# Patient Record
Sex: FEMALE | Race: BLACK OR AFRICAN AMERICAN | NOT HISPANIC OR LATINO | Employment: FULL TIME | URBAN - METROPOLITAN AREA
[De-identification: names, ages, dates, MRNs, and addresses within clinical notes are randomized per-mention and may not be internally consistent; named-entity substitution may affect disease eponyms.]

---

## 2017-01-18 ENCOUNTER — TELEPHONE (OUTPATIENT)
Dept: INTERNAL MEDICINE | Facility: CLINIC | Age: 47
End: 2017-01-18

## 2017-01-18 ENCOUNTER — OFFICE VISIT (OUTPATIENT)
Dept: INTERNAL MEDICINE | Facility: CLINIC | Age: 47
End: 2017-01-18
Payer: COMMERCIAL

## 2017-01-18 VITALS
SYSTOLIC BLOOD PRESSURE: 160 MMHG | BODY MASS INDEX: 36.43 KG/M2 | DIASTOLIC BLOOD PRESSURE: 98 MMHG | TEMPERATURE: 98 F | WEIGHT: 213.38 LBS | HEIGHT: 64 IN

## 2017-01-18 DIAGNOSIS — S16.1XXA CERVICAL STRAIN, ACUTE, INITIAL ENCOUNTER: Primary | ICD-10-CM

## 2017-01-18 DIAGNOSIS — Z00.00 HEALTH CARE MAINTENANCE: ICD-10-CM

## 2017-01-18 PROCEDURE — 99999 PR PBB SHADOW E&M-EST. PATIENT-LVL III: CPT | Mod: PBBFAC,,, | Performed by: HOSPITALIST

## 2017-01-18 PROCEDURE — 99214 OFFICE O/P EST MOD 30 MIN: CPT | Mod: 25,S$GLB,, | Performed by: HOSPITALIST

## 2017-01-18 PROCEDURE — 3077F SYST BP >= 140 MM HG: CPT | Mod: S$GLB,,, | Performed by: HOSPITALIST

## 2017-01-18 PROCEDURE — 3080F DIAST BP >= 90 MM HG: CPT | Mod: S$GLB,,, | Performed by: HOSPITALIST

## 2017-01-18 PROCEDURE — 96372 THER/PROPH/DIAG INJ SC/IM: CPT | Mod: S$GLB,,, | Performed by: HOSPITALIST

## 2017-01-18 PROCEDURE — 1159F MED LIST DOCD IN RCRD: CPT | Mod: S$GLB,,, | Performed by: HOSPITALIST

## 2017-01-18 RX ORDER — METOPROLOL SUCCINATE 50 MG/1
50 TABLET, EXTENDED RELEASE ORAL DAILY
Qty: 30 TABLET | Refills: 9 | Status: CANCELLED | OUTPATIENT
Start: 2017-01-18

## 2017-01-18 RX ORDER — SERTRALINE HYDROCHLORIDE 50 MG/1
50 TABLET, FILM COATED ORAL DAILY
Qty: 30 TABLET | Refills: 11 | Status: SHIPPED | OUTPATIENT
Start: 2017-01-18 | End: 2017-11-27 | Stop reason: SDUPTHER

## 2017-01-18 RX ORDER — SUMATRIPTAN SUCCINATE 100 MG/1
100 TABLET ORAL
Qty: 6 TABLET | Refills: 12 | Status: SHIPPED | OUTPATIENT
Start: 2017-01-18 | End: 2022-09-29

## 2017-01-18 RX ORDER — CYCLOBENZAPRINE HCL 10 MG
10 TABLET ORAL 3 TIMES DAILY PRN
Status: CANCELLED | OUTPATIENT
Start: 2017-01-18

## 2017-01-18 RX ORDER — AMLODIPINE BESYLATE 10 MG/1
10 TABLET ORAL DAILY
Qty: 30 TABLET | Refills: 9 | Status: SHIPPED | OUTPATIENT
Start: 2017-01-18 | End: 2017-06-20 | Stop reason: SDUPTHER

## 2017-01-18 RX ORDER — KETOROLAC TROMETHAMINE 30 MG/ML
30 INJECTION, SOLUTION INTRAMUSCULAR; INTRAVENOUS
Status: COMPLETED | OUTPATIENT
Start: 2017-01-18 | End: 2017-01-18

## 2017-01-18 RX ORDER — SUMATRIPTAN SUCCINATE 100 MG/1
100 TABLET ORAL
Qty: 6 TABLET | Refills: 12 | Status: CANCELLED | OUTPATIENT
Start: 2017-01-18

## 2017-01-18 RX ORDER — CYCLOBENZAPRINE HCL 10 MG
10 TABLET ORAL 3 TIMES DAILY PRN
COMMUNITY
End: 2017-11-27

## 2017-01-18 RX ORDER — MELOXICAM 7.5 MG/1
7.5 TABLET ORAL 2 TIMES DAILY PRN
Start: 2017-01-18 | End: 2017-01-25

## 2017-01-18 RX ORDER — LOSARTAN POTASSIUM 100 MG/1
100 TABLET ORAL DAILY
Qty: 30 TABLET | Refills: 9 | Status: CANCELLED | OUTPATIENT
Start: 2017-01-18

## 2017-01-18 RX ORDER — METOPROLOL SUCCINATE 50 MG/1
50 TABLET, EXTENDED RELEASE ORAL DAILY
Qty: 30 TABLET | Refills: 9 | Status: SHIPPED | OUTPATIENT
Start: 2017-01-18 | End: 2017-06-20 | Stop reason: SDUPTHER

## 2017-01-18 RX ORDER — FUROSEMIDE 40 MG/1
40 TABLET ORAL DAILY
Qty: 30 TABLET | Refills: 9 | Status: CANCELLED | OUTPATIENT
Start: 2017-01-18

## 2017-01-18 RX ORDER — KETOROLAC TROMETHAMINE 30 MG/ML
15 INJECTION, SOLUTION INTRAMUSCULAR; INTRAVENOUS
Status: DISCONTINUED | OUTPATIENT
Start: 2017-01-18 | End: 2017-01-18

## 2017-01-18 RX ORDER — SERTRALINE HYDROCHLORIDE 50 MG/1
50 TABLET, FILM COATED ORAL DAILY
Qty: 30 TABLET | Refills: 11 | Status: CANCELLED | OUTPATIENT
Start: 2017-01-18

## 2017-01-18 RX ORDER — FUROSEMIDE 40 MG/1
40 TABLET ORAL DAILY
Qty: 30 TABLET | Refills: 9 | Status: SHIPPED | OUTPATIENT
Start: 2017-01-18 | End: 2017-06-20 | Stop reason: SDUPTHER

## 2017-01-18 RX ORDER — LOSARTAN POTASSIUM 100 MG/1
100 TABLET ORAL DAILY
Qty: 30 TABLET | Refills: 9 | Status: SHIPPED | OUTPATIENT
Start: 2017-01-18 | End: 2017-06-20 | Stop reason: SDUPTHER

## 2017-01-18 RX ORDER — KETOROLAC TROMETHAMINE 10 MG/1
10 TABLET, FILM COATED ORAL DAILY PRN
COMMUNITY
End: 2017-01-18 | Stop reason: ALTCHOICE

## 2017-01-18 RX ORDER — KETOROLAC TROMETHAMINE 10 MG/1
10 TABLET, FILM COATED ORAL DAILY PRN
Status: CANCELLED | OUTPATIENT
Start: 2017-01-18

## 2017-01-18 RX ORDER — AMLODIPINE BESYLATE 10 MG/1
10 TABLET ORAL DAILY
Qty: 30 TABLET | Refills: 9 | Status: CANCELLED | OUTPATIENT
Start: 2017-01-18

## 2017-01-18 RX ADMIN — KETOROLAC TROMETHAMINE 30 MG: 30 INJECTION, SOLUTION INTRAMUSCULAR; INTRAVENOUS at 02:01

## 2017-01-18 NOTE — MR AVS SNAPSHOT
[facility] - Internal Medicine  [address]  [place] [address]  Phone: [phone]  Fax: [phone]                  Calos Rios   2017 1:15 PM   Office Visit    Description:  Female : 1970   Provider:  Aliyah Gomez MD   Department:  [facility] - Internal Medicine           Reason for Visit     Neck Pain           Diagnoses this Visit        Comments    Cervical strain, acute, initial encounter    -  Primary     Health care maintenance                To Do List           Goals (5 Years of Data)     None      Follow-Up and Disposition     Return if symptoms worsen or fail to improve.       These Medications        Disp Refills Start End    meloxicam (MOBIC) 7.5 MG tablet   2017    Take 1 tablet (7.5 mg total) by mouth 2 (two) times daily as needed for Pain. - Oral    Pharmacy: [facility]/pharmacy #[id number] - [place], [place] - [address] Ph #: [phone]         [facility] On Call     [facility] On Call Nurse Care Line -  Assistance  Registered nurses in the [facility] On Call Center provide clinical advisement, health education, appointment booking, and other advisory services.  Call for this free service at [phone].             Medications           Message regarding Medications     Verify the changes and/or additions to your medication regime listed below are the same as discussed with your clinician today.  If any of these changes or additions are incorrect, please notify your healthcare provider.        START taking these NEW medications        Refills    meloxicam (MOBIC) 7.5 MG tablet     Sig: Take 1 tablet (7.5 mg total) by mouth 2 (two) times daily as needed for Pain.    Class: No Print    Route: Oral      These medications were administered today        Dose Freq    ketorolac injection 30 mg 30 mg Clinic/HOD 1 time    Sig: Inject 30 mg into the muscle one time.    Class: Normal    Route: Intramuscular      STOP taking these medications   "   ketorolac (TORADOL) 10 mg tablet Take 10 mg by mouth daily as needed for Pain.           Verify that the below list of medications is an accurate representation of the medications you are currently taking.  If none reported, the list may be blank. If incorrect, please contact your healthcare provider. Carry this list with you in case of emergency.           Current Medications     amlodipine (NORVASC) 10 MG tablet Take 1 tablet (10 mg total) by mouth once daily.    cyclobenzaprine (FLEXERIL) 10 MG tablet Take 10 mg by mouth 3 (three) times daily as needed for Muscle spasms.    furosemide (LASIX) 40 MG tablet Take 1 tablet (40 mg total) by mouth once daily.    losartan (COZAAR) 100 MG tablet Take 1 tablet (100 mg total) by mouth once daily.    meloxicam (MOBIC) 7.5 MG tablet Take 1 tablet (7.5 mg total) by mouth 2 (two) times daily as needed for Pain.    metoprolol succinate (TOPROL-XL) 50 MG 24 hr tablet Take 1 tablet (50 mg total) by mouth once daily.    sertraline (ZOLOFT) 50 MG tablet Take 1 tablet (50 mg total) by mouth once daily.    sumatriptan (IMITREX) 100 MG tablet Take 1 tablet (100 mg total) by mouth every 2 (two) hours as needed (no more than 2 doses a day).           Clinical Reference Information           Vital Signs - Last Recorded  Most recent update: 1/18/2017  1:21 PM by Nessa Jameson MA    BP Temp Ht Wt BMI    (!) 160/98 (BP Location: Right arm, Patient Position: Sitting, BP Method: Manual) 98.2 °F (36.8 °C) (Oral) 5' 4" (1.626 m) 96.8 kg (213 lb 6.5 oz) 36.63 kg/m2      Blood Pressure          Most Recent Value    BP  (!)  160/98      Allergies as of 1/18/2017     No Known Allergies      Immunizations Administered on Date of Encounter - 1/18/2017     None      [facility] Sign-Up     Activating your [facility] account is as easy as 1-2-3!     1) Visit [email or web address], select Sign Up Now, enter this activation code and your date of birth, then select Next.  [email or web address]  Expires: 3/4/2017  " 2:01 PM      2) Create a username and password to use when you visit [facility] in the future and select a security question in case you lose your password and select Next.    3) Enter your e-mail address and click Sign Up!    Additional Information  If you have questions, please e-mail [email or web address] or call [phone] to talk to our [facility] staff. Remember, [email or web address] is NOT to be used for urgent needs. For medical emergencies, dial 911.         Instructions    Don't lift over 10 lbs of weight for the next 1 week.   Take flexeril up to 3 times daily if it doesn't make your sleepy. If flexeril makes you sleepy, only take it at night.   Take meloxicam 7.5 mg up to two times daily for the next 7 days.   No alcohol with flexeril.   Don't take ketorolac if you are taking meloxicam.   Please call us in 2 weeks if your pain doesn't improve. Also, call us sooner if you start developing numbness, tingling or sudden weakness of the arm.   You are also getting the flu vaccine today.        Smoking Cessation     If you would like to quit smoking:   You may be eligible for free services if you are a [place] resident and started smoking cigarettes before September 1, 1988.  Call the [employer]oking [employer]ess[employer] ([employer]) toll free at (757) 601-9682 or (100) 782-4370.   Call 2-800-QUIT-NOW if you do not meet the above criteria.

## 2017-01-18 NOTE — PROGRESS NOTES
Subjective:       Patient ID: Calos Rios is a 46 y.o. female.    Chief Complaint: Neck Pain    HPI     Calos Rios is a 47 yo woman with PMHx of HTN and anxiety who presents to the clinic urgently today for left sided neck pain. She was driving after work two days ago when she suddenly developed neck pain and neck strain. It starts behind her left ear and goes down to her neck and the side of the shoulder extending to her upper arm. It is a severe pain 10/10 that was only somewhat relieved by taking flexeril 10 mg twice so far and ketorolac 10 mg po once daily. Her pain is worse with trying to turn her head to the left side. She is otherwise able to turn her head to the right side, bend her head down and lift her head up. 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 She was also helping her daughter with her school project by knitting/doing handiwork at home after work. She was prescribed flexeril 10 mg to be taken 3 times daily along with ketorolac 10 mg every 6 hours by a telemedicine physician yesterday. It has only minimally improved with the medicine thus far; however, she has only taken flexeril at night due to drowsiness and ketorolac only once thus far. She denies any arm tingling, numbness, pain radiating to her lower arm or any anginal chest pain. This does not feel like a cardiac chest pain such that this pain does not worsen with walking up stairs or exertion; she only has pain worsening with motion of the neck or when driving.     She specifically denies any fevers, chills, malaise, photosensitivity, confusion, headaches, chest pain radiating to her arm, chest pressure, shortness of breath, exertional chest pain, syncope, dizziness or trauma.       Review of Systems   Constitutional: Negative for chills, diaphoresis and fever.   HENT: Positive for sore throat. Negative for trouble swallowing.    Eyes: Negative for photophobia, pain  "and visual disturbance.   Respiratory: Negative for shortness of breath.    Cardiovascular: Negative for chest pain.   Gastrointestinal: Negative for abdominal pain, nausea and vomiting.   Genitourinary: Positive for menstrual problem ( heavy cycles). Negative for pelvic pain and vaginal pain.   Musculoskeletal: Positive for myalgias and neck pain. Negative for arthralgias, back pain and joint swelling.   Skin: Negative for rash.   Neurological: Negative for syncope and weakness.        Denies numbness, tingling, weakness of arm   Psychiatric/Behavioral: Negative for confusion.       Objective:       Visit Vitals    BP (!) 160/98 (BP Location: Right arm, Patient Position: Sitting, BP Method: Manual)    Temp 98.2 °F (36.8 °C) (Oral)    Ht 5' 4" (1.626 m)    Wt 96.8 kg (213 lb 6.5 oz)    BMI 36.63 kg/m2       Physical Exam   Constitutional: She is oriented to person, place, and time. She appears well-developed and well-nourished. She appears distressed.   HENT:   Head: Normocephalic and atraumatic.   Mouth/Throat: No oropharyngeal exudate.   Eyes: EOM are normal. Pupils are equal, round, and reactive to light. No scleral icterus.   Neck: Trachea normal and phonation normal. Neck supple. Muscular tenderness present. No spinous process tenderness present. No rigidity. Decreased range of motion present. No edema and no erythema present. No Brudzinski's sign noted. No thyroid mass and no thyromegaly present.   Cardiovascular: Normal rate, regular rhythm, normal heart sounds and intact distal pulses.  Exam reveals no gallop and no friction rub.    No murmur heard.  Pulmonary/Chest: Effort normal and breath sounds normal. No respiratory distress. She has no wheezes. She has no rales. She exhibits no tenderness.   Musculoskeletal: She exhibits tenderness. She exhibits no edema or deformity.   Lymphadenopathy:     She has no cervical adenopathy.   Neurological: She is alert and oriented to person, place, and time. "   Skin: Skin is warm and dry. No rash noted. She is not diaphoretic. No erythema. No pallor.       Assessment:       1. Cervical strain, acute, initial encounter    2. Health care maintenance        Plan:     RTC in 2 weeks if pain continues, then we will consider physical therapy   Ketorolac IM 30 mg in clinic, meloxicam 7.5 mg twice daily prn for 7 days   Flu vaccine today     1. Cervical strain, acute, initial encounter  - musculoskeletal strain of upper back and shoulder due to repetitive mechanical activity of upper body   - weight bearing restrictions of 10 lbs for at least 7 days; use either ice pack or warm compress over left neck and shoulder; OTC bengay or topical pain relievers as needed   - meloxicam (MOBIC) 7.5 MG tablet; Take 1 tablet (7.5 mg total) by mouth 2 (two) times daily as needed for Pain.  - ketorolac injection 30 mg; Inject 30 mg into the muscle one time for severe 10/10 pain today    - not to use ketorolac 10 mg po q 6 hours prescription given at outside clinic when taking meloxicam   - continue using flexeril 10 mg q nightly per outside clinic prescription   - if pain does not improve in 2 weeks, pt to call the clinic for further evaluation and possibly PT/OT     2. Health care maintenance  - flu vaccine today       Discussed with attending Dr. Haider. Staff recommendations to follow.     Aliyah Gomez MD   PGY1 Internal Medicine   [facility]   Pager [phone]

## 2017-01-18 NOTE — TELEPHONE ENCOUNTER
----- Message from Macrinamilo Velázquez sent at 1/18/2017 10:38 AM CST -----  Contact: Self/[phone] cell   Type: Rx    Name of medication(s): all current medications    Is this a refill? New rx?refill    Who prescribed medication?    Pharm[facility] Phone[facility]    Comments:Patient is calling to request a refill on all current medications. She does not know the names of the medications. Pt would like to speak with someone in the office. Please call and advise.    Thank you!

## 2017-01-18 NOTE — LETTER
January 18, 2017    Calos Rios  [address]  [facility] [address]         [facility]  [address]  [facility] [address]  Phone: [phone]  Fax: [phone] January 18, 2017     Patient: Calos Rios   YOB: 1970   Date of Visit: 1/18/2017       To Whom It May Concern:    It is my medical opinion that Calos Rios may return to light duty immediately with the following restrictions: Weight bearing or lifting of objects NO more than 10 lbs.    If you have any questions or concerns, please don't hesitate to call.    Sincerely,        Aliyah Gomez MD

## 2017-01-18 NOTE — PATIENT INSTRUCTIONS
Don't lift over 10 lbs of weight for the next 1 week.   Take flexeril up to 3 times daily if it doesn't make your sleepy. If flexeril makes you sleepy, only take it at night.   Take meloxicam 7.5 mg up to two times daily for the next 7 days.   No alcohol with flexeril.   Don't take ketorolac if you are taking meloxicam.   Please call us in 2 weeks if your pain doesn't improve. Also, call us sooner if you start developing numbness, tingling or sudden weakness of the arm.   You are also getting the flu vaccine today.

## 2017-01-19 NOTE — PROGRESS NOTES
I have personally interviewed and examined this patient and agree with resident's note as below. Mildly elevated BP today but patient having pain. Recommended patient follow up with PCP for BP check.  Gabrielle Haider MD

## 2017-06-20 RX ORDER — METOPROLOL SUCCINATE 50 MG/1
50 TABLET, EXTENDED RELEASE ORAL DAILY
Qty: 30 TABLET | Refills: 9 | Status: SHIPPED | OUTPATIENT
Start: 2017-06-20 | End: 2017-11-27 | Stop reason: SDUPTHER

## 2017-06-20 RX ORDER — LOSARTAN POTASSIUM 100 MG/1
100 TABLET ORAL DAILY
Qty: 30 TABLET | Refills: 9 | Status: SHIPPED | OUTPATIENT
Start: 2017-06-20 | End: 2017-11-27 | Stop reason: SDUPTHER

## 2017-06-20 RX ORDER — FUROSEMIDE 40 MG/1
40 TABLET ORAL DAILY
Qty: 30 TABLET | Refills: 9 | Status: SHIPPED | OUTPATIENT
Start: 2017-06-20 | End: 2017-11-27 | Stop reason: SDUPTHER

## 2017-06-20 RX ORDER — AMLODIPINE BESYLATE 10 MG/1
10 TABLET ORAL DAILY
Qty: 30 TABLET | Refills: 9 | Status: SHIPPED | OUTPATIENT
Start: 2017-06-20 | End: 2017-11-27 | Stop reason: SDUPTHER

## 2017-06-20 NOTE — TELEPHONE ENCOUNTER
----- Message from Dariana Diana sent at 6/20/2017 11:39 AM CDT -----  Contact: Self/ [phone]   Type: Rx    Name of medication(s): amlodipine (NORVASC) 10 MG tablet, furosemide (LASIX) 40 MG tablet, losartan (COZAAR) 100 MG tablet, metoprolol succinate (TOPROL-XL) 50 MG 24 hr tablet, something for anxiety.     Is this a refill? New rx? Refill     Who prescribed medication? Dr. Doll     [facility]arm[facility] Phone[facility]     Comments: pt is calling to have a refill on the medications above. Pt stated the anxiety medication is not working. She would like to have something else sent to the pharmacy. Please call and advise     Thank you

## 2017-10-19 ENCOUNTER — TELEPHONE (OUTPATIENT)
Dept: INTERNAL MEDICINE | Facility: CLINIC | Age: 47
End: 2017-10-19

## 2017-10-19 NOTE — TELEPHONE ENCOUNTER
----- Message from Sarah Maki sent at 10/19/2017  2:02 PM CDT -----  Contact: Pt can be reached at [phone]  Pt is requesting to be seen asap if possible for stomach issue, nausea, stressed.      Thank you!

## 2017-11-27 ENCOUNTER — LAB VISIT (OUTPATIENT)
Dept: LAB | Facility: HOSPITAL | Age: 47
End: 2017-11-27
Attending: INTERNAL MEDICINE
Payer: COMMERCIAL

## 2017-11-27 ENCOUNTER — OFFICE VISIT (OUTPATIENT)
Dept: INTERNAL MEDICINE | Facility: CLINIC | Age: 47
End: 2017-11-27
Payer: COMMERCIAL

## 2017-11-27 VITALS
WEIGHT: 204.38 LBS | OXYGEN SATURATION: 99 % | HEART RATE: 75 BPM | SYSTOLIC BLOOD PRESSURE: 162 MMHG | HEIGHT: 64 IN | DIASTOLIC BLOOD PRESSURE: 110 MMHG | BODY MASS INDEX: 34.89 KG/M2

## 2017-11-27 DIAGNOSIS — K21.9 GASTROESOPHAGEAL REFLUX DISEASE WITHOUT ESOPHAGITIS: ICD-10-CM

## 2017-11-27 DIAGNOSIS — I10 ESSENTIAL HYPERTENSION: Primary | ICD-10-CM

## 2017-11-27 DIAGNOSIS — F41.9 ANXIETY: ICD-10-CM

## 2017-11-27 DIAGNOSIS — I10 ESSENTIAL HYPERTENSION: ICD-10-CM

## 2017-11-27 LAB
ALBUMIN SERPL BCP-MCNC: 3.3 G/DL
ALP SERPL-CCNC: 51 U/L
ALT SERPL W/O P-5'-P-CCNC: 21 U/L
ANION GAP SERPL CALC-SCNC: 8 MMOL/L
AST SERPL-CCNC: 27 U/L
BASOPHILS # BLD AUTO: 0.01 K/UL
BASOPHILS NFR BLD: 0.1 %
BILIRUB SERPL-MCNC: 0.2 MG/DL
BUN SERPL-MCNC: 21 MG/DL
CALCIUM SERPL-MCNC: 9.1 MG/DL
CHLORIDE SERPL-SCNC: 106 MMOL/L
CHOLEST SERPL-MCNC: 150 MG/DL
CHOLEST/HDLC SERPL: 2.9 {RATIO}
CO2 SERPL-SCNC: 25 MMOL/L
CREAT SERPL-MCNC: 1 MG/DL
DIFFERENTIAL METHOD: ABNORMAL
EOSINOPHIL # BLD AUTO: 0.2 K/UL
EOSINOPHIL NFR BLD: 2 %
ERYTHROCYTE [DISTWIDTH] IN BLOOD BY AUTOMATED COUNT: 14.6 %
EST. GFR  (AFRICAN AMERICAN): >60 ML/MIN/1.73 M^2
EST. GFR  (NON AFRICAN AMERICAN): >60 ML/MIN/1.73 M^2
GLUCOSE SERPL-MCNC: 90 MG/DL
HCT VFR BLD AUTO: 36.6 %
HDLC SERPL-MCNC: 52 MG/DL
HDLC SERPL: 34.7 %
HGB BLD-MCNC: 13.2 G/DL
LDLC SERPL CALC-MCNC: 74.6 MG/DL
LYMPHOCYTES # BLD AUTO: 2.9 K/UL
LYMPHOCYTES NFR BLD: 29.7 %
MCH RBC QN AUTO: 34.3 PG
MCHC RBC AUTO-ENTMCNC: 36.1 G/DL
MCV RBC AUTO: 95 FL
MONOCYTES # BLD AUTO: 0.7 K/UL
MONOCYTES NFR BLD: 6.8 %
NEUTROPHILS # BLD AUTO: 5.9 K/UL
NEUTROPHILS NFR BLD: 61.3 %
NONHDLC SERPL-MCNC: 98 MG/DL
PLATELET # BLD AUTO: 58 K/UL
PMV BLD AUTO: 11.9 FL
POTASSIUM SERPL-SCNC: 3.8 MMOL/L
PROT SERPL-MCNC: 7 G/DL
RBC # BLD AUTO: 3.85 M/UL
SODIUM SERPL-SCNC: 139 MMOL/L
TRIGL SERPL-MCNC: 117 MG/DL
WBC # BLD AUTO: 9.58 K/UL

## 2017-11-27 PROCEDURE — 85025 COMPLETE CBC W/AUTO DIFF WBC: CPT

## 2017-11-27 PROCEDURE — 80061 LIPID PANEL: CPT

## 2017-11-27 PROCEDURE — 36415 COLL VENOUS BLD VENIPUNCTURE: CPT

## 2017-11-27 PROCEDURE — 99214 OFFICE O/P EST MOD 30 MIN: CPT | Mod: S$GLB,,, | Performed by: INTERNAL MEDICINE

## 2017-11-27 PROCEDURE — 80053 COMPREHEN METABOLIC PANEL: CPT

## 2017-11-27 PROCEDURE — 99999 PR PBB SHADOW E&M-EST. PATIENT-LVL III: CPT | Mod: PBBFAC,,, | Performed by: INTERNAL MEDICINE

## 2017-11-27 RX ORDER — OMEPRAZOLE 40 MG/1
40 CAPSULE, DELAYED RELEASE ORAL DAILY
Qty: 90 CAPSULE | Refills: 3 | Status: SHIPPED | OUTPATIENT
Start: 2017-11-27 | End: 2018-11-06

## 2017-11-27 RX ORDER — SERTRALINE HYDROCHLORIDE 100 MG/1
100 TABLET, FILM COATED ORAL DAILY
Qty: 90 TABLET | Refills: 3 | Status: SHIPPED | OUTPATIENT
Start: 2017-11-27 | End: 2018-02-21

## 2017-11-27 RX ORDER — LOSARTAN POTASSIUM 100 MG/1
100 TABLET ORAL DAILY
Qty: 90 TABLET | Refills: 3 | Status: SHIPPED | OUTPATIENT
Start: 2017-11-27 | End: 2018-06-21

## 2017-11-27 RX ORDER — AMLODIPINE BESYLATE 10 MG/1
10 TABLET ORAL DAILY
Qty: 90 TABLET | Refills: 3 | Status: SHIPPED | OUTPATIENT
Start: 2017-11-27 | End: 2018-06-21

## 2017-11-27 RX ORDER — FUROSEMIDE 40 MG/1
40 TABLET ORAL DAILY
Qty: 30 TABLET | Refills: 9 | Status: SHIPPED | OUTPATIENT
Start: 2017-11-27 | End: 2018-08-21

## 2017-11-27 RX ORDER — METOPROLOL SUCCINATE 50 MG/1
50 TABLET, EXTENDED RELEASE ORAL DAILY
Qty: 90 TABLET | Refills: 3 | Status: SHIPPED | OUTPATIENT
Start: 2017-11-27 | End: 2018-02-21

## 2017-12-11 NOTE — PROGRESS NOTES
HISTORY OF PRESENT ILLNESS:  She is a 46-year-old female coming in today for   stomach issues, nausea and stress.  1.  She has hypertension.  Blood pressure 162/110.  She is off her Norvasc 10 mg   a day.  She is taking losartan, but she is unsure whether she is on her Toprol      certain medications and has forgotten to them.  She denies any chest pain,   shortness of breath, palpitations, nausea or vomiting.  She has not had any   kidney failure she knows of.  Her weight actually gone down about 9 pounds since   her last blood pressure check in January 2017, however, at that time was 160/98   also.  She has had some better blood pressure control in January.  2.  Anxiety.  She has been under a lot of stress recently, unable to eat as much   because of stress.  She is only eating one meal a day.  She has some burning in   her stomach and acidy taste in her mouth.  She feels overloaded.  She is   denying any suicidal or homicidal thoughts or ideations.  She does discuss some   stressors with me today.  She has been on Zoloft 50 mg and she is thinking it is   not working as well it has in the past.  She has some reflux-type symptoms   also.    PHYSICAL EXAMINATION:  GENERAL:  She is a well-appearing 46-year-old female in no acute distress.  NECK:  Supple.  She has no JVD.  Thyroid is not enlarged.  CARDIOVASCULAR:  S1 and S2, regular rate and rhythm without murmur, gallop or   rub.  ABDOMEN:  Soft, obese, but nontender.  SKIN:  She did not appear cachectic at all.  LOWER EXTREMITIES:  No edema.    ASSESSMENT:  Hypertension, anxiety and reflux.  We are going to increase her   Zoloft from 50 to 100.  We also discussed some biofeedback mechanisms to help   with anxiety.  Exercise would also help.  We are going to put her on omeprazole.    We will refill her blood pressure medication for hypertension.  Hopefully for   some stress increasing the Zoloft will help, but also will bring her back again   in 4-6 weeks to recheck  the blood pressure and go from there.  She denies any   suicidal or homicidal thoughts or ideations today.  We did discuss this and if   she starts having this she is going let me know or come into Emergency Room.          /denny [id number] lynn(s)        MARTHA/GRISELDA  dd: 12/10/2017 22:46:51 (CST)  td: 12/11/2017 07:08:48 (CST)  Doc ID   #[id number]  Job ID #[id number]    CC:

## 2018-01-08 ENCOUNTER — OFFICE VISIT (OUTPATIENT)
Dept: INTERNAL MEDICINE | Facility: CLINIC | Age: 48
End: 2018-01-08
Payer: COMMERCIAL

## 2018-01-08 VITALS
HEIGHT: 64 IN | SYSTOLIC BLOOD PRESSURE: 150 MMHG | HEART RATE: 65 BPM | BODY MASS INDEX: 34.89 KG/M2 | DIASTOLIC BLOOD PRESSURE: 80 MMHG | WEIGHT: 204.38 LBS

## 2018-01-08 DIAGNOSIS — F41.9 ANXIETY: ICD-10-CM

## 2018-01-08 DIAGNOSIS — I10 ESSENTIAL HYPERTENSION: ICD-10-CM

## 2018-01-08 DIAGNOSIS — Z12.31 ENCOUNTER FOR SCREENING MAMMOGRAM FOR BREAST CANCER: Primary | ICD-10-CM

## 2018-01-08 PROCEDURE — 99213 OFFICE O/P EST LOW 20 MIN: CPT | Mod: S$GLB,,, | Performed by: INTERNAL MEDICINE

## 2018-01-08 PROCEDURE — 99999 PR PBB SHADOW E&M-EST. PATIENT-LVL IV: CPT | Mod: PBBFAC,,, | Performed by: INTERNAL MEDICINE

## 2018-01-08 RX ORDER — HYDROCHLOROTHIAZIDE 25 MG/1
25 TABLET ORAL DAILY
Qty: 90 TABLET | Refills: 1 | Status: SHIPPED | OUTPATIENT
Start: 2018-01-08 | End: 2018-08-21

## 2018-01-08 NOTE — PROGRESS NOTES
INTERNAL MEDICINE RESIDENT CLINIC  CLINIC NOTE    Patient Name: Calos Rios  YOB: 1970    PRESENTING HISTORY       History of Present Illness:  Ms. Calos Rios is a 47 y.o. female w/ significant PMHx of HTN, anxiety, GERD who presents to clinic for 4W f/u on HTN and anxiety. Last visit her BP was 160s systolic, this visit is is 150 systolic. Denies CP, SOB, or any new symptoms. For her anxiety last visit we increased zoloft from 50 mg to 100 mg, she says that her appetite is getting better and that she is able to sleep better.    She says that she is under a lot of stress still as her mother is in the hospital after a cervical fusion surgery but that she is coping well.     Review of Systems   Constitutional: Negative for chills, fever and weight loss.   Eyes: Negative for blurred vision.   Respiratory: Negative for cough.    Cardiovascular: Negative for chest pain, palpitations and leg swelling.   Gastrointestinal: Negative for nausea and vomiting.   Genitourinary: Negative for dysuria and hematuria.   Musculoskeletal: Negative for joint pain and myalgias.   Skin: Negative for itching and rash.   Neurological: Negative for dizziness and headaches.   Endo/Heme/Allergies: Does not bruise/bleed easily.         PAST HISTORY:     Past Medical History:   Diagnosis Date    Hypertension     Migraine headache     Obesity        Past Surgical History:   Procedure Laterality Date    TUBAL LIGATION         Family History   Problem Relation Age of Onset    Heart disease Mother     Drug abuse Father     Heart disease Father     Kidney disease Father     Diabetes Maternal Uncle     Heart disease Maternal Grandmother     Cancer Neg Hx        Social History     Social History    Marital status: Single     Spouse name: N/A    Number of children: N/A    Years of education: N/A     Occupational History     Home [employer]     Social History Main Topics    Smoking status: Current Every Day Smoker     "Smokeless tobacco: Never Used      Comment: quit for lent 2013; none past 8 weeks.    Alcohol use Yes      Comment: scocially on weekends    Drug use: No    Sexual activity: Not Asked     Other Topics Concern    None     Social History Narrative    None       MEDICATIONS & ALLERGIES:     Current Outpatient Prescriptions on File Prior to Visit   Medication Sig    amLODIPine (NORVASC) 10 MG tablet Take 1 tablet (10 mg total) by mouth once daily.    furosemide (LASIX) 40 MG tablet Take 1 tablet (40 mg total) by mouth once daily.    losartan (COZAAR) 100 MG tablet Take 1 tablet (100 mg total) by mouth once daily.    metoprolol succinate (TOPROL-XL) 50 MG 24 hr tablet Take 1 tablet (50 mg total) by mouth once daily.    omeprazole (PRILOSEC) 40 MG capsule Take 1 capsule (40 mg total) by mouth once daily.    sertraline (ZOLOFT) 100 MG tablet Take 1 tablet (100 mg total) by mouth once daily.    sumatriptan (IMITREX) 100 MG tablet Take 1 tablet (100 mg total) by mouth every 2 (two) hours as needed (no more than 2 doses a day).     No current facility-administered medications on file prior to visit.        Review of patient's allergies indicates:  No Known Allergies    OBJECTIVE:   Vital Signs:  Vitals:    01/08/18 0828   BP: (!) 150/80   Pulse: 65   Weight: 92.7 kg (204 lb 5.9 oz)   Height: 5' 4" (1.626 m)       No results found for this or any previous visit (from the past 24 hour(s)).      Physical Exam   Constitutional: She appears well-developed.   HENT:   Head: Normocephalic and atraumatic.   Right Ear: External ear normal.   Left Ear: External ear normal.   Nose: Nose normal.   Eyes: EOM are normal. Pupils are equal, round, and reactive to light.   Neck: No tracheal deviation present. No thyromegaly present.   Cardiovascular: Normal rate.    No murmur heard.  Pulmonary/Chest: Effort normal and breath sounds normal.   Abdominal: Soft. There is no tenderness. No hernia.   Musculoskeletal: She exhibits no " edema.   Neurological: She displays normal reflexes. No cranial nerve deficit.   Skin: No rash noted.   Psychiatric: She has a normal mood and affect. Judgment normal.   Vitals reviewed.        ASSESSMENT & PLAN:     Calos was seen today for follow-up.    Diagnoses and all orders for this visit:    Encounter for screening mammogram for breast cancer  -     Mammo Digital Screening Bilat with CAD; Future    Essential hypertension    Anxiety    Other orders  -     hydroCHLOROthiazide (HYDRODIURIL) 25 MG tablet; Take 1 tablet (25 mg total) by mouth once daily.    Plan  1. HTN  - Add HCTZ 25 mg to regimen for better control of BP, f/u in 6-8W for BP check. If well-controlled, combine losartan and HCTZ  - Continue lasix on PRN basis; pt says that she doesn't take it daily because it makes her urinate too much  2. Anxiety  - Continue zoloft 100 mg daily  3. Screening / preventative health  - Mammogram ordered, plan for next week.    RTC 6-8W      Trina Knapp MD  Internal Medicine PGY-1

## 2018-01-11 NOTE — PROGRESS NOTES
I have personally taken the history and examined this patient and agree with the resident's note as stated above with the following thoughts:      bp still not well controled.  Will add some hctz and recheck bp in 2 months-- call with any ill effects.

## 2018-01-23 ENCOUNTER — TELEPHONE (OUTPATIENT)
Dept: RESEARCH | Facility: HOSPITAL | Age: 48
End: 2018-01-23

## 2018-01-24 ENCOUNTER — HOSPITAL ENCOUNTER (EMERGENCY)
Facility: HOSPITAL | Age: 48
Discharge: HOME | End: 2018-01-24
Attending: FAMILY MEDICINE
Payer: COMMERCIAL

## 2018-01-24 VITALS
DIASTOLIC BLOOD PRESSURE: 68 MMHG | BODY MASS INDEX: 33.8 KG/M2 | RESPIRATION RATE: 20 BRPM | HEART RATE: 100 BPM | OXYGEN SATURATION: 99 % | WEIGHT: 198 LBS | HEIGHT: 64 IN | TEMPERATURE: 100 F | SYSTOLIC BLOOD PRESSURE: 134 MMHG

## 2018-01-24 DIAGNOSIS — N39.0 URINARY TRACT INFECTION WITH HEMATURIA, SITE UNSPECIFIED: Primary | ICD-10-CM

## 2018-01-24 DIAGNOSIS — R31.9 URINARY TRACT INFECTION WITH HEMATURIA, SITE UNSPECIFIED: Primary | ICD-10-CM

## 2018-01-24 LAB
B-HCG UR QL: NEGATIVE
BACTERIA #/AREA URNS AUTO: ABNORMAL /HPF
BACTERIA GENITAL QL WET PREP: ABNORMAL
BASOPHILS # BLD AUTO: 0.03 K/UL
BASOPHILS NFR BLD: 0.2 %
BILIRUB UR QL STRIP: NEGATIVE
BUN SERPL-MCNC: 17 MG/DL (ref 6–30)
CHLORIDE SERPL-SCNC: 103 MMOL/L (ref 95–110)
CLARITY UR REFRACT.AUTO: ABNORMAL
CLUE CELLS VAG QL WET PREP: ABNORMAL
COLOR UR AUTO: ABNORMAL
CREAT SERPL-MCNC: 1.4 MG/DL (ref 0.5–1.4)
CTP QC/QA: YES
DIFFERENTIAL METHOD: ABNORMAL
EOSINOPHIL # BLD AUTO: 0 K/UL
EOSINOPHIL NFR BLD: 0.2 %
ERYTHROCYTE [DISTWIDTH] IN BLOOD BY AUTOMATED COUNT: 14.2 %
FILAMENT FUNGI VAG WET PREP-#/AREA: ABNORMAL
GLUCOSE SERPL-MCNC: 102 MG/DL (ref 70–110)
GLUCOSE UR QL STRIP: NEGATIVE
HCT VFR BLD AUTO: 35.3 %
HCT VFR BLD CALC: 35 %PCV (ref 36–54)
HGB BLD-MCNC: 11.6 G/DL
HGB UR QL STRIP: ABNORMAL
HYALINE CASTS UR QL AUTO: 0 /LPF
IMM GRANULOCYTES # BLD AUTO: 0.06 K/UL
IMM GRANULOCYTES NFR BLD AUTO: 0.4 %
KETONES UR QL STRIP: NEGATIVE
LEUKOCYTE ESTERASE UR QL STRIP: ABNORMAL
LYMPHOCYTES # BLD AUTO: 1.5 K/UL
LYMPHOCYTES NFR BLD: 10.6 %
MCH RBC QN AUTO: 30.2 PG
MCHC RBC AUTO-ENTMCNC: 32.9 G/DL
MCV RBC AUTO: 92 FL
MICROSCOPIC COMMENT: ABNORMAL
MONOCYTES # BLD AUTO: 1.2 K/UL
MONOCYTES NFR BLD: 8.9 %
NEUTROPHILS # BLD AUTO: 11 K/UL
NEUTROPHILS NFR BLD: 79.7 %
NITRITE UR QL STRIP: NEGATIVE
NRBC BLD-RTO: 0 /100 WBC
PH UR STRIP: 6 [PH] (ref 5–8)
PLATELET # BLD AUTO: 207 K/UL
PMV BLD AUTO: 10.4 FL
POC IONIZED CALCIUM: 1 MMOL/L (ref 1.06–1.42)
POC TCO2 (MEASURED): 23 MMOL/L (ref 23–29)
POTASSIUM BLD-SCNC: 3.1 MMOL/L (ref 3.5–5.1)
PROT UR QL STRIP: ABNORMAL
RBC # BLD AUTO: 3.84 M/UL
RBC #/AREA URNS AUTO: 51 /HPF (ref 0–4)
SAMPLE: ABNORMAL
SODIUM BLD-SCNC: 136 MMOL/L (ref 136–145)
SP GR UR STRIP: 1.02 (ref 1–1.03)
SPECIMEN SOURCE: ABNORMAL
SQUAMOUS #/AREA URNS AUTO: 2 /HPF
T VAGINALIS GENITAL QL WET PREP: ABNORMAL
URN SPEC COLLECT METH UR: ABNORMAL
UROBILINOGEN UR STRIP-ACNC: 4 EU/DL
WBC # BLD AUTO: 13.86 K/UL
WBC #/AREA URNS AUTO: >100 /HPF (ref 0–5)
WBC #/AREA VAG WET PREP: ABNORMAL
YEAST GENITAL QL WET PREP: ABNORMAL

## 2018-01-24 PROCEDURE — 87186 SC STD MICRODIL/AGAR DIL: CPT

## 2018-01-24 PROCEDURE — 87077 CULTURE AEROBIC IDENTIFY: CPT

## 2018-01-24 PROCEDURE — 81025 URINE PREGNANCY TEST: CPT | Performed by: FAMILY MEDICINE

## 2018-01-24 PROCEDURE — 85025 COMPLETE CBC W/AUTO DIFF WBC: CPT

## 2018-01-24 PROCEDURE — 87210 SMEAR WET MOUNT SALINE/INK: CPT

## 2018-01-24 PROCEDURE — 99283 EMERGENCY DEPT VISIT LOW MDM: CPT | Mod: 25

## 2018-01-24 PROCEDURE — 87088 URINE BACTERIA CULTURE: CPT

## 2018-01-24 PROCEDURE — 99284 EMERGENCY DEPT VISIT MOD MDM: CPT | Mod: ,,, | Performed by: PHYSICIAN ASSISTANT

## 2018-01-24 PROCEDURE — 25000003 PHARM REV CODE 250: Performed by: PHYSICIAN ASSISTANT

## 2018-01-24 PROCEDURE — 87491 CHLMYD TRACH DNA AMP PROBE: CPT

## 2018-01-24 PROCEDURE — 87086 URINE CULTURE/COLONY COUNT: CPT

## 2018-01-24 PROCEDURE — 81001 URINALYSIS AUTO W/SCOPE: CPT

## 2018-01-24 RX ORDER — POTASSIUM CHLORIDE 20 MEQ/1
40 TABLET, EXTENDED RELEASE ORAL ONCE
Status: COMPLETED | OUTPATIENT
Start: 2018-01-24 | End: 2018-01-24

## 2018-01-24 RX ORDER — ACETAMINOPHEN 325 MG/1
650 TABLET ORAL
Status: COMPLETED | OUTPATIENT
Start: 2018-01-24 | End: 2018-01-24

## 2018-01-24 RX ORDER — NITROFURANTOIN 25; 75 MG/1; MG/1
100 CAPSULE ORAL 2 TIMES DAILY
Qty: 10 CAPSULE | Refills: 0 | Status: SHIPPED | OUTPATIENT
Start: 2018-01-24 | End: 2018-01-29

## 2018-01-24 RX ORDER — ONDANSETRON 4 MG/1
4 TABLET, FILM COATED ORAL EVERY 6 HOURS PRN
Qty: 12 TABLET | Refills: 0 | Status: SHIPPED | OUTPATIENT
Start: 2018-01-24 | End: 2019-06-18

## 2018-01-24 RX ADMIN — POTASSIUM CHLORIDE 40 MEQ: 1500 TABLET, EXTENDED RELEASE ORAL at 08:01

## 2018-01-24 RX ADMIN — ACETAMINOPHEN 650 MG: 325 TABLET ORAL at 08:01

## 2018-01-25 LAB
C TRACH DNA SPEC QL NAA+PROBE: NOT DETECTED
N GONORRHOEA DNA SPEC QL NAA+PROBE: NOT DETECTED

## 2018-01-25 NOTE — ED TRIAGE NOTES
Presents to ER with lower abdominal and pelvic pain for 5 days.  Denies vaginal discharge or dysuria.    Pt identifiers checked and correct  LOC: The patient is awake, alert, aware of environment with an appropriate affect. Oriented x3, speaking appropriately  APPEARANCE: Pt resting comfortably, in no acute distress, pt is clean and well groomed, clothing properly fastened  SKIN: Skin warm, dry and intact, normal skin turgor, moist mucus membranes  RESPIRATORY: Airway is open and patent, respirations are spontaneous, even and unlabored, normal effort and rate  MUSCULOSKELETAL: No obvious deformities.

## 2018-01-25 NOTE — ED PROVIDER NOTES
"Encounter Date: 1/24/2018       History     Chief Complaint   Patient presents with    Multiple Medical Complaints     "I'm cold. I'm hot. I have pain below my pelvic area." pt c/o vomiting and diarrhea. want to be checked in case it's "more than the flu"     Patient is a 47-year-old female with past medical history of hypertension, anxiety, GERD, and fibroids who presents to the emergency department due to a worsening history of pelvic pain.  Patient states she's had intermittent pelvic pain for the past 12 years which began getting worse on Saturday.  Patient states that she intermittently has pelvic pain located in the left side of her pelvis and suprapubically.  Patient states she has seen her OB/GYN for this in the past and was diagnosed with fibroids.  Patient also complains of vaginal bleeding that has been spotting and intermittent since 3 weeks ago when she had intercourse.  Patient states vaginal bleeding subsided however returned today.  Patient also complains of subjective fevers and chills patient also complains of nausea and vomiting.  Patient states she has had 6 episodes of vomiting since Saturday.  Patient denies any abdominal pain, chest pain, shortness of breath, or any other complaints.            Review of patient's allergies indicates:  No Known Allergies  Past Medical History:   Diagnosis Date    Hypertension     Migraine headache     Obesity      Past Surgical History:   Procedure Laterality Date    TUBAL LIGATION       Family History   Problem Relation Age of Onset    Heart disease Mother     Drug abuse Father     Heart disease Father     Kidney disease Father     Diabetes Maternal Uncle     Heart disease Maternal Grandmother     Cancer Neg Hx      Social History   Substance Use Topics    Smoking status: Current Every Day Smoker    Smokeless tobacco: Never Used      Comment: quit for lent 2013; none past 8 weeks.    Alcohol use Yes      Comment: scocially on weekends "     Review of Systems   Constitutional: Positive for chills and fever. Negative for activity change, appetite change and fatigue.   HENT: Negative for congestion, drooling, ear discharge, ear pain, facial swelling, hearing loss, mouth sores, sore throat and trouble swallowing.    Eyes: Negative for photophobia, pain and discharge.   Respiratory: Negative for apnea, cough, chest tightness, shortness of breath and wheezing.    Cardiovascular: Negative for chest pain and leg swelling.   Gastrointestinal: Positive for nausea and vomiting. Negative for abdominal distention, abdominal pain, blood in stool, constipation and diarrhea.   Endocrine: Negative for cold intolerance, polydipsia and polyuria.   Genitourinary: Positive for pelvic pain and vaginal bleeding. Negative for decreased urine volume, difficulty urinating, enuresis, frequency and hematuria.   Musculoskeletal: Negative for arthralgias, gait problem, myalgias and neck stiffness.   Skin: Negative for color change, rash and wound.   Allergic/Immunologic: Negative for environmental allergies.   Neurological: Negative for dizziness, tremors, syncope, weakness, light-headedness, numbness and headaches.   Hematological: Negative for adenopathy.   Psychiatric/Behavioral: Negative for agitation.       Physical Exam     Initial Vitals [01/24/18 1724]   BP Pulse Resp Temp SpO2   134/68 100 20 (!) 100.7 °F (38.2 °C) 99 %      MAP       90         Physical Exam    Nursing note and vitals reviewed.  Constitutional: She appears well-developed and well-nourished. She is not diaphoretic. No distress.   HENT:   Head: Normocephalic and atraumatic.   Neck: Normal range of motion. Neck supple.   Cardiovascular: Normal rate, regular rhythm and normal heart sounds. Exam reveals no gallop and no friction rub.    No murmur heard.  Pulmonary/Chest: Breath sounds normal. She has no wheezes. She has no rhonchi. She has no rales.   Abdominal: Soft. Bowel sounds are normal. There is no  tenderness. There is no rebound and no guarding.   Genitourinary: Vagina normal.   Genitourinary Comments: Bleeding noted.  No cervical wall tenderness.   Musculoskeletal: Normal range of motion.   Neurological: She is alert and oriented to person, place, and time.   Skin: Skin is warm and dry. No rash noted. No erythema.   Psychiatric: She has a normal mood and affect.         ED Course   Procedures  Labs Reviewed   VAGINAL SCREEN - Abnormal; Notable for the following:        Result Value    WBC - Vaginal Screen Few (*)     Bacteria - Vaginal Screen Moderate (*)     All other components within normal limits   URINALYSIS, REFLEX TO URINE CULTURE - Abnormal; Notable for the following:     Appearance, UA Cloudy (*)     Protein, UA 2+ (*)     Occult Blood UA 2+ (*)     Leukocytes, UA 3+ (*)     All other components within normal limits    Narrative:     Preferred Collection Type->Urine, Clean Catch   URINALYSIS MICROSCOPIC - Abnormal; Notable for the following:     RBC, UA 51 (*)     WBC, UA >100 (*)     All other components within normal limits    Narrative:     Preferred Collection Type->Urine, Clean Catch   CBC W/ AUTO DIFFERENTIAL - Abnormal; Notable for the following:     WBC 13.86 (*)     RBC 3.84 (*)     Hemoglobin 11.6 (*)     Hematocrit 35.3 (*)     Gran # 11.0 (*)     Immature Grans (Abs) 0.06 (*)     Mono # 1.2 (*)     Gran% 79.7 (*)     Lymph% 10.6 (*)     All other components within normal limits   ISTAT PROCEDURE - Abnormal; Notable for the following:     POC Potassium 3.1 (*)     POC Ionized Calcium 1.00 (*)     POC Hematocrit 35 (*)     All other components within normal limits   C. TRACHOMATIS/N. GONORRHOEAE BY AMP DNA   CULTURE, URINE   POCT URINE PREGNANCY   ISTAT CHEM8                   APC / Resident Notes:   Patient is a 47-year-old female with past medical history of hypertension, anxiety, GERD, and fibroids who presents to the emergency department due to a worsening history of pelvic pain.   Physical exam reveals female in no acute distress.  Heart regular rate and rhythm.  Lungs clear to auscultation bilaterally.  Abdomen soft nontender nondistended.  Vaginal  exam with no cervical wall tenderness. Will obtain lab work.     CBC shows leukocytosis with a white blood cell count of 13.86.  I-STAT unremarkable.  UA shows 3+ leukocytes and greater than 100 WBCs.  Patient will be discharged on antibiotics for UTI.  Plan of treatment discussed with attending physician and he is agreeable.               ED Course      Clinical Impression:   The encounter diagnosis was Urinary tract infection with hematuria, site unspecified.    Disposition:   Disposition: Discharged  Condition: Stable                        Janay Arce PA-C  01/24/18 2053

## 2018-01-27 LAB — BACTERIA UR CULT: NORMAL

## 2018-02-21 ENCOUNTER — OFFICE VISIT (OUTPATIENT)
Dept: INTERNAL MEDICINE | Facility: CLINIC | Age: 48
End: 2018-02-21
Payer: COMMERCIAL

## 2018-02-21 VITALS
WEIGHT: 205.5 LBS | HEART RATE: 64 BPM | HEIGHT: 64 IN | SYSTOLIC BLOOD PRESSURE: 150 MMHG | BODY MASS INDEX: 35.08 KG/M2 | DIASTOLIC BLOOD PRESSURE: 110 MMHG

## 2018-02-21 DIAGNOSIS — F41.9 ANXIETY: ICD-10-CM

## 2018-02-21 DIAGNOSIS — I10 ESSENTIAL HYPERTENSION: Primary | ICD-10-CM

## 2018-02-21 DIAGNOSIS — R39.89 ABNORMAL URINE COLOR: ICD-10-CM

## 2018-02-21 DIAGNOSIS — N30.00 ACUTE CYSTITIS WITHOUT HEMATURIA: ICD-10-CM

## 2018-02-21 LAB
BACTERIA #/AREA URNS AUTO: ABNORMAL /HPF
BILIRUB UR QL STRIP: NEGATIVE
CLARITY UR REFRACT.AUTO: ABNORMAL
COLOR UR AUTO: YELLOW
GLUCOSE UR QL STRIP: NEGATIVE
HGB UR QL STRIP: ABNORMAL
HYALINE CASTS UR QL AUTO: 2 /LPF
KETONES UR QL STRIP: NEGATIVE
LEUKOCYTE ESTERASE UR QL STRIP: ABNORMAL
MICROSCOPIC COMMENT: ABNORMAL
NITRITE UR QL STRIP: NEGATIVE
PH UR STRIP: 6 [PH] (ref 5–8)
PROT UR QL STRIP: NEGATIVE
RBC #/AREA URNS AUTO: 10 /HPF (ref 0–4)
SP GR UR STRIP: 1.01 (ref 1–1.03)
SQUAMOUS #/AREA URNS AUTO: 2 /HPF
URN SPEC COLLECT METH UR: ABNORMAL
UROBILINOGEN UR STRIP-ACNC: NEGATIVE EU/DL
WBC #/AREA URNS AUTO: >100 /HPF (ref 0–5)
WBC CLUMPS UR QL AUTO: ABNORMAL

## 2018-02-21 PROCEDURE — 81001 URINALYSIS AUTO W/SCOPE: CPT

## 2018-02-21 PROCEDURE — 87186 SC STD MICRODIL/AGAR DIL: CPT

## 2018-02-21 PROCEDURE — 3008F BODY MASS INDEX DOCD: CPT | Mod: S$GLB,,, | Performed by: INTERNAL MEDICINE

## 2018-02-21 PROCEDURE — 87088 URINE BACTERIA CULTURE: CPT

## 2018-02-21 PROCEDURE — 87086 URINE CULTURE/COLONY COUNT: CPT

## 2018-02-21 PROCEDURE — 87077 CULTURE AEROBIC IDENTIFY: CPT

## 2018-02-21 PROCEDURE — 99999 PR PBB SHADOW E&M-EST. PATIENT-LVL IV: CPT | Mod: PBBFAC,,, | Performed by: INTERNAL MEDICINE

## 2018-02-21 PROCEDURE — 99214 OFFICE O/P EST MOD 30 MIN: CPT | Mod: S$GLB,,, | Performed by: INTERNAL MEDICINE

## 2018-02-21 RX ORDER — CIPROFLOXACIN 500 MG/1
500 TABLET ORAL 2 TIMES DAILY
Qty: 10 TABLET | Refills: 0 | Status: SHIPPED | OUTPATIENT
Start: 2018-02-21 | End: 2018-02-26

## 2018-02-21 RX ORDER — CARVEDILOL 12.5 MG/1
12.5 TABLET ORAL 2 TIMES DAILY WITH MEALS
Qty: 180 TABLET | Refills: 3 | Status: SHIPPED | OUTPATIENT
Start: 2018-02-21 | End: 2018-06-21

## 2018-02-21 RX ORDER — DULOXETIN HYDROCHLORIDE 60 MG/1
60 CAPSULE, DELAYED RELEASE ORAL DAILY
Qty: 90 CAPSULE | Refills: 3 | Status: SHIPPED | OUTPATIENT
Start: 2018-02-21 | End: 2018-04-04 | Stop reason: SDUPTHER

## 2018-02-21 NOTE — MEDICAL/APP STUDENT
"Progress Note    Patient Name: Calos Rios  MRN: [id number]    Patient information was obtained from patient and ER records.     Subjective     Chief Complaint:   Chief Complaint   Patient presents with    Hospital Follow Up    Urinary Tract Infection       HPI   Calos Rios is a 47 y.o. female who  has a past medical history of Hypertension; Migraine headache; and Obesity., presenting for her follow up.     On 1/24, she went to the ED with a UTI. She was prescribed nitrofurantoin and d/c same day. She took full antibiotics and feels much better but still has cloudy urine. Patient had urine dipstick in clinic which revealed still elevated WBC. Urine will be sent for culture.     1. HTN - patient is on amlodipine 10, furosemide 40, hctz 25, losartan 100, and metoprolol 50. BP today is 150/110. Patient is compliant with all medications.     2. Anxiety - patient was on Celexa and this did not help so was switched to Zoloft 100. She reports this is still not working. She is constantly anxious with work and her daughter's anxiety at home. She has insomnia and is not sleeping.         Review Of Systems:  Review of Systems   Constitutional: Negative for chills and fever.   Cardiovascular: Negative for chest pain and palpitations.   Gastrointestinal: Negative for abdominal pain, constipation, diarrhea, heartburn, nausea and vomiting.   Genitourinary: Negative for dysuria, flank pain, frequency, hematuria and urgency.   Musculoskeletal: Negative for back pain.   Neurological: Negative for dizziness and headaches.   Psychiatric/Behavioral: The patient is nervous/anxious and has insomnia.              Objective     Vitals:    02/21/18 0914   BP: (!) 150/110   BP Location: Right arm   Patient Position: Sitting   BP Method: Large (Manual)   Pulse: 64   Weight: 93.2 kg (205 lb 7.5 oz)   Height: 5' 4" (1.626 m)       Physical Exam:  Physical Exam   Constitutional: She appears well-developed and well-nourished.   Cardiovascular: " Normal rate and regular rhythm.    Pulmonary/Chest: Effort normal and breath sounds normal.   Abdominal: Soft. Bowel sounds are normal. There is tenderness. There is no CVA tenderness.   Tenderness in RLQ. Patient states this is usual and is due to her fibroids.   Psychiatric: Her speech is normal and behavior is normal. Judgment and thought content normal. Her mood appears anxious. Cognition and memory are normal.           ASSESSMENT/PLAN:     Plan    1. UTI  - Urine culture pending.   - Patient is prescribed ciprofloxacin and is told to complete abx and return if sx have not improved.     2. HTN  - replaced metoprolol for carvedilol 12.5.    3. Anxiety  - replaced sertraline for duloxetine 60.  - amb referral to psychiatry.     Return in 6 weeks for f/u.     ______________________________________________________________________  Becky Guevara  Medical Student

## 2018-02-24 LAB — BACTERIA UR CULT: NORMAL

## 2018-02-25 NOTE — PROGRESS NOTES
"Calos Rios is a 47 y.o. female who  has a past medical history of Hypertension; Migraine headache; and Obesity., presenting for her follow up.      On 1/24, she went to the ED with a UTI. She was prescribed nitrofurantoin and d/c same day. She took full antibiotics and feels much better but still has cloudy urine. Patient had urine dipstick in clinic which revealed still elevated WBC. Urine will be sent for culture.      1. HTN - patient is on amlodipine 10, furosemide 40, hctz 25, losartan 100, and metoprolol 50. BP today is 150/110. Patient is compliant with all medications.      2. Anxiety - patient was on Celexa and this did not help so was switched to Zoloft 100. She reports this is still not working. She is constantly anxious with work and her daughter's anxiety at home. She has insomnia and is not sleeping.            Review Of Systems:  Review of Systems   Constitutional: Negative for chills and fever.   Cardiovascular: Negative for chest pain and palpitations.   Gastrointestinal: Negative for abdominal pain, constipation, diarrhea, heartburn, nausea and vomiting.   Genitourinary: Negative for dysuria, flank pain, frequency, hematuria and urgency.   Musculoskeletal: Negative for back pain.   Neurological: Negative for dizziness and headaches.   Psychiatric/Behavioral: The patient is nervous/anxious and has insomnia.                   Objective         BP (!) 150/110 (BP Location: Right arm, Patient Position: Sitting, BP Method: Large (Manual))   Pulse 64   Ht 5' 4" (1.626 m)   Wt 93.2 kg (205 lb 7.5 oz)   BMI 35.27 kg/m²        Physical Exam:  Physical Exam   Constitutional: She appears well-developed and well-nourished.   Cardiovascular: Normal rate and regular rhythm.    Pulmonary/Chest: Effort normal and breath sounds normal.   Abdominal: Soft. Bowel sounds are normal. There is tenderness. There is no CVA tenderness.   Tenderness in RLQ. Patient states this is usual and is due to her fibroids. "   Psychiatric: Her speech is normal and behavior is normal. Judgment and thought content normal. Her mood appears anxious. Cognition and memory are normal.               ASSESSMENT/PLAN:      Plan     1. UTI  - Urine culture pending.   - Patient is prescribed ciprofloxacin and is told to complete abx and return if sx have not improved.      2. HTN  - replaced metoprolol for carvedilol 12.5.     3. Anxiety  - replaced sertraline for duloxetine 60.  - amb referral to psychiatry.      Return in 6 weeks for f/u.

## 2018-03-09 ENCOUNTER — TELEPHONE (OUTPATIENT)
Dept: INTERNAL MEDICINE | Facility: CLINIC | Age: 48
End: 2018-03-09

## 2018-03-09 NOTE — TELEPHONE ENCOUNTER
----- Message from Adria Doll Jr., MD sent at 3/7/2018  9:43 PM CST -----  cipro should cover UTI- lab reviewed

## 2018-03-20 ENCOUNTER — HOSPITAL ENCOUNTER (OUTPATIENT)
Dept: RADIOLOGY | Facility: HOSPITAL | Age: 48
Discharge: HOME | End: 2018-03-20
Attending: INTERNAL MEDICINE
Payer: COMMERCIAL

## 2018-03-20 DIAGNOSIS — Z12.31 ENCOUNTER FOR SCREENING MAMMOGRAM FOR BREAST CANCER: ICD-10-CM

## 2018-03-20 PROCEDURE — 77067 SCR MAMMO BI INCL CAD: CPT | Mod: TC

## 2018-03-20 PROCEDURE — 77067 SCR MAMMO BI INCL CAD: CPT | Mod: 26,,, | Performed by: RADIOLOGY

## 2018-03-20 PROCEDURE — 77063 BREAST TOMOSYNTHESIS BI: CPT | Mod: 26,,, | Performed by: RADIOLOGY

## 2018-04-04 ENCOUNTER — OFFICE VISIT (OUTPATIENT)
Dept: INTERNAL MEDICINE | Facility: CLINIC | Age: 48
End: 2018-04-04
Payer: COMMERCIAL

## 2018-04-04 VITALS
HEIGHT: 64 IN | WEIGHT: 207.25 LBS | BODY MASS INDEX: 35.38 KG/M2 | DIASTOLIC BLOOD PRESSURE: 80 MMHG | SYSTOLIC BLOOD PRESSURE: 112 MMHG

## 2018-04-04 DIAGNOSIS — F41.9 ANXIETY: ICD-10-CM

## 2018-04-04 DIAGNOSIS — I10 ESSENTIAL HYPERTENSION: Primary | ICD-10-CM

## 2018-04-04 DIAGNOSIS — G43.809 OTHER MIGRAINE WITHOUT STATUS MIGRAINOSUS, NOT INTRACTABLE: ICD-10-CM

## 2018-04-04 PROCEDURE — 3074F SYST BP LT 130 MM HG: CPT | Mod: CPTII,S$GLB,, | Performed by: INTERNAL MEDICINE

## 2018-04-04 PROCEDURE — 3079F DIAST BP 80-89 MM HG: CPT | Mod: CPTII,S$GLB,, | Performed by: INTERNAL MEDICINE

## 2018-04-04 PROCEDURE — 99999 PR PBB SHADOW E&M-EST. PATIENT-LVL III: CPT | Mod: PBBFAC,,, | Performed by: INTERNAL MEDICINE

## 2018-04-04 PROCEDURE — 99214 OFFICE O/P EST MOD 30 MIN: CPT | Mod: S$GLB,,, | Performed by: INTERNAL MEDICINE

## 2018-04-04 RX ORDER — DULOXETIN HYDROCHLORIDE 60 MG/1
120 CAPSULE, DELAYED RELEASE ORAL DAILY
Qty: 180 CAPSULE | Refills: 3 | Status: SHIPPED | OUTPATIENT
Start: 2018-04-04 | End: 2018-07-03 | Stop reason: SDUPTHER

## 2018-04-11 NOTE — PROGRESS NOTES
HISTORY OF PRESENT ILLNESS:  Ms. Live is a 47-year-old female coming for a   six-week followup of her ongoing medical problems.  I saw her last in February.    She had essential hypertension, migraine headache and obesity.  She has had   ypertension for a decent amount of time.  Her blood pressure is 150/110 the last   time despite being compliant on amlodipine, Lasix 40, hydrochlorothiazide 25,   losartan 10 and metoprolol.  At last visit, we found that she had a UTI, which   we treated with Cipro.  We also took her off her metoprolol and put her on   carvedilol.  She was having a decent amount of anxiety also.  We took her off of   her Zoloft 100 and started her on duloxetine 60.  Today, her blood pressure is   112/80.  She is no longer having headaches and hypertension has been doing much   better.  Her headaches have resolved.  She continues to have anxiety.  We have   her set up to see Psychiatry, but she has not seen them yet.  She has been on   Cymbalta and thinks this  helps, but just not well enough and she wishes to   possibly increase the dose.  She denies any suicidal or homicidal thoughts or   ideation.  Otherwise, she says she has been feeling pretty well.  She has been   sleeping better.  She has had benign insomnia, but that has helped with her   taking the Cymbalta.  Otherwise, no new complaints.    PHYSICAL EXAMINATION:  GENERAL:  She is well appearing.  VITAL SIGNS:  Blood pressure is 112/80.  Her vital signs are in the chart.  NECK:  Supple.  CHEST:  Clear without wheeze.  CARDIOVASCULAR:  S1 and S2, regular rate and rhythm without murmur, gallop or   rub.  ABDOMEN:  Soft.  LOWER EXTREMITIES:  No edema.  Mood seems good.  She does not seem overly depressed.    ASSESSMENT:  1.  Hypertension, doing well on current medications; continue current   medications.  Low salt diet.  We discussed diet and weight loss.  2.  Depression and anxiety.  Continue Cymbalta.  She is going to follow up with    Psychiatry soon.  3.  Migraine headaches and headache related to blood pressure both resolved.  4.  Insomnia, doing a little bit better.    PLAN:  I will see her back in about six months.                  /denny [id number] lynn(s)        MARTHA/GRISELDA  dd: 04/11/2018 08:03:21 (CDT)  td: 04/11/2018 22:57:34 (CDT)  Doc ID   #[id number]  Job ID #[id number]    CC:

## 2018-04-19 ENCOUNTER — OFFICE VISIT (OUTPATIENT)
Dept: PSYCHIATRY | Facility: CLINIC | Age: 48
End: 2018-04-19
Payer: COMMERCIAL

## 2018-04-19 VITALS
HEIGHT: 64 IN | DIASTOLIC BLOOD PRESSURE: 91 MMHG | SYSTOLIC BLOOD PRESSURE: 126 MMHG | BODY MASS INDEX: 34.28 KG/M2 | WEIGHT: 200.81 LBS | HEART RATE: 78 BPM

## 2018-04-19 DIAGNOSIS — F32.9 REACTIVE DEPRESSION: ICD-10-CM

## 2018-04-19 DIAGNOSIS — F41.9 ANXIETY: ICD-10-CM

## 2018-04-19 DIAGNOSIS — F51.05 INSOMNIA DUE TO MENTAL CONDITION: ICD-10-CM

## 2018-04-19 PROBLEM — F32.A DEPRESSION: Status: ACTIVE | Noted: 2018-04-19

## 2018-04-19 PROCEDURE — 99999 PR PBB SHADOW E&M-EST. PATIENT-LVL III: CPT | Mod: PBBFAC,,, | Performed by: PSYCHIATRY & NEUROLOGY

## 2018-04-19 PROCEDURE — 99204 OFFICE O/P NEW MOD 45 MIN: CPT | Mod: S$GLB,,, | Performed by: PSYCHIATRY & NEUROLOGY

## 2018-04-19 PROCEDURE — 3080F DIAST BP >= 90 MM HG: CPT | Mod: CPTII,S$GLB,, | Performed by: PSYCHIATRY & NEUROLOGY

## 2018-04-19 PROCEDURE — 3074F SYST BP LT 130 MM HG: CPT | Mod: CPTII,S$GLB,, | Performed by: PSYCHIATRY & NEUROLOGY

## 2018-04-19 RX ORDER — DULOXETIN HYDROCHLORIDE 30 MG/1
30 CAPSULE, DELAYED RELEASE ORAL DAILY
Qty: 30 CAPSULE | Refills: 3 | Status: SHIPPED | OUTPATIENT
Start: 2018-04-19 | End: 2018-07-03

## 2018-04-19 RX ORDER — TRAZODONE HYDROCHLORIDE 50 MG/1
100 TABLET ORAL NIGHTLY
Qty: 60 TABLET | Refills: 2 | Status: SHIPPED | OUTPATIENT
Start: 2018-04-19 | End: 2018-08-21

## 2018-04-19 NOTE — PATIENT INSTRUCTIONS
Decrease Cymbalta dose from 120mg to 90mg; take one capsule 30mg + one capsule 60mg = total of 90mg daily    Start Trazodone 50mg nightly, can go up to two tablets (100mg) nightly for insomnia.     Referred for therapy    Return to clinic in 3-4 weeks
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
## 2018-05-01 NOTE — PROGRESS NOTES
I have independently evaluated the patient and have reviewed this note and agree with its contents, including the assessment and plan.     Thelma Wooten MD

## 2018-05-25 ENCOUNTER — HOSPITAL ENCOUNTER (EMERGENCY)
Facility: HOSPITAL | Age: 48
Discharge: HOME | End: 2018-05-25
Attending: EMERGENCY MEDICINE
Payer: COMMERCIAL

## 2018-05-25 VITALS
OXYGEN SATURATION: 99 % | WEIGHT: 200 LBS | HEIGHT: 64 IN | RESPIRATION RATE: 16 BRPM | DIASTOLIC BLOOD PRESSURE: 108 MMHG | BODY MASS INDEX: 34.15 KG/M2 | TEMPERATURE: 99 F | HEART RATE: 90 BPM | SYSTOLIC BLOOD PRESSURE: 163 MMHG

## 2018-05-25 DIAGNOSIS — N30.00 ACUTE CYSTITIS WITHOUT HEMATURIA: Primary | ICD-10-CM

## 2018-05-25 LAB
BACTERIA #/AREA URNS AUTO: ABNORMAL /HPF
BILIRUB UR QL STRIP: NEGATIVE
CLARITY UR REFRACT.AUTO: ABNORMAL
COLOR UR AUTO: YELLOW
GLUCOSE UR QL STRIP: NEGATIVE
HGB UR QL STRIP: ABNORMAL
KETONES UR QL STRIP: NEGATIVE
LEUKOCYTE ESTERASE UR QL STRIP: NEGATIVE
MICROSCOPIC COMMENT: ABNORMAL
NITRITE UR QL STRIP: POSITIVE
PH UR STRIP: 6 [PH] (ref 5–8)
PROT UR QL STRIP: NEGATIVE
RBC #/AREA URNS AUTO: 19 /HPF (ref 0–4)
SP GR UR STRIP: 1.01 (ref 1–1.03)
SQUAMOUS #/AREA URNS AUTO: 7 /HPF
URN SPEC COLLECT METH UR: ABNORMAL
UROBILINOGEN UR STRIP-ACNC: NEGATIVE EU/DL
WBC #/AREA URNS AUTO: 4 /HPF (ref 0–5)

## 2018-05-25 PROCEDURE — 25000003 PHARM REV CODE 250: Performed by: PHYSICIAN ASSISTANT

## 2018-05-25 PROCEDURE — 81001 URINALYSIS AUTO W/SCOPE: CPT

## 2018-05-25 PROCEDURE — 99283 EMERGENCY DEPT VISIT LOW MDM: CPT

## 2018-05-25 PROCEDURE — 99283 EMERGENCY DEPT VISIT LOW MDM: CPT | Mod: ,,, | Performed by: PHYSICIAN ASSISTANT

## 2018-05-25 RX ORDER — CEPHALEXIN 500 MG/1
500 CAPSULE ORAL EVERY 12 HOURS
Qty: 14 CAPSULE | Refills: 0 | Status: SHIPPED | OUTPATIENT
Start: 2018-05-25 | End: 2018-06-01

## 2018-05-25 RX ORDER — CEPHALEXIN 500 MG/1
500 CAPSULE ORAL EVERY 12 HOURS
Qty: 14 CAPSULE | Refills: 0 | Status: SHIPPED | OUTPATIENT
Start: 2018-05-25 | End: 2018-05-25

## 2018-05-25 RX ORDER — ONDANSETRON 4 MG/1
4 TABLET, ORALLY DISINTEGRATING ORAL
Status: COMPLETED | OUTPATIENT
Start: 2018-05-25 | End: 2018-05-25

## 2018-05-25 RX ORDER — IBUPROFEN 600 MG/1
600 TABLET ORAL
Status: COMPLETED | OUTPATIENT
Start: 2018-05-25 | End: 2018-05-25

## 2018-05-25 RX ADMIN — IBUPROFEN 600 MG: 600 TABLET ORAL at 12:05

## 2018-05-25 RX ADMIN — ONDANSETRON 4 MG: 4 TABLET, ORALLY DISINTEGRATING ORAL at 12:05

## 2018-05-25 NOTE — ED PROVIDER NOTES
"Encounter Date: 5/25/2018       History     Chief Complaint   Patient presents with    Back Pain     Pt c/o "back problems".  States "I think I may have a UTI"     47-year-old female with hypertension and history of migraines presents for back pain x6 days.  Patient reports dull constant pain in her left flank radiating to her abdomen.  Nothing palliates or provokes the pain, pain is non positional.  Has not taken any medication.  Reports associated cloudy foul-smelling urine x1 day with nausea and vomiting. Denies dysuria, hematuria, fever, chills, change in bowel movements.  Patient is concerned about urinary tract infection, because she has previously had similar symptoms which led to fever and chills.          Review of patient's allergies indicates:  No Known Allergies  Past Medical History:   Diagnosis Date    Hypertension     Migraine headache     Obesity      Past Surgical History:   Procedure Laterality Date    TUBAL LIGATION       Social History   Substance Use Topics    Smoking status: Current Every Day Smoker    Smokeless tobacco: Never Used      Comment: quit for lent 2013; none past 8 weeks.    Alcohol use Yes      Comment: scocially on weekends     Review of Systems   Constitutional: Negative for chills, fatigue and fever.   Respiratory: Negative for cough and shortness of breath.    Cardiovascular: Negative for chest pain.   Gastrointestinal: Positive for nausea and vomiting. Negative for abdominal pain, blood in stool, constipation and diarrhea.   Endocrine: Positive for polyuria.   Genitourinary: Positive for flank pain and frequency. Negative for decreased urine volume, difficulty urinating, dysuria, genital sores, hematuria, pelvic pain, urgency and vaginal discharge.   Musculoskeletal: Positive for back pain.   Skin: Negative for color change.   Allergic/Immunologic: Negative for immunocompromised state.   Neurological: Negative for light-headedness and headaches.       Physical Exam "     Initial Vitals [05/25/18 1146]   BP Pulse Resp Temp SpO2   (!) 163/108 90 16 99 °F (37.2 °C) 99 %      MAP       126.33         Physical Exam    Nursing note and vitals reviewed.  Constitutional: She appears well-developed and well-nourished. She is not diaphoretic. No distress.   HENT:   Head: Normocephalic and atraumatic.   Eyes: EOM are normal. Pupils are equal, round, and reactive to light.   Neck: Normal range of motion. Neck supple.   Cardiovascular: Normal rate, regular rhythm and normal heart sounds. Exam reveals no gallop and no friction rub.    No murmur heard.  Pulmonary/Chest: Breath sounds normal. No respiratory distress. She has no wheezes. She has no rhonchi. She has no rales. She exhibits no tenderness.   Abdominal: Soft. Bowel sounds are normal. She exhibits no distension and no mass. There is tenderness. There is no rebound and no guarding.   CVA tenderness left flank   Musculoskeletal: Normal range of motion. She exhibits no edema or tenderness.   Neurological: She is alert and oriented to person, place, and time.   Skin: Skin is warm and dry.   Psychiatric: She has a normal mood and affect.         ED Course   Procedures  Labs Reviewed   URINALYSIS, REFLEX TO URINE CULTURE                   APC / Resident Notes:   47-year-old female presents with back pain and cloudy urine.  DdX includes urinary tract infection, muscle strain, nephrolithiasis.  Will check UA, give Zofran and Motrin and reassess.    UA shows nitrite positive UTI.  Previous urine cultures show Klebsiella sensitive to everything except Macrobid.  Will discharge with Keflex.  Discussed the importance of follow-up as well as ED return precautions.  Patient voiced understanding is comfortable with discharge. I discussed this patient with my supervising physician.    Luciana Maurer PA-C                   Clinical Impression:   The encounter diagnosis was Acute cystitis without hematuria.    Disposition:   Disposition:  Discharged  Condition: Stable                        Luciana Maurer PA-C  05/25/18 3337

## 2018-05-25 NOTE — ED NOTES
"Pt c/o "back pain and cloudy urPatient identifiers verified and correct for 1.5 wks    C/C: low back pain and cloudy urine  APPEARANCE: awake and alert in NAD.  SKIN: warm, dry and intact. No breakdown or bruising.  MUSCULOSKELETAL: Patient moving all extremities spontaneously, no obvious swelling or deformities noted. Ambulates independently.  RESPIRATORY: no shortness of breath. All breath sounds CTA bilaterally.  CARDIAC: heart tones normal. Regular rate and rhythm; 2+ distal pulses; no peripheral edema  ABDOMEN: S/ND/NT, normoactive bowel sounds present in all four quadrants. Normal stool pattern.  : voids spontaneously without difficulty.  Neurologic: AAO x 4; follows commands equal strength in all extremities; denies numbness/tingling. PERRLA  and nausea, last time felt like this had a UTI"      "

## 2018-06-21 ENCOUNTER — NURSE TRIAGE (OUTPATIENT)
Dept: ADMINISTRATIVE | Facility: CLINIC | Age: 48
End: 2018-06-21

## 2018-06-21 ENCOUNTER — HOSPITAL ENCOUNTER (EMERGENCY)
Facility: HOSPITAL | Age: 48
Discharge: HOME | End: 2018-06-21
Attending: EMERGENCY MEDICINE
Payer: MEDICAID

## 2018-06-21 VITALS
SYSTOLIC BLOOD PRESSURE: 218 MMHG | WEIGHT: 209 LBS | RESPIRATION RATE: 18 BRPM | HEIGHT: 64 IN | OXYGEN SATURATION: 100 % | TEMPERATURE: 98 F | DIASTOLIC BLOOD PRESSURE: 123 MMHG | BODY MASS INDEX: 35.68 KG/M2 | HEART RATE: 81 BPM

## 2018-06-21 DIAGNOSIS — R05.9 COUGH: Primary | ICD-10-CM

## 2018-06-21 DIAGNOSIS — I10 ESSENTIAL HYPERTENSION: ICD-10-CM

## 2018-06-21 LAB
B-HCG UR QL: NEGATIVE
BUN SERPL-MCNC: 13 MG/DL (ref 6–30)
CHLORIDE SERPL-SCNC: 104 MMOL/L (ref 95–110)
CREAT SERPL-MCNC: 1.1 MG/DL (ref 0.5–1.4)
CTP QC/QA: YES
GLUCOSE SERPL-MCNC: 74 MG/DL (ref 70–110)
HCT VFR BLD CALC: 40 %PCV (ref 36–54)
POC IONIZED CALCIUM: 1.19 MMOL/L (ref 1.06–1.42)
POC TCO2 (MEASURED): 28 MMOL/L (ref 23–29)
POTASSIUM BLD-SCNC: 3.5 MMOL/L (ref 3.5–5.1)
SAMPLE: NORMAL
SODIUM BLD-SCNC: 142 MMOL/L (ref 136–145)

## 2018-06-21 PROCEDURE — 25000003 PHARM REV CODE 250: Performed by: PHYSICIAN ASSISTANT

## 2018-06-21 PROCEDURE — 81025 URINE PREGNANCY TEST: CPT | Performed by: PHYSICIAN ASSISTANT

## 2018-06-21 PROCEDURE — 99284 EMERGENCY DEPT VISIT MOD MDM: CPT | Mod: 25

## 2018-06-21 PROCEDURE — 99283 EMERGENCY DEPT VISIT LOW MDM: CPT | Mod: ,,, | Performed by: PHYSICIAN ASSISTANT

## 2018-06-21 RX ORDER — AMLODIPINE BESYLATE 10 MG/1
10 TABLET ORAL DAILY
Qty: 30 TABLET | Refills: 0 | Status: SHIPPED | OUTPATIENT
Start: 2018-06-21 | End: 2018-11-06 | Stop reason: SDUPTHER

## 2018-06-21 RX ORDER — LOSARTAN POTASSIUM 100 MG/1
100 TABLET ORAL DAILY
Qty: 30 TABLET | Refills: 0 | Status: SHIPPED | OUTPATIENT
Start: 2018-06-21 | End: 2018-11-06 | Stop reason: SDUPTHER

## 2018-06-21 RX ORDER — CARVEDILOL 12.5 MG/1
12.5 TABLET ORAL
Status: COMPLETED | OUTPATIENT
Start: 2018-06-21 | End: 2018-06-21

## 2018-06-21 RX ORDER — CARVEDILOL 12.5 MG/1
12.5 TABLET ORAL 2 TIMES DAILY WITH MEALS
Qty: 60 TABLET | Refills: 0 | Status: SHIPPED | OUTPATIENT
Start: 2018-06-21 | End: 2018-11-06 | Stop reason: SDUPTHER

## 2018-06-21 RX ORDER — HYDROCHLOROTHIAZIDE 25 MG/1
25 TABLET ORAL
Status: COMPLETED | OUTPATIENT
Start: 2018-06-21 | End: 2018-06-21

## 2018-06-21 RX ORDER — AMLODIPINE BESYLATE 10 MG/1
10 TABLET ORAL
Status: COMPLETED | OUTPATIENT
Start: 2018-06-21 | End: 2018-06-21

## 2018-06-21 RX ADMIN — AMLODIPINE BESYLATE 10 MG: 10 TABLET ORAL at 12:06

## 2018-06-21 RX ADMIN — CARVEDILOL 12.5 MG: 12.5 TABLET, FILM COATED ORAL at 12:06

## 2018-06-21 RX ADMIN — HYDROCHLOROTHIAZIDE 25 MG: 25 TABLET ORAL at 02:06

## 2018-06-21 NOTE — TELEPHONE ENCOUNTER
Reason for Disposition   Wheezing can be heard across the room    Protocols used: ST BREATHING DIFFICULTY-A-OH    Calos is calling to report that she has bronchitis and last night she had severe difficulty breathing.  States today chest is tight and it is hard to exhale.  Advised ER.

## 2018-06-21 NOTE — ED PROVIDER NOTES
Encounter Date: 6/21/2018       History     Chief Complaint   Patient presents with    Cough     productive since Sunday.  Report she is now getting SOB. reports history of bronchitis. Reports HTN and did NOT take BP meds today     Patient is a 47 year old female with PMHX of HTN, migraines, GERD, insomnia, anxiety, and depression. She presents to the ED for cough. She reports having a productive cough with yellow tinged sputum for approximately four days. Reports associated wheezing and SOB. Reports hx of bronchitis. Also, reports being without hypertensive medications for approximately two months due to unemployment. Reports taking carvedilol 12.5 mg, Norvasc 10 mg, and losartan 100 mg. She denies fever,chills, nausea, vomiting, chest pain, abd pain, dysuria, diarrhea, or constipation. She is a current everyday smoker and reports alcohol use.          Review of patient's allergies indicates:  No Known Allergies  Past Medical History:   Diagnosis Date    Hypertension     Migraine headache     Obesity      Past Surgical History:   Procedure Laterality Date    TUBAL LIGATION       Family History   Problem Relation Age of Onset    Heart disease Mother     Drug abuse Father     Heart disease Father     Kidney disease Father     Diabetes Maternal Uncle     Heart disease Maternal Grandmother     Ovarian cancer Maternal Aunt     Cancer Neg Hx      Social History   Substance Use Topics    Smoking status: Current Every Day Smoker    Smokeless tobacco: Never Used      Comment: quit for lent 2013; none past 8 weeks.    Alcohol use Yes      Comment: scocially on weekends     Review of Systems   Constitutional: Negative for fever.   HENT: Negative for sore throat.    Respiratory: Positive for cough, shortness of breath and wheezing.    Cardiovascular: Negative for chest pain.   Gastrointestinal: Negative for abdominal pain, nausea and vomiting.   Genitourinary: Negative for dysuria.   Musculoskeletal: Negative  for back pain.   Skin: Negative for rash.   Neurological: Negative for weakness.   Hematological: Does not bruise/bleed easily.       Physical Exam     Initial Vitals [06/21/18 1038]   BP Pulse Resp Temp SpO2   (!) 218/123 81 18 98.2 °F (36.8 °C) 100 %      MAP       --         Physical Exam    Vitals reviewed.  Constitutional: She appears well-developed and well-nourished. She is not diaphoretic. No distress.   Patient is resting comfortably in NAD on RA.    HENT:   Head: Normocephalic and atraumatic.   Nose: Nose normal.   Eyes: Conjunctivae and EOM are normal. Pupils are equal, round, and reactive to light.   Neck: Normal range of motion.   Cardiovascular: Normal rate and regular rhythm. Exam reveals no friction rub.    No murmur heard.  Pulmonary/Chest: Breath sounds normal. No respiratory distress. She has no wheezes. She has no rales.   Abdominal: Soft. Bowel sounds are normal. She exhibits no distension. There is no tenderness. There is no rebound.   Musculoskeletal: Normal range of motion. She exhibits no edema.   Neurological: She is alert and oriented to person, place, and time. She has normal strength. No sensory deficit.   Skin: Skin is warm and dry. No erythema.   Psychiatric: She has a normal mood and affect. Thought content normal.         ED Course   Procedures  Labs Reviewed   POCT URINE PREGNANCY   ISTAT PROCEDURE          Imaging Results          X-Ray Chest PA And Lateral (Final result)  Result time 06/21/18 13:17:35    Final result by Tenzin Yepez MD (06/21/18 13:17:35)                 Impression:      Borderline cardiomegaly.  No significant detrimental interval change in the appearance of the chest since 04/05/2010 is appreciated, allowing for a poorer inspiratory depth on the current PA projection.      Electronically signed by: Tenzin Yepez MD  Date:    06/21/2018  Time:    13:17             Narrative:    EXAMINATION:  XR CHEST PA AND LATERAL    TECHNIQUE:  Two views of the chest were  obtained, with PA and lateral projections submitted.    COMPARISON:  Comparison is made to 04/05/2010.    FINDINGS:  Heart is again noted to be at the upper limit of normal to minimally enlarged, but there has been no detrimental interval change in the appearance of the cardiomediastinal silhouette since the examination referenced above, allowing for a poorer inspiratory depth level on the current PA projection.  Pulmonary vascularity is normal.  Lung zones are clear, and free of significant airspace consolidation or volume loss.  No pleural fluid.  No pneumothorax.                                       APC / Resident Notes:   Patient is a 47 year old female with PMHX of HTN, migraines, GERD, insomnia, anxiety, and depression. She presents to the ED for cough.    Will order istat and imaging. Will order hypertensive medications while in ED. Will continue to monitor.     Differential diagnoses include, but are not limited to: bronchitis, asthma, viral syndrome, or generalized anxiety disorder. I considered but do not suspect pneumonia or hypertensive emergency.     UPT negative. istat WNL. CXR found to have Borderline cardiomegaly.     Patient reassessed, reports symptoms improved with ED management. I have discussed emergency department findings, and plan with the patient. Pt has asymptomatic HTN.  Per most recent [facility] clinical policy, there is no indication for emergent work-up or treatment in the ED. Will discharge home with anti-hypertensive medications and F/U with PCP. Patient verbalizes understanding of plan and agrees. Return precautions given.     I have discussed and reviewed with my supervising physician.            Clinical Impression:   The primary encounter diagnosis was Cough. A diagnosis of Essential hypertension was also pertinent to this visit.      Disposition:   Disposition: Discharged                        Nadine Stapleton PA-C  06/21/18 9056

## 2018-06-21 NOTE — ED TRIAGE NOTES
Pt presents with c/o chest tightness, SOB and cough. Pt thought she was having and asthma attack. Pt does not have asthma. Pt is also hypertensive. She did not take her BP meds this morning.

## 2018-06-21 NOTE — ED NOTES
Pt's first and last name and birthday confirmed.   LOC: The patient is awake, alert and aware of environment with an appropriate affect, the patient is oriented x 3 and speaking appropriately.  APPEARANCE: Patient resting comfortably and in no acute distress, patient is clean and well groomed.  SKIN: The skin is warm and dry, patient has normal skin turgor and moist mucus membranes, skin intact, no breakdown or brusing noted.  MUSKULOSKELETAL: Patient moving all extremities well, no obvious swelling or deformities noted.  RESPIRATORY: Airway is open and patent, respirations are spontaneous, patient has a normal effort and rate. Breath sounds are clear and equal bilaterally.  CARDIAC: Normal heart sounds. No peripheral edema.  ABDOMEN: Soft and non tender to palpation, no distention noted. Bowel sounds present.   NEURO: No neuro deficits, hand grasp equal, no drift noted, no facial droop noted. Speech is clear.

## 2018-06-27 ENCOUNTER — TELEPHONE (OUTPATIENT)
Dept: INTERNAL MEDICINE | Facility: CLINIC | Age: 48
End: 2018-06-27

## 2018-06-27 NOTE — TELEPHONE ENCOUNTER
"----- Message from Lolita Keturah Carlos sent at 6/27/2018  9:16 AM CDT -----  2nd Request     Prior Authorization Needed     To submit the PA:     1: Go to " [email or web address] " and click "Enter a Key"     2. Enter the patient's last name and date of birth and the key.                  KEY: [id number]     3. Complete the forms and click "send to Plan"     Note chart when prior authorization has been submitted.     Please notify pharmacy when prior authorization has been approved.     Thank You   "

## 2018-07-03 ENCOUNTER — TELEPHONE (OUTPATIENT)
Dept: INTERNAL MEDICINE | Facility: CLINIC | Age: 48
End: 2018-07-03

## 2018-07-03 DIAGNOSIS — F41.9 ANXIETY: ICD-10-CM

## 2018-07-03 RX ORDER — DULOXETIN HYDROCHLORIDE 60 MG/1
120 CAPSULE, DELAYED RELEASE ORAL DAILY
Qty: 180 CAPSULE | Refills: 3 | Status: SHIPPED | OUTPATIENT
Start: 2018-07-03 | End: 2018-11-06 | Stop reason: SDUPTHER

## 2018-07-03 NOTE — TELEPHONE ENCOUNTER
----- Message from Nena Servin sent at 7/3/2018  9:53 AM CDT -----  Contact: Patient [phone]  Is this a refill or new RX:  Refill    RX name and strength: DULoxetine (CYMBALTA) 60 MG capsule    Pharmacy name and phone # [id number] - [place], [place] - [address] [phone] (Phone) [phone] (Fax)

## 2018-07-03 NOTE — TELEPHONE ENCOUNTER
"----- Message from Lolita Keturahcait Gonzalez sent at 7/3/2018 10:29 AM CDT -----  Contact: [id number] [phone]  3rd Request    Received another request. Please check the status.    Prior Authorization Needed     To submit the PA:     1: Go to " [email or web address] " and click "Enter a Key"     2. Enter the patient's last name and date of birth and the key.                  KEY: [id number]     3. Complete the forms and click "send to Plan"     Note chart when prior authorization has been submitted.     Please notify pharmacy when prior authorization has been approved.     Thank You       "

## 2018-08-21 ENCOUNTER — OFFICE VISIT (OUTPATIENT)
Dept: INTERNAL MEDICINE | Facility: CLINIC | Age: 48
End: 2018-08-21
Payer: MEDICAID

## 2018-08-21 ENCOUNTER — LAB VISIT (OUTPATIENT)
Dept: LAB | Facility: HOSPITAL | Age: 48
End: 2018-08-21
Attending: INTERNAL MEDICINE
Payer: MEDICAID

## 2018-08-21 VITALS
BODY MASS INDEX: 34.85 KG/M2 | HEIGHT: 64 IN | OXYGEN SATURATION: 100 % | SYSTOLIC BLOOD PRESSURE: 130 MMHG | DIASTOLIC BLOOD PRESSURE: 88 MMHG | HEART RATE: 61 BPM | WEIGHT: 204.13 LBS

## 2018-08-21 DIAGNOSIS — E66.9 OBESITY, UNSPECIFIED CLASSIFICATION, UNSPECIFIED OBESITY TYPE, UNSPECIFIED WHETHER SERIOUS COMORBIDITY PRESENT: ICD-10-CM

## 2018-08-21 DIAGNOSIS — F32.9 REACTIVE DEPRESSION: ICD-10-CM

## 2018-08-21 DIAGNOSIS — I10 ESSENTIAL HYPERTENSION: Primary | ICD-10-CM

## 2018-08-21 DIAGNOSIS — I10 ESSENTIAL HYPERTENSION: ICD-10-CM

## 2018-08-21 DIAGNOSIS — R06.83 SNORING: ICD-10-CM

## 2018-08-21 DIAGNOSIS — G43.809 OTHER MIGRAINE WITHOUT STATUS MIGRAINOSUS, NOT INTRACTABLE: ICD-10-CM

## 2018-08-21 LAB
ANION GAP SERPL CALC-SCNC: 9 MMOL/L
BUN SERPL-MCNC: 10 MG/DL
CALCIUM SERPL-MCNC: 9.8 MG/DL
CHLORIDE SERPL-SCNC: 105 MMOL/L
CO2 SERPL-SCNC: 25 MMOL/L
CREAT SERPL-MCNC: 1 MG/DL
EST. GFR  (AFRICAN AMERICAN): >60 ML/MIN/1.73 M^2
EST. GFR  (NON AFRICAN AMERICAN): >60 ML/MIN/1.73 M^2
ESTIMATED AVG GLUCOSE: 103 MG/DL
GLUCOSE SERPL-MCNC: 82 MG/DL
HBA1C MFR BLD HPLC: 5.2 %
POTASSIUM SERPL-SCNC: 3.4 MMOL/L
SODIUM SERPL-SCNC: 139 MMOL/L

## 2018-08-21 PROCEDURE — 99999 PR PBB SHADOW E&M-EST. PATIENT-LVL IV: CPT | Mod: PBBFAC,,, | Performed by: INTERNAL MEDICINE

## 2018-08-21 PROCEDURE — 80048 BASIC METABOLIC PNL TOTAL CA: CPT

## 2018-08-21 PROCEDURE — 36415 COLL VENOUS BLD VENIPUNCTURE: CPT

## 2018-08-21 PROCEDURE — 83036 HEMOGLOBIN GLYCOSYLATED A1C: CPT

## 2018-08-21 PROCEDURE — 99214 OFFICE O/P EST MOD 30 MIN: CPT | Mod: PBBFAC | Performed by: INTERNAL MEDICINE

## 2018-08-21 PROCEDURE — 99214 OFFICE O/P EST MOD 30 MIN: CPT | Mod: S$PBB,,, | Performed by: INTERNAL MEDICINE

## 2018-08-21 RX ORDER — SPIRONOLACTONE 25 MG/1
25 TABLET ORAL DAILY
Qty: 30 TABLET | Refills: 11 | Status: SHIPPED | OUTPATIENT
Start: 2018-08-21 | End: 2018-11-06 | Stop reason: SDUPTHER

## 2018-09-01 NOTE — PROGRESS NOTES
HISTORY OF PRESENT ILLNESS:  She is a 47-year-old female who I last saw in April   with hypertension, depression, migraines, insomnia. I bring her back in to   follow up her ongoing medical problems.  She has had hypertension for multiple   years.  She is on Aldactone right now for blood pressure.  Blood pressure today   is 130/88.  Currently, she is on amlodipine 10, Coreg 12.5, losartan, and   Aldactone for her blood pressure.  Blood pressure has been well controlled.  She   has depression for which she is on Cymbalta and that has been doing pretty   well.  She has been trying to follow up with Psychiatry.  She has migraines,   which she has not had in a while.  Has not had to take any medication for this.    No headaches.  No change in mentation.  She has Zofran which she gets nauseated   with it.  Imitrex when she has headaches.  Today she complains of daytime   fatigue and also she has trouble snoring.  Sometimes in the morning, she does   wake up with headache.  She has history of insomnia, which is doing a little bit   better last visit, and she says it is not her main problem, just the difficulty   with snoring.  She is obese with a BMI of 35.04 and her weight has been pretty   stable.  Otherwise no new complaints.    PHYSICAL EXAMINATION:  GENERAL:  She is a well-appearing 47-year-old female, no acute distress.  HEENT:  Ear canals are open.  TMs are clear.  Oropharynx, she does have a lot of   soft tissue in the back of her oropharynx.  NECK:  Supple.  She has no JVD.  Thyroid is not enlarged.  CARDIOVASCULAR:  S1 and S2.  Regular rate and rhythm without murmur, gallop, or   rub.  ABDOMEN:  Soft and nontender.  No hepatosplenomegaly.  No guarding, rebound, or   tenderness.  LOWER EXTREMITIES:  No edema.    ASSESSMENT:  Hypertension, relatively well controlled.  I would like to see   weight loss and if she does have sleep apnea, with snoring and with obesity   suggest treating that may help bring down the  blood pressure also.  Depression,   she continues Cymbalta.  Follow up with Psychiatry.  Migraines.  Actually this   is doing pretty well right now.  With the snoring, we are going to have her get   a sleep study and I will follow up in two months with labs.           /denny [id number] lynn(s)        MARTHA/GRISELDA  dd: 09/01/2018 09:24:03 (CDT)  td: 09/01/2018 18:12:59 (CDT)  Doc ID   #[id number]  Job ID #[id number]    CC:

## 2018-11-06 ENCOUNTER — OFFICE VISIT (OUTPATIENT)
Dept: INTERNAL MEDICINE | Facility: CLINIC | Age: 48
End: 2018-11-06
Payer: MEDICAID

## 2018-11-06 VITALS
WEIGHT: 212.75 LBS | BODY MASS INDEX: 36.32 KG/M2 | HEART RATE: 74 BPM | DIASTOLIC BLOOD PRESSURE: 70 MMHG | SYSTOLIC BLOOD PRESSURE: 134 MMHG | OXYGEN SATURATION: 98 % | HEIGHT: 64 IN

## 2018-11-06 DIAGNOSIS — R06.83 SNORING: Primary | ICD-10-CM

## 2018-11-06 DIAGNOSIS — I10 ESSENTIAL HYPERTENSION: ICD-10-CM

## 2018-11-06 DIAGNOSIS — F32.9 REACTIVE DEPRESSION: ICD-10-CM

## 2018-11-06 DIAGNOSIS — G47.33 OSA (OBSTRUCTIVE SLEEP APNEA): ICD-10-CM

## 2018-11-06 DIAGNOSIS — F41.9 ANXIETY: ICD-10-CM

## 2018-11-06 PROCEDURE — 99999 PR PBB SHADOW E&M-EST. PATIENT-LVL IV: CPT | Mod: PBBFAC,,, | Performed by: INTERNAL MEDICINE

## 2018-11-06 PROCEDURE — 99213 OFFICE O/P EST LOW 20 MIN: CPT | Mod: S$PBB,,, | Performed by: INTERNAL MEDICINE

## 2018-11-06 PROCEDURE — 99214 OFFICE O/P EST MOD 30 MIN: CPT | Mod: PBBFAC | Performed by: INTERNAL MEDICINE

## 2018-11-06 RX ORDER — LOSARTAN POTASSIUM 100 MG/1
100 TABLET ORAL DAILY
Qty: 90 TABLET | Refills: 3 | Status: SHIPPED | OUTPATIENT
Start: 2018-11-06 | End: 2019-06-18

## 2018-11-06 RX ORDER — AMLODIPINE BESYLATE 10 MG/1
10 TABLET ORAL DAILY
Qty: 90 TABLET | Refills: 3 | Status: SHIPPED | OUTPATIENT
Start: 2018-11-06 | End: 2019-05-10 | Stop reason: SDUPTHER

## 2018-11-06 RX ORDER — DULOXETIN HYDROCHLORIDE 60 MG/1
120 CAPSULE, DELAYED RELEASE ORAL DAILY
Qty: 180 CAPSULE | Refills: 3 | Status: SHIPPED | OUTPATIENT
Start: 2018-11-06 | End: 2019-11-22 | Stop reason: SDUPTHER

## 2018-11-06 RX ORDER — CARVEDILOL 12.5 MG/1
12.5 TABLET ORAL 2 TIMES DAILY WITH MEALS
Qty: 180 TABLET | Refills: 3 | Status: SHIPPED | OUTPATIENT
Start: 2018-11-06 | End: 2019-06-18 | Stop reason: SDUPTHER

## 2018-11-06 RX ORDER — SPIRONOLACTONE 25 MG/1
25 TABLET ORAL DAILY
Qty: 90 TABLET | Refills: 3 | Status: SHIPPED | OUTPATIENT
Start: 2018-11-06 | End: 2019-05-10 | Stop reason: SDUPTHER

## 2018-11-06 NOTE — PROGRESS NOTES
[facility]  CLINIC NOTE    Patient Name: Calos Rios  YOB: 1970    PRESENTING HISTORY       History of Present Illness:  Ms. Calos Rios is a 47 y.o. female w/ significant PMHx of hypertension, depression, migraines, insomnia presented today for follow up her ongoing medical problems. For past two months patient has been doing well. She continues to take her medications and blood pressure is controlled. Patient sleep about 5 hrs per day and takes nap everyday around 2PM for about an hour. Patient hasn't experience migraine in past two months and control with current medication regiment. Patient was under stress recently because her daughter attempted suicide by overdosing on her psych medication. Patient denied fever, chill, SOB, CP, cough, N/V, dizziness, HA and depression.           Review of Systems   Constitutional: Negative for chills, fever, malaise/fatigue and weight loss.   HENT: Negative for congestion and sore throat.    Eyes: Negative for blurred vision.   Respiratory: Negative for cough.    Cardiovascular: Negative for chest pain.   Gastrointestinal: Negative for abdominal pain, nausea and vomiting.   Genitourinary: Negative for dysuria.   Musculoskeletal: Negative for myalgias.   Neurological: Negative for dizziness and headaches.   Psychiatric/Behavioral: Negative for depression.       PAST HISTORY:     Past Medical History:   Diagnosis Date    Hypertension     Migraine headache     Obesity        Past Surgical History:   Procedure Laterality Date    TUBAL LIGATION         Family History   Problem Relation Age of Onset    Heart disease Mother     Drug abuse Father     Heart disease Father     Kidney disease Father     Diabetes Maternal Uncle     Heart disease Maternal Grandmother     Ovarian cancer Maternal Aunt     Cancer Neg Hx        Social History     Socioeconomic History    Marital status: Single     Spouse name: None    Number of  children: None    Years of education: None    Highest education level: None   Social Needs    Financial resource strain: None    Food insecurity - worry: None    Food insecurity - inability: None    Transportation needs - medical: None    Transportation needs - non-medical: None   Occupational History     Employer: [employer]   Tobacco Use    Smoking status: Current Every Day Smoker    Smokeless tobacco: Never Used    Tobacco comment: quit for lent 2013; none past 8 weeks.   Substance and Sexual Activity    Alcohol use: Yes     Comment: scocially on weekends    Drug use: No    Sexual activity: Yes     Partners: Male   Other Topics Concern    None   Social History Narrative    None       MEDICATIONS & ALLERGIES:     Current Outpatient Medications on File Prior to Visit   Medication Sig    ondansetron (ZOFRAN) 4 MG tablet Take 1 tablet (4 mg total) by mouth every 6 (six) hours as needed.    sumatriptan (IMITREX) 100 MG tablet Take 1 tablet (100 mg total) by mouth every 2 (two) hours as needed (no more than 2 doses a day).    [DISCONTINUED] DULoxetine (CYMBALTA) 60 MG capsule Take 2 capsules (120 mg total) by mouth once daily.    [DISCONTINUED] omeprazole (PRILOSEC) 40 MG capsule Take 1 capsule (40 mg total) by mouth once daily.    [DISCONTINUED] spironolactone (ALDACTONE) 25 MG tablet Take 1 tablet (25 mg total) by mouth once daily.    [DISCONTINUED] amLODIPine (NORVASC) 10 MG tablet Take 1 tablet (10 mg total) by mouth once daily.    [DISCONTINUED] carvedilol (COREG) 12.5 MG tablet Take 1 tablet (12.5 mg total) by mouth 2 (two) times daily with meals.    [DISCONTINUED] losartan (COZAAR) 100 MG tablet Take 1 tablet (100 mg total) by mouth once daily.     No current facility-administered medications on file prior to visit.        Review of patient's allergies indicates:  No Known Allergies    OBJECTIVE:   Vital Signs:  Vitals:    11/06/18 1049   BP: 134/70   Pulse: 74   SpO2: 98%   Weight: 96.5  "kg (212 lb 11.9 oz)   Height: 5' 4" (1.626 m)       No results found for this or any previous visit (from the past 24 hour(s)).      Physical Exam   Constitutional: She is oriented to person, place, and time and well-developed, well-nourished, and in no distress. No distress.   HENT:   Head: Normocephalic and atraumatic.   Eyes: Conjunctivae and EOM are normal. Pupils are equal, round, and reactive to light. Left eye exhibits no discharge.   Neck: Normal range of motion. Neck supple.   Cardiovascular: Normal rate, regular rhythm, normal heart sounds and intact distal pulses.   Pulmonary/Chest: Effort normal and breath sounds normal.   Abdominal: Soft. Bowel sounds are normal.   Musculoskeletal: Normal range of motion.   Neurological: She is alert and oriented to person, place, and time.   Skin: Skin is warm and dry.       ASSESSMENT & PLAN:     Calos was seen today for follow-up.  Patient's medical condition is stable. Blood pressure is controlled under her current medication. Referral to Pulmonary for BURAK and Psychiatry for her depression to [facility].   Continue current medication and follow up in 6 month.     Diagnoses and all orders for this visit:    Snoring  -     Ambulatory consult to Pulmonology    BURAK (obstructive sleep apnea)  -     Ambulatory consult to Pulmonology    Reactive depression  -     Ambulatory consult to Psychiatry    Essential hypertension  -     carvedilol (COREG) 12.5 MG tablet; Take 1 tablet (12.5 mg total) by mouth 2 (two) times daily with meals.    Anxiety  -     DULoxetine (CYMBALTA) 60 MG capsule; Take 2 capsules (120 mg total) by mouth once daily.    Other orders  -     amLODIPine (NORVASC) 10 MG tablet; Take 1 tablet (10 mg total) by mouth once daily.  -     losartan (COZAAR) 100 MG tablet; Take 1 tablet (100 mg total) by mouth once daily.  -     spironolactone (ALDACTONE) 25 MG tablet; Take 1 tablet (25 mg total) by mouth once daily.            DOROTEO Hernandez MD  Internal Medicine " PGY-1

## 2018-11-07 NOTE — PROGRESS NOTES
"I have personally taken the history and examined this patient and agree with the resident's note as stated above with the following thoughts:  /70 (BP Location: Left arm, Patient Position: Sitting, BP Method: Large (Manual))   Pulse 74   Ht 5' 4" (1.626 m)   Wt 96.5 kg (212 lb 11.9 oz)   SpO2 98%   BMI 36.52 kg/m²     Discussed getting vaccines up to date.  Discussed importance of low fat diet and exercise     Will have her see Pulmonary at [facility] to work her up for BURAK. And will have her follow with Psych for depression.  Will refill meds.      "

## 2019-01-16 ENCOUNTER — HOSPITAL ENCOUNTER (EMERGENCY)
Facility: HOSPITAL | Age: 49
Discharge: HOME | End: 2019-01-16
Attending: EMERGENCY MEDICINE
Payer: MEDICAID

## 2019-01-16 VITALS
HEART RATE: 91 BPM | RESPIRATION RATE: 16 BRPM | HEIGHT: 64 IN | SYSTOLIC BLOOD PRESSURE: 192 MMHG | DIASTOLIC BLOOD PRESSURE: 116 MMHG | WEIGHT: 212 LBS | BODY MASS INDEX: 36.19 KG/M2 | OXYGEN SATURATION: 95 % | TEMPERATURE: 100 F

## 2019-01-16 DIAGNOSIS — R31.9 URINARY TRACT INFECTION WITH HEMATURIA, SITE UNSPECIFIED: ICD-10-CM

## 2019-01-16 DIAGNOSIS — N39.0 URINARY TRACT INFECTION WITH HEMATURIA, SITE UNSPECIFIED: ICD-10-CM

## 2019-01-16 DIAGNOSIS — J06.9 UPPER RESPIRATORY TRACT INFECTION, UNSPECIFIED TYPE: Primary | ICD-10-CM

## 2019-01-16 LAB
B-HCG UR QL: NEGATIVE
BACTERIA #/AREA URNS AUTO: ABNORMAL /HPF
BILIRUB UR QL STRIP: NEGATIVE
CLARITY UR REFRACT.AUTO: ABNORMAL
COLOR UR AUTO: ABNORMAL
CTP QC/QA: YES
GLUCOSE UR QL STRIP: NEGATIVE
HGB UR QL STRIP: ABNORMAL
HYALINE CASTS UR QL AUTO: 0 /LPF
KETONES UR QL STRIP: NEGATIVE
LEUKOCYTE ESTERASE UR QL STRIP: NEGATIVE
MICROSCOPIC COMMENT: ABNORMAL
NITRITE UR QL STRIP: POSITIVE
PH UR STRIP: 6 [PH] (ref 5–8)
PROT UR QL STRIP: ABNORMAL
RBC #/AREA URNS AUTO: >100 /HPF (ref 0–4)
SP GR UR STRIP: 1.02 (ref 1–1.03)
URN SPEC COLLECT METH UR: ABNORMAL
WBC #/AREA URNS AUTO: 5 /HPF (ref 0–5)

## 2019-01-16 PROCEDURE — 81001 URINALYSIS AUTO W/SCOPE: CPT

## 2019-01-16 PROCEDURE — 99283 EMERGENCY DEPT VISIT LOW MDM: CPT

## 2019-01-16 PROCEDURE — 25000003 PHARM REV CODE 250: Performed by: EMERGENCY MEDICINE

## 2019-01-16 PROCEDURE — 99284 EMERGENCY DEPT VISIT MOD MDM: CPT | Mod: ,,, | Performed by: EMERGENCY MEDICINE

## 2019-01-16 PROCEDURE — 99284 PR EMERGENCY DEPT VISIT,LEVEL IV: ICD-10-PCS | Mod: ,,, | Performed by: EMERGENCY MEDICINE

## 2019-01-16 PROCEDURE — 81025 URINE PREGNANCY TEST: CPT | Performed by: EMERGENCY MEDICINE

## 2019-01-16 RX ORDER — ACETAMINOPHEN 325 MG/1
650 TABLET ORAL
Status: COMPLETED | OUTPATIENT
Start: 2019-01-16 | End: 2019-01-16

## 2019-01-16 RX ORDER — NITROFURANTOIN 25; 75 MG/1; MG/1
100 CAPSULE ORAL 2 TIMES DAILY
Qty: 10 CAPSULE | Refills: 0 | Status: SHIPPED | OUTPATIENT
Start: 2019-01-16 | End: 2019-01-21

## 2019-01-16 RX ORDER — NITROFURANTOIN 25; 75 MG/1; MG/1
100 CAPSULE ORAL
Status: COMPLETED | OUTPATIENT
Start: 2019-01-16 | End: 2019-01-16

## 2019-01-16 RX ORDER — ONDANSETRON 8 MG/1
8 TABLET, ORALLY DISINTEGRATING ORAL
Status: COMPLETED | OUTPATIENT
Start: 2019-01-16 | End: 2019-01-16

## 2019-01-16 RX ADMIN — NITROFURANTOIN MONOHYDRATE/MACROCRYSTALLINE 100 MG: 25; 75 CAPSULE ORAL at 09:01

## 2019-01-16 RX ADMIN — ONDANSETRON 8 MG: 8 TABLET, ORALLY DISINTEGRATING ORAL at 07:01

## 2019-01-16 RX ADMIN — ACETAMINOPHEN 650 MG: 325 TABLET ORAL at 07:01

## 2019-01-17 NOTE — DISCHARGE INSTRUCTIONS
It appears to have a upper respiratory tract infection, most likely viral.  Urine did show signs of urinary infection.  You were given antibiotic to treat it.   Please follow up with her primary care doctor in 3-5 days if your symptoms are not completely resolved.     Return to the emergency department if you have any trouble breathing, elevated fevers, back pain, cannot tolerate food or liquids, or any other new, worsening or worrisome symptoms.

## 2019-01-17 NOTE — ED TRIAGE NOTES
Pt arrived to ED with CC of N/V, productive coughs, fever and chills x 3 days. Denies CP. Reports SOB on exertion x 2 days.     Patient identifiers verified and correct for Nichele White.    LOC: The patient is awake, alert and oriented x 4. Pt is speaking appropriately, no slurred speech.  APPEARANCE: Patient resting comfortably and in no acute distress. Pt is clean and well groomed. No JVD visible. Pt reports pain level of 0.  SKIN: Skin is warm dry and intact, and color is consistent with ethnicity. No tenting observed and capillary refill <3 seconds. No clubbing noted to nail beds. No breakdown or brusing visible and mucus membranes moist and acyanotic.  MUSCULOSKELETAL: Full range of motion present in all extremities. Hand  equal and leg strength strong +5 bilaterally.  RESPIRATORY: Airway is open and patent. Respirations-unlabored, regular rate, equal bilaterally on inspiration and expiration. No accessory muscle use noted. Lungs clear to auscultation in all fields bilaterally anterior and posterior. No productive coughs  CARDIAC: Patient has regular heart rate and rhythm.  No peripheral edema noted, and patient has no c/o chest pain.  ABDOMEN: Soft and non-tender to palpation with no distention noted. Normoactive bowel sounds X4 quadrants. Pt has no complaints of abnormal bowel movements. Pt reports normal appetite.   NEUROLOGIC: Eyes open spontaneously and facial expression symmetrical. Pt behavior appropriate to situation, and pt follows commands.  Pt reports sensation present in all extremities when touched with a finger. No s/s of ischemic stroke. PERRLA  : No complaints of frequency, burning, urgency or blood in the urine.

## 2019-01-17 NOTE — ED PROVIDER NOTES
Encounter Date: 1/16/2019    SCRIBE #1 NOTE: I, Walt Lepe, am scribing for, and in the presence of,  Dr. Castaneda. I have scribed the entire note.       History     Chief Complaint   Patient presents with    URI     looses urine when she coughs,  n/v, chest cold - onset 3 days.hasn't been able to keep down her meds , no HTN meds today    Nausea    Vomiting     48 y.o. female with Hx of bronchitis, hypertension, migranes present for evalution of URI with associated nausea and vomiting.  Patient endorses productive cough (yellow, fragoso color), headache, subjective wheezing and nasal congestion x 3 days but symptoms worsened last night.  Patient states her cough causes her to urinate slightly and she last vomited this morning saying it is hard to tolerate anything P/O.  Did not take her hypertension medications today, but she has been able to tolerate fluids before arrival to the ED.  She denies blood in her stool, throat pain, chest pain, abdominal pain and recent travel.  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  Patient is taking dayquil and nyquil (last taken 5AM).       The history is provided by the patient and medical records.     Review of patient's allergies indicates:  No Known Allergies  Past Medical History:   Diagnosis Date    Hypertension     Migraine headache     Obesity      Past Surgical History:   Procedure Laterality Date    TUBAL LIGATION       Family History   Problem Relation Age of Onset    Heart disease Mother     Drug abuse Father     Heart disease Father     Kidney disease Father     Diabetes Maternal Uncle     Heart disease Maternal Grandmother     Ovarian cancer Maternal Aunt     Cancer Neg Hx      Social History     Tobacco Use    Smoking status: Current Every Day Smoker    Smokeless tobacco: Never  Used    Tobacco comment: quit for lent 2013; none past 8 weeks.   Substance Use Topics    Alcohol use: Yes     Comment: scocially on weekends    Drug use: No     Review of Systems  Constitutional:  No Fever, No Chills,   Eyes: No Vision Changes  ENT/Mouth: No sore throat, No rhinorrhea. (+) Nasal congestion.    Cardiovascular:  No Chest Pain, No Palpitations  Respiratory:  (+) Productive Cough, No SOB. (+) Subjective Wheezing.  (+) Chest discomfort while coughing.    Gastrointestinal:  (+) Nausea, (+) Vomiting, (+) Diarrhea, No abdo pain. No blood in stool.    Genitourinary:  No  pain, No dysuria   Musculoskeletal:  No Arthralgias, No Back Pain, No Neck Pain, No recent trauma.  Skin:  No skin Lesions  Neuro:  No Weakness, No Numbness, No Paresthesias, No Dizziness, (+) Headache  No recent travel.        Physical Exam     Initial Vitals [01/16/19 1759]   BP Pulse Resp Temp SpO2   (!) 192/116 91 16 99.5 °F (37.5 °C) 95 %      MAP       --         Physical Exam    Nursing note and vitals reviewed.    Physical Exam:  GENERAL APPEARANCE: Well developed, well nourished, in no acute distress.  HENT: Normocephalic, atraumatic, mild erythema of throat.  No exudates or tonsillar swelling.  Nasal Congestion.        EYES: Sclerae anicteric   NECK: Supple, no thyroid enlargement.  Small cervical lymphadenopathy.    CARDIOVASCULAR: Regular rate and rhythm without any murmurs, gallops, rubs.  LUNGS: Speaking in full sentences. Breathing comfortably. Auscultation of the lungs revealed normal breath sounds b/l  ABDOMEN: Soft and nontender, no masses, no rebound or guarding   NEUROLOGIC: Alert, interacting normally. No facial droop.   MSK: Moving all four extremities.  Skin: Warm and dry. No visible rash on exposed areas of skin.    Psych: Mood and affect normal.       ED Course   Procedures  Labs Reviewed   URINALYSIS, REFLEX TO URINE CULTURE   POCT URINE PREGNANCY          Imaging Results    None          Medical Decision  Making:   History:   Old Medical Records: I decided to obtain old medical records.  Initial Assessment:   48 y.o. Female has history and exam consistent with viral syndrome and URI.    Clinical Tests:   Lab Tests: Ordered and Reviewed  ED Management:  We will give her Zofran P/O for nausea, tylenol for symptom control and will check urine due to history of UTI/frequency.    Flu testing of limited value given > 48 hours of symptoms.    Chest discomfort is only while coughing, URI symptoms, not consistent with ACS  Lungs clear on my exam, breathing comfortably, not consistent with pneumonia at this time.   Abdomen is benign, non-surgical. Given zofran, but is already tolerating PO at ED arrival.   Urine with nitrites, frequency is in the context of cough, most likely stress incontinence, however given nitrites, will treat with macrobid for possible concomitant UTI.     Patient is safe for outpatient management within 1 week if all symptoms not completely resolved. Advised to re-start HTN meds and follow up with PMD for elevated BP.     ED return instruction. Pt agrees with plan and verbalized understanding or return precautions.                   Scribe Attestation:   Scribe #1: I performed the above scribed service and the documentation accurately describes the services I performed. I attest to the accuracy of the note.               Clinical Impression:   There were no encounter diagnoses.                             Sancho Castaneda MD  01/18/19 [id number]

## 2019-05-10 ENCOUNTER — HOSPITAL ENCOUNTER (EMERGENCY)
Facility: HOSPITAL | Age: 49
Discharge: HOME | End: 2019-05-10
Attending: EMERGENCY MEDICINE
Payer: MEDICAID

## 2019-05-10 VITALS
SYSTOLIC BLOOD PRESSURE: 163 MMHG | BODY MASS INDEX: 35.85 KG/M2 | DIASTOLIC BLOOD PRESSURE: 108 MMHG | TEMPERATURE: 98 F | HEART RATE: 70 BPM | HEIGHT: 64 IN | RESPIRATION RATE: 16 BRPM | OXYGEN SATURATION: 98 % | WEIGHT: 210 LBS

## 2019-05-10 DIAGNOSIS — G89.29 CHRONIC ABDOMINAL PAIN: ICD-10-CM

## 2019-05-10 DIAGNOSIS — R10.9 CHRONIC ABDOMINAL PAIN: ICD-10-CM

## 2019-05-10 DIAGNOSIS — I10 HYPERTENSION, UNSPECIFIED TYPE: Primary | ICD-10-CM

## 2019-05-10 DIAGNOSIS — Z91.148 NONCOMPLIANCE WITH MEDICATIONS: ICD-10-CM

## 2019-05-10 LAB
B-HCG UR QL: NEGATIVE
BACTERIA #/AREA URNS AUTO: ABNORMAL /HPF
BILIRUB UR QL STRIP: NEGATIVE
CLARITY UR REFRACT.AUTO: ABNORMAL
COLOR UR AUTO: YELLOW
CTP QC/QA: YES
GLUCOSE UR QL STRIP: NEGATIVE
HGB UR QL STRIP: ABNORMAL
HYALINE CASTS UR QL AUTO: 0 /LPF
KETONES UR QL STRIP: NEGATIVE
LEUKOCYTE ESTERASE UR QL STRIP: ABNORMAL
MICROSCOPIC COMMENT: ABNORMAL
NITRITE UR QL STRIP: NEGATIVE
PH UR STRIP: 6 [PH] (ref 5–8)
PROT UR QL STRIP: ABNORMAL
RBC #/AREA URNS AUTO: 39 /HPF (ref 0–4)
SP GR UR STRIP: 1.02 (ref 1–1.03)
SQUAMOUS #/AREA URNS AUTO: 17 /HPF
URN SPEC COLLECT METH UR: ABNORMAL
WBC #/AREA URNS AUTO: 21 /HPF (ref 0–5)

## 2019-05-10 PROCEDURE — 25000003 PHARM REV CODE 250: Performed by: PHYSICIAN ASSISTANT

## 2019-05-10 PROCEDURE — 81025 URINE PREGNANCY TEST: CPT | Performed by: EMERGENCY MEDICINE

## 2019-05-10 PROCEDURE — 99284 EMERGENCY DEPT VISIT MOD MDM: CPT

## 2019-05-10 PROCEDURE — 81001 URINALYSIS AUTO W/SCOPE: CPT

## 2019-05-10 PROCEDURE — 25000003 PHARM REV CODE 250: Performed by: EMERGENCY MEDICINE

## 2019-05-10 PROCEDURE — 87086 URINE CULTURE/COLONY COUNT: CPT

## 2019-05-10 PROCEDURE — 99284 PR EMERGENCY DEPT VISIT,LEVEL IV: ICD-10-PCS | Mod: ,,, | Performed by: PHYSICIAN ASSISTANT

## 2019-05-10 PROCEDURE — 99284 EMERGENCY DEPT VISIT MOD MDM: CPT | Mod: ,,, | Performed by: PHYSICIAN ASSISTANT

## 2019-05-10 RX ORDER — SPIRONOLACTONE 25 MG/1
25 TABLET ORAL DAILY
Qty: 30 TABLET | Refills: 0 | Status: SHIPPED | OUTPATIENT
Start: 2019-05-10 | End: 2019-06-18 | Stop reason: SDUPTHER

## 2019-05-10 RX ORDER — SPIRONOLACTONE 25 MG/1
25 TABLET ORAL
Status: COMPLETED | OUTPATIENT
Start: 2019-05-10 | End: 2019-05-10

## 2019-05-10 RX ORDER — AMLODIPINE BESYLATE 10 MG/1
10 TABLET ORAL DAILY
Qty: 30 TABLET | Refills: 0 | Status: SHIPPED | OUTPATIENT
Start: 2019-05-10 | End: 2019-06-18 | Stop reason: SDUPTHER

## 2019-05-10 RX ORDER — AMLODIPINE BESYLATE 10 MG/1
10 TABLET ORAL DAILY
Status: DISCONTINUED | OUTPATIENT
Start: 2019-05-10 | End: 2019-05-10

## 2019-05-10 RX ORDER — AMLODIPINE BESYLATE 10 MG/1
10 TABLET ORAL ONCE
Status: COMPLETED | OUTPATIENT
Start: 2019-05-10 | End: 2019-05-10

## 2019-05-10 RX ADMIN — AMLODIPINE BESYLATE 10 MG: 10 TABLET ORAL at 01:05

## 2019-05-10 RX ADMIN — SPIRONOLACTONE 25 MG: 25 TABLET, FILM COATED ORAL at 01:05

## 2019-05-10 NOTE — ED PROVIDER NOTES
Encounter Date: 5/10/2019       History     Chief Complaint   Patient presents with    Abdominal Pain     Pt states that she was in the office to see her OB Gyn for abd pain and was found to have high BP.  Pt states she got upset because her doctor was not in.  Pt denies CP.  Pt states that she has taken her BP med today.     12:48 PM  Patient is a 48-year-old female with a history of HTN, obesity, migraines presents the ED with elevated blood pressure.  Patient states that she had her suprapubic cramping pain earlier this week that was severe and wanted to see her OBGYN (Fab lopes Prytania) today.  Patient showed up to his office and was notified that he was not in clinic.  Patient became tearful.  They recommend that she report to the ED for elevated blood pressure.  Patient reports compliance to her Coreg.  She does not take amlodipine because she ran out.  She has never started spironolactone.  She was told to discontinue losartan by [facility].  Patient denies any headache, dizziness, chest pain, cough, shortness of breath, current abdominal pain, vaginal bleeding, dysuria, frequency, hematuria.  Her last menstrual cycle was around Easter.  Patient states that she has been having her intermittent suprapubic cramping for 3-4 years (chronic) mainly before and after her menses. She was told by her OBGYN that she needed a hysterectomy.        Review of patient's allergies indicates:  No Known Allergies  Past Medical History:   Diagnosis Date    Hypertension     Migraine headache     Obesity      Past Surgical History:   Procedure Laterality Date    TUBAL LIGATION       Family History   Problem Relation Age of Onset    Heart disease Mother     Drug abuse Father     Heart disease Father     Kidney disease Father     Diabetes Maternal Uncle     Heart disease Maternal Grandmother     Ovarian cancer Maternal Aunt     Cancer Neg Hx      Social History     Tobacco Use    Smoking status: Current Every Day  Smoker    Smokeless tobacco: Never Used    Tobacco comment: quit for lent 2013; none past 8 weeks.   Substance Use Topics    Alcohol use: Yes     Comment: scocially on weekends    Drug use: No     Review of Systems   Constitutional: Negative for chills and fever.   HENT: Negative for congestion, facial swelling and sore throat.    Respiratory: Negative for cough and shortness of breath.    Cardiovascular: Negative for chest pain.   Gastrointestinal: Positive for abdominal pain (none currently). Negative for nausea.   Genitourinary: Negative for dysuria, frequency, urgency, vaginal bleeding and vaginal discharge.   Musculoskeletal: Negative for back pain.   Skin: Negative for rash.   Neurological: Negative for dizziness, weakness and headaches.   Hematological: Does not bruise/bleed easily.       Physical Exam     Initial Vitals [05/10/19 1208]   BP Pulse Resp Temp SpO2   (!) 207/92 82 16 98.1 °F (36.7 °C) 98 %      MAP       --         Physical Exam    Nursing note and vitals reviewed.  Constitutional: She appears well-developed and well-nourished. She is not diaphoretic. No distress.   HENT:   Head: Normocephalic and atraumatic.   Nose: Nose normal.   Eyes: Conjunctivae and EOM are normal.   Neck: Normal range of motion.   Cardiovascular: Normal rate, regular rhythm and normal heart sounds. Exam reveals no friction rub.    No murmur heard.  Pulmonary/Chest: Breath sounds normal. No respiratory distress. She has no wheezes. She has no rales.   Abdominal: Soft. Bowel sounds are normal. She exhibits no distension and no mass. There is no tenderness. There is no rigidity, no rebound, no guarding, no CVA tenderness and no tenderness at McBurney's point.   Musculoskeletal: Normal range of motion.   Neurological: She is alert and oriented to person, place, and time. She has normal strength. No sensory deficit.   Skin: Skin is warm and dry. No erythema. No pallor.   Psychiatric: She has a normal mood and affect. Her  behavior is normal. Judgment and thought content normal.         ED Course   Procedures  Labs Reviewed   URINALYSIS, REFLEX TO URINE CULTURE - Abnormal; Notable for the following components:       Result Value    Appearance, UA Hazy (*)     Protein, UA 1+ (*)     Occult Blood UA 2+ (*)     Leukocytes, UA Trace (*)     All other components within normal limits    Narrative:     Preferred Collection Type->Urine, Clean Catch   URINALYSIS MICROSCOPIC - Abnormal; Notable for the following components:    RBC, UA 39 (*)     WBC, UA 21 (*)     Bacteria Few (*)     All other components within normal limits    Narrative:     Preferred Collection Type->Urine, Clean Catch   CULTURE, URINE   POCT URINE PREGNANCY          Imaging Results    None          Medical Decision Making:   History:   Old Medical Records: I decided to obtain old medical records.  Old Records Summarized: records from clinic visits.       <> Summary of Records: Patient was prescribed amlodipine, losartan, and spironolactone in January of 2019 by PCP.  Initial Assessment:   Patient is a 48-year-old female that presents the ED with elevated blood pressure.  She reports compliance to Coreg.  She does not take losartan anymore due to discontinuation.  She ran out of amlodipine and has never picked up spironolactone.  Differential Diagnosis:   Includes but is not limited to a symptomatic hypertension, stress reaction, white coat hypertension, premenstrual syndrome, dysfyunctional uterine bleeding, UTI.  Clinical Tests:   Lab Tests: Ordered and Reviewed  ED Management:  Patient has been having her suprapubic abdominal cramping for 3-4 years.  She follows up with OBGYN and was recommended that she needed a hysterectomy.  She is not here for her chronic abdominal pains.  She does not have any active pains, but states that they were bad last Monday, 4 days ago, which prompted her to see her OBGYN.  Her abdomen is soft without peritoneal signs. She does not need any  labs or imaging. I suspect that her chronic abdominal pain is premenstrual syndrome given that she will suffer from it after and before her menses. However, will defer further workup to Dr. CHRIS Sanon, her OBGYN.    BP in triage was 207/92 mmHg.  She denies any symptoms at this time. Review of last BMP with normal kidney function.  According to ACEP, there are no indications to acutely lower blood pressure in asymptomatic hypertensive patients.  Patient had her Coreg this a.m..  Will give amlodipine and spironolacton here, refill prescriptions for amlodipine and spironolactone, check UA to rule out UTI, and reassess.    UPT negative.  UA with 39 RBCs, 21 WBCs, and 17 squams. Patient without urinary symptoms. Urine culture pending. Will not treat at this time.     Patient updated with her results. Repeat BP of 163/108 mmHg prior to administration of to blood pressure medications here.  She is still asymptomatic.  She was updated with plan.  I will refill patient's amlodipine and spironolactone.  She is to closely follow up with her PCP and her OBGYN.  All questions were answered.  Patient is comfortable with plan and is stable for discharge.    I have reviewed patient's chart and discussed this case with my supervising MD.     Court Clarke PA-C  Emergent Department  [facility]  Spectralink #[id number] or #[id number]                        Clinical Impression:       ICD-10-CM ICD-9-CM   1. Hypertension, unspecified type I10 401.9   2. Noncompliance with medications Z91.14 V15.81   3. Chronic abdominal pain R10.9 789.00    G89.29 338.29         Disposition:   Disposition: Discharged  Condition: Stable                        Court Clarke PA-C  05/10/19 2224

## 2019-05-10 NOTE — ED TRIAGE NOTES
Having lower abd pain and elevated BP- onset since mid April. Worse this last week.  Having  difficulty holding down food/ liquids due to pain and nausea. Denies blood in stool/ emesis , denies dysuria or fever.

## 2019-05-10 NOTE — DISCHARGE INSTRUCTIONS
Take your high blood pressure medication. Consider lifestyle changes such as weight loss, physical activity, and healthy diet to further lower your blood pressure.  Document a blood pressure log and bring it to your primary care physician for further evaluation and blood pressure control.  Return to the emergency department for signs and symptoms of a headache, lightheadedness, chest pain, shortness of breath, or any concerning signs or symptoms.    Future Appointments   Date Time [facility]   6/18/2019 10:40 AM Adria Doll Jr., MD [facility] [place]       Our goal in the emergency department is to always give you outstanding care and exceptional service. You may receive a survey by mail or e-mail in the next week regarding your experience in our ED. We would greatly appreciate your completing and returning the survey. Your feedback provides us with a way to recognize our staff who give very good care and it helps us learn how to improve when your experience was below our aspiration of excellence.

## 2019-05-11 LAB — BACTERIA UR CULT: NORMAL

## 2019-06-18 ENCOUNTER — OFFICE VISIT (OUTPATIENT)
Dept: INTERNAL MEDICINE | Facility: CLINIC | Age: 49
End: 2019-06-18
Payer: MEDICAID

## 2019-06-18 DIAGNOSIS — I10 ESSENTIAL HYPERTENSION: ICD-10-CM

## 2019-06-18 DIAGNOSIS — R06.83 SNORING: Primary | ICD-10-CM

## 2019-06-18 PROCEDURE — 99999 PR PBB SHADOW E&M-EST. PATIENT-LVL IV: ICD-10-PCS | Mod: PBBFAC,,, | Performed by: INTERNAL MEDICINE

## 2019-06-18 PROCEDURE — 99214 PR OFFICE/OUTPT VISIT, EST, LEVL IV, 30-39 MIN: ICD-10-PCS | Mod: S$PBB,,, | Performed by: INTERNAL MEDICINE

## 2019-06-18 PROCEDURE — 99999 PR PBB SHADOW E&M-EST. PATIENT-LVL IV: CPT | Mod: PBBFAC,,, | Performed by: INTERNAL MEDICINE

## 2019-06-18 PROCEDURE — 99214 OFFICE O/P EST MOD 30 MIN: CPT | Mod: PBBFAC | Performed by: INTERNAL MEDICINE

## 2019-06-18 PROCEDURE — 99214 OFFICE O/P EST MOD 30 MIN: CPT | Mod: S$PBB,,, | Performed by: INTERNAL MEDICINE

## 2019-06-18 RX ORDER — AMLODIPINE BESYLATE 10 MG/1
10 TABLET ORAL DAILY
Qty: 90 TABLET | Refills: 3 | Status: SHIPPED | OUTPATIENT
Start: 2019-06-18 | End: 2019-06-18 | Stop reason: SDUPTHER

## 2019-06-18 RX ORDER — SPIRONOLACTONE 25 MG/1
25 TABLET ORAL DAILY
Qty: 90 TABLET | Refills: 3 | Status: SHIPPED | OUTPATIENT
Start: 2019-06-18 | End: 2019-06-18 | Stop reason: SDUPTHER

## 2019-06-18 RX ORDER — VALSARTAN AND HYDROCHLOROTHIAZIDE 160; 12.5 MG/1; MG/1
1 TABLET, FILM COATED ORAL DAILY
Qty: 90 TABLET | Refills: 3 | Status: SHIPPED | OUTPATIENT
Start: 2019-06-18 | End: 2021-01-12 | Stop reason: SDUPTHER

## 2019-06-18 RX ORDER — CARVEDILOL 12.5 MG/1
12.5 TABLET ORAL 2 TIMES DAILY WITH MEALS
Qty: 180 TABLET | Refills: 3 | Status: SHIPPED | OUTPATIENT
Start: 2019-06-18 | End: 2019-06-18 | Stop reason: SDUPTHER

## 2019-06-18 RX ORDER — CIPROFLOXACIN 500 MG/1
500 TABLET ORAL 2 TIMES DAILY
Qty: 10 TABLET | Refills: 0 | Status: SHIPPED | OUTPATIENT
Start: 2019-06-18 | End: 2019-06-23

## 2019-06-18 RX ORDER — CARVEDILOL 12.5 MG/1
12.5 TABLET ORAL 2 TIMES DAILY WITH MEALS
Qty: 180 TABLET | Refills: 3 | Status: SHIPPED | OUTPATIENT
Start: 2019-06-18 | End: 2021-01-12 | Stop reason: SDUPTHER

## 2019-06-18 RX ORDER — AMLODIPINE BESYLATE 10 MG/1
10 TABLET ORAL DAILY
Qty: 90 TABLET | Refills: 3 | Status: SHIPPED | OUTPATIENT
Start: 2019-06-18 | End: 2019-10-15 | Stop reason: SDUPTHER

## 2019-06-18 RX ORDER — SPIRONOLACTONE 25 MG/1
25 TABLET ORAL DAILY
Qty: 90 TABLET | Refills: 3 | Status: SHIPPED | OUTPATIENT
Start: 2019-06-18 | End: 2021-01-12 | Stop reason: SDUPTHER

## 2019-06-22 VITALS
HEART RATE: 81 BPM | OXYGEN SATURATION: 92 % | DIASTOLIC BLOOD PRESSURE: 100 MMHG | WEIGHT: 214.94 LBS | SYSTOLIC BLOOD PRESSURE: 160 MMHG | BODY MASS INDEX: 36.7 KG/M2 | HEIGHT: 64 IN

## 2019-06-22 NOTE — PROGRESS NOTES
She is a 48-year-old female coming in today to follow up her hypertension and   other medical problems.  I last saw her back in November 2018.  She has   hypertension for which she is supposed to be on Coreg 12.5 twice a day, but she   only takes it once a day, amlodipine 10 mg a day.  Reviewing my records, she has   not had enough to last this long.  She had last refills back in 2018 in May.    Losartan, which she is off of, and spironolactone once a day.  She was seen on   10th of the last month in the Emergency Room with hypertension at that time   blood pressure was much worse.  It was 207/92.  She does have obstructive sleep   apnea.  She is supposed to be seeing [facility] for her CPAP, but she is   unsure about this.  She denies any headaches or chest pain today.  No nausea,   vomiting, palpitations.  No PND or orthopnea.  Today, we discussed her   medications with her.  It looks like she got off to losartan because she got   recalled even though her particular brand or setting did not necessarily get   recall, she just decided to stop it.    PHYSICAL EXAMINATION:  GENERAL:  She is much older appearing 48-year-old, in no acute distress.  VITAL SIGNS:  Her blood pressure on recheck is 160/100, pulse of 81, temperature   is 97.5.  She is 5 feet 4 inches, weighing 98 kg.  NECK:  Supple.  HEENT:  Pupils are equal, round, reactive to light and accommodation.  CHEST:  Clear without wheeze.  CARDIOVASCULAR:  S1 and S2, regular rate and rhythm without murmur, gallop or   rub.  ABDOMEN:  Soft, obese, nontender, no hepatosplenomegaly, no guarding or rebound   tenderness.  LOWER EXTREMITIES:  No edema.    ASSESSMENT:  Hypertension, uncontrolled.  Today, we discussed in great detail   that she needs to take her medicines more appropriately and not get off   medicines or take twice a day medicines once a day and that she has increased   risk of stroke and heart attack because of her hypertension.  We will bring her    back to the unit in a couple of months to recheck the blood pressure, but first   we are going to start her Coreg 12.5 twice a day, refill her Aldactone and will   switch her to Diovan and hydrochlorothiazide since she cannot take any losartan   and hydrochlorothiazide and refill the amlodipine that she has probably been in   and out for a couple of weeks, but she still says she has on, but it has been   about 50 days and she only had 30 pills so that    work out, so we discussed   this.  We also discussed possibly getting a pill container or something to help   her keep track the medications.  She is snoring, which makes me worry about   obstructive sleep apnea, so we would like to get her set up to see [facility]   [facility] and to be tested for obstructive sleep apnea.  We discussed the role   this may have in her hypertension.  We will see her back again in a couple of   months.  I gave her a 90-day refill with multiple refills, so she should not be   running out of medication.        LUIS MANUEL  dd: 06/22/2019 08:57:12 (CDT)  td: 06/22/2019 16:28:13 (CDT)  Doc ID   #[id number]  Job ID #[id number]    CC:

## 2019-07-19 ENCOUNTER — TELEPHONE (OUTPATIENT)
Dept: INTERNAL MEDICINE | Facility: CLINIC | Age: 49
End: 2019-07-19

## 2019-07-19 NOTE — TELEPHONE ENCOUNTER
----- Message from Jasmine Thompson sent at 7/19/2019 12:35 PM CDT -----  Contact: Patient [phone]  Pt states that she is calling to speak with someone in your office. She states that she has a UTI and wants to know if she can get something sent over to her pharmacy over on [facility]/pharmacy #[id number] - [place], [place] - [address] Please call back and advise.      Thanks

## 2019-07-22 NOTE — TELEPHONE ENCOUNTER
----- Message from Karen Thompson sent at 7/22/2019 12:35 PM CDT -----  Contact: SELF/ [phone]  Patient is returning a phone call.  Who left a message for the patient: Kaela Medina MA      Does patient know what this is regarding:  Rx for a UTI  Comments: Please leave a message if no answer.

## 2019-07-24 RX ORDER — CIPROFLOXACIN 500 MG/1
500 TABLET ORAL 2 TIMES DAILY
Qty: 10 TABLET | Refills: 0 | OUTPATIENT
Start: 2019-07-24 | End: 2019-07-29

## 2019-08-23 ENCOUNTER — TELEPHONE (OUTPATIENT)
Dept: INTERNAL MEDICINE | Facility: CLINIC | Age: 49
End: 2019-08-23

## 2019-08-23 DIAGNOSIS — N39.0 URINARY TRACT INFECTION WITHOUT HEMATURIA, SITE UNSPECIFIED: Primary | ICD-10-CM

## 2019-08-23 NOTE — TELEPHONE ENCOUNTER
----- Message from Maggie Gallagher sent at 8/23/2019  3:51 PM CDT -----  Contact: self/[phone]  .1 Patient would like to get medical advice.  Symptoms (please be specific): Possible UTI  How long has patient had these symptoms: Possible a month  Pharmacy name and phone#: [id number] - [place], [place] - [address] -[phone] (Phone) [phone] (Fax)  Any drug allergies:  Comments: Patient stated that she called about a month ago for the same issue but didn't get a courtesy call back. Patient would like to get medical advice.

## 2019-08-23 NOTE — TELEPHONE ENCOUNTER
informed pt that an appt maybe needed, pt would like an RX sent in. Stated she would like diflucan    Possible uti for x1 month with smelly urine

## 2019-08-30 ENCOUNTER — LAB VISIT (OUTPATIENT)
Dept: LAB | Facility: HOSPITAL | Age: 49
End: 2019-08-30
Attending: INTERNAL MEDICINE
Payer: MEDICAID

## 2019-08-30 DIAGNOSIS — N39.0 URINARY TRACT INFECTION WITHOUT HEMATURIA, SITE UNSPECIFIED: ICD-10-CM

## 2019-08-30 LAB
BACTERIA #/AREA URNS AUTO: ABNORMAL /HPF
BILIRUB UR QL STRIP: NEGATIVE
CLARITY UR REFRACT.AUTO: ABNORMAL
COLOR UR AUTO: YELLOW
GLUCOSE UR QL STRIP: NEGATIVE
HGB UR QL STRIP: ABNORMAL
KETONES UR QL STRIP: NEGATIVE
LEUKOCYTE ESTERASE UR QL STRIP: ABNORMAL
MICROSCOPIC COMMENT: ABNORMAL
NITRITE UR QL STRIP: NEGATIVE
PH UR STRIP: 7 [PH] (ref 5–8)
PROT UR QL STRIP: NEGATIVE
RBC #/AREA URNS AUTO: 19 /HPF (ref 0–4)
SP GR UR STRIP: 1.01 (ref 1–1.03)
SQUAMOUS #/AREA URNS AUTO: 9 /HPF
URN SPEC COLLECT METH UR: ABNORMAL
WBC #/AREA URNS AUTO: 9 /HPF (ref 0–5)

## 2019-08-30 PROCEDURE — 81001 URINALYSIS AUTO W/SCOPE: CPT

## 2019-10-15 ENCOUNTER — OFFICE VISIT (OUTPATIENT)
Dept: INTERNAL MEDICINE | Facility: CLINIC | Age: 49
End: 2019-10-15
Payer: MEDICAID

## 2019-10-15 ENCOUNTER — IMMUNIZATION (OUTPATIENT)
Dept: PHARMACY | Facility: CLINIC | Age: 49
End: 2019-10-15
Payer: MEDICAID

## 2019-10-15 VITALS
SYSTOLIC BLOOD PRESSURE: 146 MMHG | OXYGEN SATURATION: 98 % | BODY MASS INDEX: 37.34 KG/M2 | WEIGHT: 218.69 LBS | HEIGHT: 64 IN | DIASTOLIC BLOOD PRESSURE: 100 MMHG | HEART RATE: 85 BPM

## 2019-10-15 DIAGNOSIS — I10 ESSENTIAL HYPERTENSION: Primary | ICD-10-CM

## 2019-10-15 DIAGNOSIS — G47.33 OSA (OBSTRUCTIVE SLEEP APNEA): ICD-10-CM

## 2019-10-15 PROCEDURE — 99999 PR PBB SHADOW E&M-EST. PATIENT-LVL III: ICD-10-PCS | Mod: PBBFAC,,, | Performed by: INTERNAL MEDICINE

## 2019-10-15 PROCEDURE — 99214 OFFICE O/P EST MOD 30 MIN: CPT | Mod: S$PBB,,, | Performed by: INTERNAL MEDICINE

## 2019-10-15 PROCEDURE — 99214 PR OFFICE/OUTPT VISIT, EST, LEVL IV, 30-39 MIN: ICD-10-PCS | Mod: S$PBB,,, | Performed by: INTERNAL MEDICINE

## 2019-10-15 PROCEDURE — 99213 OFFICE O/P EST LOW 20 MIN: CPT | Mod: PBBFAC | Performed by: INTERNAL MEDICINE

## 2019-10-15 PROCEDURE — 99999 PR PBB SHADOW E&M-EST. PATIENT-LVL III: CPT | Mod: PBBFAC,,, | Performed by: INTERNAL MEDICINE

## 2019-10-15 RX ORDER — AMLODIPINE BESYLATE 10 MG/1
10 TABLET ORAL DAILY
Qty: 90 TABLET | Refills: 3 | Status: SHIPPED | OUTPATIENT
Start: 2019-10-15 | End: 2021-01-12 | Stop reason: SDUPTHER

## 2019-10-27 NOTE — PROGRESS NOTES
"Is a 48-year-old female comes in today to follow-up for hypertension.  She is currently taking valsartan hct. , carvedilol, and spironolactone.  When I saw back in June she was out of some of medications and she also needed her losartan changed to valsartan.  She is tolerating the valsartan very well.  Her blood pressure is 146/100 today.  She says the blood pressure has been high recently.  We went over her medicine was she tells me she is not taking  her amlodipine.  She does not remember why she stopped taking it.  Last visit she was off of her losartan because she has decided to stop taking.  At that visit she was on her amlodipine, but her blood pressure still elevated.  She occasionally has headaches, but no nausea and vomiting.  She denies any other problems today.  I reviewed my note from last time and it is mentioned that she was following up with [facility] for her CPAP.  She has not done.  this yet.    PHYSICAL EXAMINATION:  GENERAL:  She is much older appearing 48-year-old, in no acute distress.  VITAL SIGNS:  BP (!) 146/100 (BP Location: Right arm, Patient Position: Sitting, BP Method: Large (Manual))   Pulse 85   Ht 5' 4" (1.626 m)   Wt 99.2 kg (218 lb 11.1 oz)   SpO2 98%   BMI 37.54 kg/m²   NECK:  Supple.  HEENT:  Pupils are equal, round, reactive to light and accommodation.  CHEST:  Clear without wheeze.  CARDIOVASCULAR:  S1 and S2, regular rate and rhythm without murmur, gallop or   rub.  ABDOMEN:  Soft, obese, nontender, no hepatosplenomegaly, no guarding or rebound   tenderness.  LOWER EXTREMITIES:  No edema.     ASSESSMENT:  Hypertension, uncontrolled.   continue on her current medication.  Also osseous important that she does follow-up with [facility] and get her CPAP set up for her obstructive sleep apnea.  I am going to refill her amlodipine.  Today we discussed that she needs to be on the valsartan hydrochlorothiazide, spironolactone, the amlodipine, and carvedilol.  We discussed " importance of blood pressure control in helping reduce the risk of death, stroke, and/ or heart attacks

## 2019-11-08 ENCOUNTER — TELEPHONE (OUTPATIENT)
Dept: INTERNAL MEDICINE | Facility: CLINIC | Age: 49
End: 2019-11-08

## 2019-11-08 NOTE — TELEPHONE ENCOUNTER
----- Message from Hina To sent at 11/8/2019 11:00 AM CST -----  Contact: Patient [phone]  Patient is calling for an RX refill or new RX.  Is this a refill or new RX:  new  RX name and strength:   Directions (copy/paste from chart):    Is this a 30 day or 90 day RX:    Local pharmacy or mail order pharmacy:  local  Pharmacy name and phone # (copy/paste from chart): [id number] [place] [facility] [address] -[phone] (Phone)  [phone] (Fax)      Comments:  Requesting a Zpac. Patient is coughing and sneezing. Has been taking things OTC but not working. Requesting something to her pharmacy asap.

## 2019-11-09 ENCOUNTER — OFFICE VISIT (OUTPATIENT)
Dept: INTERNAL MEDICINE | Facility: CLINIC | Age: 49
End: 2019-11-09
Payer: MEDICAID

## 2019-11-09 VITALS
BODY MASS INDEX: 36.47 KG/M2 | OXYGEN SATURATION: 99 % | HEIGHT: 64 IN | SYSTOLIC BLOOD PRESSURE: 156 MMHG | DIASTOLIC BLOOD PRESSURE: 100 MMHG | HEART RATE: 79 BPM | WEIGHT: 213.63 LBS

## 2019-11-09 DIAGNOSIS — J20.9 BRONCHITIS WITH BRONCHOSPASM: Primary | ICD-10-CM

## 2019-11-09 PROCEDURE — 99213 OFFICE O/P EST LOW 20 MIN: CPT | Mod: PBBFAC | Performed by: INTERNAL MEDICINE

## 2019-11-09 PROCEDURE — 99214 OFFICE O/P EST MOD 30 MIN: CPT | Mod: S$PBB,,, | Performed by: INTERNAL MEDICINE

## 2019-11-09 PROCEDURE — 99999 PR PBB SHADOW E&M-EST. PATIENT-LVL III: CPT | Mod: PBBFAC,,, | Performed by: INTERNAL MEDICINE

## 2019-11-09 PROCEDURE — 99999 PR PBB SHADOW E&M-EST. PATIENT-LVL III: ICD-10-PCS | Mod: PBBFAC,,, | Performed by: INTERNAL MEDICINE

## 2019-11-09 PROCEDURE — 99214 PR OFFICE/OUTPT VISIT, EST, LEVL IV, 30-39 MIN: ICD-10-PCS | Mod: S$PBB,,, | Performed by: INTERNAL MEDICINE

## 2019-11-09 PROCEDURE — 94640 AIRWAY INHALATION TREATMENT: CPT | Mod: PBBFAC

## 2019-11-09 RX ORDER — ALBUTEROL SULFATE 0.83 MG/ML
2.5 SOLUTION RESPIRATORY (INHALATION)
Status: COMPLETED | OUTPATIENT
Start: 2019-11-09 | End: 2019-11-09

## 2019-11-09 RX ORDER — LEVOFLOXACIN 500 MG/1
500 TABLET, FILM COATED ORAL DAILY
Qty: 10 TABLET | Refills: 0 | Status: SHIPPED | OUTPATIENT
Start: 2019-11-09 | End: 2019-11-19

## 2019-11-09 RX ORDER — ALBUTEROL SULFATE 90 UG/1
2 AEROSOL, METERED RESPIRATORY (INHALATION) EVERY 6 HOURS PRN
Qty: 1 EACH | Refills: 11 | Status: SHIPPED | OUTPATIENT
Start: 2019-11-09 | End: 2021-06-01 | Stop reason: SDUPTHER

## 2019-11-09 RX ADMIN — ALBUTEROL SULFATE 2.5 MG: 2.5 SOLUTION INTRABRONCHIAL at 08:11

## 2019-11-09 NOTE — PROGRESS NOTES
"Subjective:       Patient ID: Calos Rios is a 48 y.o. female.    Chief Complaint: URI (since wed)    48 year old lady with "cold" and wheezing for 2 days.  Sputum is thick and yellow    Review of Systems   Constitutional: Negative for activity change, chills, fatigue and fever.   HENT: Negative for congestion, ear pain, nosebleeds, postnasal drip, sinus pressure and sore throat.    Eyes: Negative.  Negative for visual disturbance.   Respiratory: Negative for cough, chest tightness, shortness of breath and wheezing.    Cardiovascular: Negative for chest pain.   Gastrointestinal: Negative for abdominal pain, diarrhea, nausea and vomiting.   Genitourinary: Negative for difficulty urinating, dysuria, frequency and urgency.   Musculoskeletal: Negative for arthralgias and neck stiffness.   Skin: Negative for rash.   Neurological: Negative for dizziness, weakness and headaches.   Psychiatric/Behavioral: Negative for sleep disturbance. The patient is not nervous/anxious.        Objective:      Physical Exam   Constitutional: She is oriented to person, place, and time. She appears well-developed and well-nourished.  Non-toxic appearance. No distress.   HENT:   Head: Normocephalic and atraumatic.   Right Ear: Tympanic membrane, external ear and ear canal normal.   Left Ear: Tympanic membrane, external ear and ear canal normal.   Eyes: Pupils are equal, round, and reactive to light. EOM are normal. No scleral icterus.   Neck: Normal range of motion. Neck supple. No thyromegaly present.   Cardiovascular: Normal rate, regular rhythm and normal heart sounds.   Pulmonary/Chest: Effort normal. She has decreased breath sounds in the right lower field and the left lower field. She has wheezes in the right upper field, the right middle field, the right lower field, the left upper field, the left middle field and the left lower field. She has no rhonchi. She has no rales.           Abdominal: Soft. Bowel sounds are normal. She " exhibits no mass. There is no tenderness. There is no rebound.   Musculoskeletal: Normal range of motion.   Lymphadenopathy:     She has no cervical adenopathy.   Neurological: She is alert and oriented to person, place, and time. She has normal reflexes. She displays normal reflexes. No cranial nerve deficit. She exhibits normal muscle tone. Coordination normal.   Skin: Skin is warm and dry.   Psychiatric: She has a normal mood and affect. Her behavior is normal.       Assessment:       1. Bronchitis with bronchospasm        Plan:   Calos was seen today for uri.    Diagnoses and all orders for this visit:    Bronchitis with bronchospasm    Other orders  -     albuterol nebulizer solution 2.5 mg  -     albuterol (PROVENTIL/VENTOLIN HFA) 90 mcg/actuation inhaler; Inhale 2 puffs into the lungs every 6 (six) hours as needed for Wheezing.  -     levoFLOXacin (LEVAQUIN) 500 MG tablet; Take 1 tablet (500 mg total) by mouth once daily. for 10 days

## 2019-11-09 NOTE — TELEPHONE ENCOUNTER
----- Message from Avi Martinez sent at 11/8/2019  4:31 PM CST -----  Contact: Patient [phone]  Patient would like to get medical advice.  Symptoms (please be specific):  Read below   How long has patient had these symptoms:  yesterday  Pharmacy name and phone #:  [id number] - [place], [place] - [address] -[phone] (Phone)  [phone] (Fax    Comments: congested, wheezing, eyes and nose irritation, patient calling would like to know if Rx can be sent to local pharmacy above, please call prior to weekend to inform has been sent.    Please call an advise  Thank you

## 2019-11-19 ENCOUNTER — OFFICE VISIT (OUTPATIENT)
Dept: INTERNAL MEDICINE | Facility: CLINIC | Age: 49
End: 2019-11-19
Payer: MEDICAID

## 2019-11-19 VITALS
DIASTOLIC BLOOD PRESSURE: 72 MMHG | OXYGEN SATURATION: 99 % | WEIGHT: 211 LBS | HEIGHT: 64 IN | HEART RATE: 66 BPM | SYSTOLIC BLOOD PRESSURE: 100 MMHG | BODY MASS INDEX: 36.02 KG/M2

## 2019-11-19 DIAGNOSIS — R06.83 SNORING: ICD-10-CM

## 2019-11-19 DIAGNOSIS — I10 ESSENTIAL HYPERTENSION: Primary | ICD-10-CM

## 2019-11-19 DIAGNOSIS — E66.9 OBESITY, UNSPECIFIED CLASSIFICATION, UNSPECIFIED OBESITY TYPE, UNSPECIFIED WHETHER SERIOUS COMORBIDITY PRESENT: ICD-10-CM

## 2019-11-19 PROCEDURE — 99999 PR PBB SHADOW E&M-EST. PATIENT-LVL III: ICD-10-PCS | Mod: PBBFAC,,, | Performed by: INTERNAL MEDICINE

## 2019-11-19 PROCEDURE — 99213 OFFICE O/P EST LOW 20 MIN: CPT | Mod: S$PBB,,, | Performed by: INTERNAL MEDICINE

## 2019-11-19 PROCEDURE — 99213 PR OFFICE/OUTPT VISIT, EST, LEVL III, 20-29 MIN: ICD-10-PCS | Mod: S$PBB,,, | Performed by: INTERNAL MEDICINE

## 2019-11-19 PROCEDURE — 99213 OFFICE O/P EST LOW 20 MIN: CPT | Mod: PBBFAC | Performed by: INTERNAL MEDICINE

## 2019-11-19 PROCEDURE — 99999 PR PBB SHADOW E&M-EST. PATIENT-LVL III: CPT | Mod: PBBFAC,,, | Performed by: INTERNAL MEDICINE

## 2019-11-22 DIAGNOSIS — F41.9 ANXIETY: ICD-10-CM

## 2019-11-22 RX ORDER — DULOXETIN HYDROCHLORIDE 60 MG/1
120 CAPSULE, DELAYED RELEASE ORAL DAILY
Qty: 60 CAPSULE | Refills: 11 | Status: SHIPPED | OUTPATIENT
Start: 2019-11-22 | End: 2020-12-01 | Stop reason: SDUPTHER

## 2019-12-31 NOTE — PROGRESS NOTES
Is a 48-year-old female comes in today to follow-up for hypertension.  She is currently taking valsartan hct., amlodipine  , carvedilol, and spironolactone.    last visit her blood pressure was still elevated.  .  We restarted her  amlodipine that she was out of.  Currently she says she is back on all her medications and tolerating well.  Her blood pressure day his 100/72 She occasionally has headaches, but no nausea and vomiting.  She denies any other problems today.  I reviewed my note from last time and it is mentioned that she was following up with [facility] for her CPAP.  She has not done.  this yet.       ROS : Gen - no fatigue or significant weight change  Eyes - no eye pain or visual changes  ENT - no hoarseness or sore throat  CV - No chest pain or SOB.  NO palpitations.  Pulm - no cough or wheezing  GI - no N/V/D   no dysuria or incontinence  MS - no joint pain or muscle pain  Skin - no rash, or c/o of skin lesions  Neuro - no HA, dizziness--- memory is doing well.   Heme - no abnormal bleeding or bruising  Endo - no polydipsia, or temperature changes  Psych - no anxiety or depression      PHYSICAL EXAMINATION:  GENERAL:  She is much older appearing 49-year-old, in no acute distress.  VITAL SIGNS:  .vws  HEENT:  Pupils are equal, round, reactive to light and accommodation.  CHEST:  Clear without wheeze.  CARDIOVASCULAR:  S1 and S2, regular rate and rhythm without murmur, gallop or   rub.  ABDOMEN:  Soft, obese, nontender, no hepatosplenomegaly, no guarding or rebound   tenderness.  LOWER EXTREMITIES:  No edema.     ASSESSMENT:  Hypertension, much better controlled.  We discussed low-fat, low-salt diet.  She should get a sleep study to rule out sleep apnea contributing to her hypertension.  She needs to stay on all her medications.  If she runs out of medication she needs to let me know we refill it over the phone.  Also she has had a little trouble with compliance in the past list make sure to  reconcile her medicine  list every time she comes in.  Last visit she says she is on amlodipine but after further questioning she is off amlodipine and that made all the difference.  I will see her back in about 4-6 months.

## 2020-02-16 ENCOUNTER — HOSPITAL ENCOUNTER (EMERGENCY)
Facility: HOSPITAL | Age: 50
Discharge: HOME | End: 2020-02-16
Attending: EMERGENCY MEDICINE
Payer: MEDICAID

## 2020-02-16 VITALS
RESPIRATION RATE: 15 BRPM | BODY MASS INDEX: 37.22 KG/M2 | TEMPERATURE: 98 F | WEIGHT: 218 LBS | OXYGEN SATURATION: 99 % | HEART RATE: 58 BPM | HEIGHT: 64 IN | SYSTOLIC BLOOD PRESSURE: 105 MMHG | DIASTOLIC BLOOD PRESSURE: 63 MMHG

## 2020-02-16 DIAGNOSIS — J11.1 INFLUENZA: Primary | ICD-10-CM

## 2020-02-16 DIAGNOSIS — I95.9 HYPOTENSION: ICD-10-CM

## 2020-02-16 LAB
ALBUMIN SERPL BCP-MCNC: 2.7 G/DL (ref 3.5–5.2)
ALP SERPL-CCNC: 43 U/L (ref 55–135)
ALT SERPL W/O P-5'-P-CCNC: 16 U/L (ref 10–44)
ANION GAP SERPL CALC-SCNC: 9 MMOL/L (ref 8–16)
AST SERPL-CCNC: 22 U/L (ref 10–40)
B-HCG UR QL: NEGATIVE
BACTERIA #/AREA URNS AUTO: NORMAL /HPF
BASOPHILS # BLD AUTO: 0.02 K/UL (ref 0–0.2)
BASOPHILS NFR BLD: 0.4 % (ref 0–1.9)
BILIRUB SERPL-MCNC: 0.2 MG/DL (ref 0.1–1)
BILIRUB UR QL STRIP: NEGATIVE
BNP SERPL-MCNC: <10 PG/ML (ref 0–99)
BUN SERPL-MCNC: 10 MG/DL (ref 6–20)
CALCIUM SERPL-MCNC: 8.2 MG/DL (ref 8.7–10.5)
CHLORIDE SERPL-SCNC: 106 MMOL/L (ref 95–110)
CK SERPL-CCNC: 211 U/L (ref 20–180)
CLARITY UR REFRACT.AUTO: ABNORMAL
CO2 SERPL-SCNC: 22 MMOL/L (ref 23–29)
COLOR UR AUTO: YELLOW
CREAT SERPL-MCNC: 1.4 MG/DL (ref 0.5–1.4)
CTP QC/QA: YES
DIFFERENTIAL METHOD: ABNORMAL
EOSINOPHIL # BLD AUTO: 0.2 K/UL (ref 0–0.5)
EOSINOPHIL NFR BLD: 3.7 % (ref 0–8)
ERYTHROCYTE [DISTWIDTH] IN BLOOD BY AUTOMATED COUNT: 15.2 % (ref 11.5–14.5)
EST. GFR  (AFRICAN AMERICAN): 51.3 ML/MIN/1.73 M^2
EST. GFR  (NON AFRICAN AMERICAN): 44.5 ML/MIN/1.73 M^2
GLUCOSE SERPL-MCNC: 92 MG/DL (ref 70–110)
GLUCOSE UR QL STRIP: NEGATIVE
HCT VFR BLD AUTO: 42.6 % (ref 37–48.5)
HGB BLD-MCNC: 13.5 G/DL (ref 12–16)
HGB UR QL STRIP: ABNORMAL
IMM GRANULOCYTES # BLD AUTO: 0.01 K/UL (ref 0–0.04)
IMM GRANULOCYTES NFR BLD AUTO: 0.2 % (ref 0–0.5)
INFLUENZA A, MOLECULAR: POSITIVE
INFLUENZA B, MOLECULAR: NEGATIVE
KETONES UR QL STRIP: NEGATIVE
LACTATE SERPL-SCNC: 0.6 MMOL/L (ref 0.5–2.2)
LACTATE SERPL-SCNC: 0.6 MMOL/L (ref 0.5–2.2)
LEUKOCYTE ESTERASE UR QL STRIP: ABNORMAL
LYMPHOCYTES # BLD AUTO: 1.9 K/UL (ref 1–4.8)
LYMPHOCYTES NFR BLD: 35.4 % (ref 18–48)
MAGNESIUM SERPL-MCNC: 2.1 MG/DL (ref 1.6–2.6)
MCH RBC QN AUTO: 31.1 PG (ref 27–31)
MCHC RBC AUTO-ENTMCNC: 31.7 G/DL (ref 32–36)
MCV RBC AUTO: 98 FL (ref 82–98)
MICROSCOPIC COMMENT: NORMAL
MONOCYTES # BLD AUTO: 0.5 K/UL (ref 0.3–1)
MONOCYTES NFR BLD: 9.9 % (ref 4–15)
NEUTROPHILS # BLD AUTO: 2.7 K/UL (ref 1.8–7.7)
NEUTROPHILS NFR BLD: 50.4 % (ref 38–73)
NITRITE UR QL STRIP: NEGATIVE
NRBC BLD-RTO: 0 /100 WBC
PH UR STRIP: 6 [PH] (ref 5–8)
PHOSPHATE SERPL-MCNC: 3.9 MG/DL (ref 2.7–4.5)
PLATELET # BLD AUTO: 210 K/UL (ref 150–350)
PMV BLD AUTO: 11.3 FL (ref 9.2–12.9)
POCT GLUCOSE: 107 MG/DL (ref 70–110)
POTASSIUM SERPL-SCNC: 4.2 MMOL/L (ref 3.5–5.1)
PROCALCITONIN SERPL IA-MCNC: 0.05 NG/ML
PROT SERPL-MCNC: 6.4 G/DL (ref 6–8.4)
PROT UR QL STRIP: NEGATIVE
RBC # BLD AUTO: 4.34 M/UL (ref 4–5.4)
RBC #/AREA URNS AUTO: 1 /HPF (ref 0–4)
SODIUM SERPL-SCNC: 137 MMOL/L (ref 136–145)
SP GR UR STRIP: 1.02 (ref 1–1.03)
SPECIMEN SOURCE: ABNORMAL
SQUAMOUS #/AREA URNS AUTO: 2 /HPF
TROPONIN I SERPL DL<=0.01 NG/ML-MCNC: <0.006 NG/ML (ref 0–0.03)
URN SPEC COLLECT METH UR: ABNORMAL
WBC # BLD AUTO: 5.36 K/UL (ref 3.9–12.7)
WBC #/AREA URNS AUTO: 2 /HPF (ref 0–5)

## 2020-02-16 PROCEDURE — 85025 COMPLETE CBC W/AUTO DIFF WBC: CPT

## 2020-02-16 PROCEDURE — 83880 ASSAY OF NATRIURETIC PEPTIDE: CPT

## 2020-02-16 PROCEDURE — 93010 EKG 12-LEAD: ICD-10-PCS | Mod: ,,, | Performed by: INTERNAL MEDICINE

## 2020-02-16 PROCEDURE — 96375 TX/PRO/DX INJ NEW DRUG ADDON: CPT

## 2020-02-16 PROCEDURE — 25000003 PHARM REV CODE 250: Performed by: EMERGENCY MEDICINE

## 2020-02-16 PROCEDURE — 87502 INFLUENZA DNA AMP PROBE: CPT

## 2020-02-16 PROCEDURE — 99291 PR CRITICAL CARE, E/M 30-74 MINUTES: ICD-10-PCS | Mod: ,,, | Performed by: EMERGENCY MEDICINE

## 2020-02-16 PROCEDURE — 81001 URINALYSIS AUTO W/SCOPE: CPT

## 2020-02-16 PROCEDURE — 96361 HYDRATE IV INFUSION ADD-ON: CPT

## 2020-02-16 PROCEDURE — 83735 ASSAY OF MAGNESIUM: CPT

## 2020-02-16 PROCEDURE — 96365 THER/PROPH/DIAG IV INF INIT: CPT

## 2020-02-16 PROCEDURE — 99291 CRITICAL CARE FIRST HOUR: CPT | Mod: 25

## 2020-02-16 PROCEDURE — 82550 ASSAY OF CK (CPK): CPT

## 2020-02-16 PROCEDURE — 63600175 PHARM REV CODE 636 W HCPCS: Performed by: EMERGENCY MEDICINE

## 2020-02-16 PROCEDURE — 84100 ASSAY OF PHOSPHORUS: CPT

## 2020-02-16 PROCEDURE — 81025 URINE PREGNANCY TEST: CPT | Performed by: EMERGENCY MEDICINE

## 2020-02-16 PROCEDURE — 96366 THER/PROPH/DIAG IV INF ADDON: CPT

## 2020-02-16 PROCEDURE — 83605 ASSAY OF LACTIC ACID: CPT | Mod: 91

## 2020-02-16 PROCEDURE — 99291 CRITICAL CARE FIRST HOUR: CPT | Mod: ,,, | Performed by: EMERGENCY MEDICINE

## 2020-02-16 PROCEDURE — 84484 ASSAY OF TROPONIN QUANT: CPT

## 2020-02-16 PROCEDURE — 80053 COMPREHEN METABOLIC PANEL: CPT

## 2020-02-16 PROCEDURE — 93010 ELECTROCARDIOGRAM REPORT: CPT | Mod: ,,, | Performed by: INTERNAL MEDICINE

## 2020-02-16 PROCEDURE — 96367 TX/PROPH/DG ADDL SEQ IV INF: CPT

## 2020-02-16 PROCEDURE — 87040 BLOOD CULTURE FOR BACTERIA: CPT | Mod: 59

## 2020-02-16 PROCEDURE — 84145 PROCALCITONIN (PCT): CPT

## 2020-02-16 PROCEDURE — 82962 GLUCOSE BLOOD TEST: CPT

## 2020-02-16 PROCEDURE — 93005 ELECTROCARDIOGRAM TRACING: CPT

## 2020-02-16 RX ORDER — OSELTAMIVIR PHOSPHATE 75 MG/1
75 CAPSULE ORAL
Status: COMPLETED | OUTPATIENT
Start: 2020-02-16 | End: 2020-02-16

## 2020-02-16 RX ORDER — ONDANSETRON 4 MG/1
4 TABLET, ORALLY DISINTEGRATING ORAL EVERY 6 HOURS PRN
Qty: 12 TABLET | Refills: 0 | Status: SHIPPED | OUTPATIENT
Start: 2020-02-16 | End: 2021-01-12

## 2020-02-16 RX ORDER — OSELTAMIVIR PHOSPHATE 75 MG/1
75 CAPSULE ORAL 2 TIMES DAILY
Qty: 10 CAPSULE | Refills: 0 | Status: SHIPPED | OUTPATIENT
Start: 2020-02-16 | End: 2020-02-21

## 2020-02-16 RX ORDER — KETOROLAC TROMETHAMINE 30 MG/ML
15 INJECTION, SOLUTION INTRAMUSCULAR; INTRAVENOUS
Status: COMPLETED | OUTPATIENT
Start: 2020-02-16 | End: 2020-02-16

## 2020-02-16 RX ORDER — IBUPROFEN 600 MG/1
600 TABLET ORAL EVERY 6 HOURS PRN
Qty: 20 TABLET | Refills: 0 | Status: SHIPPED | OUTPATIENT
Start: 2020-02-16

## 2020-02-16 RX ADMIN — AZITHROMYCIN MONOHYDRATE 250 MG: 500 INJECTION, POWDER, LYOPHILIZED, FOR SOLUTION INTRAVENOUS at 08:02

## 2020-02-16 RX ADMIN — OSELTAMIVIR PHOSPHATE 75 MG: 75 CAPSULE ORAL at 08:02

## 2020-02-16 RX ADMIN — SODIUM CHLORIDE 2967 ML: 0.9 INJECTION, SOLUTION INTRAVENOUS at 07:02

## 2020-02-16 RX ADMIN — CEFTRIAXONE 2 G: 2 INJECTION, SOLUTION INTRAVENOUS at 07:02

## 2020-02-16 RX ADMIN — KETOROLAC TROMETHAMINE 15 MG: 30 INJECTION, SOLUTION INTRAMUSCULAR; INTRAVENOUS at 09:02

## 2020-02-16 NOTE — ED NOTES
The patient is awake, alert and cooperative with a calm affect, patient is aware of environment. Airway is open and patent, respirations are spontaneous, normal respiratory effort and rate noted, skin warm and dry, moves all extremities well, appearance: no apparent distress noted. Pt states she feels a lot better. Pt escorted by wheelchair to parking garage.

## 2020-02-16 NOTE — DISCHARGE INSTRUCTIONS
Drink plenty of fluids.  Ibuprofen as needed for pain.  Zofran as needed for nausea.  Return to the emergency department for any worsening weakness, dehydration, fever, or other concerning symptoms.

## 2020-02-16 NOTE — ED TRIAGE NOTES
Calos Rios, a 49 y.o. female presents to the ED w/ complaint of flu like symtoms x 3 days with acute onset SB this morning. PT stated that a few other people have tested positive for the flu at her job. PT denies chest pain and n/v/d.    Triage note:  Chief Complaint   Patient presents with    Shortness of Breath     Pt c/o SOB, hx of asthma with no relief from rescue inhaler. Pt also c/o productive cough associated with stress urinary incontinence. Pt also c/o light headed and body aches     Review of patient's allergies indicates:  No Known Allergies  Past Medical History:   Diagnosis Date    Hypertension     Migraine headache     Obesity      Adult Physical Assessment  LOC: Calos Rios, 49 y.o. female verified via two identifiers.  The patient is awake, alert, oriented and speaking appropriately at this time.  APPEARANCE: Patient resting comfortably and appears to be in no acute distress at this time. Patient is clean and well groomed, patient's clothing is properly fastened.  SKIN:The skin is warm and dry, color consistent with ethnicity, patient has normal skin turgor and moist mucus membranes, skin intact, no breakdown or brusing noted.  MUSCULOSKELETAL: Patient moving all extremities well, no obvious swelling or deformities noted.  RESPIRATORY: Airway is open and patent, respirations are spontaneous, patient has a normal effort and rate, no accessory muscle use noted.  CARDIAC: Patient has a normal rate and rhythm, no periphreal edema noted in any extremity, capillary refill < 3 seconds in all extremities  ABDOMEN: Soft and non tender to palpation, no abdominal distention noted. Bowel sounds present in all four quadrants.  NEUROLOGIC: Eyes open spontaneously, behavior appropriate to situation, follows commands, facial expression symmetrical, bilateral hand grasp equal and even, purposeful motor response noted, normal sensation in all extremities when touched with a finger.

## 2020-02-16 NOTE — ED PROVIDER NOTES
Encounter Date: 2/16/2020       History     Chief Complaint   Patient presents with    Shortness of Breath     Pt c/o SOB, hx of asthma with no relief from rescue inhaler. Pt also c/o productive cough associated with stress urinary incontinence. Pt also c/o light headed and body aches     50 yo W with pmhx migraines, HTN, obesity, asthma presents with a chief complaint of URI symptoms. Patient reports worsening cough for the past 5 days.  Associated with chest pain but none at the time.  Also shortness of breath with dyspnea on exertion.  Patient has tried her rescue inhaler without relief of symptoms. Since onset of symptoms, she has not had an appetite and has not eaten much.  Patient reports myalgias over the past 48 hrs.  Patient has had sick contacts at work.  This morning patient felt lightheaded and diaphoretic.  No black or bloody stools.  Three weeks ago, patient got off a 6 hour bus ride to [place].  No history of VTE.        Review of patient's allergies indicates:  No Known Allergies  Past Medical History:   Diagnosis Date    Hypertension     Migraine headache     Obesity      Past Surgical History:   Procedure Laterality Date    TUBAL LIGATION       Family History   Problem Relation Age of Onset    Heart disease Mother     Drug abuse Father     Heart disease Father     Kidney disease Father     Diabetes Maternal Uncle     Heart disease Maternal Grandmother     Ovarian cancer Maternal Aunt     Cancer Neg Hx      Social History     Tobacco Use    Smoking status: Current Every Day Smoker    Smokeless tobacco: Never Used    Tobacco comment: quit for lent 2013; none past 8 weeks.   Substance Use Topics    Alcohol use: Yes     Comment: scocially on weekends    Drug use: No     Review of Systems   Constitutional: Positive for activity change, chills, diaphoresis and fatigue. Negative for fever.   HENT: Positive for postnasal drip. Negative for congestion.    Eyes: Negative for discharge.    Respiratory: Positive for cough and shortness of breath.    Cardiovascular: Positive for chest pain.   Gastrointestinal: Negative for abdominal pain.   Endocrine: Negative for polyuria.   Genitourinary: Negative for dysuria.   Musculoskeletal: Negative for back pain.   Skin: Negative for wound.   Allergic/Immunologic: Negative for immunocompromised state.   Neurological: Negative for headaches.   Hematological: Does not bruise/bleed easily.   Psychiatric/Behavioral: Negative for confusion.       Physical Exam     Initial Vitals [02/16/20 0629]   BP Pulse Resp Temp SpO2   (!) 81/56 84 16 98.2 °F (36.8 °C) 95 %      MAP       --         Physical Exam    Nursing note and vitals reviewed.  Constitutional: She appears well-developed and well-nourished. She is diaphoretic. She appears distressed.   HENT:   Head: Normocephalic and atraumatic.   Dry mucous membranes   Eyes: EOM are normal. Right eye exhibits no discharge. Left eye exhibits no discharge. No scleral icterus.   Neck: Normal range of motion. Neck supple. No JVD present.   Cardiovascular: Normal rate, regular rhythm, normal heart sounds and intact distal pulses. Exam reveals no gallop and no friction rub.    No murmur heard.  Pulmonary/Chest: Breath sounds normal. No respiratory distress. She has no wheezes. She has no rhonchi. She has no rales. She exhibits no tenderness.   Rare cough noted   Abdominal: Soft. Bowel sounds are normal. She exhibits no distension and no mass. There is no tenderness. There is no rebound and no guarding.   Musculoskeletal: Normal range of motion. She exhibits no edema or tenderness.   Neurological: She is alert and oriented to person, place, and time. She has normal strength. No sensory deficit.   Skin: Capillary refill takes more than 3 seconds.   Cool, diaphoretic   Psychiatric: She has a normal mood and affect.         ED Course   Procedures  Labs Reviewed   INFLUENZA A & B BY MOLECULAR - Abnormal; Notable for the following  components:       Result Value    Influenza A, Molecular Positive (*)     All other components within normal limits   CBC W/ AUTO DIFFERENTIAL - Abnormal; Notable for the following components:    Mean Corpuscular Hemoglobin 31.1 (*)     Mean Corpuscular Hemoglobin Conc 31.7 (*)     RDW 15.2 (*)     All other components within normal limits   URINALYSIS, REFLEX TO URINE CULTURE - Abnormal; Notable for the following components:    Appearance, UA Hazy (*)     Occult Blood UA 3+ (*)     Leukocytes, UA 2+ (*)     All other components within normal limits    Narrative:     Preferred Collection Type->Urine, Clean Catch   COMPREHENSIVE METABOLIC PANEL - Abnormal; Notable for the following components:    CO2 22 (*)     Calcium 8.2 (*)     Albumin 2.7 (*)     Alkaline Phosphatase 43 (*)     eGFR if  51.3 (*)     eGFR if non  44.5 (*)     All other components within normal limits   CK - Abnormal; Notable for the following components:     (*)     All other components within normal limits    Narrative:     CPK added per Dr. Manuel, order ID [id number] 02/16/20 10:39   CULTURE, BLOOD   CULTURE, BLOOD   B-TYPE NATRIURETIC PEPTIDE   TROPONIN I   PROCALCITONIN   MAGNESIUM   PHOSPHORUS   LACTIC ACID, PLASMA   URINALYSIS MICROSCOPIC    Narrative:     Preferred Collection Type->Urine, Clean Catch   LACTIC ACID, PLASMA   POCT URINE PREGNANCY   POCT GLUCOSE        ECG Results          EKG 12-lead (In process)  Result time 02/16/20 09:24:32    In process by Interface, Lab In [facility] (02/16/20 09:24:32)                 Narrative:    Test Reason : I95.9,    Vent. Rate : 070 BPM     Atrial Rate : 070 BPM     P-R Int : 152 ms          QRS Dur : 080 ms      QT Int : 408 ms       P-R-T Axes : 028 065 021 degrees     QTc Int : 440 ms    Normal sinus rhythm  Normal ECG  When compared with ECG of 08-SEP-2014 10:32,  Questionable change in The axis    Referred By: [email or web address]   SELF           Confirmed By:                              Imaging Results          X-Ray Chest AP Portable (Final result)  Result time 02/16/20 07:32:57    Final result by Ger Ho MD (02/16/20 07:32:57)                 Impression:      No radiographic evidence of acute intrathoracic process.      Electronically signed by: Ger Ho MD  Date:    02/16/2020  Time:    07:32             Narrative:    EXAMINATION:  XR CHEST AP PORTABLE    CLINICAL HISTORY:  Sepsis;    TECHNIQUE:  Single frontal view of the chest was performed.    COMPARISON:  06/21/2018    FINDINGS:  Cardiac monitoring leads overlie the chest.  Cardiomediastinal silhouette is stable in size.  The lungs appear symmetrically expanded without evidence of confluent airspace consolidation or pleural effusion.  There is no evidence of pneumothorax.  Osseous structures demonstrate mild degenerative changes.                                 Medical Decision Making:   History:   I obtained history from: someone other than patient.  Old Medical Records: I decided to obtain old medical records.  Initial Assessment:   50 yo W with pmhx migraines, HTN, obesity, asthma presents with a chief complaint of URI symptoms. Patient noted to be profoundly hypotensive.  Differential Diagnosis:   Shock, sepsis, hemorrhagic shock, ruptured ectopic, cardiogenic shock, PE  Clinical Tests:   Lab Tests: Ordered  Radiological Study: Ordered  Medical Tests: Ordered  ED Management:  Patient was evaluated probably in resuscitation [place].  She is noted to be hypotensive.  Ultrasound-guided peripheral IVs were placed.  Will initiate investigation for sepsis with 30 cc/kg bolus of IV fluids, labs, cultures.  Empiric treatment for community-acquired pneumonia with ceftriaxone and azithromycin.    Bedside echo was negative for any apparent acute right heart strain.  No pericardial effusion.  There is a preserved ejection fraction.  There is significant hypertrophy of the left ventricle.    Reassessment:   Patient's blood pressure continues to trend up and is within normal limits. Influenza A is positive, will administer Tamiflu. Chest x-ray is negative for pneumonia, pneumothorax, or other acute process.    Reassessment: CMP with no significant abnormalities.  Lactic acid is 0.6.  Procalcitonin is 0.05.  CBC without leukocytosis or anemia.  Troponin and BNP are within normal limits.  On reassessment, patient appears improved and less diaphoretic but reports she still feels myalgias.  Will administer Toradol.  Patient has received 2 L IV fluids with only trace urine output.  Will add CPK.    Reassessment:  UA without evidence of infection.  There is 3+ blood and only 1 RBC.  CPK 200s.  Mild rhabdo secondary to influenza.  This likely improved after IV fluids.  Patient's blood pressure continues to trend up.    Reassessment:  Serial lactate 0.6.  Patient reports feeling improved.  On exam, she appears significantly improved.  Symptoms are overall consistent with influenza.  Patient's hypotension resolved with IV fluids.  Patient feels comfortable with discharge at this time.  Patient encouraged p.o. hydration.  Will discharge on course of NSAIDs, antiemetics, and course of Tamiflu.  Patient provided with extensive instructions for symptomatic treatment and return precautions.             Attending Attestation:         Attending Critical Care:   Critical Care Times:   Direct Patient Care (initial evaluation, reassessments, and time considering the case)................................................................25 minutes.   Additional History from reviewing old medical records or taking additional history from the family, EMS, PCP, etc.......................5 minutes.   Ordering, Reviewing, and Interpreting Diagnostic Studies...............................................................................................................5 minutes.    Documentation..................................................................................................................................................................................5 minutes.   ==============================================================  · Total Critical Care Time - exclusive of procedural time: 40 minutes.  ==============================================================  Critical care was necessary to treat or prevent imminent or life-threatening deterioration of the following conditions: hypotension.                             Clinical Impression:       ICD-10-CM ICD-9-CM   1. Influenza J11.1 487.1   2. Hypotension I95.9 458.9         Disposition:   Disposition: Discharged                     Yunier Manuel MD  02/16/20 1142       Yunier Manuel MD  02/16/20 1144

## 2020-02-21 LAB
BACTERIA BLD CULT: NORMAL
BACTERIA BLD CULT: NORMAL

## 2020-06-01 ENCOUNTER — TELEPHONE (OUTPATIENT)
Dept: INTERNAL MEDICINE | Facility: CLINIC | Age: 50
End: 2020-06-01

## 2020-06-01 NOTE — TELEPHONE ENCOUNTER
----- Message from Herminia Wolf sent at 6/1/2020 11:34 AM CDT -----  Contact: patient [phone]  Patient called with c/o a possible uti. She doesn't want an appt. Pt asked if you will send medication to her pharmacy. She c/o chills and fever over the weekend/ strong odor to her urine./ severe pain on r side of abdomen.    [id number] [place] [facility] [address] -[phone]

## 2020-06-03 ENCOUNTER — TELEPHONE (OUTPATIENT)
Dept: INTERNAL MEDICINE | Facility: CLINIC | Age: 50
End: 2020-06-03

## 2020-06-03 DIAGNOSIS — R30.0 DYSURIA: Primary | ICD-10-CM

## 2020-06-03 NOTE — TELEPHONE ENCOUNTER
----- Message from Denisse Harrison sent at 6/3/2020  8:07 AM CDT -----  Contact: self [phone]  Would like to get medical advice.  Symptoms (please be specific):  UTI with fever, abdominal pain, frequent urination and smelly urine  How long has patient had these symptoms:  5/30  Pharmacy name and phone #:  [id number] [place], [place] - [address] [phone] (Phone)  [phone] (Fax)  Any drug allergies (copy from chart):  See chart   Would the patient rather a call back or a response via [facility]?:  Call back  Comments:

## 2020-06-04 ENCOUNTER — LAB VISIT (OUTPATIENT)
Dept: LAB | Facility: HOSPITAL | Age: 50
End: 2020-06-04
Attending: INTERNAL MEDICINE
Payer: MEDICAID

## 2020-06-04 DIAGNOSIS — R30.0 DYSURIA: ICD-10-CM

## 2020-06-04 LAB
BACTERIA #/AREA URNS AUTO: ABNORMAL /HPF
BILIRUB UR QL STRIP: NEGATIVE
CLARITY UR REFRACT.AUTO: ABNORMAL
COLOR UR AUTO: ABNORMAL
GLUCOSE UR QL STRIP: NEGATIVE
HGB UR QL STRIP: ABNORMAL
HYALINE CASTS UR QL AUTO: 0 /LPF
KETONES UR QL STRIP: NEGATIVE
LEUKOCYTE ESTERASE UR QL STRIP: ABNORMAL
MICROSCOPIC COMMENT: ABNORMAL
NITRITE UR QL STRIP: NEGATIVE
PH UR STRIP: 6 [PH] (ref 5–8)
PROT UR QL STRIP: ABNORMAL
RBC #/AREA URNS AUTO: >100 /HPF (ref 0–4)
SP GR UR STRIP: 1.01 (ref 1–1.03)
SQUAMOUS #/AREA URNS AUTO: 2 /HPF
URN SPEC COLLECT METH UR: ABNORMAL
WBC #/AREA URNS AUTO: >100 /HPF (ref 0–5)
WBC CLUMPS UR QL AUTO: ABNORMAL

## 2020-06-04 PROCEDURE — 81001 URINALYSIS AUTO W/SCOPE: CPT

## 2020-06-05 ENCOUNTER — TELEPHONE (OUTPATIENT)
Dept: INTERNAL MEDICINE | Facility: CLINIC | Age: 50
End: 2020-06-05

## 2020-06-05 NOTE — TELEPHONE ENCOUNTER
----- Message from Cristóbal Chakraborty sent at 6/5/2020  9:28 AM CDT -----  Contact: Self   Patient state she missed a call from Dr. Doll office. Please call and advise.

## 2020-06-07 RX ORDER — CIPROFLOXACIN 500 MG/1
500 TABLET ORAL 2 TIMES DAILY
Qty: 14 TABLET | Refills: 0 | Status: SHIPPED | OUTPATIENT
Start: 2020-06-07 | End: 2020-06-14

## 2020-06-09 DIAGNOSIS — Z12.39 BREAST CANCER SCREENING: ICD-10-CM

## 2020-06-16 ENCOUNTER — TELEPHONE (OUTPATIENT)
Dept: INTERNAL MEDICINE | Facility: CLINIC | Age: 50
End: 2020-06-16

## 2020-10-29 ENCOUNTER — HOSPITAL ENCOUNTER (EMERGENCY)
Facility: HOSPITAL | Age: 50
Discharge: HOME | End: 2020-10-29
Attending: EMERGENCY MEDICINE
Payer: MEDICAID

## 2020-10-29 VITALS
RESPIRATION RATE: 18 BRPM | HEART RATE: 66 BPM | DIASTOLIC BLOOD PRESSURE: 113 MMHG | TEMPERATURE: 98 F | OXYGEN SATURATION: 95 % | SYSTOLIC BLOOD PRESSURE: 184 MMHG

## 2020-10-29 DIAGNOSIS — T20.10XA FACIAL BURN, FIRST DEGREE, INITIAL ENCOUNTER: Primary | ICD-10-CM

## 2020-10-29 PROCEDURE — 99284 EMERGENCY DEPT VISIT MOD MDM: CPT | Mod: 25,,, | Performed by: EMERGENCY MEDICINE

## 2020-10-29 PROCEDURE — 16000 INITIAL TREATMENT OF BURN(S): CPT | Mod: ,,, | Performed by: EMERGENCY MEDICINE

## 2020-10-29 PROCEDURE — 16000 PR INITIAL RX BURN(S) 1ST DEGREE: ICD-10-PCS | Mod: ,,, | Performed by: EMERGENCY MEDICINE

## 2020-10-29 PROCEDURE — 99283 EMERGENCY DEPT VISIT LOW MDM: CPT

## 2020-10-29 PROCEDURE — 25000003 PHARM REV CODE 250: Performed by: EMERGENCY MEDICINE

## 2020-10-29 PROCEDURE — 99284 PR EMERGENCY DEPT VISIT,LEVEL IV: ICD-10-PCS | Mod: 25,,, | Performed by: EMERGENCY MEDICINE

## 2020-10-29 RX ORDER — BACITRACIN ZINC 500 UNIT/G
1 OINTMENT (GRAM) TOPICAL
Status: DISCONTINUED | OUTPATIENT
Start: 2020-10-29 | End: 2020-10-29 | Stop reason: HOSPADM

## 2020-10-29 RX ADMIN — FLUORESCEIN SODIUM 1 EACH: 1 STRIP OPHTHALMIC at 07:10
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
## 2020-10-29 NOTE — Clinical Note
"Calos Muse" Gabriel was seen and treated in our emergency department on 10/29/2020.  She may return to work on 10/30/2020.       If you have any questions or concerns, please don't hesitate to call.      Flavia Guthrie MD"
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
## 2020-10-30 NOTE — ED NOTES
Patient was told by Flavia Guthrie MD that she can buy bacitracin over the counter. Patient was ok with that and stated that she had some at home

## 2020-10-30 NOTE — DISCHARGE INSTRUCTIONS
Apply bacitracin to the face twice a day for five days.     Our goal in the emergency department is to always give you outstanding care and exceptional service. You may receive a survey by mail or e-mail in the next week regarding your experience in our ED. We would greatly appreciate your completing and returning the survey. Your feedback provides us with a way to recognize our staff who give very good care and it helps us learn how to improve when your experience was below our aspiration of excellence.

## 2020-12-01 DIAGNOSIS — F41.9 ANXIETY: ICD-10-CM

## 2020-12-01 RX ORDER — DULOXETIN HYDROCHLORIDE 60 MG/1
120 CAPSULE, DELAYED RELEASE ORAL DAILY
Qty: 60 CAPSULE | Refills: 11 | Status: SHIPPED | OUTPATIENT
Start: 2020-12-01 | End: 2021-01-12 | Stop reason: SDUPTHER

## 2020-12-01 NOTE — TELEPHONE ENCOUNTER
----- Message from Deepa Glen sent at 12/1/2020  9:58 AM CST -----  Contact: [phone]  Requesting an RX refill or new RX.  Is this a refill or new RX: Refill  RX name and strength:DULoxetine (CYMBALTA) 60 MG capsule  Is this a 30 day or 90 day RX: 30  Pharmacy name and phone # (copy/paste from chart):  [id number] [place] [facility] [address] -[phone] (Phone)  [phone] (Fax)  Comments:

## 2020-12-11 ENCOUNTER — PATIENT MESSAGE (OUTPATIENT)
Dept: OTHER | Facility: OTHER | Age: 50
End: 2020-12-11

## 2020-12-14 ENCOUNTER — PATIENT MESSAGE (OUTPATIENT)
Dept: ADMINISTRATIVE | Facility: OTHER | Age: 50
End: 2020-12-14

## 2020-12-22 ENCOUNTER — LAB VISIT (OUTPATIENT)
Dept: PRIMARY CARE CLINIC | Facility: OTHER | Age: 50
End: 2020-12-22
Attending: INTERNAL MEDICINE
Payer: MEDICAID

## 2020-12-22 DIAGNOSIS — Z03.818 ENCOUNTER FOR OBSERVATION FOR SUSPECTED EXPOSURE TO OTHER BIOLOGICAL AGENTS RULED OUT: ICD-10-CM

## 2020-12-22 PROCEDURE — U0003 INFECTIOUS AGENT DETECTION BY NUCLEIC ACID (DNA OR RNA); SEVERE ACUTE RESPIRATORY SYNDROME CORONAVIRUS 2 (SARS-COV-2) (CORONAVIRUS DISEASE [COVID-19]), AMPLIFIED PROBE TECHNIQUE, MAKING USE OF HIGH THROUGHPUT TECHNOLOGIES AS DESCRIBED BY CMS-2020-01-R: HCPCS

## 2020-12-23 LAB — SARS-COV-2 RNA RESP QL NAA+PROBE: NOT DETECTED

## 2021-01-04 ENCOUNTER — PATIENT MESSAGE (OUTPATIENT)
Dept: ADMINISTRATIVE | Facility: HOSPITAL | Age: 51
End: 2021-01-04

## 2021-01-05 ENCOUNTER — PATIENT MESSAGE (OUTPATIENT)
Dept: ADMINISTRATIVE | Facility: OTHER | Age: 51
End: 2021-01-05

## 2021-01-05 ENCOUNTER — TELEPHONE (OUTPATIENT)
Dept: INTERNAL MEDICINE | Facility: CLINIC | Age: 51
End: 2021-01-05

## 2021-01-12 ENCOUNTER — LAB VISIT (OUTPATIENT)
Dept: LAB | Facility: HOSPITAL | Age: 51
End: 2021-01-12
Attending: INTERNAL MEDICINE
Payer: MEDICAID

## 2021-01-12 ENCOUNTER — OFFICE VISIT (OUTPATIENT)
Dept: INTERNAL MEDICINE | Facility: CLINIC | Age: 51
End: 2021-01-12
Payer: MEDICAID

## 2021-01-12 VITALS
DIASTOLIC BLOOD PRESSURE: 86 MMHG | SYSTOLIC BLOOD PRESSURE: 118 MMHG | WEIGHT: 222.25 LBS | BODY MASS INDEX: 37.94 KG/M2 | HEART RATE: 88 BPM | OXYGEN SATURATION: 100 % | HEIGHT: 64 IN

## 2021-01-12 DIAGNOSIS — I10 ESSENTIAL HYPERTENSION: ICD-10-CM

## 2021-01-12 DIAGNOSIS — E66.9 OBESITY, UNSPECIFIED CLASSIFICATION, UNSPECIFIED OBESITY TYPE, UNSPECIFIED WHETHER SERIOUS COMORBIDITY PRESENT: ICD-10-CM

## 2021-01-12 DIAGNOSIS — Z11.59 ENCOUNTER FOR HEPATITIS C SCREENING TEST FOR LOW RISK PATIENT: Primary | ICD-10-CM

## 2021-01-12 DIAGNOSIS — Z11.59 ENCOUNTER FOR HEPATITIS C SCREENING TEST FOR LOW RISK PATIENT: ICD-10-CM

## 2021-01-12 DIAGNOSIS — Z11.4 ENCOUNTER FOR SCREENING FOR HIV: ICD-10-CM

## 2021-01-12 DIAGNOSIS — F41.9 ANXIETY: ICD-10-CM

## 2021-01-12 LAB
ALBUMIN SERPL BCP-MCNC: 3.5 G/DL (ref 3.5–5.2)
ALP SERPL-CCNC: 65 U/L (ref 55–135)
ALT SERPL W/O P-5'-P-CCNC: 15 U/L (ref 10–44)
ANION GAP SERPL CALC-SCNC: 9 MMOL/L (ref 8–16)
AST SERPL-CCNC: 18 U/L (ref 10–40)
BILIRUB SERPL-MCNC: 0.5 MG/DL (ref 0.1–1)
BUN SERPL-MCNC: 15 MG/DL (ref 6–20)
CALCIUM SERPL-MCNC: 9 MG/DL (ref 8.7–10.5)
CHLORIDE SERPL-SCNC: 101 MMOL/L (ref 95–110)
CHOLEST SERPL-MCNC: 170 MG/DL (ref 120–199)
CHOLEST/HDLC SERPL: 4 {RATIO} (ref 2–5)
CO2 SERPL-SCNC: 25 MMOL/L (ref 23–29)
CREAT SERPL-MCNC: 1.2 MG/DL (ref 0.5–1.4)
EST. GFR  (AFRICAN AMERICAN): >60 ML/MIN/1.73 M^2
EST. GFR  (NON AFRICAN AMERICAN): 53.2 ML/MIN/1.73 M^2
ESTIMATED AVG GLUCOSE: 105 MG/DL (ref 68–131)
GLUCOSE SERPL-MCNC: 86 MG/DL (ref 70–110)
HBA1C MFR BLD HPLC: 5.3 % (ref 4–5.6)
HDLC SERPL-MCNC: 42 MG/DL (ref 40–75)
HDLC SERPL: 24.7 % (ref 20–50)
LDLC SERPL CALC-MCNC: 102.4 MG/DL (ref 63–159)
NONHDLC SERPL-MCNC: 128 MG/DL
POTASSIUM SERPL-SCNC: 3.8 MMOL/L (ref 3.5–5.1)
PROT SERPL-MCNC: 7.8 G/DL (ref 6–8.4)
SODIUM SERPL-SCNC: 135 MMOL/L (ref 136–145)
TRIGL SERPL-MCNC: 128 MG/DL (ref 30–150)

## 2021-01-12 PROCEDURE — 80061 LIPID PANEL: CPT

## 2021-01-12 PROCEDURE — 99396 PR PREVENTIVE VISIT,EST,40-64: ICD-10-PCS | Mod: S$PBB,,, | Performed by: INTERNAL MEDICINE

## 2021-01-12 PROCEDURE — 86703 HIV-1/HIV-2 1 RESULT ANTBDY: CPT

## 2021-01-12 PROCEDURE — 83036 HEMOGLOBIN GLYCOSYLATED A1C: CPT

## 2021-01-12 PROCEDURE — 99396 PREV VISIT EST AGE 40-64: CPT | Mod: S$PBB,,, | Performed by: INTERNAL MEDICINE

## 2021-01-12 PROCEDURE — 80053 COMPREHEN METABOLIC PANEL: CPT

## 2021-01-12 PROCEDURE — 86803 HEPATITIS C AB TEST: CPT

## 2021-01-12 PROCEDURE — 99999 PR PBB SHADOW E&M-EST. PATIENT-LVL III: ICD-10-PCS | Mod: PBBFAC,,, | Performed by: INTERNAL MEDICINE

## 2021-01-12 PROCEDURE — 99999 PR PBB SHADOW E&M-EST. PATIENT-LVL III: CPT | Mod: PBBFAC,,, | Performed by: INTERNAL MEDICINE

## 2021-01-12 PROCEDURE — 36415 COLL VENOUS BLD VENIPUNCTURE: CPT

## 2021-01-12 PROCEDURE — 99213 OFFICE O/P EST LOW 20 MIN: CPT | Mod: PBBFAC | Performed by: INTERNAL MEDICINE

## 2021-01-12 RX ORDER — VALSARTAN AND HYDROCHLOROTHIAZIDE 160; 12.5 MG/1; MG/1
1 TABLET, FILM COATED ORAL DAILY
Qty: 90 TABLET | Refills: 3 | Status: SHIPPED | OUTPATIENT
Start: 2021-01-12 | End: 2022-01-27

## 2021-01-12 RX ORDER — AMLODIPINE BESYLATE 10 MG/1
10 TABLET ORAL DAILY
Qty: 90 TABLET | Refills: 3 | Status: SHIPPED | OUTPATIENT
Start: 2021-01-12 | End: 2022-01-04

## 2021-01-12 RX ORDER — SPIRONOLACTONE 25 MG/1
25 TABLET ORAL DAILY
Qty: 90 TABLET | Refills: 3 | Status: SHIPPED | OUTPATIENT
Start: 2021-01-12 | End: 2022-01-04

## 2021-01-12 RX ORDER — CARVEDILOL 12.5 MG/1
12.5 TABLET ORAL 2 TIMES DAILY WITH MEALS
Qty: 180 TABLET | Refills: 3 | Status: SHIPPED | OUTPATIENT
Start: 2021-01-12 | End: 2022-01-04

## 2021-01-12 RX ORDER — DULOXETIN HYDROCHLORIDE 60 MG/1
120 CAPSULE, DELAYED RELEASE ORAL DAILY
Qty: 180 CAPSULE | Refills: 3 | Status: SHIPPED | OUTPATIENT
Start: 2021-01-12 | End: 2022-04-11

## 2021-01-13 LAB
HCV AB SERPL QL IA: NEGATIVE
HIV 1+2 AB+HIV1 P24 AG SERPL QL IA: NEGATIVE

## 2021-04-05 ENCOUNTER — PATIENT MESSAGE (OUTPATIENT)
Dept: ADMINISTRATIVE | Facility: HOSPITAL | Age: 51
End: 2021-04-05

## 2021-04-16 ENCOUNTER — PATIENT MESSAGE (OUTPATIENT)
Dept: RESEARCH | Facility: HOSPITAL | Age: 51
End: 2021-04-16

## 2021-06-02 RX ORDER — ALBUTEROL SULFATE 90 UG/1
2 AEROSOL, METERED RESPIRATORY (INHALATION) EVERY 6 HOURS PRN
Qty: 18 G | Refills: 3 | Status: SHIPPED | OUTPATIENT
Start: 2021-06-02 | End: 2022-01-06 | Stop reason: SDUPTHER

## 2021-06-08 ENCOUNTER — OFFICE VISIT (OUTPATIENT)
Dept: INTERNAL MEDICINE | Facility: CLINIC | Age: 51
End: 2021-06-08
Payer: MEDICAID

## 2021-06-08 VITALS
HEART RATE: 62 BPM | OXYGEN SATURATION: 98 % | WEIGHT: 222.88 LBS | BODY MASS INDEX: 38.05 KG/M2 | HEIGHT: 64 IN | SYSTOLIC BLOOD PRESSURE: 118 MMHG | DIASTOLIC BLOOD PRESSURE: 80 MMHG

## 2021-06-08 DIAGNOSIS — I10 ESSENTIAL HYPERTENSION: Primary | ICD-10-CM

## 2021-06-08 DIAGNOSIS — E66.9 CLASS 2 OBESITY WITHOUT SERIOUS COMORBIDITY WITH BODY MASS INDEX (BMI) OF 38.0 TO 38.9 IN ADULT, UNSPECIFIED OBESITY TYPE: ICD-10-CM

## 2021-06-08 PROCEDURE — 99999 PR PBB SHADOW E&M-EST. PATIENT-LVL III: ICD-10-PCS | Mod: PBBFAC,,, | Performed by: INTERNAL MEDICINE

## 2021-06-08 PROCEDURE — 99213 OFFICE O/P EST LOW 20 MIN: CPT | Mod: PBBFAC | Performed by: INTERNAL MEDICINE

## 2021-06-08 PROCEDURE — 99999 PR PBB SHADOW E&M-EST. PATIENT-LVL III: CPT | Mod: PBBFAC,,, | Performed by: INTERNAL MEDICINE

## 2021-06-08 PROCEDURE — 99214 OFFICE O/P EST MOD 30 MIN: CPT | Mod: S$PBB,,, | Performed by: INTERNAL MEDICINE

## 2021-06-08 PROCEDURE — 99214 PR OFFICE/OUTPT VISIT, EST, LEVL IV, 30-39 MIN: ICD-10-PCS | Mod: S$PBB,,, | Performed by: INTERNAL MEDICINE

## 2021-06-08 RX ORDER — DULAGLUTIDE 0.75 MG/.5ML
0.75 INJECTION, SOLUTION SUBCUTANEOUS
Qty: 4 PEN | Refills: 5 | Status: SHIPPED | OUTPATIENT
Start: 2021-06-08 | End: 2022-01-06 | Stop reason: SDUPTHER

## 2021-06-08 RX ORDER — ESTRADIOL 1 MG/1
1 TABLET ORAL DAILY
COMMUNITY
Start: 2021-05-27 | End: 2022-10-21 | Stop reason: CLARIF

## 2021-09-03 ENCOUNTER — TELEPHONE (OUTPATIENT)
Dept: INTERNAL MEDICINE | Facility: CLINIC | Age: 51
End: 2021-09-03

## 2021-09-09 ENCOUNTER — OFFICE VISIT (OUTPATIENT)
Dept: INTERNAL MEDICINE | Facility: CLINIC | Age: 51
End: 2021-09-09
Payer: MEDICAID

## 2021-09-09 DIAGNOSIS — F41.9 ANXIETY: Primary | ICD-10-CM

## 2021-09-09 DIAGNOSIS — I10 ESSENTIAL HYPERTENSION: ICD-10-CM

## 2021-09-09 DIAGNOSIS — J45.909 REACTIVE AIRWAY DISEASE WITHOUT COMPLICATION, UNSPECIFIED ASTHMA SEVERITY, UNSPECIFIED WHETHER PERSISTENT: ICD-10-CM

## 2021-09-09 PROCEDURE — 99999 PR PBB SHADOW E&M-EST. PATIENT-LVL III: ICD-10-PCS | Mod: PBBFAC,,, | Performed by: INTERNAL MEDICINE

## 2021-09-09 PROCEDURE — 99214 PR OFFICE/OUTPT VISIT, EST, LEVL IV, 30-39 MIN: ICD-10-PCS | Mod: S$PBB,,, | Performed by: INTERNAL MEDICINE

## 2021-09-09 PROCEDURE — 99214 OFFICE O/P EST MOD 30 MIN: CPT | Mod: S$PBB,,, | Performed by: INTERNAL MEDICINE

## 2021-09-09 PROCEDURE — 99213 OFFICE O/P EST LOW 20 MIN: CPT | Mod: PBBFAC | Performed by: INTERNAL MEDICINE

## 2021-09-09 PROCEDURE — 99999 PR PBB SHADOW E&M-EST. PATIENT-LVL III: CPT | Mod: PBBFAC,,, | Performed by: INTERNAL MEDICINE

## 2021-09-09 RX ORDER — PREDNISONE 20 MG/1
40 TABLET ORAL DAILY
Qty: 8 TABLET | Refills: 0 | Status: SHIPPED | OUTPATIENT
Start: 2021-09-09 | End: 2021-09-13

## 2021-09-09 RX ORDER — AZITHROMYCIN 250 MG/1
TABLET, FILM COATED ORAL
Qty: 6 TABLET | Refills: 0 | Status: SHIPPED | OUTPATIENT
Start: 2021-09-09 | End: 2021-09-14

## 2021-09-10 VITALS
HEART RATE: 86 BPM | HEIGHT: 64 IN | BODY MASS INDEX: 36.62 KG/M2 | WEIGHT: 214.5 LBS | DIASTOLIC BLOOD PRESSURE: 96 MMHG | SYSTOLIC BLOOD PRESSURE: 152 MMHG | OXYGEN SATURATION: 99 %

## 2021-10-01 ENCOUNTER — PATIENT OUTREACH (OUTPATIENT)
Dept: ADMINISTRATIVE | Facility: HOSPITAL | Age: 51
End: 2021-10-01

## 2021-10-22 ENCOUNTER — PATIENT OUTREACH (OUTPATIENT)
Dept: ADMINISTRATIVE | Facility: HOSPITAL | Age: 51
End: 2021-10-22

## 2021-11-11 LAB — BCS RECOMMENDATION EXT: NORMAL

## 2021-12-22 DIAGNOSIS — Z12.31 OTHER SCREENING MAMMOGRAM: ICD-10-CM

## 2022-01-06 RX ORDER — DULAGLUTIDE 0.75 MG/.5ML
0.75 INJECTION, SOLUTION SUBCUTANEOUS
Qty: 4 PEN | Refills: 0 | Status: SHIPPED | OUTPATIENT
Start: 2022-01-06 | End: 2022-01-27

## 2022-01-06 RX ORDER — ALBUTEROL SULFATE 90 UG/1
2 AEROSOL, METERED RESPIRATORY (INHALATION) EVERY 6 HOURS PRN
Qty: 18 G | Refills: 0 | Status: SHIPPED | OUTPATIENT
Start: 2022-01-06 | End: 2023-05-23 | Stop reason: SDUPTHER

## 2022-01-06 NOTE — TELEPHONE ENCOUNTER
No new care gaps identified.  Powered by "[employer]" by [employer] (formerly [employer] Reference number: [id number].   1/06/2022 9:18:53 AM CST  
no

## 2022-01-07 ENCOUNTER — TELEPHONE (OUTPATIENT)
Dept: INTERNAL MEDICINE | Facility: CLINIC | Age: 52
End: 2022-01-07
Payer: MEDICAID

## 2022-01-07 NOTE — TELEPHONE ENCOUNTER
----- Message from Galina Mathias sent at 1/6/2022 10:25 AM CST -----  Contact: Calos [phone]  Patient would like to get medical advice.    Comments:   Calling to discuss scheduling with provider for a 3 month follow up.

## 2022-01-18 ENCOUNTER — IMMUNIZATION (OUTPATIENT)
Dept: INTERNAL MEDICINE | Facility: CLINIC | Age: 52
End: 2022-01-18
Payer: MEDICAID

## 2022-01-18 ENCOUNTER — PATIENT MESSAGE (OUTPATIENT)
Dept: ADMINISTRATIVE | Facility: HOSPITAL | Age: 52
End: 2022-01-18
Payer: MEDICAID

## 2022-01-18 DIAGNOSIS — Z23 NEED FOR VACCINATION: Primary | ICD-10-CM

## 2022-01-18 PROCEDURE — 0004A COVID-19, MRNA, LNP-S, PF, 30 MCG/0.3 ML DOSE VACCINE: CPT | Mod: PBBFAC,CV19

## 2022-01-27 ENCOUNTER — OFFICE VISIT (OUTPATIENT)
Dept: INTERNAL MEDICINE | Facility: CLINIC | Age: 52
End: 2022-01-27
Payer: MEDICAID

## 2022-01-27 VITALS
WEIGHT: 216.25 LBS | SYSTOLIC BLOOD PRESSURE: 150 MMHG | BODY MASS INDEX: 36.92 KG/M2 | DIASTOLIC BLOOD PRESSURE: 94 MMHG | HEIGHT: 64 IN

## 2022-01-27 DIAGNOSIS — I10 PRIMARY HYPERTENSION: Primary | ICD-10-CM

## 2022-01-27 DIAGNOSIS — E66.9 CLASS 2 OBESITY WITHOUT SERIOUS COMORBIDITY WITH BODY MASS INDEX (BMI) OF 38.0 TO 38.9 IN ADULT, UNSPECIFIED OBESITY TYPE: ICD-10-CM

## 2022-01-27 DIAGNOSIS — E88.810 METABOLIC SYNDROME: ICD-10-CM

## 2022-01-27 PROCEDURE — 99999 PR PBB SHADOW E&M-EST. PATIENT-LVL III: ICD-10-PCS | Mod: PBBFAC,,, | Performed by: INTERNAL MEDICINE

## 2022-01-27 PROCEDURE — 3008F BODY MASS INDEX DOCD: CPT | Mod: CPTII,,, | Performed by: INTERNAL MEDICINE

## 2022-01-27 PROCEDURE — 3008F PR BODY MASS INDEX (BMI) DOCUMENTED: ICD-10-PCS | Mod: CPTII,,, | Performed by: INTERNAL MEDICINE

## 2022-01-27 PROCEDURE — 3080F DIAST BP >= 90 MM HG: CPT | Mod: CPTII,,, | Performed by: INTERNAL MEDICINE

## 2022-01-27 PROCEDURE — 3077F PR MOST RECENT SYSTOLIC BLOOD PRESSURE >= 140 MM HG: ICD-10-PCS | Mod: CPTII,,, | Performed by: INTERNAL MEDICINE

## 2022-01-27 PROCEDURE — 1159F PR MEDICATION LIST DOCUMENTED IN MEDICAL RECORD: ICD-10-PCS | Mod: CPTII,,, | Performed by: INTERNAL MEDICINE

## 2022-01-27 PROCEDURE — 3080F PR MOST RECENT DIASTOLIC BLOOD PRESSURE >= 90 MM HG: ICD-10-PCS | Mod: CPTII,,, | Performed by: INTERNAL MEDICINE

## 2022-01-27 PROCEDURE — 1159F MED LIST DOCD IN RCRD: CPT | Mod: CPTII,,, | Performed by: INTERNAL MEDICINE

## 2022-01-27 PROCEDURE — 99214 OFFICE O/P EST MOD 30 MIN: CPT | Mod: S$PBB,,, | Performed by: INTERNAL MEDICINE

## 2022-01-27 PROCEDURE — 3077F SYST BP >= 140 MM HG: CPT | Mod: CPTII,,, | Performed by: INTERNAL MEDICINE

## 2022-01-27 PROCEDURE — 99214 PR OFFICE/OUTPT VISIT, EST, LEVL IV, 30-39 MIN: ICD-10-PCS | Mod: S$PBB,,, | Performed by: INTERNAL MEDICINE

## 2022-01-27 PROCEDURE — 99999 PR PBB SHADOW E&M-EST. PATIENT-LVL III: CPT | Mod: PBBFAC,,, | Performed by: INTERNAL MEDICINE

## 2022-01-27 PROCEDURE — 99213 OFFICE O/P EST LOW 20 MIN: CPT | Mod: PBBFAC | Performed by: INTERNAL MEDICINE

## 2022-01-27 RX ORDER — DULAGLUTIDE 1.5 MG/.5ML
1.5 INJECTION, SOLUTION SUBCUTANEOUS
Qty: 4 PEN | Refills: 5 | Status: SHIPPED | OUTPATIENT
Start: 2022-01-27 | End: 2023-01-23 | Stop reason: SDUPTHER

## 2022-01-27 RX ORDER — VALSARTAN AND HYDROCHLOROTHIAZIDE 320; 12.5 MG/1; MG/1
1 TABLET, FILM COATED ORAL DAILY
Qty: 90 TABLET | Refills: 3 | Status: SHIPPED | OUTPATIENT
Start: 2022-01-27 | End: 2023-03-10 | Stop reason: SDUPTHER

## 2022-01-31 NOTE — PROGRESS NOTES
"    This note was generated with [employer] voice recognition software. I apologize for any possible typographical errors.    She is a 51-year-old lady coming in today to follow-up her obesity and other medical problems.  We put her on [place] for her obesity back in June of last year.  She has lost about 6 lb if she is trying to get started back in to diet exercising.  Her BMI today is 37.12.  She is tolerating Trulicity well not having any difficulties with.        She is currently taking valsartan hct., amlodipine  , carvedilol, and spironolactone.   Blood pressure today is 150/94.  She is out of her valsartan hydrochlorothiazide.  She is just on amlodipine and carvedilol in his viral like on now.  Blood pressure is been pretty well controlled when she is taking all her medications I reviewed this with her at her last visit.         SHe has been diagnosed with renal stones and Fibroids.  She is seeing Dr eileen Nair for  this .  She is not have any urinary complaints this time.         ROS : Gen - no fatigue  Eyes - no eye pain or visual changes  ENT - no hoarseness or sore throat  CV - No chest pain or SOB.  NO palpitations.  Pulm - no cough or wheezing  GI - no N/V/D.  She is having lower abdominal pains but said that is her fibroids.     no dysuria or incontinence  MS - no joint pain or muscle pain  Skin - no rash, or c/o of skin lesions  Neuro - no HA, dizziness--- memory is doing well.   Heme - no abnormal bleeding or bruising  Endo - no polydipsia, or temperature changes  Psych - no anxiety or depression        PHYSICAL EXAMINATION:  GENERAL:  She is an obese  50-year-old, in no acute distress.  VITAL SIGNS:     BP (!) 150/94   Ht 5' 4" (1.626 m)   Wt 98.1 kg (216 lb 4.3 oz)   BMI 37.12 kg/m²        HEENT:  Pupils are equal, round, reactive to light and accommodation.  CHEST:  Clear without wheeze.  CARDIOVASCULAR:  S1 and S2, regular rate and rhythm without murmur, gallop or   Rub.  Lungs: CTA " bilaterally    ABDOMEN:  Soft, obese, nontender, no hepatosplenomegaly, no guarding or rebound   tenderness.  LOWER EXTREMITIES:  No edema.     ASSESSMENT:  Hypertension, much better controlled.  We discussed low-fat, low-salt diet.  We discussed weight loss.       She needs to stay on all her medications.   .  I will see her back in about 4 months     Today she also complains about her obesity.  BMI is 38.  She has tried Adipex before if she wants to know what she can do to help her lose weight.  We discussed bariatric surgery, we discussed lifestyle modifications such as diet and exercise, we also discussed some medicines that could use off-label for her weight loss.   we are going to increase to Trulicity to 1.5 mg a week.   This will help with her appetite but she also needs to work with diet and exercise.  We discussed about using a pedometer in also recommend using some type of calorie counting software so that we can have her on a calorie restricted diet along with the Trulicity and the exercise.  I will see her back in about 4 months.  We discussed the possible side effects of the Trulicity.  I will refill her  valsartan hydrochlorothiazide.  She is to make sure she is taking her medicine every day and we discussed.

## 2022-03-04 ENCOUNTER — TELEPHONE (OUTPATIENT)
Dept: INTERNAL MEDICINE | Facility: CLINIC | Age: 52
End: 2022-03-04
Payer: MEDICAID

## 2022-03-04 NOTE — TELEPHONE ENCOUNTER
----- Message from Kari Cohen sent at 3/3/2022 12:50 PM CST -----  Regarding: appointment access  Contact: patient [phone]  No blue slot available to schedule an appointment for the patient.  Patient is established with which PCP: Lavon  Reason for the visit: fu annabel   Would the patient like a call back, or a response through their [facility] portal?:  please call

## 2022-03-08 ENCOUNTER — PATIENT OUTREACH (OUTPATIENT)
Dept: ADMINISTRATIVE | Facility: HOSPITAL | Age: 52
End: 2022-03-08
Payer: MEDICAID

## 2022-03-08 NOTE — PROGRESS NOTES
Health Maintenance Due   Topic Date Due    Cervical Cancer Screening  Never done    TETANUS VACCINE  08/07/2003    Colorectal Cancer Screening  Never done    Influenza Vaccine (1) 09/01/2021    Mammogram  11/11/2021    Shingles Vaccine (1 of 2) Never done     Triggered LINKS & reconciled immunizations.  Added outside procedure to surgical history.  Chart review completed.

## 2022-03-10 ENCOUNTER — PATIENT MESSAGE (OUTPATIENT)
Dept: RESEARCH | Facility: HOSPITAL | Age: 52
End: 2022-03-10
Payer: MEDICAID

## 2022-03-11 ENCOUNTER — OFFICE VISIT (OUTPATIENT)
Dept: INTERNAL MEDICINE | Facility: CLINIC | Age: 52
End: 2022-03-11
Payer: MEDICAID

## 2022-03-11 VITALS
SYSTOLIC BLOOD PRESSURE: 134 MMHG | WEIGHT: 211.19 LBS | BODY MASS INDEX: 36.05 KG/M2 | HEART RATE: 76 BPM | HEIGHT: 64 IN | DIASTOLIC BLOOD PRESSURE: 86 MMHG | OXYGEN SATURATION: 99 %

## 2022-03-11 DIAGNOSIS — I10 PRIMARY HYPERTENSION: Primary | ICD-10-CM

## 2022-03-11 DIAGNOSIS — E66.9 CLASS 2 OBESITY WITHOUT SERIOUS COMORBIDITY WITH BODY MASS INDEX (BMI) OF 36.0 TO 36.9 IN ADULT, UNSPECIFIED OBESITY TYPE: ICD-10-CM

## 2022-03-11 PROCEDURE — 3075F SYST BP GE 130 - 139MM HG: CPT | Mod: CPTII,,, | Performed by: INTERNAL MEDICINE

## 2022-03-11 PROCEDURE — 3079F DIAST BP 80-89 MM HG: CPT | Mod: CPTII,,, | Performed by: INTERNAL MEDICINE

## 2022-03-11 PROCEDURE — 99999 PR PBB SHADOW E&M-EST. PATIENT-LVL III: CPT | Mod: PBBFAC,,, | Performed by: INTERNAL MEDICINE

## 2022-03-11 PROCEDURE — 3008F BODY MASS INDEX DOCD: CPT | Mod: CPTII,,, | Performed by: INTERNAL MEDICINE

## 2022-03-11 PROCEDURE — 3075F PR MOST RECENT SYSTOLIC BLOOD PRESS GE 130-139MM HG: ICD-10-PCS | Mod: CPTII,,, | Performed by: INTERNAL MEDICINE

## 2022-03-11 PROCEDURE — 99213 OFFICE O/P EST LOW 20 MIN: CPT | Mod: S$PBB,,, | Performed by: INTERNAL MEDICINE

## 2022-03-11 PROCEDURE — 1159F PR MEDICATION LIST DOCUMENTED IN MEDICAL RECORD: ICD-10-PCS | Mod: CPTII,,, | Performed by: INTERNAL MEDICINE

## 2022-03-11 PROCEDURE — 3079F PR MOST RECENT DIASTOLIC BLOOD PRESSURE 80-89 MM HG: ICD-10-PCS | Mod: CPTII,,, | Performed by: INTERNAL MEDICINE

## 2022-03-11 PROCEDURE — 99999 PR PBB SHADOW E&M-EST. PATIENT-LVL III: ICD-10-PCS | Mod: PBBFAC,,, | Performed by: INTERNAL MEDICINE

## 2022-03-11 PROCEDURE — 99213 OFFICE O/P EST LOW 20 MIN: CPT | Mod: PBBFAC | Performed by: INTERNAL MEDICINE

## 2022-03-11 PROCEDURE — 1159F MED LIST DOCD IN RCRD: CPT | Mod: CPTII,,, | Performed by: INTERNAL MEDICINE

## 2022-03-11 PROCEDURE — 99213 PR OFFICE/OUTPT VISIT, EST, LEVL III, 20-29 MIN: ICD-10-PCS | Mod: S$PBB,,, | Performed by: INTERNAL MEDICINE

## 2022-03-11 PROCEDURE — 3008F PR BODY MASS INDEX (BMI) DOCUMENTED: ICD-10-PCS | Mod: CPTII,,, | Performed by: INTERNAL MEDICINE

## 2022-03-11 NOTE — PROGRESS NOTES
"  This note was generated with [employer] voice recognition software. I apologize for any possible typographical errors.     She is a 51-year-old lady coming in today to follow-up her obesity and  her hypertension.     She has lost about 12  lb if she is trying to get started back in to diet exercising.  Her BMI today is last visit was 36.25   She is tolerating Trulicity well not having any difficulties with.          She is currently taking valsartan hct., amlodipine  , carvedilol, and spironolactone.   Blood pressure last visit was  150/94.  She was out of her valsartan hydrochlorothiazide.   Blood pressure is been pretty well controlled when she is taking all her medications I reviewed this with her at her last visit.  Today she is taking all her bp meds and bp is 134/8.          SHe has been diagnosed with renal stones and Fibroids.  She is seeing Dr eileen Nair for  this .  She is not have any urinary complaints this time.         ROS : Gen - no fatigue  Eyes - no eye pain or visual changes  ENT - no hoarseness or sore throat  CV - No chest pain or SOB.  NO palpitations.  Pulm - no cough or wheezing  GI - no N/V/D.  She is having lower abdominal pains but said that is her fibroids.     no dysuria or incontinence  MS - no joint pain or muscle pain  Skin - no rash, or c/o of skin lesions  Neuro - no HA, dizziness--- memory is doing well.   Heme - no abnormal bleeding or bruising  Endo - no polydipsia, or temperature changes  Psych - no anxiety or depression        PHYSICAL EXAMINATION:  GENERAL:  She is an obese  50-year-old, in no acute distress.  VITAL SIGNS:      Vital Signs  Pulse: 76  SpO2: 99 %  BP: 134/86  BP Location: Right arm  Patient Position: Sitting  Pain Score: 0-No pain  Height and Weight  Height: 5' 4" (162.6 cm)  Weight: 95.8 kg (211 lb 3.2 oz)  BSA (Calculated - sq m): 2.08 sq meters  BMI (Calculated): 36.2  Weight in (lb) to have BMI = 25: 145.3]      HEENT:  Pupils are equal, round, reactive to " light and accommodation.  CHEST:  Clear without wheeze.  CARDIOVASCULAR:  S1 and S2, regular rate and rhythm without murmur, gallop or   Rub.  Lungs: CTA bilaterally    ABDOMEN:  Soft, obese, nontender, no hepatosplenomegaly, no guarding or rebound   tenderness.  LOWER EXTREMITIES:  No edema.     ASSESSMENT:  Hypertension, much better controlled.  We discussed low-fat, low-salt diet.  We discussed weight loss.       She needs to stay on all her medications.   .  I will see her back in about 6  months.  continue the trulicity for weight loss-- looks like she has lost 12 # since the start of the year.

## 2022-03-16 ENCOUNTER — PATIENT MESSAGE (OUTPATIENT)
Dept: ADMINISTRATIVE | Facility: HOSPITAL | Age: 52
End: 2022-03-16
Payer: MEDICAID

## 2022-03-18 ENCOUNTER — PATIENT MESSAGE (OUTPATIENT)
Dept: ADMINISTRATIVE | Facility: HOSPITAL | Age: 52
End: 2022-03-18
Payer: MEDICAID

## 2022-04-09 DIAGNOSIS — F41.9 ANXIETY: ICD-10-CM

## 2022-04-09 NOTE — TELEPHONE ENCOUNTER
Care Due:                  Date            Visit Type   Department     Provider  --------------------------------------------------------------------------------                                EP -                              PRIMARY      [facility]  Last Visit: 03-      CARE ([facility])   MEDICINE       Adria Doll  Next Visit: None Scheduled  None         None Found                                                            Last  Test          Frequency    Reason                     Performed    Due Date  --------------------------------------------------------------------------------    CMP.........  12 months..  DULoxetine,                01- 01-                             spironolactone,                             valsartan-hydrochlorothia                             zide.....................    HBA1C.......  6 months...  dulaglutide..............  01- 07-    Powered by [employer]. Reference number: [id number].   4/09/2022 6:57:42 AM CDT

## 2022-04-11 RX ORDER — DULOXETIN HYDROCHLORIDE 60 MG/1
120 CAPSULE, DELAYED RELEASE ORAL DAILY
Qty: 180 CAPSULE | Refills: 0 | Status: SHIPPED | OUTPATIENT
Start: 2022-04-11 | End: 2022-07-15

## 2022-04-11 NOTE — TELEPHONE ENCOUNTER
Refill Routing Note   Medication(s) are not appropriate for processing by [facility] for the following reason(s):      - Required laboratory values are outdated    [facility] action(s):  Defer Medication-related problems identified: Requires labs        Medication reconciliation completed: No     Appointments  past 12m or future 3m with PCP    Date Provider   Last Visit   3/11/2022 Adria Doll Jr., MD   Next Visit   Visit date not found Adria Doll Jr., MD   ED visits in past 90 days: 0        Note composed:11:12 AM 04/11/2022

## 2022-05-30 ENCOUNTER — PATIENT MESSAGE (OUTPATIENT)
Dept: ADMINISTRATIVE | Facility: HOSPITAL | Age: 52
End: 2022-05-30
Payer: MEDICAID

## 2022-07-07 ENCOUNTER — PATIENT OUTREACH (OUTPATIENT)
Dept: ADMINISTRATIVE | Facility: HOSPITAL | Age: 52
End: 2022-07-07
Payer: MEDICAID

## 2022-07-07 NOTE — LETTER
AUTHORIZATION FOR RELEASE OF   CONFIDENTIAL INFORMATION    Dear Dr. Juan Labadie,    We are seeing Calos Rios, date of birth 1970, in the clinic at [facility] INTERNAL MEDICINE. Adria Doll MD is the patient's PCP. Calos Rios has an outstanding lab/procedure at the time we reviewed her chart. In order to help keep her health information updated, she has authorized us to request the following medical record(s):        (  )  MAMMOGRAM                                      (  )  COLONOSCOPY      ( X )  PAP SMEAR                                       (  )  OUTSIDE LAB RESULTS     (  )  DEXA SCAN                                          (  )  EYE EXAM            (  )  FOOT EXAM                                          (  )  ENTIRE RECORD     (  )  OUTSIDE IMMUNIZATIONS                 (  )  _______________         Please fax records to [facility], James W Bragg, MD, [phone]     If you have any questions, please contact NINOSKA Guzman at [phone].           Patient Name: Calos Rios  : 1970  Patient Phone #: [phone]

## 2022-07-07 NOTE — PROGRESS NOTES
Health Maintenance Due   Topic Date Due    Cervical Cancer Screening  Never done    TETANUS VACCINE  08/07/2003    Colorectal Cancer Screening  Never done    Pneumococcal Vaccines (Age 0-64) (2 - PCV) 04/04/2019    Mammogram  11/11/2021    Shingles Vaccine (1 of 2) Never done    COVID-19 Vaccine (4 - Booster for Pfizer series) 05/18/2022     Triggered LINKS. Updated Care Everywhere. Checked for outside lab results in [place] and [place]; no results found. Record request sent to Dr. Juan Labadie asking for a copy of pt's most recent pap smear results. Record request sent to [facility] asking for a copy of pt's most recent mammography results. Chart review completed.

## 2022-07-07 NOTE — LETTER
AUTHORIZATION FOR RELEASE OF   CONFIDENTIAL INFORMATION    Dear [facility] Records,    We are seeing Calos Rios, date of birth 1970, in the clinic at [facility] INTER[facility] MEDICINE. Adria Doll MD is the patient's PCP. Calos Rios has an outstanding lab/procedure at the time we reviewed her chart. In order to help keep her health information updated, she has authorized us to request the following medical record(s):        ( X )  MAMMOGRAM                                   (  )  COLONOSCOPY      (  )  PAP SMEAR                                          (  )  OUTSIDE LAB RESULTS     (  )  DEXA SCAN                                          (  )  EYE EXAM            (  )  FOOT EXAM                                          (  )  ENTIRE RECORD     (  )  OUTSIDE IMMUNIZATIONS                 (  )  _______________         Please fax records to [facility], James W Bragg, MD, [phone]     If you have any questions, please contact NINOSKA Guzman at [phone].           Patient Name: Calos Rios  : 1970  Patient Phone #: [phone]

## 2022-07-11 ENCOUNTER — PATIENT MESSAGE (OUTPATIENT)
Dept: ADMINISTRATIVE | Facility: HOSPITAL | Age: 52
End: 2022-07-11
Payer: MEDICAID

## 2022-07-14 DIAGNOSIS — F41.9 ANXIETY: ICD-10-CM

## 2022-07-14 NOTE — TELEPHONE ENCOUNTER
Refill Routing Note   Medication(s) are not appropriate for processing by [facility] for the following reason(s):      - Required laboratory values are outdated    [facility] action(s):  Defer          Medication reconciliation completed: No     Appointments  past 12m or future 3m with PCP    Date Provider   Last Visit   3/11/2022 Adria Doll Jr., MD   Next Visit   9/9/2022 Adria Doll Jr., MD   ED visits in past 90 days: 0        Note composed:9:57 AM 07/14/2022

## 2022-07-14 NOTE — TELEPHONE ENCOUNTER
Care Due:                  Date            Visit Type   Department     Provider  --------------------------------------------------------------------------------                                EP -                              PRIMARY      [facility] INTERNAL  Last Visit: 03-      CARE ([facility])   PORFIRIO Doll                               -                              PRIMARY      [facility] INTERNAL  Next Visit: 09-      CARE ([facility])   [facility]       Adria Doll                                                            Last  Test          Frequency    Reason                     Performed    Due Date  --------------------------------------------------------------------------------    CMP.........  12 months..  DULoxetine,                01- 01-                             spironolactone,                             valsartan-hydrochlorothia                             zide.....................    HBA1C.......  6 months...  dulaglutide..............  01- 07-    [facility] Embedded Care Gaps. Reference number: [id number]. 7/14/2022   4:03:22 AM CDT

## 2022-07-15 RX ORDER — DULOXETIN HYDROCHLORIDE 60 MG/1
CAPSULE, DELAYED RELEASE ORAL
Qty: 180 CAPSULE | Refills: 2 | Status: SHIPPED | OUTPATIENT
Start: 2022-07-15 | End: 2023-03-10 | Stop reason: SDUPTHER

## 2022-08-24 ENCOUNTER — PATIENT MESSAGE (OUTPATIENT)
Dept: ADMINISTRATIVE | Facility: HOSPITAL | Age: 52
End: 2022-08-24
Payer: MEDICAID

## 2022-09-09 ENCOUNTER — LAB VISIT (OUTPATIENT)
Dept: LAB | Facility: HOSPITAL | Age: 52
End: 2022-09-09
Attending: INTERNAL MEDICINE
Payer: MEDICAID

## 2022-09-09 ENCOUNTER — OFFICE VISIT (OUTPATIENT)
Dept: INTERNAL MEDICINE | Facility: CLINIC | Age: 52
End: 2022-09-09
Payer: MEDICAID

## 2022-09-09 VITALS
HEART RATE: 72 BPM | DIASTOLIC BLOOD PRESSURE: 86 MMHG | HEIGHT: 64 IN | OXYGEN SATURATION: 97 % | BODY MASS INDEX: 34.4 KG/M2 | WEIGHT: 201.5 LBS | SYSTOLIC BLOOD PRESSURE: 138 MMHG

## 2022-09-09 DIAGNOSIS — I10 PRIMARY HYPERTENSION: ICD-10-CM

## 2022-09-09 DIAGNOSIS — G43.809 OTHER MIGRAINE WITHOUT STATUS MIGRAINOSUS, NOT INTRACTABLE: ICD-10-CM

## 2022-09-09 DIAGNOSIS — E88.810 METABOLIC SYNDROME: Primary | ICD-10-CM

## 2022-09-09 DIAGNOSIS — E88.810 METABOLIC SYNDROME: ICD-10-CM

## 2022-09-09 LAB
ALBUMIN SERPL BCP-MCNC: 3.7 G/DL (ref 3.5–5.2)
ALP SERPL-CCNC: 60 U/L (ref 55–135)
ALT SERPL W/O P-5'-P-CCNC: 11 U/L (ref 10–44)
ANION GAP SERPL CALC-SCNC: 10 MMOL/L (ref 8–16)
AST SERPL-CCNC: 14 U/L (ref 10–40)
BACTERIA #/AREA URNS AUTO: ABNORMAL /HPF
BILIRUB SERPL-MCNC: 0.5 MG/DL (ref 0.1–1)
BILIRUB UR QL STRIP: NEGATIVE
BUN SERPL-MCNC: 16 MG/DL (ref 6–20)
CALCIUM SERPL-MCNC: 9.7 MG/DL (ref 8.7–10.5)
CHLORIDE SERPL-SCNC: 106 MMOL/L (ref 95–110)
CLARITY UR REFRACT.AUTO: CLEAR
CO2 SERPL-SCNC: 25 MMOL/L (ref 23–29)
COLOR UR AUTO: YELLOW
CREAT SERPL-MCNC: 1 MG/DL (ref 0.5–1.4)
EST. GFR  (NO RACE VARIABLE): >60 ML/MIN/1.73 M^2
ESTIMATED AVG GLUCOSE: 100 MG/DL (ref 68–131)
GLUCOSE SERPL-MCNC: 75 MG/DL (ref 70–110)
GLUCOSE UR QL STRIP: NEGATIVE
HBA1C MFR BLD: 5.1 % (ref 4–5.6)
HGB UR QL STRIP: ABNORMAL
HYALINE CASTS UR QL AUTO: 7 /LPF
KETONES UR QL STRIP: NEGATIVE
LEUKOCYTE ESTERASE UR QL STRIP: NEGATIVE
MICROSCOPIC COMMENT: ABNORMAL
NITRITE UR QL STRIP: NEGATIVE
PH UR STRIP: 6 [PH] (ref 5–8)
POTASSIUM SERPL-SCNC: 3.7 MMOL/L (ref 3.5–5.1)
PROT SERPL-MCNC: 7.4 G/DL (ref 6–8.4)
PROT UR QL STRIP: NEGATIVE
RBC #/AREA URNS AUTO: 60 /HPF (ref 0–4)
SODIUM SERPL-SCNC: 141 MMOL/L (ref 136–145)
SP GR UR STRIP: 1.02 (ref 1–1.03)
SQUAMOUS #/AREA URNS AUTO: 1 /HPF
URN SPEC COLLECT METH UR: ABNORMAL
WBC #/AREA URNS AUTO: 1 /HPF (ref 0–5)

## 2022-09-09 PROCEDURE — 1159F MED LIST DOCD IN RCRD: CPT | Mod: CPTII,,, | Performed by: INTERNAL MEDICINE

## 2022-09-09 PROCEDURE — 83036 HEMOGLOBIN GLYCOSYLATED A1C: CPT | Performed by: INTERNAL MEDICINE

## 2022-09-09 PROCEDURE — 3008F BODY MASS INDEX DOCD: CPT | Mod: CPTII,,, | Performed by: INTERNAL MEDICINE

## 2022-09-09 PROCEDURE — 3079F PR MOST RECENT DIASTOLIC BLOOD PRESSURE 80-89 MM HG: ICD-10-PCS | Mod: CPTII,,, | Performed by: INTERNAL MEDICINE

## 2022-09-09 PROCEDURE — 99999 PR PBB SHADOW E&M-EST. PATIENT-LVL III: CPT | Mod: PBBFAC,,, | Performed by: INTERNAL MEDICINE

## 2022-09-09 PROCEDURE — 99999 PR PBB SHADOW E&M-EST. PATIENT-LVL III: ICD-10-PCS | Mod: PBBFAC,,, | Performed by: INTERNAL MEDICINE

## 2022-09-09 PROCEDURE — 81001 URINALYSIS AUTO W/SCOPE: CPT | Performed by: INTERNAL MEDICINE

## 2022-09-09 PROCEDURE — 99213 OFFICE O/P EST LOW 20 MIN: CPT | Mod: PBBFAC | Performed by: INTERNAL MEDICINE

## 2022-09-09 PROCEDURE — 3079F DIAST BP 80-89 MM HG: CPT | Mod: CPTII,,, | Performed by: INTERNAL MEDICINE

## 2022-09-09 PROCEDURE — 1159F PR MEDICATION LIST DOCUMENTED IN MEDICAL RECORD: ICD-10-PCS | Mod: CPTII,,, | Performed by: INTERNAL MEDICINE

## 2022-09-09 PROCEDURE — 99214 OFFICE O/P EST MOD 30 MIN: CPT | Mod: S$PBB,,, | Performed by: INTERNAL MEDICINE

## 2022-09-09 PROCEDURE — 36415 COLL VENOUS BLD VENIPUNCTURE: CPT | Performed by: INTERNAL MEDICINE

## 2022-09-09 PROCEDURE — 3075F PR MOST RECENT SYSTOLIC BLOOD PRESS GE 130-139MM HG: ICD-10-PCS | Mod: CPTII,,, | Performed by: INTERNAL MEDICINE

## 2022-09-09 PROCEDURE — 3008F PR BODY MASS INDEX (BMI) DOCUMENTED: ICD-10-PCS | Mod: CPTII,,, | Performed by: INTERNAL MEDICINE

## 2022-09-09 PROCEDURE — 99214 PR OFFICE/OUTPT VISIT, EST, LEVL IV, 30-39 MIN: ICD-10-PCS | Mod: S$PBB,,, | Performed by: INTERNAL MEDICINE

## 2022-09-09 PROCEDURE — 80053 COMPREHEN METABOLIC PANEL: CPT | Performed by: INTERNAL MEDICINE

## 2022-09-09 PROCEDURE — 3075F SYST BP GE 130 - 139MM HG: CPT | Mod: CPTII,,, | Performed by: INTERNAL MEDICINE

## 2022-09-09 NOTE — PROGRESS NOTES
"  This note was generated with [employer] voice recognition software. I apologize for any possible typographical errors.     She is a 51-year-old lady coming in today to follow-up her obesity and  her hypertension.     She has lost about 11 lb if she is trying to get started back in to diet exercising.  Her BMI today is l  34.59  She is tolerating Trulicity well not having any difficulties with.          She is currently taking valsartan hct., amlodipine  , carvedilol, and spironolactone.   Blood pressure last visit was  138/86   She was out of her valsartan hydrochlorothiazide.   Blood pressure is been pretty well controlled when she is taking all her medications I reviewed this with her at her last visit.  T         SHe has been diagnosed with renal stones and Fibroids.  She is seeing Dr eilene Nair for  this .  She is not have any urinary complaints this time.         ROS : Gen - no fatigue  Eyes - no eye pain or visual changes  ENT - no hoarseness or sore throat  CV - No chest pain or SOB.  NO palpitations.  Pulm - no cough or wheezing  GI - no N/V/D.  She is having lower abdominal pains but said that is her fibroids.     no dysuria or incontinence  MS - no joint pain or muscle pain  Skin - no rash, or c/o of skin lesions  Neuro - no HA, dizziness--- memory is doing well.   Heme - no abnormal bleeding or bruising  Endo - no polydipsia, or temperature changes  Psych - no anxiety or depression        PHYSICAL EXAMINATION:  GENERAL:  She is an obese  51 year-old, in no acute distress.      /86 (BP Location: Right arm, Patient Position: Sitting, BP Method: Large (Manual))   Pulse 72   Ht 5' 4" (1.626 m)   Wt 91.4 kg (201 lb 8 oz)   SpO2 97%   BMI 34.59 kg/m²        HEENT:  Pupils are equal, round, reactive to light and accommodation.  CHEST:  Clear without wheeze.  CARDIOVASCULAR:  S1 and S2, regular rate and rhythm without murmur, gallop or   Rub.  Lungs: CTA bilaterally    ABDOMEN:  Soft, obese, nontender, " no hepatosplenomegaly, no guarding or rebound   tenderness.  LOWER EXTREMITIES:  No edema.     ASSESSMENT:  Hypertension, much better controlled.  We discussed low-fat, low-salt diet.  We discussed weight loss.          continue the trulicity for weight loss-- looks like she has lost 23 # since the start of the year.  discussed diet and exercise.  Check UA

## 2022-09-10 ENCOUNTER — PATIENT MESSAGE (OUTPATIENT)
Dept: INTERNAL MEDICINE | Facility: CLINIC | Age: 52
End: 2022-09-10
Payer: MEDICAID

## 2022-09-10 DIAGNOSIS — R31.9 HEMATURIA, UNSPECIFIED TYPE: Primary | ICD-10-CM

## 2022-09-10 NOTE — TELEPHONE ENCOUNTER
Please get her set up for a ct scan and a urology visit.  She has some blood in her urine and this needs to be worked  up.  I see her a portal message

## 2022-09-12 NOTE — TELEPHONE ENCOUNTER
Notified pt of urine results, pt verbalized understanding. CT scheduled, and department number for urology given to pt because I could not schedule.    Mukund POSEY

## 2022-09-13 ENCOUNTER — HOSPITAL ENCOUNTER (OUTPATIENT)
Dept: RADIOLOGY | Facility: OTHER | Age: 52
Discharge: HOME | End: 2022-09-13
Attending: INTERNAL MEDICINE
Payer: MEDICAID

## 2022-09-13 ENCOUNTER — PATIENT MESSAGE (OUTPATIENT)
Dept: INTERNAL MEDICINE | Facility: CLINIC | Age: 52
End: 2022-09-13
Payer: MEDICAID

## 2022-09-13 DIAGNOSIS — R31.9 HEMATURIA, UNSPECIFIED TYPE: ICD-10-CM

## 2022-09-13 PROCEDURE — 74178 CT ABD&PLV WO CNTR FLWD CNTR: CPT | Mod: 26,,, | Performed by: RADIOLOGY

## 2022-09-13 PROCEDURE — 74178 CT UROGRAM ABD PELVIS W WO: ICD-10-PCS | Mod: 26,,, | Performed by: RADIOLOGY

## 2022-09-13 PROCEDURE — 25500020 PHARM REV CODE 255: Performed by: INTERNAL MEDICINE

## 2022-09-13 PROCEDURE — 74178 CT ABD&PLV WO CNTR FLWD CNTR: CPT | Mod: TC

## 2022-09-13 RX ADMIN — IOHEXOL 125 ML: 350 INJECTION, SOLUTION INTRAVENOUS at 05:09

## 2022-09-26 NOTE — PROGRESS NOTES
"Subjective:      Calos Rios is a 51 y.o. female who presents for evaluation of hematuria.      Hematuria  Patient complains of microscopic hematuria. Micro UA (9/9) revealed 60 RBCS/HPF.  Onset of hematuria was unknown. There is not a history of nephrolithiasis. There is not a history of urologic trauma. She denies LUTS, denies flank pain. Patient admits to history of tobacco use. Patient denies history of Agent Orange exposure, chronic [place] catheter,  surgeries, and occupational exposure. Prior workup has been CT urogram (9/14) revealed nonobstructive bilateral renal stones (13mm LP right and 18mm LP left), no hydro, no masses.        The following portions of the patient's history were reviewed and updated as appropriate: allergies, current medications, past family history, past medical history, past social history, past surgical history and problem list.    Review of Systems  Constitutional: no fever or chills  ENT: no nasal congestion or sore throat  Respiratory: no cough or shortness of breath  Cardiovascular: no chest pain or palpitations  Gastrointestinal: no nausea or vomiting, tolerating diet  Genitourinary: as per HPI  Hematologic/Lymphatic: no easy bruising or lymphadenopathy  Musculoskeletal: no arthralgias or myalgias  Neurological: no seizures or tremors  Behavioral/Psych: no auditory or visual hallucinations     Objective:   Vital Signs:BP (!) 157/100 (BP Location: Left arm, Patient Position: Sitting, BP Method: Small (Automatic))   Pulse 77   Resp 18   Ht 5' 4" (1.626 m)   Wt 91.4 kg (201 lb 8 oz)   BMI 34.59 kg/m²     Physical Exam   General: alert and oriented, no acute distress  Head: normocephalic, atraumatic  Neck: supple  Respiratory: Symmetric expansion, non-labored breathing  Cardiovascular:no peripheral edema  Abdomen: soft, non tender, non distended,  Skin: normal coloration and turgor, no rashes, no suspicious skin lesions noted  Neuro: alert and oriented x3, no gross " deficits  Psych: non-anxious    Lab Review   Urinalysis demonstrates positive for red blood cells, protein  Lab Results   Component Value Date    WBC 5.36 02/16/2020    HGB 13.5 02/16/2020    HCT 42.6 02/16/2020    HCT 40 06/21/2018    MCV 98 02/16/2020     02/16/2020     Lab Results   Component Value Date    CREATININE 1.0 09/09/2022    BUN 16 09/09/2022       Imaging   CT UROGRAM ABD PELVIS W WO     CLINICAL HISTORY:  Hematuria, microscopic, increased risk for urinary tract malignancy;Hematuria, microscopic, no increased risk for urinary tract malignancy;Hematuria, unspecified     TECHNIQUE:  Low dose axial, sagittal and coronal reformations were obtained from the lung bases to the pubic symphysis before and following the IV administration of 125 mL of Omnipaque 350.  Timing was optimized for nephrogram and excretory renal phases.     COMPARISON:  Ultrasound 05/07/2010     FINDINGS:  Limited evaluation lung bases show no acute or concerning process.     The liver is slightly enlarged.  There is a cyst in the right hepatic lobe as well as a smaller cyst in the left hepatic lobe.  No solid mass or biliary ductal dilatation.  The gallbladder is grossly unremarkable.     Spleen, adrenal glands, and pancreas show no focal abnormality.     Bilateral kidneys are normal in size and position.  There is a 1.8 cm calculus in the left lower pole.  There is a 1.3 cm calculus in the right lower pole.  No hydronephrosis bilaterally.  The ureters show normal course and caliber.  The urinary bladder shows no focal wall thickening.  The uterus is absent.     The gastrointestinal tract shows no wall thickening or obstruction.  No free fluid or free gas.  No concerning lymphadenopathy.     Vascular structures show normal course and caliber.  No evidence of an acute fracture or destructive osseous lesion.  There are degenerative changes of the lumbar spine.     Impression:     Bilateral nonobstructing nephrolithiasis.   "Bilateral lower pole calculi measure approximately 1.3 cm maximum diameter on the right and 1.8 cm on the left.        Electronically signed by: Ugo Venegas MD  Date:                                            09/14/2022  Time:                                           05:30        Assessment and Plan:   1. Microscopic hematuria  --CT urogram negative for hydro, masses. Does show bilateral renal stones (11mm and 18mm); currently asymptomatic   --Will repeat UA and UC today   --Would like to proceed with cystoscopy NA to complete workup     2. Bilateral renal stones  --CT urogram reviewed with patient; discussed possible treatment options" ESWL vs URS vs surveillance. Pt wishes to defer treatment at this time  --Will monitor with KUB     3. Generalized abdominal pain  - Ambulatory referral/consult to Gastroenterology; Future       This note is dictated on [employer] word recognition program.  There are word recognition mistakes that are occasionally missed on review.      "

## 2022-09-29 ENCOUNTER — OFFICE VISIT (OUTPATIENT)
Dept: UROLOGY | Facility: CLINIC | Age: 52
End: 2022-09-29
Payer: MEDICAID

## 2022-09-29 VITALS
HEIGHT: 64 IN | SYSTOLIC BLOOD PRESSURE: 157 MMHG | WEIGHT: 201.5 LBS | DIASTOLIC BLOOD PRESSURE: 100 MMHG | HEART RATE: 77 BPM | RESPIRATION RATE: 18 BRPM | BODY MASS INDEX: 34.4 KG/M2

## 2022-09-29 DIAGNOSIS — N20.0 BILATERAL RENAL STONES: ICD-10-CM

## 2022-09-29 DIAGNOSIS — R31.21 ASYMPTOMATIC MICROSCOPIC HEMATURIA: Primary | ICD-10-CM

## 2022-09-29 DIAGNOSIS — R10.84 GENERALIZED ABDOMINAL PAIN: ICD-10-CM

## 2022-09-29 LAB
BACTERIA #/AREA URNS HPF: ABNORMAL /HPF
BILIRUB SERPL-MCNC: NEGATIVE MG/DL
BLOOD URINE, POC: 250
CLARITY, POC UA: CLEAR
COLOR, POC UA: YELLOW
GLUCOSE UR QL STRIP: NORMAL
KETONES UR QL STRIP: NEGATIVE
LEUKOCYTE ESTERASE URINE, POC: NEGATIVE
MICROSCOPIC COMMENT: ABNORMAL
NITRITE, POC UA: NEGATIVE
PH, POC UA: 5
PROTEIN, POC: ABNORMAL
RBC #/AREA URNS HPF: 30 /HPF (ref 0–4)
SPECIFIC GRAVITY, POC UA: 1.01
UROBILINOGEN, POC UA: NORMAL
WBC #/AREA URNS HPF: 1 /HPF (ref 0–5)

## 2022-09-29 PROCEDURE — 3080F PR MOST RECENT DIASTOLIC BLOOD PRESSURE >= 90 MM HG: ICD-10-PCS | Mod: CPTII,,, | Performed by: NURSE PRACTITIONER

## 2022-09-29 PROCEDURE — 3044F PR MOST RECENT HEMOGLOBIN A1C LEVEL <7.0%: ICD-10-PCS | Mod: CPTII,,, | Performed by: NURSE PRACTITIONER

## 2022-09-29 PROCEDURE — 99999 PR PBB SHADOW E&M-EST. PATIENT-LVL V: ICD-10-PCS | Mod: PBBFAC,,, | Performed by: NURSE PRACTITIONER

## 2022-09-29 PROCEDURE — 1160F RVW MEDS BY RX/DR IN RCRD: CPT | Mod: CPTII,,, | Performed by: NURSE PRACTITIONER

## 2022-09-29 PROCEDURE — 3008F PR BODY MASS INDEX (BMI) DOCUMENTED: ICD-10-PCS | Mod: CPTII,,, | Performed by: NURSE PRACTITIONER

## 2022-09-29 PROCEDURE — 81002 URINALYSIS NONAUTO W/O SCOPE: CPT | Mod: PBBFAC,PN | Performed by: NURSE PRACTITIONER

## 2022-09-29 PROCEDURE — 1159F PR MEDICATION LIST DOCUMENTED IN MEDICAL RECORD: ICD-10-PCS | Mod: CPTII,,, | Performed by: NURSE PRACTITIONER

## 2022-09-29 PROCEDURE — 87086 URINE CULTURE/COLONY COUNT: CPT | Performed by: NURSE PRACTITIONER

## 2022-09-29 PROCEDURE — 99203 PR OFFICE/OUTPT VISIT, NEW, LEVL III, 30-44 MIN: ICD-10-PCS | Mod: S$PBB,,, | Performed by: NURSE PRACTITIONER

## 2022-09-29 PROCEDURE — 3008F BODY MASS INDEX DOCD: CPT | Mod: CPTII,,, | Performed by: NURSE PRACTITIONER

## 2022-09-29 PROCEDURE — 3080F DIAST BP >= 90 MM HG: CPT | Mod: CPTII,,, | Performed by: NURSE PRACTITIONER

## 2022-09-29 PROCEDURE — 99215 OFFICE O/P EST HI 40 MIN: CPT | Mod: PBBFAC,PN | Performed by: NURSE PRACTITIONER

## 2022-09-29 PROCEDURE — 3077F SYST BP >= 140 MM HG: CPT | Mod: CPTII,,, | Performed by: NURSE PRACTITIONER

## 2022-09-29 PROCEDURE — 1159F MED LIST DOCD IN RCRD: CPT | Mod: CPTII,,, | Performed by: NURSE PRACTITIONER

## 2022-09-29 PROCEDURE — 1160F PR REVIEW ALL MEDS BY PRESCRIBER/CLIN PHARMACIST DOCUMENTED: ICD-10-PCS | Mod: CPTII,,, | Performed by: NURSE PRACTITIONER

## 2022-09-29 PROCEDURE — 3044F HG A1C LEVEL LT 7.0%: CPT | Mod: CPTII,,, | Performed by: NURSE PRACTITIONER

## 2022-09-29 PROCEDURE — 3077F PR MOST RECENT SYSTOLIC BLOOD PRESSURE >= 140 MM HG: ICD-10-PCS | Mod: CPTII,,, | Performed by: NURSE PRACTITIONER

## 2022-09-29 PROCEDURE — 99203 OFFICE O/P NEW LOW 30 MIN: CPT | Mod: S$PBB,,, | Performed by: NURSE PRACTITIONER

## 2022-09-29 PROCEDURE — 99999 PR PBB SHADOW E&M-EST. PATIENT-LVL V: CPT | Mod: PBBFAC,,, | Performed by: NURSE PRACTITIONER

## 2022-10-01 LAB — BACTERIA UR CULT: NO GROWTH

## 2022-10-10 ENCOUNTER — PATIENT MESSAGE (OUTPATIENT)
Dept: ADMINISTRATIVE | Facility: HOSPITAL | Age: 52
End: 2022-10-10
Payer: MEDICAID

## 2022-10-14 ENCOUNTER — PROCEDURE VISIT (OUTPATIENT)
Dept: UROLOGY | Facility: CLINIC | Age: 52
End: 2022-10-14
Payer: MEDICAID

## 2022-10-14 VITALS — HEART RATE: 66 BPM | DIASTOLIC BLOOD PRESSURE: 99 MMHG | SYSTOLIC BLOOD PRESSURE: 151 MMHG

## 2022-10-14 DIAGNOSIS — N20.0 BILATERAL RENAL STONES: ICD-10-CM

## 2022-10-14 LAB
BILIRUB SERPL-MCNC: NEGATIVE MG/DL
BLOOD URINE, POC: 250
CLARITY, POC UA: CLEAR
COLOR, POC UA: YELLOW
GLUCOSE UR QL STRIP: NORMAL
KETONES UR QL STRIP: NEGATIVE
LEUKOCYTE ESTERASE URINE, POC: NEGATIVE
NITRITE, POC UA: NEGATIVE
PH, POC UA: 5
PROTEIN, POC: NEGATIVE
SPECIFIC GRAVITY, POC UA: 1.01
UROBILINOGEN, POC UA: NORMAL

## 2022-10-14 PROCEDURE — 52000 CYSTOURETHROSCOPY: CPT | Mod: S$PBB,,, | Performed by: UROLOGY

## 2022-10-14 PROCEDURE — 52000 CYSTOSCOPY: ICD-10-PCS | Mod: S$PBB,,, | Performed by: UROLOGY

## 2022-10-14 PROCEDURE — 52000 CYSTOURETHROSCOPY: CPT | Mod: PBBFAC,PN | Performed by: UROLOGY

## 2022-10-14 PROCEDURE — 81002 URINALYSIS NONAUTO W/O SCOPE: CPT | Mod: PBBFAC,PN | Performed by: UROLOGY

## 2022-10-14 RX ORDER — CIPROFLOXACIN 500 MG/1
500 TABLET ORAL
Status: COMPLETED | OUTPATIENT
Start: 2022-10-14 | End: 2022-10-14

## 2022-10-14 RX ORDER — LIDOCAINE HYDROCHLORIDE 20 MG/ML
JELLY TOPICAL
Status: COMPLETED | OUTPATIENT
Start: 2022-10-14 | End: 2022-10-14

## 2022-10-14 RX ADMIN — CIPROFLOXACIN 500 MG: 500 TABLET ORAL at 11:10

## 2022-10-14 RX ADMIN — LIDOCAINE HYDROCHLORIDE 1 ML: 20 JELLY TOPICAL at 11:10

## 2022-10-14 NOTE — PROCEDURES
Cystoscopy    Date/Time: 10/14/2022 11:00 AM  Performed by: Raz Cantrell MD  Authorized by: Alicia Rojo NP     Consent Done?:  Yes (Written)  Prep: patient was prepped and draped in usual sterile fashion    Anesthesia:  Lidocaine jelly  Position:  Dorsal lithotomy  Anesthesia:  Lidocaine jelly  Preparation: Patient was prepped and draped in usual sterile fashion    Scope type:  Flexible cystoscope  External exam normal: Yes    Urethra normal: Yes    Bladder neck normal: Yes    Bladder normal: Yes (No tumors, lesions, or stones noted.  Normal bilateral UOs.)     Patient tolerated the procedure well with no immediate complications    Imaging  CT Urogram reviewed. Bilateral renal stones, both LP.    Impression:   Negative hematuria workup for malignancy  Bilateral renal stones    Plan:  -- Wishes now to proceed with stone treatment  -- Will obtain KUB and FU after to review and discuss stone surgery

## 2022-10-18 ENCOUNTER — HOSPITAL ENCOUNTER (OUTPATIENT)
Dept: RADIOLOGY | Facility: OTHER | Age: 52
Discharge: HOME | End: 2022-10-18
Attending: UROLOGY
Payer: MEDICAID

## 2022-10-18 DIAGNOSIS — N20.0 BILATERAL RENAL STONES: ICD-10-CM

## 2022-10-18 PROCEDURE — 74018 RADEX ABDOMEN 1 VIEW: CPT | Mod: 26,,, | Performed by: RADIOLOGY

## 2022-10-18 PROCEDURE — 74018 XR KUB: ICD-10-PCS | Mod: 26,,, | Performed by: RADIOLOGY

## 2022-10-18 PROCEDURE — 74018 RADEX ABDOMEN 1 VIEW: CPT | Mod: TC,FY

## 2022-10-21 ENCOUNTER — OFFICE VISIT (OUTPATIENT)
Dept: UROLOGY | Facility: CLINIC | Age: 52
End: 2022-10-21
Payer: MEDICAID

## 2022-10-21 ENCOUNTER — ANESTHESIA EVENT (OUTPATIENT)
Dept: SURGERY | Facility: OTHER | Age: 52
End: 2022-10-21
Payer: MEDICAID

## 2022-10-21 ENCOUNTER — HOSPITAL ENCOUNTER (OUTPATIENT)
Dept: PREADMISSION TESTING | Facility: OTHER | Age: 52
Discharge: HOME | End: 2022-10-21
Attending: UROLOGY
Payer: MEDICAID

## 2022-10-21 VITALS
RESPIRATION RATE: 18 BRPM | OXYGEN SATURATION: 98 % | DIASTOLIC BLOOD PRESSURE: 101 MMHG | HEIGHT: 64 IN | WEIGHT: 220 LBS | SYSTOLIC BLOOD PRESSURE: 172 MMHG | BODY MASS INDEX: 37.56 KG/M2 | HEART RATE: 76 BPM | TEMPERATURE: 98 F

## 2022-10-21 VITALS
SYSTOLIC BLOOD PRESSURE: 175 MMHG | HEIGHT: 64 IN | DIASTOLIC BLOOD PRESSURE: 114 MMHG | WEIGHT: 201.5 LBS | OXYGEN SATURATION: 99 % | HEART RATE: 75 BPM | BODY MASS INDEX: 34.4 KG/M2 | RESPIRATION RATE: 16 BRPM

## 2022-10-21 DIAGNOSIS — N20.0 BILATERAL RENAL STONES: Primary | ICD-10-CM

## 2022-10-21 PROCEDURE — 99999 PR PBB SHADOW E&M-EST. PATIENT-LVL V: ICD-10-PCS | Mod: PBBFAC,,, | Performed by: UROLOGY

## 2022-10-21 PROCEDURE — 3080F PR MOST RECENT DIASTOLIC BLOOD PRESSURE >= 90 MM HG: ICD-10-PCS | Mod: CPTII,,, | Performed by: UROLOGY

## 2022-10-21 PROCEDURE — 87086 URINE CULTURE/COLONY COUNT: CPT | Performed by: UROLOGY

## 2022-10-21 PROCEDURE — 1159F PR MEDICATION LIST DOCUMENTED IN MEDICAL RECORD: ICD-10-PCS | Mod: CPTII,,, | Performed by: UROLOGY

## 2022-10-21 PROCEDURE — 99215 OFFICE O/P EST HI 40 MIN: CPT | Mod: PBBFAC,PN | Performed by: UROLOGY

## 2022-10-21 PROCEDURE — 3077F PR MOST RECENT SYSTOLIC BLOOD PRESSURE >= 140 MM HG: ICD-10-PCS | Mod: CPTII,,, | Performed by: UROLOGY

## 2022-10-21 PROCEDURE — 3044F HG A1C LEVEL LT 7.0%: CPT | Mod: CPTII,,, | Performed by: UROLOGY

## 2022-10-21 PROCEDURE — 1160F PR REVIEW ALL MEDS BY PRESCRIBER/CLIN PHARMACIST DOCUMENTED: ICD-10-PCS | Mod: CPTII,,, | Performed by: UROLOGY

## 2022-10-21 PROCEDURE — 3077F SYST BP >= 140 MM HG: CPT | Mod: CPTII,,, | Performed by: UROLOGY

## 2022-10-21 PROCEDURE — 1159F MED LIST DOCD IN RCRD: CPT | Mod: CPTII,,, | Performed by: UROLOGY

## 2022-10-21 PROCEDURE — 3080F DIAST BP >= 90 MM HG: CPT | Mod: CPTII,,, | Performed by: UROLOGY

## 2022-10-21 PROCEDURE — 1160F RVW MEDS BY RX/DR IN RCRD: CPT | Mod: CPTII,,, | Performed by: UROLOGY

## 2022-10-21 PROCEDURE — 99999 PR PBB SHADOW E&M-EST. PATIENT-LVL V: CPT | Mod: PBBFAC,,, | Performed by: UROLOGY

## 2022-10-21 PROCEDURE — 3044F PR MOST RECENT HEMOGLOBIN A1C LEVEL <7.0%: ICD-10-PCS | Mod: CPTII,,, | Performed by: UROLOGY

## 2022-10-21 PROCEDURE — 99215 PR OFFICE/OUTPT VISIT, EST, LEVL V, 40-54 MIN: ICD-10-PCS | Mod: S$PBB,,, | Performed by: UROLOGY

## 2022-10-21 PROCEDURE — 99215 OFFICE O/P EST HI 40 MIN: CPT | Mod: S$PBB,,, | Performed by: UROLOGY

## 2022-10-21 RX ORDER — CIPROFLOXACIN 2 MG/ML
400 INJECTION, SOLUTION INTRAVENOUS
Status: CANCELLED | OUTPATIENT
Start: 2022-10-21

## 2022-10-21 RX ORDER — LIDOCAINE HYDROCHLORIDE 10 MG/ML
0.5 INJECTION, SOLUTION EPIDURAL; INFILTRATION; INTRACAUDAL; PERINEURAL ONCE
Status: CANCELLED | OUTPATIENT
Start: 2022-10-21 | End: 2022-10-21

## 2022-10-21 RX ORDER — ACETAMINOPHEN 500 MG
1000 TABLET ORAL
Status: CANCELLED | OUTPATIENT
Start: 2022-10-21 | End: 2022-10-21

## 2022-10-21 RX ORDER — SODIUM CHLORIDE, SODIUM LACTATE, POTASSIUM CHLORIDE, CALCIUM CHLORIDE 600; 310; 30; 20 MG/100ML; MG/100ML; MG/100ML; MG/100ML
INJECTION, SOLUTION INTRAVENOUS CONTINUOUS
Status: CANCELLED | OUTPATIENT
Start: 2022-10-21

## 2022-10-21 NOTE — DISCHARGE INSTRUCTIONS
Information to Prepare you for your Surgery    PRE-ADMIT TESTING -  [phone]    [address] [facility]          Your surgery has been scheduled at [facility]. We are pleased to have the opportunity to serve you. For Further Information please call [phone].    On the day of surgery please report to the Information Desk on the 1st floor.    CONTACT YOUR PHYSICIAN'S OFFICE THE DAY PRIOR TO YOUR SURGERY TO OBTAIN YOUR ARRIVAL TIME.     The evening before surgery do not eat anything after 9 p.m. ( this includes hard candy, chewing gum and mints).  You may only have GATORADE, POWERADE AND WATER  from 9 p.m. until you leave your home.   DO NOT DRINK ANY LIQUIDS ON THE WAY TO THE HOSPITAL.      Why does your anesthesiologist allow you to drink Gatorade/Powerade before surgery?  Gatorade/Powerade helps to increase your comfort before surgery and to decrease your nausea after surgery. The carbohydrates in Gatorade/Powerade help reduce your body's stress response to surgery.  If you are a diabetic-drink only water prior to surgery.      Current Visitor policy(12/27/2021) - Patients may have 2 visitors pre and post procedure. Only 2 visitors will be allowed in the Surgical building with the patient.     SPECIAL MEDICATION INSTRUCTIONS: TAKE medications checked off by the Anesthesiologist on your Medication List.    Angiogram Patients: Take medications as instructed by your physician, including aspirin.     Surgery Patients:    If you take ASPIRIN - Your PHYSICIAN/SURGEON will need to inform you IF/OR when you need to stop taking aspirin prior to your surgery.     The week prior to surgery do not ot take any medications containing IBUPROFEN or NSAIDS ( Advil, Motrin, Goodys, BC, Aleve, Naproxen etc) If you are not sure if you should take a medicine please call your surgeon's office.  Ok to take Tylenol    Do Not Wear any make-up (especially eye make-up) to  surgery. Please remove any false eyelashes or eyelash extensions. If you arrive the day of surgery with makeup/eyelashes on you will be required to remove prior to surgery. (There is a risk of corneal abrasions if eye makeup/eyelash extensions are not removed)      Leave all valuables at home.   Do Not wear any jewelry or watches, including any metal in body piercings. Jewelry must be removed prior to coming to the hospital.  There is a possibility that rings that are unable to be removed may be cut off if they are on the surgical extremity.    Please remove all hair extensions, wigs, clips and any other metal accessories/ ornaments from your hair.  These items may pose a flammable/fire risk in Surgery and must be removed.    Do not shave your surgical area at least 5 days prior to your surgery. The surgical prep will be performed at the hospital according to Infection Control regulations.    Contact Lens must be removed before surgery. Either do not wear the contact lens or bring a case and solution for storage.  Please bring a container for eyeglasses or dentures as required.  Bring any paperwork your physician has provided, such as consent forms,  history and physicals, doctor's orders, etc.   Bring comfortable clothes that are loose fitting to wear upon discharge. Take into consideration the type of surgery being performed.  Maintain your diet as advised per your physician the day prior to surgery.      Adequate rest the night before surgery is advised.   Park in the Parking lot behind the hospital or in the [facility] across the street from the parking lot. Parking is complimentary.  If you will be discharged the same day as your procedure, please arrange for a responsible adult to drive you home or to accompany you if traveling by taxi.   YOU WILL NOT BE PERMITTED TO DRIVE OR TO LEAVE THE HOSPITAL ALONE AFTER SURGERY.   If you are being discharged the same day, it is strongly recommended that you  arrange for someone to remain with you for the first 24 hrs following your surgery.    The Surgeon will speak to your family/visitor after your surgery regarding the outcome of your surgery and post op care.  The Surgeon may speak to you after your surgery, but there is a possibility you may not remember the details.  Please check with your family members regarding the conversation with the Surgeon.    We strongly recommend whoever is bringing you home be present for discharge instructions.  This will ensure a thorough understanding for your post op home care.    ALL CHILDREN MUST ALWAYS BE ACCOMPANIED BY AN ADULT.    Visitors-Refer to current Visitor policy handouts.    Thank you for your cooperation.  The Staff of [facility].            Bathing Instructions with Hibiclens    Shower the evening before and morning of your procedure with Chlorhexidine (Hibiclens)  do not use Chlorhexidine on your face or genitals. Do not get in your eyes.  Wash your face with water and your regular face wash/soap  Use your regular shampoo  Apply Chlorhexidine (Hibiclens) directly on your skin or on a wet washcloth and wash gently. When showering: Move away from the shower stream when applying Chlorhexidine (Hibiclens) to avoid rinsing off too soon.  Rinse thoroughly with warm water  Do not dilute Chlorhexidine (Hibiclens)   Dry off as usual, do not use any deodorant, powder, body lotions, perfume, after shave or cologne.

## 2022-10-21 NOTE — H&P (VIEW-ONLY)
"Subjective:      Calos Rios is a 51 y.o. female who returns today regarding her stones.    She had bilateral renal stones diagnosed on CT for hematuria workup, which was otherwise negative. She wishes to proceed with treatment and is here to discuss options.    The following portions of the patient's history were reviewed and updated as appropriate: allergies, current medications, past family history, past medical history, past social history, past surgical history and problem list.    Review of Systems  A comprehensive multipoint review of systems was negative except as otherwise stated in the HPI.     Objective:   Vitals: BP (!) 175/114 (BP Location: Right arm, Patient Position: Sitting, BP Method: Large (Automatic))   Pulse 75   Resp 16   Ht 5' 4" (1.626 m)   Wt 91.4 kg (201 lb 8 oz)   LMP 04/15/2019 (Approximate)   SpO2 99%   BMI 34.59 kg/m²     Physical Exam   General: alert and oriented, no acute distress  Respiratory: Symmetric expansion, non-labored breathing  Neuro: no gross deficits  Psych: normal judgment and insight, normal mood/affect, and non-anxious    Lab Review     Lab Results   Component Value Date    WBC 5.36 02/16/2020    HGB 13.5 02/16/2020    HCT 42.6 02/16/2020    HCT 40 06/21/2018    MCV 98 02/16/2020     02/16/2020     Lab Results   Component Value Date    CREATININE 1.0 09/09/2022    BUN 16 09/09/2022       Imaging (all images personally reviewed; agree with report below)  Results for orders placed during the hospital encounter of 10/18/22    X-Ray KUB    Narrative  EXAMINATION:  XR KUB    CLINICAL HISTORY:  calculus;Calculus of kidney    TECHNIQUE:  Single AP supine view of the abdomen (KUB) was performed    COMPARISON:  CT urogram 09/13/2022    FINDINGS:  Calcifications projecting along the right kidney measure up to 8 mm.  Several calcifications project along the left kidney measuring up to 10 mm.  No calcifications along the course of either ureter.  Single " calcification in the right pelvis felt to represent a phlebolith.    Bowel loops are normal caliber.  Moderate stool burden.    Impression  Bilateral nephrolithiasis.      Electronically signed by: Kim Coronel  Date:    10/18/2022  Time:    09:36     Assessment and Plan:   1. Bilateral renal stones    Discussed surgical options in detail, including staged bilateral URS versus left PCNL followed by right URS versus bilateral PCNL. She understands that bilateral PCNL is the only option to treat all stones in one procedure, but that it also the most invasive option with highest risks. She wishes therefore to proceed with staged bilateral URS. Will schedule initial bilateral procedure at [facility] [place] next week with plans for 2nd procedure 2 weeks later.    I spent a total of 40 minutes on the day of the visit.  This includes face to face time and non-face to face time preparing to see the patient (eg, review of tests), obtaining and/or reviewing separately obtained history, documenting clinical information in the electronic or other health record, independently interpreting results and communicating results to the patient/family/caregiver, or care coordinator.

## 2022-10-21 NOTE — ANESTHESIA PREPROCEDURE EVALUATION
10/21/2022  Calos Rios is a 51 y.o., female.      Pre-op Assessment    I have reviewed the Patient Summary Reports.     I have reviewed the Nursing Notes. I have reviewed the NPO Status.   I have reviewed the Medications.     Review of Systems  Anesthesia Hx:  Denies Family Hx of Anesthesia complications.   Denies Personal Hx of Anesthesia complications.   Social:  Non-Smoker    Hematology/Oncology:  Hematology Normal   Oncology Normal     EENT/Dental:EENT/Dental Normal   Cardiovascular:   Hypertension    Pulmonary:  Pulmonary Normal    Renal/:  Renal/ Normal     Hepatic/GI:   GERD    Musculoskeletal:  Musculoskeletal Normal    Neurological:   Headaches    Endocrine:  Endocrine Normal    Dermatological:  Skin Normal    Psych:   anxiety          Physical Exam    Airway:  Mallampati: II           Anesthesia Plan  Type of Anesthesia, risks & benefits discussed:    Anesthesia Type: Gen Supraglottic Airway  Intra-op Monitoring Plan: Standard ASA Monitors  Post Op Pain Control Plan: multimodal analgesia  Induction:  IV  Airway Plan: Video  Informed Consent: Informed consent signed with the Patient and all parties understand the risks and agree with anesthesia plan.  All questions answered.   ASA Score: 2  Anesthesia Plan Notes: [facility] 9/9/22 ok    Ready For Surgery From Anesthesia Perspective.     .

## 2022-10-21 NOTE — PROGRESS NOTES
"Subjective:      Calos iRos is a 51 y.o. female who returns today regarding her stones.    She had bilateral renal stones diagnosed on CT for hematuria workup, which was otherwise negative. She wishes to proceed with treatment and is here to discuss options.    The following portions of the patient's history were reviewed and updated as appropriate: allergies, current medications, past family history, past medical history, past social history, past surgical history and problem list.    Review of Systems  A comprehensive multipoint review of systems was negative except as otherwise stated in the HPI.     Objective:   Vitals: BP (!) 175/114 (BP Location: Right arm, Patient Position: Sitting, BP Method: Large (Automatic))   Pulse 75   Resp 16   Ht 5' 4" (1.626 m)   Wt 91.4 kg (201 lb 8 oz)   LMP 04/15/2019 (Approximate)   SpO2 99%   BMI 34.59 kg/m²     Physical Exam   General: alert and oriented, no acute distress  Respiratory: Symmetric expansion, non-labored breathing  Neuro: no gross deficits  Psych: normal judgment and insight, normal mood/affect, and non-anxious    Lab Review     Lab Results   Component Value Date    WBC 5.36 02/16/2020    HGB 13.5 02/16/2020    HCT 42.6 02/16/2020    HCT 40 06/21/2018    MCV 98 02/16/2020     02/16/2020     Lab Results   Component Value Date    CREATININE 1.0 09/09/2022    BUN 16 09/09/2022       Imaging (all images personally reviewed; agree with report below)  Results for orders placed during the hospital encounter of 10/18/22    X-Ray KUB    Narrative  EXAMINATION:  XR KUB    CLINICAL HISTORY:  calculus;Calculus of kidney    TECHNIQUE:  Single AP supine view of the abdomen (KUB) was performed    COMPARISON:  CT urogram 09/13/2022    FINDINGS:  Calcifications projecting along the right kidney measure up to 8 mm.  Several calcifications project along the left kidney measuring up to 10 mm.  No calcifications along the course of either ureter.  Single " calcification in the right pelvis felt to represent a phlebolith.    Bowel loops are normal caliber.  Moderate stool burden.    Impression  Bilateral nephrolithiasis.      Electronically signed by: Kim Coronel  Date:    10/18/2022  Time:    09:36     Assessment and Plan:   1. Bilateral renal stones    Discussed surgical options in detail, including staged bilateral URS versus left PCNL followed by right URS versus bilateral PCNL. She understands that bilateral PCNL is the only option to treat all stones in one procedure, but that it also the most invasive option with highest risks. She wishes therefore to proceed with staged bilateral URS. Will schedule initial bilateral procedure at [facility] next week with plans for 2nd procedure 2 weeks later.    I spent a total of 40 minutes on the day of the visit.  This includes face to face time and non-face to face time preparing to see the patient (eg, review of tests), obtaining and/or reviewing separately obtained history, documenting clinical information in the electronic or other health record, independently interpreting results and communicating results to the patient/family/caregiver, or care coordinator.

## 2022-10-23 LAB — BACTERIA UR CULT: NO GROWTH

## 2022-10-24 ENCOUNTER — TELEPHONE (OUTPATIENT)
Dept: UROLOGY | Facility: CLINIC | Age: 52
End: 2022-10-24
Payer: MEDICAID

## 2022-10-25 ENCOUNTER — HOSPITAL ENCOUNTER (OUTPATIENT)
Facility: OTHER | Age: 52
Discharge: HOME | End: 2022-10-25
Attending: UROLOGY | Admitting: UROLOGY
Payer: MEDICAID

## 2022-10-25 ENCOUNTER — ANESTHESIA (OUTPATIENT)
Dept: SURGERY | Facility: OTHER | Age: 52
End: 2022-10-25
Payer: MEDICAID

## 2022-10-25 VITALS
DIASTOLIC BLOOD PRESSURE: 77 MMHG | SYSTOLIC BLOOD PRESSURE: 138 MMHG | OXYGEN SATURATION: 97 % | RESPIRATION RATE: 16 BRPM | HEART RATE: 72 BPM | TEMPERATURE: 97 F | BODY MASS INDEX: 37.56 KG/M2 | HEIGHT: 64 IN | WEIGHT: 220 LBS

## 2022-10-25 DIAGNOSIS — N20.0 BILATERAL RENAL STONES: ICD-10-CM

## 2022-10-25 PROCEDURE — 37000008 HC ANESTHESIA 1ST 15 MINUTES: Performed by: UROLOGY

## 2022-10-25 PROCEDURE — 71000033 HC RECOVERY, INTIAL HOUR: Performed by: UROLOGY

## 2022-10-25 PROCEDURE — 63600175 PHARM REV CODE 636 W HCPCS: Performed by: NURSE ANESTHETIST, CERTIFIED REGISTERED

## 2022-10-25 PROCEDURE — 71000039 HC RECOVERY, EACH ADD'L HOUR: Performed by: UROLOGY

## 2022-10-25 PROCEDURE — 71000016 HC POSTOP RECOV ADDL HR: Performed by: UROLOGY

## 2022-10-25 PROCEDURE — C1758 CATHETER, URETERAL: HCPCS | Performed by: UROLOGY

## 2022-10-25 PROCEDURE — 25000003 PHARM REV CODE 250: Performed by: ANESTHESIOLOGY

## 2022-10-25 PROCEDURE — 36000706: Performed by: UROLOGY

## 2022-10-25 PROCEDURE — 52356 CYSTO/URETERO W/LITHOTRIPSY: CPT | Mod: 50,,, | Performed by: UROLOGY

## 2022-10-25 PROCEDURE — 71000015 HC POSTOP RECOV 1ST HR: Performed by: UROLOGY

## 2022-10-25 PROCEDURE — 36000707: Performed by: UROLOGY

## 2022-10-25 PROCEDURE — 63600175 PHARM REV CODE 636 W HCPCS: Performed by: UROLOGY

## 2022-10-25 PROCEDURE — 25000003 PHARM REV CODE 250: Performed by: UROLOGY

## 2022-10-25 PROCEDURE — 25000003 PHARM REV CODE 250: Performed by: NURSE ANESTHETIST, CERTIFIED REGISTERED

## 2022-10-25 PROCEDURE — 27201423 OPTIME MED/SURG SUP & DEVICES STERILE SUPPLY: Performed by: UROLOGY

## 2022-10-25 PROCEDURE — C2617 STENT, NON-COR, TEM W/O DEL: HCPCS | Performed by: UROLOGY

## 2022-10-25 PROCEDURE — C1769 GUIDE WIRE: HCPCS | Performed by: UROLOGY

## 2022-10-25 PROCEDURE — 52356 PR CYSTO/URETERO W/LITHOTRIPSY: ICD-10-PCS | Mod: 50,,, | Performed by: UROLOGY

## 2022-10-25 PROCEDURE — 63600175 PHARM REV CODE 636 W HCPCS: Performed by: ANESTHESIOLOGY

## 2022-10-25 PROCEDURE — 37000009 HC ANESTHESIA EA ADD 15 MINS: Performed by: UROLOGY

## 2022-10-25 DEVICE — STENT URETERAL UNIV 6FR 24CM: Type: IMPLANTABLE DEVICE | Site: URETER | Status: FUNCTIONAL

## 2022-10-25 RX ORDER — MIDAZOLAM HYDROCHLORIDE 1 MG/ML
INJECTION INTRAMUSCULAR; INTRAVENOUS
Status: DISCONTINUED | OUTPATIENT
Start: 2022-10-25 | End: 2022-10-25

## 2022-10-25 RX ORDER — LIDOCAINE HYDROCHLORIDE 20 MG/ML
INJECTION INTRAVENOUS
Status: DISCONTINUED | OUTPATIENT
Start: 2022-10-25 | End: 2022-10-25

## 2022-10-25 RX ORDER — HYDRALAZINE HYDROCHLORIDE 20 MG/ML
10 INJECTION INTRAMUSCULAR; INTRAVENOUS ONCE
Status: COMPLETED | OUTPATIENT
Start: 2022-10-25 | End: 2022-10-25

## 2022-10-25 RX ORDER — ROCURONIUM BROMIDE 10 MG/ML
INJECTION, SOLUTION INTRAVENOUS
Status: DISCONTINUED | OUTPATIENT
Start: 2022-10-25 | End: 2022-10-25

## 2022-10-25 RX ORDER — PROPOFOL 10 MG/ML
VIAL (ML) INTRAVENOUS
Status: DISCONTINUED | OUTPATIENT
Start: 2022-10-25 | End: 2022-10-25

## 2022-10-25 RX ORDER — PHENYLEPHRINE HYDROCHLORIDE 10 MG/ML
INJECTION INTRAVENOUS
Status: DISCONTINUED | OUTPATIENT
Start: 2022-10-25 | End: 2022-10-25

## 2022-10-25 RX ORDER — SODIUM CHLORIDE 0.9 % (FLUSH) 0.9 %
3 SYRINGE (ML) INJECTION
Status: DISCONTINUED | OUTPATIENT
Start: 2022-10-25 | End: 2022-10-25 | Stop reason: HOSPADM

## 2022-10-25 RX ORDER — LIDOCAINE HYDROCHLORIDE 10 MG/ML
0.5 INJECTION, SOLUTION EPIDURAL; INFILTRATION; INTRACAUDAL; PERINEURAL ONCE
Status: DISCONTINUED | OUTPATIENT
Start: 2022-10-25 | End: 2022-10-25 | Stop reason: HOSPADM

## 2022-10-25 RX ORDER — DEXAMETHASONE SODIUM PHOSPHATE 4 MG/ML
INJECTION, SOLUTION INTRA-ARTICULAR; INTRALESIONAL; INTRAMUSCULAR; INTRAVENOUS; SOFT TISSUE
Status: DISCONTINUED | OUTPATIENT
Start: 2022-10-25 | End: 2022-10-25

## 2022-10-25 RX ORDER — MEPERIDINE HYDROCHLORIDE 25 MG/ML
12.5 INJECTION INTRAMUSCULAR; INTRAVENOUS; SUBCUTANEOUS ONCE AS NEEDED
Status: DISCONTINUED | OUTPATIENT
Start: 2022-10-25 | End: 2022-10-25 | Stop reason: HOSPADM

## 2022-10-25 RX ORDER — PHENAZOPYRIDINE HYDROCHLORIDE 200 MG/1
200 TABLET, FILM COATED ORAL 3 TIMES DAILY PRN
Qty: 30 TABLET | Refills: 0 | Status: SHIPPED | OUTPATIENT
Start: 2022-10-25 | End: 2022-12-20

## 2022-10-25 RX ORDER — HYDROCODONE BITARTRATE AND ACETAMINOPHEN 5; 325 MG/1; MG/1
1 TABLET ORAL EVERY 4 HOURS PRN
Status: DISCONTINUED | OUTPATIENT
Start: 2022-10-25 | End: 2022-10-25 | Stop reason: HOSPADM

## 2022-10-25 RX ORDER — HYDROMORPHONE HYDROCHLORIDE 2 MG/ML
0.4 INJECTION, SOLUTION INTRAMUSCULAR; INTRAVENOUS; SUBCUTANEOUS EVERY 5 MIN PRN
Status: DISCONTINUED | OUTPATIENT
Start: 2022-10-25 | End: 2022-10-25 | Stop reason: HOSPADM

## 2022-10-25 RX ORDER — SODIUM CHLORIDE, SODIUM LACTATE, POTASSIUM CHLORIDE, CALCIUM CHLORIDE 600; 310; 30; 20 MG/100ML; MG/100ML; MG/100ML; MG/100ML
INJECTION, SOLUTION INTRAVENOUS CONTINUOUS
Status: DISCONTINUED | OUTPATIENT
Start: 2022-10-25 | End: 2022-10-25 | Stop reason: HOSPADM

## 2022-10-25 RX ORDER — ONDANSETRON 2 MG/ML
INJECTION INTRAMUSCULAR; INTRAVENOUS
Status: DISCONTINUED | OUTPATIENT
Start: 2022-10-25 | End: 2022-10-25

## 2022-10-25 RX ORDER — OXYCODONE HYDROCHLORIDE 5 MG/1
5 TABLET ORAL
Status: DISCONTINUED | OUTPATIENT
Start: 2022-10-25 | End: 2022-10-25 | Stop reason: HOSPADM

## 2022-10-25 RX ORDER — CIPROFLOXACIN 2 MG/ML
400 INJECTION, SOLUTION INTRAVENOUS
Status: COMPLETED | OUTPATIENT
Start: 2022-10-25 | End: 2022-10-25

## 2022-10-25 RX ORDER — ACETAMINOPHEN 500 MG
1000 TABLET ORAL
Status: COMPLETED | OUTPATIENT
Start: 2022-10-25 | End: 2022-10-25

## 2022-10-25 RX ORDER — OXYBUTYNIN CHLORIDE 5 MG/1
5 TABLET ORAL 3 TIMES DAILY PRN
Qty: 30 TABLET | Refills: 0 | Status: SHIPPED | OUTPATIENT
Start: 2022-10-25

## 2022-10-25 RX ORDER — PROCHLORPERAZINE EDISYLATE 5 MG/ML
5 INJECTION INTRAMUSCULAR; INTRAVENOUS EVERY 30 MIN PRN
Status: DISCONTINUED | OUTPATIENT
Start: 2022-10-25 | End: 2022-10-25 | Stop reason: HOSPADM

## 2022-10-25 RX ORDER — ATROPA BELLADONNA AND OPIUM 16.2; 6 MG/1; MG/1
SUPPOSITORY RECTAL
Status: DISCONTINUED | OUTPATIENT
Start: 2022-10-25 | End: 2022-10-25 | Stop reason: HOSPADM

## 2022-10-25 RX ORDER — FENTANYL CITRATE 50 UG/ML
INJECTION, SOLUTION INTRAMUSCULAR; INTRAVENOUS
Status: DISCONTINUED | OUTPATIENT
Start: 2022-10-25 | End: 2022-10-25

## 2022-10-25 RX ADMIN — PHENYLEPHRINE HYDROCHLORIDE 100 MCG: 10 INJECTION INTRAVENOUS at 12:10

## 2022-10-25 RX ADMIN — MIDAZOLAM HYDROCHLORIDE 2 MG: 1 INJECTION, SOLUTION INTRAMUSCULAR; INTRAVENOUS at 12:10

## 2022-10-25 RX ADMIN — CIPROFLOXACIN 400 MG: 2 INJECTION, SOLUTION INTRAVENOUS at 12:10

## 2022-10-25 RX ADMIN — SODIUM CHLORIDE, SODIUM LACTATE, POTASSIUM CHLORIDE, AND CALCIUM CHLORIDE: 600; 310; 30; 20 INJECTION, SOLUTION INTRAVENOUS at 11:10

## 2022-10-25 RX ADMIN — PHENYLEPHRINE HYDROCHLORIDE 50 MCG: 10 INJECTION INTRAVENOUS at 02:10

## 2022-10-25 RX ADMIN — PHENYLEPHRINE HYDROCHLORIDE 50 MCG: 10 INJECTION INTRAVENOUS at 01:10

## 2022-10-25 RX ADMIN — PROPOFOL 150 MG: 10 INJECTION, EMULSION INTRAVENOUS at 12:10

## 2022-10-25 RX ADMIN — FENTANYL CITRATE 100 MCG: 50 INJECTION, SOLUTION INTRAMUSCULAR; INTRAVENOUS at 12:10

## 2022-10-25 RX ADMIN — PHENYLEPHRINE HYDROCHLORIDE 200 MCG: 10 INJECTION INTRAVENOUS at 12:10

## 2022-10-25 RX ADMIN — OXYCODONE 5 MG: 5 TABLET ORAL at 03:10

## 2022-10-25 RX ADMIN — GLYCOPYRROLATE 0.2 MG: 0.2 INJECTION, SOLUTION INTRAMUSCULAR; INTRAVITREAL at 01:10

## 2022-10-25 RX ADMIN — HYDRALAZINE HYDROCHLORIDE 10 MG: 20 INJECTION INTRAMUSCULAR; INTRAVENOUS at 02:10

## 2022-10-25 RX ADMIN — SUGAMMADEX 200 MG: 100 INJECTION, SOLUTION INTRAVENOUS at 02:10

## 2022-10-25 RX ADMIN — ACETAMINOPHEN 1000 MG: 500 TABLET, FILM COATED ORAL at 10:10

## 2022-10-25 RX ADMIN — ONDANSETRON HYDROCHLORIDE 4 MG: 2 INJECTION INTRAMUSCULAR; INTRAVENOUS at 01:10

## 2022-10-25 RX ADMIN — LIDOCAINE HYDROCHLORIDE 100 MG: 20 INJECTION, SOLUTION INTRAVENOUS at 12:10

## 2022-10-25 RX ADMIN — ROCURONIUM BROMIDE 50 MG: 10 INJECTION, SOLUTION INTRAVENOUS at 12:10

## 2022-10-25 RX ADMIN — DEXAMETHASONE SODIUM PHOSPHATE 8 MG: 4 INJECTION, SOLUTION INTRAMUSCULAR; INTRAVENOUS at 12:10

## 2022-10-25 RX ADMIN — PROCHLORPERAZINE EDISYLATE 5 MG: 5 INJECTION INTRAMUSCULAR; INTRAVENOUS at 04:10

## 2022-10-25 NOTE — ANESTHESIA PROCEDURE NOTES
Intubation    Date/Time: 10/25/2022 12:48 PM  Performed by: Margaret Goldstein  Authorized by: Zac Serna MD     Intubation:     Induction:  Intravenous    Intubated:  Postinduction    Mask Ventilation:  Unsatisfactory mask ventilation - proceeded to laryngoscopy    Attempts:  1    Attempted By:  Student (DENICE Rivera)    Method of Intubation:  Video laryngoscopy    Blade:  [place] 3    Laryngeal View Grade: Grade I - full view of cords      Difficult Airway Encountered?: No      Complications:  None    Airway Device:  Oral endotracheal tube    Airway Device Size:  7.0    Tube secured:  22    Secured at:  The lips    Placement Verified By:  Capnometry    Complicating Factors:  None    Findings Post-Intubation:  BS equal bilateral and atraumatic/condition of teeth unchanged

## 2022-10-25 NOTE — PLAN OF CARE
Assessment/Plan:  1  Annual GYN exam  2  History of Colon Cancer  3  History of Thyroid Cancer  4  MHL1 variant of uncertain significance  Diagnoses and all orders for this visit:    Encounter for gynecological examination  -     Liquid-based pap, screening    History of cancer  -     Liquid-based pap, screening    Screening for human papillomavirus  -     Liquid-based pap, screening    Encounter for screening mammogram for malignant neoplasm of breast  -     Mammo screening bilateral w 3d & cad; Future    History of pathologic fracture  -     DXA bone density spine hip and pelvis; Future          Subjective:      Patient ID: Reena Christopher is a 79 y o  female  The patient is a 80-year-old who presents for a gynecologic evaluation  She has a past history of colon and thyroid cancer  She has had genetic testing in the past and has a MHL1 variant of uncertain significance  She has had a hysterectomy and bilateral salpingo-oophorectomy for benign disease  In the past 15 months she has had a right breast biopsy for a benign papilloma, of colon polypectomy, lung biopsy, and a neck biopsy, all of which have been benign  She denies any vaginal bleeding breast or bladder problems  She is not sexually active  Gynecologic Exam         The following portions of the patient's history were reviewed and updated as appropriate: allergies, current medications, past family history, past medical history, past social history, past surgical history and problem list     Review of Systems   Constitutional: Negative  Respiratory: Negative for shortness of breath  Cardiovascular: Negative for chest pain  Gastrointestinal: Negative  Genitourinary: Negative  Skin: Negative  Psychiatric/Behavioral: Negative for agitation, behavioral problems and confusion           Objective:      /64   Pulse 72   Resp 16   Ht 5' 4 5" (1 638 m)   Wt 77 1 kg (170 lb)   BMI 28 73 kg/m²          Physical Exam Calos Rios has met all discharge criteria from Phase II. Vital Signs are stable, ambulating  without difficulty.Pain is now under control and tolerable for the pt. Pain score 5 at this time. States nausea has subsided. Discharge instructions given, patient verbalized understanding. Discharged from facility via wheelchair in stable condition.     Constitutional: She appears well-developed and well-nourished  No distress  HENT:   Head: Normocephalic  Pulmonary/Chest: Effort normal  No respiratory distress  Abdominal: She exhibits no distension and no mass  There is no tenderness  There is no rebound and no guarding  Genitourinary: Rectum normal and uterus normal  No breast swelling, tenderness, discharge or bleeding  No labial fusion  There is no rash, tenderness, lesion or injury on the right labia  There is no rash, tenderness, lesion or injury on the left labia  Uterus is not deviated, not enlarged, not fixed and not tender  Cervix exhibits no motion tenderness, no discharge and no friability  Right adnexum displays no mass, no tenderness and no fullness  Left adnexum displays no mass, no tenderness and no fullness  No erythema, tenderness or bleeding in the vagina  No foreign body in the vagina  No signs of injury around the vagina  No vaginal discharge found  Genitourinary Comments: Atrophic vaginal changes  The cervix, uterus and adnexa are surgically absent  Lymphadenopathy:        Right axillary: No pectoral and no lateral adenopathy present  Left axillary: No pectoral and no lateral adenopathy present  Skin: Skin is warm, dry and intact  She is not diaphoretic  No cyanosis  Nails show no clubbing  Psychiatric: She has a normal mood and affect   Her speech is normal and behavior is normal

## 2022-10-25 NOTE — OP NOTE
[facility] ([facility])  Operative Note    Date: 10/25/2022    Pre-Op Diagnosis: bilateral renal stones     Post-Op Diagnosis: same    Procedure(s) Performed:   1.  Bilateral ureteroscopy with pyeloscopy  2.  Cystoscopy  3.  Laser lithotripsy  4.  Bilateral ureteral stent placement  5. Fluoro < 1 hr     Specimen(s): none    Staff Surgeon:  Raz Cantrell MD     Assistant Surgeon: Deep Munoz MD     Anesthesia: General endotracheal anesthesia    Indications: Ms. Rios is a 51 y.o. female  with bilateral renal stones presenting for definitive stone management.  She currently does not have a JJ ureteral stent in place.        Findings:   Radiolucent stone in right lower pole completely dusted. Two left lower pole stones, one medium (moved to upper pole and dusted) and one large (fragmented; all fragments but one moved to renal pelvis and dusted.)    Estimated Blood Loss: min    Drains: bilateral 6 Fr x 24 cm JJ ureteral stent swithout strings    Procedure in detail:  After informed consent was obtained, the patient was brought the the cystoscopy suite and placed in the supine position.  SCDs were applied and working.  Anesthesia was administered.  The patient was then placed in the dorsal lithotomy position and prepped and draped in the usual sterile fashion.      A rigid cystoscope in a 22 Fr sheath was introduced into the patient's urethra.  This passed easily.  The entire urethra was visualized which showed no strictures or masses.  Formal cystoscopy was performed which revealed no masses or lesions suspicious for malignancy, no bladder stones, no bladder diverticuli, no trabeculations.  The ureteral orifices were visualized in the normal anatomic position bilaterally.      A motion wire was passed up the right ureteral orifice and up into the kidney.  This passed easily and placement was confirmed using fluoro.  A flexible ureteroscope was passed over the wire into the renal pelvis.  Flexible pyeloscopy was performed systematically.  A stone was encountered at the level of the lower pole; this was dusted thoroughly with a 272 micron Thulium laser fiber.  The scope was then exchanged for a motion wire. Over the wire, a 6 Fr x 24 cm right JJ ureteral stent without strings was placed under direct vision. Good coils were seen in the renal pelvis and bladder under fluoro and in the bladder with direct vision.     The flexible ureteroscope was then advanced into the left renal pelvis in identical fashion as the right. Two stones in the lower pole were identified. The smaller was moved to the upper pole with a Nitinol tipless basket, where it was dusted with the laser. The laser was used to dust and fragment the remaining large lower pole stone into fragments. All but one fragment were able to be moved into the renal pelvis with a basket, where they were dusted. The remaining fragment in the lower pole was dusted. The ureteroscope was exchanged for a motion wire, and a 6 Fr x 24 cm left JJ ureteral stent without strings was placed in identical fashion to the right side. The bladder was then drained.    The patient tolerated the procedure well and was transferred to the recovery room in stable condition.      Disposition:  The patient will follow up with Dr. Cantrell in 1-2 weeks for second-stage ureteroscopy.     Deep Munoz    Attestation:  I have reviewed the notes, assessments, and/or procedures documented by Dr. Munoz and I concur with her/his documentation of Calos Rios.     I was present for the entire procedure.    Edy Cantrell MD

## 2022-10-25 NOTE — ANESTHESIA POSTPROCEDURE EVALUATION
Anesthesia Post Evaluation    Patient: Calos Rios    Procedure(s) Performed: Procedure(s) (LRB):  CYSTOURETEROSCOPY,WITH HOLMIUM LASER LITHOTRIPSY OF URETERAL CALCULUS (Bilateral)  URETEROSCOPY (Bilateral)  PYELOSCOPY (Bilateral)    Final Anesthesia Type: general      Patient location during evaluation: PACU  Patient participation: Yes- Able to Participate  Level of consciousness: awake and alert  Post-procedure vital signs: reviewed and stable  Pain management: adequate  Airway patency: patent  BURAK mitigation strategies: Extubation while patient is awake  PONV status at discharge: No PONV  Anesthetic complications: no      Cardiovascular status: hemodynamically stable  Respiratory status: unassisted  Hydration status: euvolemic  Follow-up not needed.          Vitals Value Taken Time   /86 10/25/22 1517   Temp 36.2 °C (97.1 °F) 10/25/22 1432   Pulse 69 10/25/22 1519   Resp 16 10/25/22 1517   SpO2 99 % 10/25/22 1519   Vitals shown include unvalidated device data.      Event Time   Out of Recovery 15:29:00         Pain/Ita Score: Pain Rating Prior to Med Admin: 0 (10/25/2022 10:08 AM)  Ita Score: 9 (10/25/2022  3:02 PM)

## 2022-10-25 NOTE — OR NURSING
VSS on RA. Pt denies pain. No visible surgical site/dressing. PIV C/D/I. Meets Phase I discharge criteria. Ready for transfer to Phase II. Family notified.

## 2022-10-25 NOTE — TRANSFER OF CARE
"Anesthesia Transfer of Care Note    Patient: Calos Rios    Procedure(s) Performed: Procedure(s) (LRB):  CYSTOURETEROSCOPY,WITH HOLMIUM LASER LITHOTRIPSY OF URETERAL CALCULUS (Bilateral)  URETEROSCOPY (Bilateral)  PYELOSCOPY (Bilateral)    Patient location: PACU    Anesthesia Type: general    Transport from OR: Transported from OR on 2-3 L/min O2 by NC with adequate spontaneous ventilation    Post pain: adequate analgesia    Post assessment: no apparent anesthetic complications and tolerated procedure well    Post vital signs: stable    Level of consciousness: awake    Nausea/Vomiting: no nausea/vomiting    Complications: none    Transfer of care protocol was followed      Last vitals:   Visit Vitals  BP (!) 174/98 (BP Location: Right arm, Patient Position: Lying)   Pulse 60   Temp 36.2 °C (97.1 °F) (Temporal)   Resp 16   Ht 5' 4" (1.626 m)   Wt 99.8 kg (220 lb)   LMP 04/15/2019 (Approximate)   SpO2 100%   Breastfeeding No   BMI 37.76 kg/m²     "

## 2022-10-25 NOTE — DISCHARGE SUMMARY
[facility]  Discharge Note  Short Stay    Admit Date: 10/25/2022    Discharge Date and Time: 10/25/2022 2:43 PM      Attending Physician: Raz Cantrell MD     Discharge Provider: Deep Munoz    Diagnoses:  Patient Active Problem List    Diagnosis Date Noted    Metabolic syndrome 01/27/2022    Snoring 08/21/2018    Insomnia due to mental condition 04/19/2018    Reactive depression 04/19/2018    Anxiety 11/27/2017    Gastroesophageal reflux disease without esophagitis 11/27/2017    Hypertension     Migraine headache     Obesity            Discharged Condition: good    Hospital Course: Patient was admitted for bilateral ureteroscopy and tolerated the procedure well with no complications. The patient was discharged home in good condition on the same day.       Final Diagnoses: Same as principal problem.    Disposition: Home or Self Care    Follow up/Patient Instructions:    Medications:  Reconciled Home Medications:   Current Discharge Medication List        START taking these medications    Details   oxybutynin (DITROPAN) 5 MG Tab Take 1 tablet (5 mg total) by mouth 3 (three) times daily as needed (bladder spasms).  Qty: 30 tablet, Refills: 0      phenazopyridine (PYRIDIUM) 200 MG tablet Take 1 tablet (200 mg total) by mouth 3 (three) times daily as needed for Pain.  Qty: 30 tablet, Refills: 0           CONTINUE these medications which have NOT CHANGED    Details   amLODIPine (NORVASC) 10 MG tablet TAKE 1 TABLET BY MOUTH EVERY DAY  Qty: 90 tablet, Refills: 3      carvediloL (COREG) 12.5 MG tablet TAKE 1 TABLET BY MOUTH TWICE A DAY WITH MEALS  Qty: 180 tablet, Refills: 3    Associated Diagnoses: Essential hypertension      DULoxetine (CYMBALTA) 60 MG capsule TAKE 2 CAPSULES BY MOUTH ONCE DAILY  Qty: 180 capsule, Refills: 2    Associated Diagnoses: Anxiety      spironolactone (ALDACTONE) 25 MG tablet TAKE 1 TABLET BY MOUTH EVERY DAY  Qty: 90 tablet, Refills: 3      valsartan-hydrochlorothiazide  (DIOVAN-HCT) 320-12.5 mg per tablet Take 1 tablet by mouth once daily.  Qty: 90 tablet, Refills: 3    Comments: .      albuterol (PROVENTIL/VENTOLIN HFA) 90 mcg/actuation inhaler Inhale 2 puffs into the lungs every 6 (six) hours as needed for Wheezing.  Qty: 18 g, Refills: 0      blood pressure monitor (IHE[employer] EASE) LG arm size (OP) by Other route as needed for High Blood Pressure.  Qty: 1 each, Refills: 0    Comments: ^[email or web address]^2020-12-14T18:01:17Z^NKDA^NNDT^      dulaglutide (TRULICITY) 1.5 mg/0.5 mL pen injector Inject 1.5 mg into the skin every 7 days.  Qty: 4 pen, Refills: 5      ibuprofen (ADVIL,MOTRIN) 600 MG tablet Take 1 tablet (600 mg total) by mouth every 6 (six) hours as needed.  Qty: 20 tablet, Refills: 0           Discharge Procedure Orders   Diet general     Call MD for:  extreme fatigue     Call MD for:  persistent dizziness or light-headedness     Call MD for:  hives     Call MD for:  redness, tenderness, or signs of infection (pain, swelling, redness, odor or green/yellow discharge around incision site)     Call MD for:  difficulty breathing, headache or visual disturbances     Call MD for:  severe uncontrolled pain     Call MD for:  persistent nausea and vomiting     Call MD for:  temperature >100.4

## 2022-10-27 ENCOUNTER — TELEPHONE (OUTPATIENT)
Dept: UROLOGY | Facility: CLINIC | Age: 52
End: 2022-10-27
Payer: MEDICAID

## 2022-10-27 NOTE — TELEPHONE ENCOUNTER
Post-op questions has been direct to the provider      Hanna reddy----- Message from Son Muller sent at 10/26/2022 10:29 AM CDT -----  Contact: @[facility] [phone]  Pt is calling with questions from post op. Please call back to further assist.

## 2022-10-31 DIAGNOSIS — N20.0 BILATERAL RENAL STONES: Primary | ICD-10-CM

## 2022-10-31 DIAGNOSIS — N20.0 NEPHROLITHIASIS: ICD-10-CM

## 2022-10-31 RX ORDER — ONDANSETRON 4 MG/1
4 TABLET, ORALLY DISINTEGRATING ORAL 2 TIMES DAILY
Qty: 20 TABLET | Refills: 1 | Status: SHIPPED | OUTPATIENT
Start: 2022-10-31

## 2022-10-31 RX ORDER — CIPROFLOXACIN 2 MG/ML
400 INJECTION, SOLUTION INTRAVENOUS
Status: CANCELLED | OUTPATIENT
Start: 2022-10-31

## 2022-11-04 ENCOUNTER — OFFICE VISIT (OUTPATIENT)
Dept: UROLOGY | Facility: CLINIC | Age: 52
End: 2022-11-04
Payer: MEDICAID

## 2022-11-04 VITALS
HEIGHT: 64 IN | BODY MASS INDEX: 33.46 KG/M2 | DIASTOLIC BLOOD PRESSURE: 100 MMHG | HEART RATE: 73 BPM | SYSTOLIC BLOOD PRESSURE: 143 MMHG | WEIGHT: 196 LBS

## 2022-11-04 DIAGNOSIS — N20.0 BILATERAL RENAL STONES: Primary | ICD-10-CM

## 2022-11-04 PROCEDURE — 3044F HG A1C LEVEL LT 7.0%: CPT | Mod: CPTII,,, | Performed by: UROLOGY

## 2022-11-04 PROCEDURE — 3080F PR MOST RECENT DIASTOLIC BLOOD PRESSURE >= 90 MM HG: ICD-10-PCS | Mod: CPTII,,, | Performed by: UROLOGY

## 2022-11-04 PROCEDURE — 87086 URINE CULTURE/COLONY COUNT: CPT | Performed by: UROLOGY

## 2022-11-04 PROCEDURE — 3008F PR BODY MASS INDEX (BMI) DOCUMENTED: ICD-10-PCS | Mod: CPTII,,, | Performed by: UROLOGY

## 2022-11-04 PROCEDURE — 3077F SYST BP >= 140 MM HG: CPT | Mod: CPTII,,, | Performed by: UROLOGY

## 2022-11-04 PROCEDURE — 3077F PR MOST RECENT SYSTOLIC BLOOD PRESSURE >= 140 MM HG: ICD-10-PCS | Mod: CPTII,,, | Performed by: UROLOGY

## 2022-11-04 PROCEDURE — 3080F DIAST BP >= 90 MM HG: CPT | Mod: CPTII,,, | Performed by: UROLOGY

## 2022-11-04 PROCEDURE — 3008F BODY MASS INDEX DOCD: CPT | Mod: CPTII,,, | Performed by: UROLOGY

## 2022-11-04 PROCEDURE — 99214 PR OFFICE/OUTPT VISIT, EST, LEVL IV, 30-39 MIN: ICD-10-PCS | Mod: S$PBB,,, | Performed by: UROLOGY

## 2022-11-04 PROCEDURE — 1160F RVW MEDS BY RX/DR IN RCRD: CPT | Mod: CPTII,,, | Performed by: UROLOGY

## 2022-11-04 PROCEDURE — 99999 PR PBB SHADOW E&M-EST. PATIENT-LVL III: ICD-10-PCS | Mod: PBBFAC,,, | Performed by: UROLOGY

## 2022-11-04 PROCEDURE — 1160F PR REVIEW ALL MEDS BY PRESCRIBER/CLIN PHARMACIST DOCUMENTED: ICD-10-PCS | Mod: CPTII,,, | Performed by: UROLOGY

## 2022-11-04 PROCEDURE — 3044F PR MOST RECENT HEMOGLOBIN A1C LEVEL <7.0%: ICD-10-PCS | Mod: CPTII,,, | Performed by: UROLOGY

## 2022-11-04 PROCEDURE — 99213 OFFICE O/P EST LOW 20 MIN: CPT | Mod: PBBFAC,PN | Performed by: UROLOGY

## 2022-11-04 PROCEDURE — 1159F PR MEDICATION LIST DOCUMENTED IN MEDICAL RECORD: ICD-10-PCS | Mod: CPTII,,, | Performed by: UROLOGY

## 2022-11-04 PROCEDURE — 99999 PR PBB SHADOW E&M-EST. PATIENT-LVL III: CPT | Mod: PBBFAC,,, | Performed by: UROLOGY

## 2022-11-04 PROCEDURE — 1159F MED LIST DOCD IN RCRD: CPT | Mod: CPTII,,, | Performed by: UROLOGY

## 2022-11-04 PROCEDURE — 99214 OFFICE O/P EST MOD 30 MIN: CPT | Mod: S$PBB,,, | Performed by: UROLOGY

## 2022-11-04 NOTE — PROGRESS NOTES
"Subjective:      Calos Rios is a 51 y.o. female who returns today regarding her bilateral renal stones.    1 week s/p bilateral URS for large renal stones. Residual stone known to remain on left side. Here to schedule 2nd stage URS as planned.    The following portions of the patient's history were reviewed and updated as appropriate: allergies, current medications, past family history, past medical history, past social history, past surgical history and problem list.    Review of Systems  A comprehensive multipoint review of systems was negative except as otherwise stated in the HPI.     Objective:   Vitals: BP (!) 143/100   Pulse 73   Ht 5' 4" (1.626 m)   Wt 88.9 kg (196 lb)   LMP 04/15/2019 (Approximate)   BMI 33.64 kg/m²     Physical Exam   General: alert and oriented, no acute distress  Respiratory: Symmetric expansion, non-labored breathing  Neuro: no gross deficits  Psych: normal judgment and insight, normal mood/affect, and non-anxious    Lab Review   Urinalysis demonstrates positive for leukocytes, red blood cells  Lab Results   Component Value Date    WBC 5.36 02/16/2020    HGB 13.5 02/16/2020    HCT 42.6 02/16/2020    HCT 40 06/21/2018    MCV 98 02/16/2020     02/16/2020     Lab Results   Component Value Date    CREATININE 1.0 09/09/2022    BUN 16 09/09/2022       Assessment and Plan:   1. Bilateral renal stones    2nd stage URS at [facility] next week as planned.             "

## 2022-11-06 LAB — BACTERIA UR CULT: NO GROWTH

## 2022-11-09 PROBLEM — N20.0 NEPHROLITHIASIS: Status: ACTIVE | Noted: 2022-11-09

## 2022-12-02 ENCOUNTER — HOSPITAL ENCOUNTER (EMERGENCY)
Facility: HOSPITAL | Age: 52
Discharge: HOME | End: 2022-12-02
Attending: EMERGENCY MEDICINE
Payer: MEDICAID

## 2022-12-02 ENCOUNTER — TELEPHONE (OUTPATIENT)
Dept: INTERNAL MEDICINE | Facility: CLINIC | Age: 52
End: 2022-12-02
Payer: MEDICAID

## 2022-12-02 VITALS
SYSTOLIC BLOOD PRESSURE: 200 MMHG | HEIGHT: 64 IN | HEART RATE: 65 BPM | TEMPERATURE: 100 F | OXYGEN SATURATION: 98 % | RESPIRATION RATE: 17 BRPM | DIASTOLIC BLOOD PRESSURE: 108 MMHG | WEIGHT: 191 LBS | BODY MASS INDEX: 32.61 KG/M2

## 2022-12-02 DIAGNOSIS — U07.1 COVID-19: ICD-10-CM

## 2022-12-02 DIAGNOSIS — I10 HTN (HYPERTENSION): Primary | ICD-10-CM

## 2022-12-02 DIAGNOSIS — N12 PYELONEPHRITIS: ICD-10-CM

## 2022-12-02 DIAGNOSIS — E87.6 HYPOKALEMIA: ICD-10-CM

## 2022-12-02 LAB
ALBUMIN SERPL BCP-MCNC: 3.7 G/DL (ref 3.5–5.2)
ALP SERPL-CCNC: 68 U/L (ref 55–135)
ALT SERPL W/O P-5'-P-CCNC: 11 U/L (ref 10–44)
ANION GAP SERPL CALC-SCNC: 13 MMOL/L (ref 8–16)
AST SERPL-CCNC: 15 U/L (ref 10–40)
BACTERIA #/AREA URNS AUTO: ABNORMAL /HPF
BASOPHILS # BLD AUTO: 0.02 K/UL (ref 0–0.2)
BASOPHILS NFR BLD: 0.3 % (ref 0–1.9)
BILIRUB SERPL-MCNC: 0.7 MG/DL (ref 0.1–1)
BILIRUB UR QL STRIP: NEGATIVE
BUN SERPL-MCNC: 11 MG/DL (ref 6–20)
CALCIUM SERPL-MCNC: 9.9 MG/DL (ref 8.7–10.5)
CHLORIDE SERPL-SCNC: 105 MMOL/L (ref 95–110)
CLARITY UR REFRACT.AUTO: ABNORMAL
CO2 SERPL-SCNC: 25 MMOL/L (ref 23–29)
COLOR UR AUTO: YELLOW
CREAT SERPL-MCNC: 1.3 MG/DL (ref 0.5–1.4)
DIFFERENTIAL METHOD: ABNORMAL
EOSINOPHIL # BLD AUTO: 0.1 K/UL (ref 0–0.5)
EOSINOPHIL NFR BLD: 0.9 % (ref 0–8)
ERYTHROCYTE [DISTWIDTH] IN BLOOD BY AUTOMATED COUNT: 15.4 % (ref 11.5–14.5)
EST. GFR  (NO RACE VARIABLE): 50.1 ML/MIN/1.73 M^2
GLUCOSE SERPL-MCNC: 93 MG/DL (ref 70–110)
GLUCOSE UR QL STRIP: NEGATIVE
HCT VFR BLD AUTO: 41.1 % (ref 37–48.5)
HCV AB SERPL QL IA: NORMAL
HGB BLD-MCNC: 13.5 G/DL (ref 12–16)
HGB UR QL STRIP: ABNORMAL
HIV 1+2 AB+HIV1 P24 AG SERPL QL IA: NORMAL
HYALINE CASTS UR QL AUTO: 1 /LPF
IMM GRANULOCYTES # BLD AUTO: 0.02 K/UL (ref 0–0.04)
IMM GRANULOCYTES NFR BLD AUTO: 0.3 % (ref 0–0.5)
INFLUENZA A, MOLECULAR: NOT DETECTED
INFLUENZA B, MOLECULAR: NOT DETECTED
KETONES UR QL STRIP: NEGATIVE
LEUKOCYTE ESTERASE UR QL STRIP: ABNORMAL
LYMPHOCYTES # BLD AUTO: 1.4 K/UL (ref 1–4.8)
LYMPHOCYTES NFR BLD: 20.1 % (ref 18–48)
MAGNESIUM SERPL-MCNC: 1.9 MG/DL (ref 1.6–2.6)
MCH RBC QN AUTO: 30.9 PG (ref 27–31)
MCHC RBC AUTO-ENTMCNC: 32.8 G/DL (ref 32–36)
MCV RBC AUTO: 94 FL (ref 82–98)
MICROSCOPIC COMMENT: ABNORMAL
MONOCYTES # BLD AUTO: 0.7 K/UL (ref 0.3–1)
MONOCYTES NFR BLD: 10.1 % (ref 4–15)
NEUTROPHILS # BLD AUTO: 4.7 K/UL (ref 1.8–7.7)
NEUTROPHILS NFR BLD: 68.3 % (ref 38–73)
NITRITE UR QL STRIP: NEGATIVE
NRBC BLD-RTO: 0 /100 WBC
PH UR STRIP: 7 [PH] (ref 5–8)
PLATELET # BLD AUTO: 194 K/UL (ref 150–450)
PMV BLD AUTO: 10.6 FL (ref 9.2–12.9)
POTASSIUM SERPL-SCNC: 2.9 MMOL/L (ref 3.5–5.1)
PROT SERPL-MCNC: 7.9 G/DL (ref 6–8.4)
PROT UR QL STRIP: ABNORMAL
RBC # BLD AUTO: 4.37 M/UL (ref 4–5.4)
RBC #/AREA URNS AUTO: 24 /HPF (ref 0–4)
RSV AG BY MOLECULAR METHOD: NOT DETECTED
SARS-COV-2 RNA RESP QL NAA+PROBE: DETECTED
SODIUM SERPL-SCNC: 143 MMOL/L (ref 136–145)
SP GR UR STRIP: 1.02 (ref 1–1.03)
SQUAMOUS #/AREA URNS AUTO: 10 /HPF
URN SPEC COLLECT METH UR: ABNORMAL
WBC # BLD AUTO: 6.9 K/UL (ref 3.9–12.7)
WBC #/AREA URNS AUTO: >100 /HPF (ref 0–5)

## 2022-12-02 PROCEDURE — 87389 HIV-1 AG W/HIV-1&-2 AB AG IA: CPT | Performed by: PHYSICIAN ASSISTANT

## 2022-12-02 PROCEDURE — 99284 EMERGENCY DEPT VISIT MOD MDM: CPT | Mod: CS,,, | Performed by: PHYSICIAN ASSISTANT

## 2022-12-02 PROCEDURE — 93010 ELECTROCARDIOGRAM REPORT: CPT | Mod: ,,, | Performed by: INTERNAL MEDICINE

## 2022-12-02 PROCEDURE — 0241U SARS-COV2 (COVID) WITH FLU/RSV BY PCR: CPT | Performed by: EMERGENCY MEDICINE

## 2022-12-02 PROCEDURE — 85025 COMPLETE CBC W/AUTO DIFF WBC: CPT | Performed by: EMERGENCY MEDICINE

## 2022-12-02 PROCEDURE — 83735 ASSAY OF MAGNESIUM: CPT | Performed by: PHYSICIAN ASSISTANT

## 2022-12-02 PROCEDURE — 63600175 PHARM REV CODE 636 W HCPCS: Performed by: PHYSICIAN ASSISTANT

## 2022-12-02 PROCEDURE — 87086 URINE CULTURE/COLONY COUNT: CPT | Performed by: EMERGENCY MEDICINE

## 2022-12-02 PROCEDURE — 81001 URINALYSIS AUTO W/SCOPE: CPT | Performed by: EMERGENCY MEDICINE

## 2022-12-02 PROCEDURE — 25000003 PHARM REV CODE 250: Performed by: EMERGENCY MEDICINE

## 2022-12-02 PROCEDURE — 99284 PR EMERGENCY DEPT VISIT,LEVEL IV: ICD-10-PCS | Mod: CS,,, | Performed by: PHYSICIAN ASSISTANT

## 2022-12-02 PROCEDURE — 93010 EKG 12-LEAD: ICD-10-PCS | Mod: ,,, | Performed by: INTERNAL MEDICINE

## 2022-12-02 PROCEDURE — 93005 ELECTROCARDIOGRAM TRACING: CPT

## 2022-12-02 PROCEDURE — 25000003 PHARM REV CODE 250: Performed by: PHYSICIAN ASSISTANT

## 2022-12-02 PROCEDURE — 86803 HEPATITIS C AB TEST: CPT | Performed by: PHYSICIAN ASSISTANT

## 2022-12-02 PROCEDURE — 99284 EMERGENCY DEPT VISIT MOD MDM: CPT | Mod: 25

## 2022-12-02 PROCEDURE — 80053 COMPREHEN METABOLIC PANEL: CPT | Performed by: EMERGENCY MEDICINE

## 2022-12-02 RX ORDER — VALSARTAN 160 MG/1
320 TABLET ORAL
Status: COMPLETED | OUTPATIENT
Start: 2022-12-02 | End: 2022-12-02

## 2022-12-02 RX ORDER — SPIRONOLACTONE 25 MG/1
25 TABLET ORAL
Status: COMPLETED | OUTPATIENT
Start: 2022-12-02 | End: 2022-12-02

## 2022-12-02 RX ORDER — HYDROCHLOROTHIAZIDE 12.5 MG/1
12.5 TABLET ORAL
Status: COMPLETED | OUTPATIENT
Start: 2022-12-02 | End: 2022-12-02

## 2022-12-02 RX ORDER — CARVEDILOL 12.5 MG/1
12.5 TABLET ORAL 2 TIMES DAILY
Status: DISCONTINUED | OUTPATIENT
Start: 2022-12-02 | End: 2022-12-02 | Stop reason: HOSPADM

## 2022-12-02 RX ORDER — AMLODIPINE BESYLATE 10 MG/1
10 TABLET ORAL
Status: COMPLETED | OUTPATIENT
Start: 2022-12-02 | End: 2022-12-02

## 2022-12-02 RX ORDER — ACETAMINOPHEN 500 MG
1000 TABLET ORAL
Status: COMPLETED | OUTPATIENT
Start: 2022-12-02 | End: 2022-12-02

## 2022-12-02 RX ORDER — ONDANSETRON 4 MG/1
4 TABLET, ORALLY DISINTEGRATING ORAL 2 TIMES DAILY
Qty: 6 TABLET | Refills: 0 | Status: SHIPPED | OUTPATIENT
Start: 2022-12-02 | End: 2022-12-05

## 2022-12-02 RX ORDER — CEPHALEXIN 500 MG/1
500 CAPSULE ORAL 4 TIMES DAILY
Qty: 40 CAPSULE | Refills: 0 | Status: SHIPPED | OUTPATIENT
Start: 2022-12-02 | End: 2022-12-12

## 2022-12-02 RX ORDER — ONDANSETRON 4 MG/1
4 TABLET, ORALLY DISINTEGRATING ORAL
Status: COMPLETED | OUTPATIENT
Start: 2022-12-02 | End: 2022-12-02

## 2022-12-02 RX ORDER — CEPHALEXIN 500 MG/1
500 CAPSULE ORAL
Status: COMPLETED | OUTPATIENT
Start: 2022-12-02 | End: 2022-12-02

## 2022-12-02 RX ADMIN — SODIUM CHLORIDE, POTASSIUM CHLORIDE, SODIUM LACTATE AND CALCIUM CHLORIDE 1000 ML: 600; 310; 30; 20 INJECTION, SOLUTION INTRAVENOUS at 03:12

## 2022-12-02 RX ADMIN — CEPHALEXIN 500 MG: 500 CAPSULE ORAL at 03:12

## 2022-12-02 RX ADMIN — AMLODIPINE BESYLATE 10 MG: 10 TABLET ORAL at 03:12

## 2022-12-02 RX ADMIN — VALSARTAN 320 MG: 160 TABLET, FILM COATED ORAL at 03:12

## 2022-12-02 RX ADMIN — HYDROCHLOROTHIAZIDE 12.5 MG: 12.5 TABLET ORAL at 03:12

## 2022-12-02 RX ADMIN — ONDANSETRON 4 MG: 4 TABLET, ORALLY DISINTEGRATING ORAL at 03:12

## 2022-12-02 RX ADMIN — POTASSIUM BICARBONATE 40 MEQ: 391 TABLET, EFFERVESCENT ORAL at 04:12

## 2022-12-02 RX ADMIN — ACETAMINOPHEN 1000 MG: 500 TABLET ORAL at 02:12

## 2022-12-02 RX ADMIN — SPIRONOLACTONE 25 MG: 25 TABLET, FILM COATED ORAL at 03:12

## 2022-12-02 NOTE — TELEPHONE ENCOUNTER
----- Message from Katja DERECK Sun sent at 12/2/2022 10:42 AM CST -----  Contact: [phone]  Pt called to advise that she is having flu like symptoms and has tested negative for C-19. She would like an RX for tamiflu sent to her pharmacy. Please Advise       [id number] - [place], [place] - [address]  [address]  [place] [address]  Phone: [phone] Fax: [phone]

## 2022-12-02 NOTE — TELEPHONE ENCOUNTER
Spoke w/ pt, states she is having aches all over he body. Started vomiting last night after dinner. Advised her to go to an urgent care or ED to be seen. Pt expressed understanding and states she will

## 2022-12-02 NOTE — ED PROVIDER NOTES
Encounter Date: 12/2/2022       History     Chief Complaint   Patient presents with    Flu Like Symptoms     Body aches and vomiting since yesterday. Denies fever.     [age] old female with history of anxiety, hypertension, migraines, obesity presents to the ED for generalized body aches and nausea vomiting.  Reports she is been having persistent nausea vomiting for months.  States yesterday was worse and was unable to tolerate anything p.o. despite Zofran.  Has an appointment on December 15th with GI for this issue.  States yesterday she began having body aches which is new.  Denies any chest pain, shortness of breath, cough, congestion, sore throat or abdominal pain.  Noted to be hypertensive on arrival to the ED and states that she did not take her blood pressure medications this morning.    The history is provided by the patient.   Review of patient's allergies indicates:  No Known Allergies  Past Medical History:   Diagnosis Date    Anxiety disorder, unspecified     Hypertension     Migraine headache     Obesity      Past Surgical History:   Procedure Laterality Date    CYSTOURETEROSCOPY,WITH HOLMIUM LASER LITHOTRIPSY OF URETERAL CALCULUS - Bilateral Bilateral 11/09/2022    HYSTERECTOMY, TOTAL, LAPAROSCOPIC, WITH SALPINGO-OOPHORECTOMY Bilateral 03/17/2021    LAPAROSCOPIC TOTAL HYSTERECTOMY Bilateral 03/17/2021    PYELOSCOPY Bilateral 10/25/2022    Procedure: PYELOSCOPY;  Surgeon: Raz Cantrell MD;  Location: [facility] OR;  Service: Urology;  Laterality: Bilateral;    TUBAL LIGATION      URETEROSCOPY Bilateral 10/25/2022    Procedure: URETEROSCOPY;  Surgeon: Raz Cantrell MD;  Location: [facility] OR;  Service: Urology;  Laterality: Bilateral;     Family History   Problem Relation Age of Onset    Heart disease Mother     Drug abuse Father     Heart disease Father     Kidney disease Father     Diabetes Maternal Uncle     Heart disease Maternal Grandmother     Ovarian cancer Maternal Aunt     Cancer Neg Hx       Social History     Tobacco Use    Smoking status: Every Day     Packs/day: 0.25     Years: 15.00     Pack years: 3.75     Types: Cigarettes    Smokeless tobacco: Never   Substance Use Topics    Alcohol use: Yes     Alcohol/week: 3.0 standard drinks     Types: 3 Shots of liquor per week     Comment: scocially on weekends    Drug use: No     Review of Systems   Constitutional:  Negative for chills and fever.   HENT:  Negative for sore throat.    Eyes:  Negative for pain.   Respiratory:  Negative for cough and shortness of breath.    Cardiovascular:  Negative for chest pain.   Gastrointestinal:  Positive for nausea and vomiting. Negative for abdominal pain.   Genitourinary:  Negative for dyspareunia and dysuria.   Musculoskeletal:  Positive for myalgias.   Skin: Negative.    Neurological:  Negative for weakness.   Psychiatric/Behavioral:  Negative for confusion.      Physical Exam     Initial Vitals [12/02/22 1309]   BP Pulse Resp Temp SpO2   (!) 211/129 97 16 100 °F (37.8 °C) 98 %      MAP       --         Physical Exam    Nursing note and vitals reviewed.  Constitutional: She appears well-developed and well-nourished. She is not diaphoretic. No distress.   HENT:   Head: Normocephalic and atraumatic.   Eyes: Conjunctivae are normal. Pupils are equal, round, and reactive to light.   Neck: Neck supple.   Normal range of motion.  Cardiovascular:  Normal rate, regular rhythm, normal heart sounds and intact distal pulses.     Exam reveals no gallop and no friction rub.       No murmur heard.  Pulmonary/Chest: Breath sounds normal.   Abdominal: Abdomen is soft. Bowel sounds are normal. There is no abdominal tenderness.   Musculoskeletal:         General: Normal range of motion.      Cervical back: Normal range of motion and neck supple.     Neurological: She is alert and oriented to person, place, and time. GCS score is 15. GCS eye subscore is 4. GCS verbal subscore is 5. GCS motor subscore is 6.   Skin: Skin is warm  and dry. Capillary refill takes less than 2 seconds.   Psychiatric: She has a normal mood and affect.       ED Course   Procedures  Labs Reviewed   SARS-COV2 (COVID) WITH FLU/RSV BY PCR - Abnormal; Notable for the following components:       Result Value    SARS-CoV2 (COVID-19) Qualitative PCR Detected (*)     All other components within normal limits   CBC W/ AUTO DIFFERENTIAL - Abnormal; Notable for the following components:    RDW 15.4 (*)     All other components within normal limits    Narrative:     Release to patient->Immediate   COMPREHENSIVE METABOLIC PANEL - Abnormal; Notable for the following components:    Potassium 2.9 (*)     eGFR 50.1 (*)     All other components within normal limits    Narrative:     Release to patient->Immediate   URINALYSIS, REFLEX TO URINE CULTURE - Abnormal; Notable for the following components:    Appearance, UA Hazy (*)     Protein, UA 1+ (*)     Occult Blood UA 1+ (*)     Leukocytes, UA 3+ (*)     All other components within normal limits    Narrative:     Specimen Source->Urine   URINALYSIS MICROSCOPIC - Abnormal; Notable for the following components:    RBC, UA 24 (*)     WBC, UA >100 (*)     Bacteria Many (*)     All other components within normal limits    Narrative:     Specimen Source->Urine   CULTURE, URINE   HIV 1 / 2 ANTIBODY    Narrative:     Release to patient->Immediate   MAGNESIUM    Narrative:      ADDD ON MAGNESIUM TO ORDER #[id number]  PER EUFEMIA CUEVAS PA-C  12/02/2022  16:19 Release to patient->Immediate   HEPATITIS C ANTIBODY          Imaging Results    None          Medications   carvediloL tablet 12.5 mg (has no administration in time range)   acetaminophen tablet 1,000 mg (1,000 mg Oral Given 12/2/22 1409)   amLODIPine tablet 10 mg (10 mg Oral Given 12/2/22 1527)   hydroCHLOROthiazide tablet 12.5 mg (12.5 mg Oral Given 12/2/22 1527)   valsartan tablet 320 mg (320 mg Oral Given 12/2/22 1527)   ondansetron disintegrating tablet 4 mg (4 mg Oral Given 12/2/22  1527)   spironolactone tablet 25 mg (25 mg Oral Given 12/2/22 1527)   lactated ringers bolus 1,000 mL (1,000 mLs Intravenous New Bag 12/2/22 1528)   cephALEXin capsule 500 mg (500 mg Oral Given 12/2/22 1527)   potassium bicarbonate disintegrating tablet 40 mEq (40 mEq Oral Given 12/2/22 1612)     Medical Decision Making:   History:   Old Medical Records: I decided to obtain old medical records.  Initial Assessment:   51-year-old female presents to the ED for body aches and nausea vomiting.  Differential Diagnosis:   UTI, viral syndrome, LATOYA, dehydration  Clinical Tests:   Lab Tests: Ordered and Reviewed  ED Management:  UA significant for UTI.  Given her constitutional symptoms will treat for pyelonephritis with Keflex.  Tolerating p.o. in the ED.  Noted to be hypertensive with a BP of 228/123 although she did not take her for antihypertensive this morning.  She is without symptoms and no evidence of end-organ dysfunction on labs.  Given her home meds in the ED advised to follow-up with PCP as she advised that her baseline is 200 systolic despite her medications and her PCP is aware of this..  No LATOYA on CMP.  No leukocytosis and vital signs do not appear to represent sepsis.  He is overall well-appearing.  No focal neurological deficits on exam.  Will discharge home with Keflex.  Discussed return to ED precautions for new worsening symptoms.  Viral screen positive for COVID-19.  She is not hypoxic with a low risk COVID score of 2.  Will treat supportively with instructions for OTC medications as well as quarantine.           ED Course as of 12/02/22 1639   Fri Dec 02, 2022   1455 WBC, UA(!): >100  Significant for UTI [HJ]   1457 WBC: 6.90  No leukocytosis [HJ]   1551 Creatinine: 1.3 [HJ]   1551 Creatinine: 1.3  No LATOYA [HJ]   1552 Potassium(!): 2.9  Mild hyperkalemia, no EKG changes.  Likely due to nausea vomiting.  Will replete orally. [HJ]   1638 SARS-CoV2 (COVID-19) Qualitative PCR(!): Detected [HJ]      ED Course  User Index  [HJ] Paloma Toussaint PA-C                   Clinical Impression:   Final diagnoses:  [I10] HTN (hypertension) (Primary)  [N12] Pyelonephritis  [E87.6] Hypokalemia  [U07.1] COVID-19        ED Disposition Condition    Discharge Stable          ED Prescriptions       Medication Sig Dispense Start Date End Date Auth. Provider    ondansetron (ZOFRAN-ODT) 4 MG TbDL Take 1 tablet (4 mg total) by mouth 2 (two) times daily. for 3 days 6 tablet 12/2/2022 12/5/2022 Paloma Toussaint PA-C    cephALEXin (KEFLEX) 500 MG capsule Take 1 capsule (500 mg total) by mouth 4 (four) times daily. for 10 days 40 capsule 12/2/2022 12/12/2022 Paloma Toussaint PA-C          Follow-up Information       Follow up With Specialties Details Why Contact Info    Adria Doll Jr., MD Internal Medicine Schedule an appointment as soon as possible for a visit  As needed [id number]  [place] [address]  [phone]               Paloma Toussaint PA-C  12/02/22 1612       Paloma Toussaint PA-C  12/02/22 1614       Paloma Toussaint PA-C  12/02/22 [id number]

## 2022-12-02 NOTE — DISCHARGE INSTRUCTIONS
You were seen in the emergency department for body aches and nausea your urine was consistent with a urinary tract infection and I have given you antibiotics to treat this.  Your COVID-19 and influenza swabs are still pending.  You may follow-up on the patient portal and if your COVID test is positive please home quarantine for 5 days.  You may return to the ED sooner for any new or worsening symptoms.  Your blood pressure was high today please keep a BP log and if you continue to be high over the next week please follow-up with your primary care provider for medication adjustment.    You have been evaluated in the Emergency Department by a provider and have a rapid COVID test that is currently pending. Please access [facility] to review the results of your test. Until the results of your COVID test return, you should isolate yourself so as not to potentially spread illness to others.   If your COVID test returns positive, you should isolate yourself so as not to spread illness to others. After five full days, if you are feeling better and you have not had fever for 24 hours, you can return to your typical daily activities but you must wear a mask around others for an additional 5 days.   If your COVID test returns negative and you are either unvaccinated or more than six months out from your two-dose vaccine and are not yet boosted, you should still quarantine for 5 full days followed by strict mask use for an additional 5 full days.   If your COVID test returns negative and you have received your 2-dose initial vaccine as well as a booster, you should continue strict mask use for 10 full days after the exposure.  For all those exposed, best practice includes a test at day 5 after the exposure. This can be a home test or a test through one of the many testing centers throughout our community.   Masking is always advised to limit the spread of COVID. Cdc.gov is an excellent site to obtain the latest up to date  recommendations regarding COVID and COVID testing.   After your evaluation today, we ruled out any emergent condition. However, if you develop shortness of breath, chest pain, or ANY OTHER CONCERNS please return to the emergency department for further care.

## 2022-12-04 LAB — BACTERIA UR CULT: NORMAL

## 2022-12-15 ENCOUNTER — OFFICE VISIT (OUTPATIENT)
Dept: GASTROENTEROLOGY | Facility: CLINIC | Age: 52
End: 2022-12-15
Payer: MEDICAID

## 2022-12-15 ENCOUNTER — TELEPHONE (OUTPATIENT)
Dept: GASTROENTEROLOGY | Facility: CLINIC | Age: 52
End: 2022-12-15
Payer: MEDICAID

## 2022-12-15 VITALS
HEIGHT: 64 IN | SYSTOLIC BLOOD PRESSURE: 139 MMHG | BODY MASS INDEX: 32.49 KG/M2 | WEIGHT: 190.31 LBS | DIASTOLIC BLOOD PRESSURE: 96 MMHG | HEART RATE: 74 BPM

## 2022-12-15 DIAGNOSIS — R11.2 NAUSEA AND VOMITING, UNSPECIFIED VOMITING TYPE: ICD-10-CM

## 2022-12-15 DIAGNOSIS — R10.10 PAIN OF UPPER ABDOMEN: Primary | ICD-10-CM

## 2022-12-15 DIAGNOSIS — K59.04 CHRONIC IDIOPATHIC CONSTIPATION: ICD-10-CM

## 2022-12-15 PROCEDURE — 99215 OFFICE O/P EST HI 40 MIN: CPT | Mod: PBBFAC,PO | Performed by: NURSE PRACTITIONER

## 2022-12-15 PROCEDURE — 3008F BODY MASS INDEX DOCD: CPT | Mod: CPTII,,, | Performed by: NURSE PRACTITIONER

## 2022-12-15 PROCEDURE — 3008F PR BODY MASS INDEX (BMI) DOCUMENTED: ICD-10-PCS | Mod: CPTII,,, | Performed by: NURSE PRACTITIONER

## 2022-12-15 PROCEDURE — 3075F PR MOST RECENT SYSTOLIC BLOOD PRESS GE 130-139MM HG: ICD-10-PCS | Mod: CPTII,,, | Performed by: NURSE PRACTITIONER

## 2022-12-15 PROCEDURE — 3080F PR MOST RECENT DIASTOLIC BLOOD PRESSURE >= 90 MM HG: ICD-10-PCS | Mod: CPTII,,, | Performed by: NURSE PRACTITIONER

## 2022-12-15 PROCEDURE — 99214 OFFICE O/P EST MOD 30 MIN: CPT | Mod: S$PBB,,, | Performed by: NURSE PRACTITIONER

## 2022-12-15 PROCEDURE — 1160F PR REVIEW ALL MEDS BY PRESCRIBER/CLIN PHARMACIST DOCUMENTED: ICD-10-PCS | Mod: CPTII,,, | Performed by: NURSE PRACTITIONER

## 2022-12-15 PROCEDURE — 99999 PR PBB SHADOW E&M-EST. PATIENT-LVL V: CPT | Mod: PBBFAC,,, | Performed by: NURSE PRACTITIONER

## 2022-12-15 PROCEDURE — 3080F DIAST BP >= 90 MM HG: CPT | Mod: CPTII,,, | Performed by: NURSE PRACTITIONER

## 2022-12-15 PROCEDURE — 99214 PR OFFICE/OUTPT VISIT, EST, LEVL IV, 30-39 MIN: ICD-10-PCS | Mod: S$PBB,,, | Performed by: NURSE PRACTITIONER

## 2022-12-15 PROCEDURE — 3075F SYST BP GE 130 - 139MM HG: CPT | Mod: CPTII,,, | Performed by: NURSE PRACTITIONER

## 2022-12-15 PROCEDURE — 1159F PR MEDICATION LIST DOCUMENTED IN MEDICAL RECORD: ICD-10-PCS | Mod: CPTII,,, | Performed by: NURSE PRACTITIONER

## 2022-12-15 PROCEDURE — 3044F HG A1C LEVEL LT 7.0%: CPT | Mod: CPTII,,, | Performed by: NURSE PRACTITIONER

## 2022-12-15 PROCEDURE — 99999 PR PBB SHADOW E&M-EST. PATIENT-LVL V: ICD-10-PCS | Mod: PBBFAC,,, | Performed by: NURSE PRACTITIONER

## 2022-12-15 PROCEDURE — 1159F MED LIST DOCD IN RCRD: CPT | Mod: CPTII,,, | Performed by: NURSE PRACTITIONER

## 2022-12-15 PROCEDURE — 1160F RVW MEDS BY RX/DR IN RCRD: CPT | Mod: CPTII,,, | Performed by: NURSE PRACTITIONER

## 2022-12-15 PROCEDURE — 3044F PR MOST RECENT HEMOGLOBIN A1C LEVEL <7.0%: ICD-10-PCS | Mod: CPTII,,, | Performed by: NURSE PRACTITIONER

## 2022-12-15 RX ORDER — OMEPRAZOLE 40 MG/1
40 CAPSULE, DELAYED RELEASE ORAL DAILY
Qty: 30 CAPSULE | Refills: 2 | Status: SHIPPED | OUTPATIENT
Start: 2022-12-15 | End: 2023-03-23

## 2022-12-15 RX ORDER — POLYETHYLENE GLYCOL 3350 17 G/17G
17 POWDER, FOR SOLUTION ORAL DAILY
Qty: 510 G | Refills: 11 | Status: SHIPPED | OUTPATIENT
Start: 2022-12-15

## 2022-12-15 RX ORDER — DICYCLOMINE HYDROCHLORIDE 20 MG/1
20 TABLET ORAL 3 TIMES DAILY PRN
Qty: 60 TABLET | Refills: 2 | Status: SHIPPED | OUTPATIENT
Start: 2022-12-15

## 2022-12-15 NOTE — TELEPHONE ENCOUNTER
Tried contacting patient to inform her that I was able to schedule her GES in [place] on 01/13/2023, left message for patient to call the office.

## 2022-12-15 NOTE — LETTER
December 15, 2022      [facility] G[facility]  [address], [place]   [place] [address]  Phone: [phone]  Fax: [phone]       Patient: Calos Rios   YOB: 1970  Date of Visit: 12/15/2022    To Whom It May Concern:    Bladimir Rios  was at [facility] on 12/15/2022. The patient may return to work/school on 12/16/2022 with no restrictions. If you have any questions or concerns, or if I can be of further assistance, please do not hesitate to contact me.    Sincerely,    ARMOND Staley

## 2022-12-15 NOTE — TELEPHONE ENCOUNTER
----- Message from ARMOND Staley sent at 12/15/2022 10:53 AM CST -----  Please call patient to schedule GES after 12/23/2022.

## 2022-12-15 NOTE — PATIENT INSTRUCTIONS
EGD Prep Instructions    [facility] [address] [place]  [address]    You are scheduled for an EGD with Dr. Cohen on 12/23/2022 at [facility].  You will enter through the [facility] entrance and check in at Same Day Surgery.    Start a CLEAR LIQUID DIET ONE DAY BEFORE YOUR PROCEDURE.  This means no solid food for the entire day.    CLEAR LIQUID DIET:   - Avoid Red, Orange, Purple, and/or Blue food coloring   - NO DAIRY   - You can have:  Coffee with sugar (no creamer), tea, water, soda, apple or white grape juice, chicken or beef broth/bouillon (no meat, noodles, or veggies), green/yellow popsicles, green/yellow Jell-O, lemonade.     Nothing to eat or drink after midnight before the procedure.  You MAY brush your teeth.    You MAY take your blood pressure, heart, and seizure medication on the morning of the procedure, with a SIP of water.  Hold ALL other medications until after the procedure.    You must have someone with you to DRIVE YOU HOME since you will be receiving IV sedation for the procedure.    If you are on blood thinners THAT YOU HAVE BEEN INSTRUCTED TO HOLD BY YOUR DOCTOR FOR THIS PROCEDURE, then do NOT take this the morning of your EGD.  Do NOT stop these medications on your own, they must be approved to be held by your doctor.  Your EGD can NOT be done if you are on these medications.  Examples of blood thinners include: Coumadin, Aggrenox, Plavix, Pradaxa, Reapro, Pletal, Xarelto, Ticagrelor, Brilinta, Eliquis, and high dose aspirin (325 mg).  You do not have to stop baby aspirin 81 mg.    You will receive a call a few days before your EGD to tell you the time to arrive.  If you have not received a call by the day before your procedure, call the Pre-op Coordinator at [phone].

## 2022-12-15 NOTE — PROGRESS NOTES
Subjective:       Patient ID: Calos Rios is a 51 y.o. female.    Chief Complaint: Abdominal Pain and Nausea    51 female with hypertension and hx of recurrent kidney stones and marijuana use referred by PCP for abdominal pain.     Abdominal Pain  This is a recurrent problem. The current episode started more than 1 year ago. The problem occurs intermittently. The problem has been waxing and waning. The pain is located in the epigastric region. Pain scale: no pain today. The quality of the pain is aching. The abdominal pain does not radiate. Associated symptoms include constipation, nausea and vomiting. Pertinent negatives include no belching, hematochezia or melena. Nothing aggravates the pain. The pain is relieved by Nothing. She has tried nothing for the symptoms. Patient's medical history includes kidney stones.     Past Medical History:   Diagnosis Date    Anxiety disorder, unspecified     Hypertension     Migraine headache     Obesity        Past Surgical History:   Procedure Laterality Date    CYSTOURETEROSCOPY,WITH HOLMIUM LASER LITHOTRIPSY OF URETERAL CALCULUS - Bilateral Bilateral 11/09/2022    HYSTERECTOMY, TOTAL, LAPAROSCOPIC, WITH SALPINGO-OOPHORECTOMY Bilateral 03/17/2021    LAPAROSCOPIC TOTAL HYSTERECTOMY Bilateral 03/17/2021    PYELOSCOPY Bilateral 10/25/2022    Procedure: PYELOSCOPY;  Surgeon: Raz Cantrell MD;  Location: [facility] OR;  Service: Urology;  Laterality: Bilateral;    TUBAL LIGATION      URETEROSCOPY Bilateral 10/25/2022    Procedure: URETEROSCOPY;  Surgeon: Raz Cantrell MD;  Location: [facility] OR;  Service: Urology;  Laterality: Bilateral;       Family History   Problem Relation Age of Onset    Heart disease Mother     Drug abuse Father     Heart disease Father     Kidney disease Father     Diabetes Maternal Uncle     Heart disease Maternal Grandmother     Ovarian cancer Maternal Aunt     Cancer Neg Hx        Social History     Socioeconomic History    Marital status: Single  "  Occupational History     Employer: [employer]   Tobacco Use    Smoking status: Every Day     Packs/day: 0.25     Years: 15.00     Pack years: 3.75     Types: Cigarettes    Smokeless tobacco: Never   Substance and Sexual Activity    Alcohol use: Yes     Alcohol/week: 3.0 standard drinks     Types: 3 Shots of liquor per week     Comment: scocially on weekends    Drug use: Yes     Types: Marijuana    Sexual activity: Yes     Partners: Male     Birth control/protection: None       Review of Systems   Constitutional:  Positive for appetite change. Negative for unexpected weight change.   HENT:  Negative for trouble swallowing.    Respiratory:  Negative for shortness of breath.    Cardiovascular:  Negative for chest pain.   Gastrointestinal:  Positive for abdominal pain, constipation, nausea and vomiting. Negative for hematochezia and melena.   Neurological:  Negative for weakness.   Hematological:  Negative for adenopathy. Does not bruise/bleed easily.   Psychiatric/Behavioral:  Negative for dysphoric mood.        Objective:     Vitals:    12/15/22 1026   BP: (!) 139/96   BP Location: Right arm   Patient Position: Sitting   BP Method: Large (Automatic)   Pulse: 74   Weight: 86.3 kg (190 lb 4.8 oz)   Height: 5' 4" (1.626 m)          Physical Exam  Constitutional:       General: She is not in acute distress.     Appearance: Normal appearance. She is not ill-appearing.   HENT:      Head: Normocephalic.   Eyes:      Conjunctiva/sclera: Conjunctivae normal.      Pupils: Pupils are equal, round, and reactive to light.   Pulmonary:      Effort: Pulmonary effort is normal. No respiratory distress.   Musculoskeletal:         General: Normal range of motion.      Cervical back: Normal range of motion.   Skin:     General: Skin is warm and dry.   Neurological:      Mental Status: She is alert and oriented to person, place, and time.   Psychiatric:         Mood and Affect: Mood normal.         Behavior: Behavior normal.         "     Assessment:         ICD-10-CM ICD-9-CM   1. Pain of upper abdomen  R10.10 789.09   2. Nausea and vomiting, unspecified vomiting type  R11.2 787.01   3. Chronic idiopathic constipation  K59.04 564.00       Plan:       Pain of upper abdomen  -     Ambulatory referral/consult to Gastroenterology  -     X-Ray Abdomen Flat And Erect; Future; Expected date: 12/15/2022  -     Case Request Endoscopy: EGD (ESOPHAGOGASTRODUODENOSCOPY)  -     omeprazole (PRILOSEC) 40 MG capsule; Take 1 capsule (40 mg total) by mouth once daily.  Dispense: 30 capsule; Refill: 2  -     dicyclomine (BENTYL) 20 mg tablet; Take 1 tablet (20 mg total) by mouth 3 (three) times daily as needed (abdominal pain).  Dispense: 60 tablet; Refill: 2    Nausea and vomiting, unspecified vomiting type  -     NM Gastric Emptying; Future; Expected date: 12/15/2022  -     Case Request Endoscopy: EGD (ESOPHAGOGASTRODUODENOSCOPY)    Chronic idiopathic constipation  -     X-Ray Abdomen Flat And Erect; Future; Expected date: 12/15/2022  -     polyethylene glycol (GLYCOLAX) 17 gram/dose powder; Take 17 g by mouth once daily.  Dispense: 510 g; Refill: 11      Follow up if symptoms worsen or fail to improve.     Patient's Medications   New Prescriptions    DICYCLOMINE (BENTYL) 20 MG TABLET    Take 1 tablet (20 mg total) by mouth 3 (three) times daily as needed (abdominal pain).    OMEPRAZOLE (PRILOSEC) 40 MG CAPSULE    Take 1 capsule (40 mg total) by mouth once daily.    POLYETHYLENE GLYCOL (GLYCOLAX) 17 GRAM/DOSE POWDER    Take 17 g by mouth once daily.   Previous Medications    ALBUTEROL (PROVENTIL/VENTOLIN HFA) 90 MCG/ACTUATION INHALER    Inhale 2 puffs into the lungs every 6 (six) hours as needed for Wheezing.    AMLODIPINE (NORVASC) 10 MG TABLET    TAKE 1 TABLET BY MOUTH EVERY DAY    BLOOD PRESSURE MONITOR (IHEALTH EASE) LG ARM SIZE (OP)    by Other route as needed for High Blood Pressure.    CARVEDILOL (COREG) 12.5 MG TABLET    TAKE 1 TABLET BY MOUTH TWICE A  DAY WITH MEALS    DULAGLUTIDE (TRULICITY) 1.5 MG/0.5 ML PEN INJECTOR    Inject 1.5 mg into the skin every 7 days.    DULOXETINE (CYMBALTA) 60 MG CAPSULE    TAKE 2 CAPSULES BY MOUTH ONCE DAILY    IBUPROFEN (ADVIL,MOTRIN) 600 MG TABLET    Take 1 tablet (600 mg total) by mouth every 6 (six) hours as needed.    ONDANSETRON (ZOFRAN-ODT) 4 MG TBDL    Take 1 tablet (4 mg total) by mouth 2 (two) times daily.    OXYBUTYNIN (DITROPAN) 5 MG TAB    Take 1 tablet (5 mg total) by mouth 3 (three) times daily as needed (bladder spasms).    PHENAZOPYRIDINE (PYRIDIUM) 200 MG TABLET    Take 1 tablet (200 mg total) by mouth 3 (three) times daily as needed for Pain.    SPIRONOLACTONE (ALDACTONE) 25 MG TABLET    TAKE 1 TABLET BY MOUTH EVERY DAY    VALSARTAN-HYDROCHLOROTHIAZIDE (DIOVAN-HCT) 320-12.5 MG PER TABLET    Take 1 tablet by mouth once daily.   Modified Medications    No medications on file   Discontinued Medications    No medications on file

## 2023-01-04 ENCOUNTER — PATIENT MESSAGE (OUTPATIENT)
Dept: GASTROENTEROLOGY | Facility: CLINIC | Age: 53
End: 2023-01-04
Payer: MEDICAID

## 2023-01-09 ENCOUNTER — TELEPHONE (OUTPATIENT)
Dept: GASTROENTEROLOGY | Facility: CLINIC | Age: 53
End: 2023-01-09
Payer: MEDICAID

## 2023-01-09 NOTE — TELEPHONE ENCOUNTER
----- Message from Anaid Cohen MD sent at 1/5/2023 12:15 PM CST -----  HP (-), cont PPI and Miralax, RTC as needed

## 2023-01-23 RX ORDER — DULAGLUTIDE 1.5 MG/.5ML
1.5 INJECTION, SOLUTION SUBCUTANEOUS
Qty: 4 PEN | Refills: 5 | Status: SHIPPED | OUTPATIENT
Start: 2023-01-23 | End: 2023-03-10 | Stop reason: SDUPTHER

## 2023-01-23 NOTE — TELEPHONE ENCOUNTER
----- Message from Ayde Bella sent at 1/23/2023  4:37 PM CST -----  Contact: [phone] pt  Requesting an RX refill or new RX. refill  Is this a refill or new RX: refill  RX name and strength (copy/paste from chart):  dulaglutide (TRULICITY) 1.5 mg/0.5 mL pen injector 4 pen   Is this a 30 day or 90 day RX: 30  Pharmacy name and phone # (copy/paste from chart):    [id number] - [place], [place] - [address]  [address]  [place] [address]  Phone: [phone] Fax: [phone]

## 2023-01-23 NOTE — TELEPHONE ENCOUNTER
Care Due:                  Date            Visit Type   Department     Provider  --------------------------------------------------------------------------------                                EP -                              PRIMARY      [facility] INTERNAL  Last Visit: 09-      CARE ([facility])   PORFIRIO Doll                              EP -                              PRIMARY      [facility] INTERNAL  Next Visit: 03-      CARE ([facility])   [facility]       Adria Doll                                                            Last  Test          Frequency    Reason                     Performed    Due Date  --------------------------------------------------------------------------------    HBA1C.......  6 months...  dulaglutide..............  09- 03-    [facility] Embedded Care Gaps. Reference number: [id number]. 1/23/2023   4:46:40 PM CST

## 2023-03-10 ENCOUNTER — LAB VISIT (OUTPATIENT)
Dept: LAB | Facility: HOSPITAL | Age: 53
End: 2023-03-10
Attending: INTERNAL MEDICINE
Payer: MEDICAID

## 2023-03-10 ENCOUNTER — OFFICE VISIT (OUTPATIENT)
Dept: INTERNAL MEDICINE | Facility: CLINIC | Age: 53
End: 2023-03-10
Payer: MEDICAID

## 2023-03-10 VITALS
HEIGHT: 64 IN | SYSTOLIC BLOOD PRESSURE: 128 MMHG | DIASTOLIC BLOOD PRESSURE: 88 MMHG | WEIGHT: 185.88 LBS | OXYGEN SATURATION: 98 % | BODY MASS INDEX: 31.73 KG/M2 | HEART RATE: 83 BPM

## 2023-03-10 DIAGNOSIS — E87.6 LOW SERUM POTASSIUM: ICD-10-CM

## 2023-03-10 DIAGNOSIS — I10 PRIMARY HYPERTENSION: ICD-10-CM

## 2023-03-10 DIAGNOSIS — Z12.11 COLON CANCER SCREENING: ICD-10-CM

## 2023-03-10 DIAGNOSIS — I10 ESSENTIAL HYPERTENSION: ICD-10-CM

## 2023-03-10 DIAGNOSIS — F41.9 ANXIETY: ICD-10-CM

## 2023-03-10 DIAGNOSIS — K21.9 GASTROESOPHAGEAL REFLUX DISEASE WITHOUT ESOPHAGITIS: Primary | ICD-10-CM

## 2023-03-10 DIAGNOSIS — E66.9 CLASS 1 OBESITY WITHOUT SERIOUS COMORBIDITY WITH BODY MASS INDEX (BMI) OF 31.0 TO 31.9 IN ADULT, UNSPECIFIED OBESITY TYPE: ICD-10-CM

## 2023-03-10 LAB
ANION GAP SERPL CALC-SCNC: 9 MMOL/L (ref 8–16)
BUN SERPL-MCNC: 11 MG/DL (ref 6–20)
CALCIUM SERPL-MCNC: 10.1 MG/DL (ref 8.7–10.5)
CHLORIDE SERPL-SCNC: 106 MMOL/L (ref 95–110)
CO2 SERPL-SCNC: 27 MMOL/L (ref 23–29)
CREAT SERPL-MCNC: 0.9 MG/DL (ref 0.5–1.4)
EST. GFR  (NO RACE VARIABLE): >60 ML/MIN/1.73 M^2
GLUCOSE SERPL-MCNC: 84 MG/DL (ref 70–110)
POTASSIUM SERPL-SCNC: 3.2 MMOL/L (ref 3.5–5.1)
SODIUM SERPL-SCNC: 142 MMOL/L (ref 136–145)

## 2023-03-10 PROCEDURE — 3074F PR MOST RECENT SYSTOLIC BLOOD PRESSURE < 130 MM HG: ICD-10-PCS | Mod: CPTII,,, | Performed by: INTERNAL MEDICINE

## 2023-03-10 PROCEDURE — 1159F MED LIST DOCD IN RCRD: CPT | Mod: CPTII,,, | Performed by: INTERNAL MEDICINE

## 2023-03-10 PROCEDURE — 99999 PR PBB SHADOW E&M-EST. PATIENT-LVL III: ICD-10-PCS | Mod: PBBFAC,,, | Performed by: INTERNAL MEDICINE

## 2023-03-10 PROCEDURE — 99214 OFFICE O/P EST MOD 30 MIN: CPT | Mod: S$PBB,,, | Performed by: INTERNAL MEDICINE

## 2023-03-10 PROCEDURE — 99999 PR PBB SHADOW E&M-EST. PATIENT-LVL III: CPT | Mod: PBBFAC,,, | Performed by: INTERNAL MEDICINE

## 2023-03-10 PROCEDURE — 3074F SYST BP LT 130 MM HG: CPT | Mod: CPTII,,, | Performed by: INTERNAL MEDICINE

## 2023-03-10 PROCEDURE — 3079F PR MOST RECENT DIASTOLIC BLOOD PRESSURE 80-89 MM HG: ICD-10-PCS | Mod: CPTII,,, | Performed by: INTERNAL MEDICINE

## 2023-03-10 PROCEDURE — 99213 OFFICE O/P EST LOW 20 MIN: CPT | Mod: PBBFAC | Performed by: INTERNAL MEDICINE

## 2023-03-10 PROCEDURE — 99214 PR OFFICE/OUTPT VISIT, EST, LEVL IV, 30-39 MIN: ICD-10-PCS | Mod: S$PBB,,, | Performed by: INTERNAL MEDICINE

## 2023-03-10 PROCEDURE — 36415 COLL VENOUS BLD VENIPUNCTURE: CPT | Performed by: INTERNAL MEDICINE

## 2023-03-10 PROCEDURE — 3008F BODY MASS INDEX DOCD: CPT | Mod: CPTII,,, | Performed by: INTERNAL MEDICINE

## 2023-03-10 PROCEDURE — 80048 BASIC METABOLIC PNL TOTAL CA: CPT | Performed by: INTERNAL MEDICINE

## 2023-03-10 PROCEDURE — 3008F PR BODY MASS INDEX (BMI) DOCUMENTED: ICD-10-PCS | Mod: CPTII,,, | Performed by: INTERNAL MEDICINE

## 2023-03-10 PROCEDURE — 3079F DIAST BP 80-89 MM HG: CPT | Mod: CPTII,,, | Performed by: INTERNAL MEDICINE

## 2023-03-10 PROCEDURE — 1159F PR MEDICATION LIST DOCUMENTED IN MEDICAL RECORD: ICD-10-PCS | Mod: CPTII,,, | Performed by: INTERNAL MEDICINE

## 2023-03-10 RX ORDER — DULOXETIN HYDROCHLORIDE 60 MG/1
120 CAPSULE, DELAYED RELEASE ORAL DAILY
Qty: 180 CAPSULE | Refills: 2 | Status: SHIPPED | OUTPATIENT
Start: 2023-03-10 | End: 2024-03-18 | Stop reason: SDUPTHER

## 2023-03-10 RX ORDER — DULAGLUTIDE 1.5 MG/.5ML
1.5 INJECTION, SOLUTION SUBCUTANEOUS
Qty: 4 PEN | Refills: 5 | Status: SHIPPED | OUTPATIENT
Start: 2023-03-10 | End: 2023-08-30 | Stop reason: SDUPTHER

## 2023-03-10 RX ORDER — CARVEDILOL 12.5 MG/1
12.5 TABLET ORAL 2 TIMES DAILY WITH MEALS
Qty: 180 TABLET | Refills: 3 | Status: SHIPPED | OUTPATIENT
Start: 2023-03-10 | End: 2024-03-18 | Stop reason: SDUPTHER

## 2023-03-10 RX ORDER — SPIRONOLACTONE 25 MG/1
25 TABLET ORAL DAILY
Qty: 90 TABLET | Refills: 3 | Status: SHIPPED | OUTPATIENT
Start: 2023-03-10 | End: 2024-03-18 | Stop reason: SDUPTHER

## 2023-03-10 RX ORDER — VALSARTAN AND HYDROCHLOROTHIAZIDE 320; 12.5 MG/1; MG/1
1 TABLET, FILM COATED ORAL DAILY
Qty: 90 TABLET | Refills: 3 | Status: SHIPPED | OUTPATIENT
Start: 2023-03-10 | End: 2024-03-18 | Stop reason: SDUPTHER

## 2023-03-10 NOTE — PROGRESS NOTES
"   This note was generated with Visualnet voice recognition software. I apologize for any possible typographical errors.  MModal seems to have trouble with pronouns so I apologize if I Mis pronoun anyone      She is a 52-year-old lady coming in today to follow-up her obesity and  her hypertension.     She has lost about 16  lb if she is trying to get started back in to diet exercising.  Her BMI today is l  31.90.   She is tolerating Trulicity well not having any difficulties with.          She is currently taking valsartan hct., amlodipine  , carvedilol, and spironolactone.   Blood pressure last visit was  128/88.    She was out of her valsartan hydrochlorothiazide.   Blood pressure is been pretty well controlled when she is taking all her medications I reviewed this with her at her last visit.  T         SHe has been diagnosed with renal stones.   s/p bilateral bilateral laser lithotripsy for large renal stones.  In November 2022.  She drinking plenty of water and not having any urinary  problem.      Fibroids.  She is seeing Dr eileen Nair for  this .  She is not have any urinary complaints this time.         ROS : Gen - no fatigue  Eyes - no eye pain or visual changes SHe has lost 16 #   ENT - no hoarseness or sore throat  CV - No chest pain or SOB.  NO palpitations.  Pulm - no cough or wheezing  GI - no N/V/D.  She is having lower abdominal pains but said that is her fibroids.     no dysuria or incontinence  MS - no joint pain or muscle pain  Skin - no rash, or c/o of skin lesions  Neuro - no HA, dizziness--- memory is doing well.   Heme - no abnormal bleeding or bruising  Endo - no polydipsia, or temperature changes  Psych - no anxiety or depression        PHYSICAL EXAMINATION:  GENERAL:  She is an obese  52 year-old, in no acute distress.    /88 (BP Location: Right arm, Patient Position: Sitting, BP Method: Large (Manual))   Pulse 83   Ht 5' 4" (1.626 m)   Wt 84.3 kg (185 lb 13.6 oz)   LMP 04/15/2019 " (Approximate)   SpO2 98%   BMI 31.90 kg/m²        HEENT:  Pupils are equal, round, reactive to light and accommodation.  CHEST:  Clear without wheeze.  CARDIOVASCULAR:  S1 and S2, regular rate and rhythm without murmur, gallop or   Rub.  Lungs: CTA bilaterally    ABDOMEN:  Soft, obese, nontender, no hepatosplenomegaly, no guarding or rebound   tenderness.  LOWER EXTREMITIES:  No edema.     ASSESSMENT:  Hypertension, much better controlled.  We discussed low-fat, low-salt diet.  We discussed weight loss.          continue the trulicity for weight loss-- looks like she has lost 39 # since the starting t of trulicity .  discussed diet and exercise.   K was low in Dec -- will recheck.

## 2023-03-13 ENCOUNTER — HOSPITAL ENCOUNTER (EMERGENCY)
Facility: HOSPITAL | Age: 53
Discharge: HOME | End: 2023-03-13
Attending: EMERGENCY MEDICINE
Payer: MEDICAID

## 2023-03-13 VITALS
RESPIRATION RATE: 17 BRPM | TEMPERATURE: 98 F | HEART RATE: 60 BPM | OXYGEN SATURATION: 99 % | SYSTOLIC BLOOD PRESSURE: 165 MMHG | DIASTOLIC BLOOD PRESSURE: 85 MMHG | HEIGHT: 64 IN | BODY MASS INDEX: 30.73 KG/M2 | WEIGHT: 180 LBS

## 2023-03-13 DIAGNOSIS — R55 SYNCOPE: ICD-10-CM

## 2023-03-13 LAB
ALBUMIN SERPL BCP-MCNC: 3.3 G/DL (ref 3.5–5.2)
ALP SERPL-CCNC: 68 U/L (ref 55–135)
ALT SERPL W/O P-5'-P-CCNC: 10 U/L (ref 10–44)
ANION GAP SERPL CALC-SCNC: 7 MMOL/L (ref 8–16)
AST SERPL-CCNC: 14 U/L (ref 10–40)
BASOPHILS # BLD AUTO: 0.03 K/UL (ref 0–0.2)
BASOPHILS NFR BLD: 0.4 % (ref 0–1.9)
BILIRUB SERPL-MCNC: 0.2 MG/DL (ref 0.1–1)
BUN SERPL-MCNC: 17 MG/DL (ref 6–20)
CALCIUM SERPL-MCNC: 9.4 MG/DL (ref 8.7–10.5)
CHLORIDE SERPL-SCNC: 105 MMOL/L (ref 95–110)
CO2 SERPL-SCNC: 27 MMOL/L (ref 23–29)
CREAT SERPL-MCNC: 1 MG/DL (ref 0.5–1.4)
DIFFERENTIAL METHOD: ABNORMAL
EOSINOPHIL # BLD AUTO: 0.4 K/UL (ref 0–0.5)
EOSINOPHIL NFR BLD: 4.5 % (ref 0–8)
ERYTHROCYTE [DISTWIDTH] IN BLOOD BY AUTOMATED COUNT: 15.9 % (ref 11.5–14.5)
EST. GFR  (NO RACE VARIABLE): >60 ML/MIN/1.73 M^2
GLUCOSE SERPL-MCNC: 94 MG/DL (ref 70–110)
HCT VFR BLD AUTO: 42.2 % (ref 37–48.5)
HGB BLD-MCNC: 13.8 G/DL (ref 12–16)
IMM GRANULOCYTES # BLD AUTO: 0.01 K/UL (ref 0–0.04)
IMM GRANULOCYTES NFR BLD AUTO: 0.1 % (ref 0–0.5)
LYMPHOCYTES # BLD AUTO: 2.7 K/UL (ref 1–4.8)
LYMPHOCYTES NFR BLD: 34.6 % (ref 18–48)
MCH RBC QN AUTO: 31.8 PG (ref 27–31)
MCHC RBC AUTO-ENTMCNC: 32.7 G/DL (ref 32–36)
MCV RBC AUTO: 97 FL (ref 82–98)
MONOCYTES # BLD AUTO: 0.4 K/UL (ref 0.3–1)
MONOCYTES NFR BLD: 5.3 % (ref 4–15)
NEUTROPHILS # BLD AUTO: 4.4 K/UL (ref 1.8–7.7)
NEUTROPHILS NFR BLD: 55.1 % (ref 38–73)
NRBC BLD-RTO: 0 /100 WBC
PLATELET # BLD AUTO: 218 K/UL (ref 150–450)
PMV BLD AUTO: 10.6 FL (ref 9.2–12.9)
POTASSIUM SERPL-SCNC: 3.1 MMOL/L (ref 3.5–5.1)
PROT SERPL-MCNC: 6.9 G/DL (ref 6–8.4)
RBC # BLD AUTO: 4.34 M/UL (ref 4–5.4)
SODIUM SERPL-SCNC: 139 MMOL/L (ref 136–145)
WBC # BLD AUTO: 7.92 K/UL (ref 3.9–12.7)

## 2023-03-13 PROCEDURE — 25000003 PHARM REV CODE 250: Performed by: EMERGENCY MEDICINE

## 2023-03-13 PROCEDURE — 93005 ELECTROCARDIOGRAM TRACING: CPT

## 2023-03-13 PROCEDURE — 99284 EMERGENCY DEPT VISIT MOD MDM: CPT | Mod: ,,, | Performed by: EMERGENCY MEDICINE

## 2023-03-13 PROCEDURE — 93010 EKG 12-LEAD: ICD-10-PCS | Mod: ,,, | Performed by: INTERNAL MEDICINE

## 2023-03-13 PROCEDURE — 80053 COMPREHEN METABOLIC PANEL: CPT | Performed by: EMERGENCY MEDICINE

## 2023-03-13 PROCEDURE — 99285 EMERGENCY DEPT VISIT HI MDM: CPT | Mod: 25

## 2023-03-13 PROCEDURE — 93010 ELECTROCARDIOGRAM REPORT: CPT | Mod: ,,, | Performed by: INTERNAL MEDICINE

## 2023-03-13 PROCEDURE — 85025 COMPLETE CBC W/AUTO DIFF WBC: CPT | Performed by: EMERGENCY MEDICINE

## 2023-03-13 PROCEDURE — 99284 PR EMERGENCY DEPT VISIT,LEVEL IV: ICD-10-PCS | Mod: ,,, | Performed by: EMERGENCY MEDICINE

## 2023-03-13 RX ORDER — ACETAMINOPHEN 500 MG
1000 TABLET ORAL
Status: COMPLETED | OUTPATIENT
Start: 2023-03-13 | End: 2023-03-13

## 2023-03-13 RX ORDER — AMLODIPINE BESYLATE 10 MG/1
10 TABLET ORAL
Status: COMPLETED | OUTPATIENT
Start: 2023-03-13 | End: 2023-03-13

## 2023-03-13 RX ORDER — CARVEDILOL 12.5 MG/1
12.5 TABLET ORAL
Status: COMPLETED | OUTPATIENT
Start: 2023-03-13 | End: 2023-03-13

## 2023-03-13 RX ORDER — PROPARACAINE HYDROCHLORIDE 5 MG/ML
1 SOLUTION/ DROPS OPHTHALMIC
Status: COMPLETED | OUTPATIENT
Start: 2023-03-13 | End: 2023-03-13

## 2023-03-13 RX ADMIN — PROPARACAINE HYDROCHLORIDE 1 DROP: 5 SOLUTION/ DROPS OPHTHALMIC at 04:03

## 2023-03-13 RX ADMIN — CARVEDILOL 12.5 MG: 12.5 TABLET, FILM COATED ORAL at 03:03

## 2023-03-13 RX ADMIN — FLUORESCEIN SODIUM 1 EACH: 1 STRIP OPHTHALMIC at 04:03

## 2023-03-13 RX ADMIN — AMLODIPINE BESYLATE 10 MG: 10 TABLET ORAL at 03:03

## 2023-03-13 RX ADMIN — ACETAMINOPHEN 1000 MG: 500 TABLET ORAL at 03:03

## 2023-03-13 NOTE — FIRST PROVIDER EVALUATION
Medical screening examination initiated.  I have conducted a focused provider triage encounter, findings are as follows:    Brief history of present illness:  passed out last night, fell, hit head.  Awoke with eye pain.    There were no vitals filed for this visit.    Pertinent physical exam:  ambulatory    Brief workup plan:  EKG, to back for full evaluation of need for imaging / testing    Preliminary workup initiated; this workup will be continued and followed by the physician or advanced practice provider that is assigned to the patient when roomed.

## 2023-03-14 NOTE — ED PROVIDER NOTES
Encounter Date: 3/13/2023       History     Chief Complaint   Patient presents with    Loss of Consciousness     Passed out last night hit back of head, pain to r eye     52-year-old female with history of anxiety hypertension migraine headache presents with syncopal episode.  Last night she was outside smoking a cigar after a few drinks.  She got up and felt lightheaded and passed out for a 2nd.  There was no shortness of breath blurred vision or palpitations.  She does notice some irritation around the rim of her right eye.  No change in her vision.  No pain to the eyeball itself.  Currently feeling almost back to normal except for the discomfort around the orbital rim.    Of note, patient states she did not take her blood pressure medication this morning.    Review of patient's allergies indicates:  No Known Allergies  Past Medical History:   Diagnosis Date    Anxiety disorder, unspecified     Hypertension     Migraine headache     Obesity      Past Surgical History:   Procedure Laterality Date    CYSTOURETEROSCOPY,WITH HOLMIUM LASER LITHOTRIPSY OF URETERAL CALCULUS - Bilateral Bilateral 11/09/2022    ESOPHAGOGASTRODUODENOSCOPY N/A 12/23/2022    Procedure: EGD (ESOPHAGOGASTRODUODENOSCOPY);  Surgeon: Anaid Cohen MD;  Location: [facility];  Service: Endoscopy;  Laterality: N/A;    HYSTERECTOMY, TOTAL, LAPAROSCOPIC, WITH SALPINGO-OOPHORECTOMY Bilateral 03/17/2021    LAPAROSCOPIC TOTAL HYSTERECTOMY Bilateral 03/17/2021    PYELOSCOPY Bilateral 10/25/2022    Procedure: PYELOSCOPY;  Surgeon: Raz Cantrell MD;  Location: [facility] OR;  Service: Urology;  Laterality: Bilateral;    TUBAL LIGATION      URETEROSCOPY Bilateral 10/25/2022    Procedure: URETEROSCOPY;  Surgeon: Raz Cantrell MD;  Location: [facility] OR;  Service: Urology;  Laterality: Bilateral;     Family History   Problem Relation Age of Onset    Heart disease Mother     Drug abuse Father     Heart disease Father     Kidney disease Father     Diabetes  Maternal Uncle     Heart disease Maternal Grandmother     Ovarian cancer Maternal Aunt     Cancer Neg Hx      Social History     Tobacco Use    Smoking status: Every Day     Packs/day: 0.25     Years: 15.00     Pack years: 3.75     Types: Cigarettes    Smokeless tobacco: Never   Substance Use Topics    Alcohol use: Yes     Alcohol/week: 3.0 standard drinks     Types: 3 Shots of liquor per week     Comment: scocially on weekends    Drug use: Yes     Types: Marijuana     Review of Systems    Physical Exam     Initial Vitals [03/13/23 1513]   BP Pulse Resp Temp SpO2   (!) 182/109 80 18 97.9 °F (36.6 °C) 98 %      MAP       --         Physical Exam    Constitutional: She appears well-developed and well-nourished. She is not diaphoretic. No distress.   HENT:   Head: Normocephalic.   Eyes: Conjunctivae and EOM are normal. Pupils are equal, round, and reactive to light. Right eye exhibits no discharge. Left eye exhibits no discharge.   Neck: Neck supple. No JVD present.   Normal range of motion.  Cardiovascular:  Normal rate, regular rhythm and normal heart sounds.           No murmur heard.  Pulmonary/Chest: Breath sounds normal. No respiratory distress. She has no wheezes. She has no rales.   Abdominal: Abdomen is soft. Bowel sounds are normal. She exhibits no distension. There is no abdominal tenderness.   Musculoskeletal:      Cervical back: Normal range of motion and neck supple.     Neurological: She is alert. She has normal strength.   Psychiatric: She has a normal mood and affect.       ED Course   Procedures  Labs Reviewed   CBC W/ AUTO DIFFERENTIAL - Abnormal; Notable for the following components:       Result Value    MCH 31.8 (*)     RDW 15.9 (*)     All other components within normal limits   COMPREHENSIVE METABOLIC PANEL - Abnormal; Notable for the following components:    Potassium 3.1 (*)     Albumin 3.3 (*)     Anion Gap 7 (*)     All other components within normal limits     EKG Readings: (Independently  Interpreted)   Normal sinus rhythm without acute ischemic changes   ECG Results              EKG 12-lead (Final result)  Result time 03/13/23 16:12:22      Final result by Interface, Lab In [facility] (03/13/23 16:12:22)                   Narrative:    Test Reason : R55,    Vent. Rate : 072 BPM     Atrial Rate : 072 BPM     P-R Int : 140 ms          QRS Dur : 092 ms      QT Int : 412 ms       P-R-T Axes : 009 -09 042 degrees     QTc Int : 451 ms    Normal sinus rhythm  Moderate voltage criteria for LVH, may be normal variant ( R in aVL ,   Flushing product )  Nonspecific ST abnormality  Abnormal ECG  When compared with ECG of 02-DEC-2022 15:39,  No significant change was found  Confirmed by Yair TANNER MD ([id number]) on 3/13/2023 4:12:09 PM    Referred By:             Confirmed By:Yair TANNER MD                                  Imaging Results              CT Head Without Contrast (Final result)  Result time 03/13/23 18:43:42      Final result by Tacho Strong MD (03/13/23 18:43:42)                   Impression:      No evidence of acute intracranial pathology.  Further evaluation as clinically warranted.    Electronically signed by resident: Artie Esteban  Date:    03/13/2023  Time:    18:26    Electronically signed by: Tacho Strong MD  Date:    03/13/2023  Time:    18:43               Narrative:    EXAMINATION:  CT HEAD WITHOUT CONTRAST    CLINICAL HISTORY:  Head trauma, moderate-severe;    TECHNIQUE:  Low dose axial CT images obtained throughout the head without the use of intravenous contrast.  Axial, sagittal and coronal reconstructions were performed.    COMPARISON:  None.    FINDINGS:  Ventricles and sulci are normal in size for age without evidence of hydrocephalus.    Partial empty sella configuration.  Minimal patchy hypoattenuation in the supratentorial white matter, nonspecific but most likely reflecting chronic microvascular ischemic changes. No recent or remote major vascular distribution infarct. No acute  hemorrhage.  No mass effect or midline shift.    No extra-axial blood or fluid collections.    No calvarial fracture.  Mastoid air cells and paranasal sinuses are essentially clear.  Trace vascular calcification at the skull base.                                       Medications   fluorescein ophthalmic strip 1 each (has no administration in time range)   proparacaine 0.5 % ophthalmic solution 1 drop (has no administration in time range)   amLODIPine tablet 10 mg (10 mg Oral Given 3/13/23 1556)   carvediloL tablet 12.5 mg (12.5 mg Oral Given 3/13/23 1556)   acetaminophen tablet 1,000 mg (1,000 mg Oral Given 3/13/23 1556)     Medical Decision Making:   Initial Assessment:   Patient with syncopal episode.  I suspect this was provoked.  Does not sound like an arrhythmia.  Does not sound like an acute neurologic event.  Will check EKG labs and head CT.  The eye pain seems to be resolving and more of a discomfort around the orbit.  There is no orbital tenderness or sign of fracture.  Did a detailed eye exam that revealed pressure of 14 OD, no foreign body on fluorescein exam or slit-lamp exam.    Labs CT and EKG were reassuring.  Will discharge home.  Independently Interpreted Test(s):   I have ordered and independently interpreted EKG Reading(s) - see prior notes  Clinical Tests:   Lab Tests: Ordered and Reviewed  Radiological Study: Ordered and Reviewed  Medical Tests: Reviewed and Ordered                        Clinical Impression:   Final diagnoses:  [R55] Syncope        ED Disposition Condition    Discharge Stable          ED Prescriptions    None       Follow-up Information       Follow up With Specialties Details Why Contact Info    Adria Doll Jr., MD Internal Medicine   [address]  [place] [address]  [phone]      [facility] - Emergency Dept Emergency Medicine  If symptoms worsen [address] [id number] [phone]             Sergio Ferris MD  03/13/23 1143

## 2023-04-14 ENCOUNTER — PATIENT MESSAGE (OUTPATIENT)
Dept: RESEARCH | Facility: HOSPITAL | Age: 53
End: 2023-04-14
Payer: MEDICAID

## 2023-04-18 ENCOUNTER — PATIENT OUTREACH (OUTPATIENT)
Dept: ADMINISTRATIVE | Facility: HOSPITAL | Age: 53
End: 2023-04-18
Payer: MEDICAID

## 2023-04-18 ENCOUNTER — PATIENT MESSAGE (OUTPATIENT)
Dept: ADMINISTRATIVE | Facility: HOSPITAL | Age: 53
End: 2023-04-18
Payer: MEDICAID

## 2023-04-18 NOTE — PROGRESS NOTES
Health Maintenance Due   Topic Date Due    TETANUS VACCINE  08/07/2003    Colorectal Cancer Screening  Never done    Pneumococcal Vaccines (Age 0-64) (2 - PCV) 04/04/2019    Shingles Vaccine (1 of 2) Never done    COVID-19 Vaccine (4 - Booster for Pfizer series) 03/15/2022    Influenza Vaccine (1) 09/01/2022    Mammogram  11/11/2022     Triggered LINKS. Updated Care Everywhere. Checked for outside mammography results in [facility] portal. Imported pt's most recent mammography found in [facility] into . Portal message sent asking pt to schedule mammogram. Chart review completed.

## 2023-04-19 LAB — NONINV COLON CA DNA+OCC BLD SCRN STL QL: NORMAL

## 2023-04-21 ENCOUNTER — PATIENT MESSAGE (OUTPATIENT)
Dept: ADMINISTRATIVE | Facility: HOSPITAL | Age: 53
End: 2023-04-21
Payer: MEDICAID

## 2023-05-18 LAB — NONINV COLON CA DNA+OCC BLD SCRN STL QL: NEGATIVE

## 2023-05-23 ENCOUNTER — HOSPITAL ENCOUNTER (EMERGENCY)
Facility: OTHER | Age: 53
Discharge: HOME | End: 2023-05-23
Attending: EMERGENCY MEDICINE
Payer: MEDICAID

## 2023-05-23 VITALS
DIASTOLIC BLOOD PRESSURE: 115 MMHG | WEIGHT: 181 LBS | RESPIRATION RATE: 18 BRPM | OXYGEN SATURATION: 95 % | SYSTOLIC BLOOD PRESSURE: 185 MMHG | BODY MASS INDEX: 30.9 KG/M2 | HEART RATE: 81 BPM | HEIGHT: 64 IN | TEMPERATURE: 98 F

## 2023-05-23 DIAGNOSIS — E87.6 HYPOKALEMIA: ICD-10-CM

## 2023-05-23 DIAGNOSIS — R06.02 SHORTNESS OF BREATH: ICD-10-CM

## 2023-05-23 DIAGNOSIS — I10 UNCONTROLLED HYPERTENSION: ICD-10-CM

## 2023-05-23 DIAGNOSIS — J45.901 EXACERBATION OF ASTHMA, UNSPECIFIED ASTHMA SEVERITY, UNSPECIFIED WHETHER PERSISTENT: Primary | ICD-10-CM

## 2023-05-23 LAB
ALBUMIN SERPL BCP-MCNC: 3.7 G/DL (ref 3.5–5.2)
ALP SERPL-CCNC: 81 U/L (ref 55–135)
ALT SERPL W/O P-5'-P-CCNC: 13 U/L (ref 10–44)
ANION GAP SERPL CALC-SCNC: 15 MMOL/L (ref 8–16)
AST SERPL-CCNC: 15 U/L (ref 10–40)
BASOPHILS # BLD AUTO: 0.04 K/UL (ref 0–0.2)
BASOPHILS NFR BLD: 0.4 % (ref 0–1.9)
BILIRUB SERPL-MCNC: 0.5 MG/DL (ref 0.1–1)
BUN SERPL-MCNC: 15 MG/DL (ref 6–20)
CALCIUM SERPL-MCNC: 10 MG/DL (ref 8.7–10.5)
CHLORIDE SERPL-SCNC: 105 MMOL/L (ref 95–110)
CO2 SERPL-SCNC: 22 MMOL/L (ref 23–29)
CREAT SERPL-MCNC: 1.3 MG/DL (ref 0.5–1.4)
CTP QC/QA: YES
DIFFERENTIAL METHOD: ABNORMAL
EOSINOPHIL # BLD AUTO: 0.8 K/UL (ref 0–0.5)
EOSINOPHIL NFR BLD: 8.2 % (ref 0–8)
ERYTHROCYTE [DISTWIDTH] IN BLOOD BY AUTOMATED COUNT: 14.6 % (ref 11.5–14.5)
EST. GFR  (NO RACE VARIABLE): 50 ML/MIN/1.73 M^2
GLUCOSE SERPL-MCNC: 134 MG/DL (ref 70–110)
HCT VFR BLD AUTO: 49.8 % (ref 37–48.5)
HGB BLD-MCNC: 16.7 G/DL (ref 12–16)
IMM GRANULOCYTES # BLD AUTO: 0.01 K/UL (ref 0–0.04)
IMM GRANULOCYTES NFR BLD AUTO: 0.1 % (ref 0–0.5)
LYMPHOCYTES # BLD AUTO: 3.1 K/UL (ref 1–4.8)
LYMPHOCYTES NFR BLD: 31.6 % (ref 18–48)
MCH RBC QN AUTO: 32.5 PG (ref 27–31)
MCHC RBC AUTO-ENTMCNC: 33.5 G/DL (ref 32–36)
MCV RBC AUTO: 97 FL (ref 82–98)
MONOCYTES # BLD AUTO: 0.3 K/UL (ref 0.3–1)
MONOCYTES NFR BLD: 3.5 % (ref 4–15)
NEUTROPHILS # BLD AUTO: 5.4 K/UL (ref 1.8–7.7)
NEUTROPHILS NFR BLD: 56.2 % (ref 38–73)
NRBC BLD-RTO: 0 /100 WBC
PLATELET # BLD AUTO: 242 K/UL (ref 150–450)
PMV BLD AUTO: 10.8 FL (ref 9.2–12.9)
POTASSIUM SERPL-SCNC: 3 MMOL/L (ref 3.5–5.1)
PROT SERPL-MCNC: 8 G/DL (ref 6–8.4)
RBC # BLD AUTO: 5.14 M/UL (ref 4–5.4)
SARS-COV-2 RDRP RESP QL NAA+PROBE: NEGATIVE
SODIUM SERPL-SCNC: 142 MMOL/L (ref 136–145)
WBC # BLD AUTO: 9.66 K/UL (ref 3.9–12.7)

## 2023-05-23 PROCEDURE — 85025 COMPLETE CBC W/AUTO DIFF WBC: CPT | Performed by: PHYSICIAN ASSISTANT

## 2023-05-23 PROCEDURE — 25000003 PHARM REV CODE 250: Performed by: NURSE PRACTITIONER

## 2023-05-23 PROCEDURE — 93010 ELECTROCARDIOGRAM REPORT: CPT | Mod: ,,, | Performed by: INTERNAL MEDICINE

## 2023-05-23 PROCEDURE — 63600175 PHARM REV CODE 636 W HCPCS: Performed by: NURSE PRACTITIONER

## 2023-05-23 PROCEDURE — 93005 ELECTROCARDIOGRAM TRACING: CPT

## 2023-05-23 PROCEDURE — 99285 EMERGENCY DEPT VISIT HI MDM: CPT | Mod: 25

## 2023-05-23 PROCEDURE — 25000242 PHARM REV CODE 250 ALT 637 W/ HCPCS: Performed by: NURSE PRACTITIONER

## 2023-05-23 PROCEDURE — 94640 AIRWAY INHALATION TREATMENT: CPT

## 2023-05-23 PROCEDURE — 94761 N-INVAS EAR/PLS OXIMETRY MLT: CPT

## 2023-05-23 PROCEDURE — 80053 COMPREHEN METABOLIC PANEL: CPT | Performed by: PHYSICIAN ASSISTANT

## 2023-05-23 PROCEDURE — 93010 EKG 12-LEAD: ICD-10-PCS | Mod: ,,, | Performed by: INTERNAL MEDICINE

## 2023-05-23 RX ORDER — POTASSIUM CHLORIDE 20 MEQ/1
40 TABLET, EXTENDED RELEASE ORAL ONCE
Status: COMPLETED | OUTPATIENT
Start: 2023-05-23 | End: 2023-05-23

## 2023-05-23 RX ORDER — POTASSIUM CHLORIDE 20 MEQ/1
TABLET, EXTENDED RELEASE ORAL
Status: DISCONTINUED
Start: 2023-05-23 | End: 2023-05-23 | Stop reason: HOSPADM

## 2023-05-23 RX ORDER — POTASSIUM CHLORIDE 750 MG/1
10 TABLET, EXTENDED RELEASE ORAL DAILY
Qty: 5 TABLET | Refills: 0 | Status: SHIPPED | OUTPATIENT
Start: 2023-05-23 | End: 2023-05-28

## 2023-05-23 RX ORDER — ALBUTEROL SULFATE 90 UG/1
2 AEROSOL, METERED RESPIRATORY (INHALATION) EVERY 6 HOURS PRN
Qty: 18 G | Refills: 0 | Status: SHIPPED | OUTPATIENT
Start: 2023-05-23 | End: 2024-03-18 | Stop reason: SDUPTHER

## 2023-05-23 RX ORDER — PREDNISONE 20 MG/1
TABLET ORAL
Status: DISCONTINUED
Start: 2023-05-23 | End: 2023-05-23 | Stop reason: HOSPADM

## 2023-05-23 RX ORDER — PREDNISONE 20 MG/1
40 TABLET ORAL
Status: COMPLETED | OUTPATIENT
Start: 2023-05-23 | End: 2023-05-23

## 2023-05-23 RX ORDER — IPRATROPIUM BROMIDE AND ALBUTEROL SULFATE 2.5; .5 MG/3ML; MG/3ML
3 SOLUTION RESPIRATORY (INHALATION)
Status: COMPLETED | OUTPATIENT
Start: 2023-05-23 | End: 2023-05-23

## 2023-05-23 RX ORDER — PREDNISONE 20 MG/1
40 TABLET ORAL DAILY
Qty: 10 TABLET | Refills: 0 | Status: SHIPPED | OUTPATIENT
Start: 2023-05-23 | End: 2023-05-27

## 2023-05-23 RX ORDER — ALBUTEROL SULFATE 1.25 MG/3ML
1.25 SOLUTION RESPIRATORY (INHALATION) EVERY 6 HOURS PRN
Qty: 75 ML | Refills: 0 | Status: SHIPPED | OUTPATIENT
Start: 2023-05-23 | End: 2024-03-18

## 2023-05-23 RX ADMIN — POTASSIUM CHLORIDE 40 MEQ: 1500 TABLET, EXTENDED RELEASE ORAL at 07:05

## 2023-05-23 RX ADMIN — PREDNISONE 40 MG: 20 TABLET ORAL at 07:05

## 2023-05-23 RX ADMIN — IPRATROPIUM BROMIDE AND ALBUTEROL SULFATE 3 ML: .5; 3 SOLUTION RESPIRATORY (INHALATION) at 06:05

## 2023-05-23 NOTE — ED PROVIDER NOTES
Source of History:  Patient    Chief complaint:  Cough (Reports cough and congestion x 2 weeks. States it feels like its going down her throat into her chest, describes a burning sensation. Sob with exertion. Reports BL ear pain. States her throat and ears itch. Reports compliance with bp meds, currently 182/121)      HPI:  Calos Rios is a 52 y.o. female with past medical history of hypertension and asthma presenting with nasal congestion, cough and shortness of breath for the past 2 weeks.  She states that when she coughs the pain radiates up into her ears and throat and down into her chest.  She denies chest pain.  She has a rescue inhaler which she is been using without relief.  She reports hot flashes but denies known fever, chills, chest pain, abdominal pain, nausea, vomiting, dysuria, flank pain and back pain.    This is the extent to the patients complaints today here in the emergency department.    ROS: As per HPI and below:    Review of patient's allergies indicates:  No Known Allergies    PMH:  As per HPI and below:  Past Medical History:   Diagnosis Date    Anxiety disorder, unspecified     Hypertension     Migraine headache     Obesity      Past Surgical History:   Procedure Laterality Date    CYSTOURETEROSCOPY,WITH HOLMIUM LASER LITHOTRIPSY OF URETERAL CALCULUS - Bilateral Bilateral 11/09/2022    ESOPHAGOGASTRODUODENOSCOPY N/A 12/23/2022    Procedure: EGD (ESOPHAGOGASTRODUODENOSCOPY);  Surgeon: Anaid Cohen MD;  Location: [facility] ENDO;  Service: Endoscopy;  Laterality: N/A;    HYSTERECTOMY, TOTAL, LAPAROSCOPIC, WITH SALPINGO-OOPHORECTOMY Bilateral 03/17/2021    LAPAROSCOPIC TOTAL HYSTERECTOMY Bilateral 03/17/2021    PYELOSCOPY Bilateral 10/25/2022    Procedure: PYELOSCOPY;  Surgeon: Raz Cantrell MD;  Location: [facility] OR;  Service: Urology;  Laterality: Bilateral;    TUBAL LIGATION      URETEROSCOPY Bilateral 10/25/2022    Procedure: URETEROSCOPY;  Surgeon: Raz Cantrell MD;   "Location: [facility] OR;  Service: Urology;  Laterality: Bilateral;       Social History     Tobacco Use    Smoking status: Every Day     Packs/day: 0.25     Years: 15.00     Pack years: 3.75     Types: Cigarettes    Smokeless tobacco: Never   Substance Use Topics    Alcohol use: Yes     Alcohol/week: 3.0 standard drinks     Types: 3 Shots of liquor per week     Comment: scocially on weekends    Drug use: Yes     Types: Marijuana       Physical Exam:    BP (!) 185/115 (BP Location: Right arm, Patient Position: Sitting)   Pulse 81   Temp 97.6 °F (36.4 °C) (Oral)   Resp 18   Ht 5' 4" (1.626 m)   Wt 82.1 kg (181 lb)   LMP 04/15/2019 (Approximate)   SpO2 95%   BMI 31.07 kg/m²   Nursing note and vital signs reviewed.  Appearance: No acute distress.  Eyes: No conjunctival injection.  ENT: Oropharynx clear.  No sinus tenderness.  Bilateral TMs translucent without bulging or erythema.  No cervical lymphadenopathy  Chest/ Respiratory:  No acute respiratory distress.  Decreased air movement.  Faint expiratory wheeze heard.  No rhonchi. No rales. No accessory muscle use.  Cardiovascular: Regular rate and rhythm.  No murmurs. No gallops. No rubs.  Abdomen: Soft.  Not distended.  Nontender.  No guarding.  No rebound. Non-peritoneal.  Musculoskeletal: Good range of motion all joints.  No deformities.  Neck supple.  No meningismus.  Skin: No rashes seen.  Good turgor.  No abrasions.  No ecchymoses.  Neurologic: Motor intact.  Sensation intact.  Cerebellar intact.  Cranial nerves intact.  Mental Status:  Alert and oriented x 3.  Appropriate, conversant    Labs that have been ordered have been independently reviewed and interpreted by myself.        Labs Reviewed   CBC W/ AUTO DIFFERENTIAL - Abnormal; Notable for the following components:       Result Value    Hemoglobin 16.7 (*)     Hematocrit 49.8 (*)     MCH 32.5 (*)     RDW 14.6 (*)     Eos # 0.8 (*)     Mono % 3.5 (*)     Eosinophil % 8.2 (*)     All other components within " normal limits   COMPREHENSIVE METABOLIC PANEL - Abnormal; Notable for the following components:    Potassium 3.0 (*)     CO2 22 (*)     Glucose 134 (*)     eGFR 50 (*)     All other components within normal limits   SARS-COV-2 RDRP GENE       Imaging Results              X-Ray Chest 1 View (Final result)  Result time 05/23/23 17:33:07      Final result by Laura Munoz MD (05/23/23 17:33:07)                   Impression:      No acute intrathoracic abnormality detected.      Electronically signed by: Laura Munoz  Date:    05/23/2023  Time:    17:33               Narrative:    EXAMINATION:  CHEST ONE VIEW    CLINICAL HISTORY:  shortness of breath;    TECHNIQUE:  One view of the chest.    COMPARISON:  02/16/2020    FINDINGS:  The cardiac silhouette is within normal limits.  There is no focal consolidation, pneumothorax, or pleural effusion.                                      Initial Impression/ Differential Dx:  Urgent evaluation of 52 y.o. female presenting with nasal congestion, cough, shortness of breath.  Patient is afebrile and not toxic appearing.  She is hypertensive with known history of hypertension.  She states that she is on 4 medications for her blood pressure.  Her blood pressure is normally in the 180s over the 120s.  She has close follow-up with her PCP.  Denies chest pain and headache.  Likely longstanding uncontrolled hypertension.  Labs ordered from tele triage in process.  CXR pending.  Given history and faint expiratory wheeze heard upon auscultation, suspect asthma exacerbation.  Will give steroids and DuoNeb.  COVID negative.    Differential Diagnosis includes, but is not limited to:  PE, MI/ACS, pneumothorax, pericardial effusion/tamonade, pneumonia, lung abscess, pericarditis/myocarditis, pleural effusion, lung mass, CHF exacerbation, asthma exacerbation, COPD exacerbation, aspirated/ingested foreign body, airway obstruction, CO poisoning, anemia, metabolic derangement,  allergy/atopy, influenza, viral URI, viral syndrome.      MDM:    CBC with hemoconcentration.  H&H elevated from baseline.  Patient encouraged to orally hydrate. No leukocytosis. CMP with hypokalemia.  Patient is on spironolactone.  Replaced orally and patient discharged home with short course of potassium.  Instructed to follow-up with her PCP as if she continues to have hypokalemia, she may need potassium replacement daily.  Furthermore instructed patient to follow-up with PCP regarding blood pressure as she may need additional medications.  After breathing treatment patient reports improvement in her symptoms.  Albuterol inhaler, albuterol nebs refilled per request.  Patient discharged home with short course of steroids and PCP follow-up. Patient educated on signs and symptoms to monitor for and when to return to ED. Patient verbalized understanding agrees with treatment plan. All questions and concerns addressed.                        Diagnostic Impression:    1. Exacerbation of asthma, unspecified asthma severity, unspecified whether persistent    2. Shortness of breath    3. Uncontrolled hypertension    4. Hypokalemia         ED Disposition Condition    Discharge Good            ED Prescriptions       Medication Sig Dispense Start Date End Date Auth. Provider    albuterol (PROVENTIL/VENTOLIN HFA) 90 mcg/actuation inhaler Inhale 2 puffs into the lungs every 6 (six) hours as needed for Wheezing. 18 g 5/23/2023 -- Luz Maria Lee NP    albuterol (ACCUNEB) 1.25 mg/3 mL Nebu Take 3 mLs (1.25 mg total) by nebulization every 6 (six) hours as needed. Rescue 75 mL 5/23/2023 5/22/2024 Luz Maria Lee NP    predniSONE (DELTASONE) 20 MG tablet Take 2 tablets (40 mg total) by mouth once daily. for 4 days 10 tablet 5/23/2023 5/27/2023 Luz Maria Lee NP    potassium chloride (KLOR-CON) 10 MEQ TbSR Take 1 tablet (10 mEq total) by mouth once daily. for 5 days 5 tablet 5/23/2023 5/28/2023 Luz Maria Lee, NP           Follow-up Information       Follow up With Specialties Details Why Contact Info    Adria Doll Jr., MD Internal Medicine In 2 days  [address]  [place] [address]  [phone]               Luz Maria Lee, TRACI  05/23/23 1672

## 2023-05-23 NOTE — Clinical Note
"Calos Muse"Gabriel was seen and treated in our emergency department on 5/23/2023.  She may return to work on 05/25/2023.       If you have any questions or concerns, please don't hesitate to call.      Luz Maria Lee NP"

## 2023-05-23 NOTE — FIRST PROVIDER EVALUATION
Emergency Department TeleTriage Encounter Note      CHIEF COMPLAINT    Chief Complaint   Patient presents with    Cough     Reports cough and congestion x 2 weeks. States it feels like its going down her throat into her chest, describes a burning sensation. Sob with exertion. Reports BL ear pain. States her throat and ears itch. Reports compliance with bp meds, currently 182/121       VITAL SIGNS   Initial Vitals [05/23/23 1609]   BP Pulse Resp Temp SpO2   (!) 182/121 99 20 97.8 °F (36.6 °C) (!) 94 %      MAP       --            ALLERGIES    Review of patient's allergies indicates:  No Known Allergies    PROVIDER TRIAGE NOTE  Patient presents with complaint of  cough and wheezing that started about two weeks ago. No fever. She reports shortness of breath with walking. No swelling to LE. She denied CP.      Phy:   Constitutional: well nourished, well developed, appearing stated age, NAD   HEENT: NCAT, symmetrical lids, No obvious facial deformity.  Normal phonation. Normal Conjunctiva   Neck: NAROM   Respiratory: Normal effort.  No obvious use of accessory muscles   Musculoskeletal: Moved upper extremities well   Neuro: Alert, answers questions appropriately    Psych: appropriate mood and affect      Initial orders will be placed and care will be transferred to an alternate provider when patient is roomed for a full evaluation. Any additional orders and the final disposition will be determined by that provider.        ORDERS  Labs Reviewed   CBC W/ AUTO DIFFERENTIAL   COMPREHENSIVE METABOLIC PANEL   SARS-COV-2 RDRP GENE       ED Orders (720h ago, onward)      Start Ordered     Status Ordering Provider    05/23/23 1634 05/23/23 1633  CBC Auto Differential  STAT         Ordered HARMONY JONES    05/23/23 1634 05/23/23 1633  Comprehensive Metabolic Panel  STAT         Ordered HARMONY JONES    05/23/23 1634 05/23/23 1633  Pulse Oximetry Continuous  Continuous         Ordered MERVIN VAUGHN  HARMONY    05/23/23 1634 05/23/23 1633  Cardiac Monitoring - Adult  Continuous         Ordered NEGROTTO RODERICK, HARMONY    05/23/23 1634 05/23/23 1633  EKG 12-lead  Once         Ordered NEGGRANT VAUGHN, HARMONY    05/23/23 1634 05/23/23 1633  POCT COVID-19 Rapid Screening  Once         Ordered MERVIN VAUGHN, HARMONY    05/23/23 1634 05/23/23 1633  X-Ray Chest 1 View  1 time imaging         Ordered MERVIN VAUGHN HARMONY              Virtual Visit Note: The provider triage portion of this emergency department evaluation and documentation was performed via [employer], a HIPAA-compliant telemedicine application, in concert with a tele-presenter in the room. A face to face patient evaluation with one of my colleagues will occur once the patient is placed in an emergency department room.      DISCLAIMER: This note was prepared with [employer] voice recognition transcription software. Garbled syntax, mangled pronouns, and other bizarre constructions may be attributed to that software system.

## 2023-05-23 NOTE — Clinical Note
"Calos Muse" Gabriel was seen and treated in our emergency department on 5/23/2023.  She may return to work on 05/24/2023.  ?     If you have any questions or concerns, please don't hesitate to call.      Luz Maria Lee NP"

## 2023-05-23 NOTE — ED TRIAGE NOTES
URI symptoms x 2 weeks with no fever or sick contacts. Denies using OTC cold meds for symptoms and reports compliance with BP meds. Presents in no distress.

## 2023-07-24 ENCOUNTER — TELEPHONE (OUTPATIENT)
Dept: INTERNAL MEDICINE | Facility: CLINIC | Age: 53
End: 2023-07-24
Payer: MEDICAID

## 2023-07-24 NOTE — TELEPHONE ENCOUNTER
----- Message from Johanny Mathias sent at 7/24/2023 11:46 AM CDT -----  Contact: self [phone]  Per pt pharm need diagnostic code for dulaglutide (TRULICITY) 1.5 mg/0.5 mL pen injector.    Please call and advise

## 2023-07-25 ENCOUNTER — TELEPHONE (OUTPATIENT)
Dept: INTERNAL MEDICINE | Facility: CLINIC | Age: 53
End: 2023-07-25
Payer: MEDICAID

## 2023-07-25 NOTE — TELEPHONE ENCOUNTER
Pt wanted to inform you that her ob provider is sending her to a kidney specialist. She will f/u with you once she gets her child settled in for college.    Mukund POSEY

## 2023-07-25 NOTE — TELEPHONE ENCOUNTER
Contact pharmacy with diagnostic code, pharmacy was able to approve rx with a copay of $3. Pt updated on script and copay, she verbalized understanding.    Mukund POSEY

## 2023-08-30 RX ORDER — DULAGLUTIDE 1.5 MG/.5ML
1.5 INJECTION, SOLUTION SUBCUTANEOUS
Qty: 4 PEN | Refills: 5 | Status: SHIPPED | OUTPATIENT
Start: 2023-08-30 | End: 2023-08-31 | Stop reason: SDUPTHER

## 2023-08-30 NOTE — TELEPHONE ENCOUNTER
----- Message from Ameena Chakraborty sent at 8/30/2023  7:13 AM CDT -----  Contact: Pt [phone]  Pt called in regards to medication dulaglutide (TRULICITY) 1.5 mg/0.5 mL pen injector she states pharmacy will not approve the refills she would like to speak with the nurse please advise.

## 2023-08-30 NOTE — TELEPHONE ENCOUNTER
Care Due:                  Date            Visit Type   Department     Provider  --------------------------------------------------------------------------------                                EP -                              PRIMARY      [facility] INTERNAL  Last Visit: 03-      CARE ([facility])   PORFIRIO Doll                              EP -                              PRIMARY      [facility] INTERNAL  Next Visit: 09-      CARE ([facility])   MEDICINE       Adria Doll                                                            Last  Test          Frequency    Reason                     Performed    Due Date  --------------------------------------------------------------------------------    HBA1C.......  6 months...  dulaglutide..............  09- 03-    [facility] Embedded Care Due Messages. Reference number: [id number].   8/30/2023 8:44:27 AM CDT

## 2023-08-31 NOTE — TELEPHONE ENCOUNTER
No care due was identified.  [facility] Embedded Care Due Messages. Reference number: [id number].   8/31/2023 5:04:08 PM CDT

## 2023-09-01 RX ORDER — DULAGLUTIDE 1.5 MG/.5ML
1.5 INJECTION, SOLUTION SUBCUTANEOUS
Qty: 4 PEN | Refills: 5 | Status: SHIPPED | OUTPATIENT
Start: 2023-09-01 | End: 2024-03-18 | Stop reason: SDUPTHER

## 2023-12-05 ENCOUNTER — TELEPHONE (OUTPATIENT)
Dept: INTERNAL MEDICINE | Facility: CLINIC | Age: 53
End: 2023-12-05
Payer: MEDICAID

## 2023-12-05 NOTE — TELEPHONE ENCOUNTER
Left pt a voicemail instructing her to contact the office for assistance with scheduling.    Mukund POSEY

## 2023-12-05 NOTE — TELEPHONE ENCOUNTER
----- Message from Dorothea Harper sent at 12/5/2023  1:56 PM CST -----  Contact: Pt @[phone]  No blue slot available to schedule an appointment for the patient.  Patient is established with which PCP: Lavon   Reason for the visit: annual   Would the patient like a call back, or a response through their [facility] portal?:  call back

## 2023-12-27 DIAGNOSIS — Z12.31 OTHER SCREENING MAMMOGRAM: ICD-10-CM

## 2024-03-18 ENCOUNTER — OFFICE VISIT (OUTPATIENT)
Dept: INTERNAL MEDICINE | Facility: CLINIC | Age: 54
End: 2024-03-18
Payer: MEDICAID

## 2024-03-18 ENCOUNTER — LAB VISIT (OUTPATIENT)
Dept: LAB | Facility: HOSPITAL | Age: 54
End: 2024-03-18
Payer: MEDICAID

## 2024-03-18 VITALS
WEIGHT: 183.19 LBS | BODY MASS INDEX: 31.27 KG/M2 | SYSTOLIC BLOOD PRESSURE: 170 MMHG | HEIGHT: 64 IN | DIASTOLIC BLOOD PRESSURE: 110 MMHG | HEART RATE: 64 BPM

## 2024-03-18 DIAGNOSIS — J45.901 EXACERBATION OF ASTHMA, UNSPECIFIED ASTHMA SEVERITY, UNSPECIFIED WHETHER PERSISTENT: ICD-10-CM

## 2024-03-18 DIAGNOSIS — I10 ESSENTIAL HYPERTENSION: ICD-10-CM

## 2024-03-18 DIAGNOSIS — I10 PRIMARY HYPERTENSION: ICD-10-CM

## 2024-03-18 DIAGNOSIS — F41.9 ANXIETY: ICD-10-CM

## 2024-03-18 DIAGNOSIS — J45.901 EXACERBATION OF ASTHMA, UNSPECIFIED ASTHMA SEVERITY, UNSPECIFIED WHETHER PERSISTENT: Primary | ICD-10-CM

## 2024-03-18 DIAGNOSIS — Z12.31 SCREENING MAMMOGRAM FOR BREAST CANCER: ICD-10-CM

## 2024-03-18 DIAGNOSIS — E66.9 CLASS 2 OBESITY WITHOUT SERIOUS COMORBIDITY WITH BODY MASS INDEX (BMI) OF 36.0 TO 36.9 IN ADULT, UNSPECIFIED OBESITY TYPE: ICD-10-CM

## 2024-03-18 LAB
ALBUMIN SERPL BCP-MCNC: 3.6 G/DL (ref 3.5–5.2)
ALP SERPL-CCNC: 78 U/L (ref 55–135)
ALT SERPL W/O P-5'-P-CCNC: 18 U/L (ref 10–44)
ANION GAP SERPL CALC-SCNC: 10 MMOL/L (ref 8–16)
AST SERPL-CCNC: 21 U/L (ref 10–40)
BASOPHILS # BLD AUTO: 0.04 K/UL (ref 0–0.2)
BASOPHILS NFR BLD: 0.5 % (ref 0–1.9)
BILIRUB SERPL-MCNC: 0.6 MG/DL (ref 0.1–1)
BUN SERPL-MCNC: 18 MG/DL (ref 6–20)
CALCIUM SERPL-MCNC: 9.6 MG/DL (ref 8.7–10.5)
CHLORIDE SERPL-SCNC: 101 MMOL/L (ref 95–110)
CHOLEST SERPL-MCNC: 180 MG/DL (ref 120–199)
CHOLEST/HDLC SERPL: 4.2 {RATIO} (ref 2–5)
CO2 SERPL-SCNC: 26 MMOL/L (ref 23–29)
CREAT SERPL-MCNC: 1.4 MG/DL (ref 0.5–1.4)
DIFFERENTIAL METHOD BLD: ABNORMAL
EOSINOPHIL # BLD AUTO: 0.6 K/UL (ref 0–0.5)
EOSINOPHIL NFR BLD: 7.5 % (ref 0–8)
ERYTHROCYTE [DISTWIDTH] IN BLOOD BY AUTOMATED COUNT: 14.5 % (ref 11.5–14.5)
EST. GFR  (NO RACE VARIABLE): 45.3 ML/MIN/1.73 M^2
ESTIMATED AVG GLUCOSE: 103 MG/DL (ref 68–131)
GLUCOSE SERPL-MCNC: 82 MG/DL (ref 70–110)
HBA1C MFR BLD: 5.2 % (ref 4–5.6)
HCT VFR BLD AUTO: 54.9 % (ref 37–48.5)
HDLC SERPL-MCNC: 43 MG/DL (ref 40–75)
HDLC SERPL: 23.9 % (ref 20–50)
HGB BLD-MCNC: 17.9 G/DL (ref 12–16)
IMM GRANULOCYTES # BLD AUTO: 0.01 K/UL (ref 0–0.04)
IMM GRANULOCYTES NFR BLD AUTO: 0.1 % (ref 0–0.5)
LDLC SERPL CALC-MCNC: 105.6 MG/DL (ref 63–159)
LYMPHOCYTES # BLD AUTO: 3.7 K/UL (ref 1–4.8)
LYMPHOCYTES NFR BLD: 45.8 % (ref 18–48)
MCH RBC QN AUTO: 31.9 PG (ref 27–31)
MCHC RBC AUTO-ENTMCNC: 32.6 G/DL (ref 32–36)
MCV RBC AUTO: 98 FL (ref 82–98)
MONOCYTES # BLD AUTO: 0.3 K/UL (ref 0.3–1)
MONOCYTES NFR BLD: 3.2 % (ref 4–15)
NEUTROPHILS # BLD AUTO: 3.5 K/UL (ref 1.8–7.7)
NEUTROPHILS NFR BLD: 42.9 % (ref 38–73)
NONHDLC SERPL-MCNC: 137 MG/DL
NRBC BLD-RTO: 0 /100 WBC
PLATELET # BLD AUTO: 173 K/UL (ref 150–450)
PMV BLD AUTO: 12.8 FL (ref 9.2–12.9)
POTASSIUM SERPL-SCNC: 4.2 MMOL/L (ref 3.5–5.1)
PROT SERPL-MCNC: 7.4 G/DL (ref 6–8.4)
RBC # BLD AUTO: 5.61 M/UL (ref 4–5.4)
SODIUM SERPL-SCNC: 137 MMOL/L (ref 136–145)
TRIGL SERPL-MCNC: 157 MG/DL (ref 30–150)
WBC # BLD AUTO: 8.14 K/UL (ref 3.9–12.7)

## 2024-03-18 PROCEDURE — 3008F BODY MASS INDEX DOCD: CPT | Mod: CPTII,,,

## 2024-03-18 PROCEDURE — 80061 LIPID PANEL: CPT | Performed by: INTERNAL MEDICINE

## 2024-03-18 PROCEDURE — 36415 COLL VENOUS BLD VENIPUNCTURE: CPT

## 2024-03-18 PROCEDURE — 99999 PR PBB SHADOW E&M-EST. PATIENT-LVL III: CPT | Mod: PBBFAC,,,

## 2024-03-18 PROCEDURE — 99213 OFFICE O/P EST LOW 20 MIN: CPT | Mod: PBBFAC,25

## 2024-03-18 PROCEDURE — 93005 ELECTROCARDIOGRAM TRACING: CPT | Mod: PBBFAC | Performed by: INTERNAL MEDICINE

## 2024-03-18 PROCEDURE — 93010 ELECTROCARDIOGRAM REPORT: CPT | Mod: S$PBB,,, | Performed by: INTERNAL MEDICINE

## 2024-03-18 PROCEDURE — 83036 HEMOGLOBIN GLYCOSYLATED A1C: CPT

## 2024-03-18 PROCEDURE — 85025 COMPLETE CBC W/AUTO DIFF WBC: CPT

## 2024-03-18 PROCEDURE — 80053 COMPREHEN METABOLIC PANEL: CPT

## 2024-03-18 PROCEDURE — 3077F SYST BP >= 140 MM HG: CPT | Mod: CPTII,,,

## 2024-03-18 PROCEDURE — 99214 OFFICE O/P EST MOD 30 MIN: CPT | Mod: S$PBB,,,

## 2024-03-18 PROCEDURE — 3080F DIAST BP >= 90 MM HG: CPT | Mod: CPTII,,,

## 2024-03-18 RX ORDER — ALBUTEROL SULFATE 90 UG/1
2 AEROSOL, METERED RESPIRATORY (INHALATION) EVERY 6 HOURS PRN
Qty: 18 G | Refills: 0 | Status: SHIPPED | OUTPATIENT
Start: 2024-03-18 | End: 2024-03-18

## 2024-03-18 RX ORDER — FLUTICASONE PROPIONATE 50 MCG
2 SPRAY, SUSPENSION (ML) NASAL DAILY
Qty: 11.1 ML | Refills: 0 | Status: SHIPPED | OUTPATIENT
Start: 2024-03-18 | End: 2024-04-17

## 2024-03-18 RX ORDER — CARVEDILOL 12.5 MG/1
12.5 TABLET ORAL 2 TIMES DAILY WITH MEALS
Qty: 180 TABLET | Refills: 3 | Status: SHIPPED | OUTPATIENT
Start: 2024-03-18

## 2024-03-18 RX ORDER — DULOXETIN HYDROCHLORIDE 60 MG/1
120 CAPSULE, DELAYED RELEASE ORAL DAILY
Qty: 180 CAPSULE | Refills: 2 | Status: SHIPPED | OUTPATIENT
Start: 2024-03-18

## 2024-03-18 RX ORDER — SPIRONOLACTONE 25 MG/1
25 TABLET ORAL DAILY
Qty: 90 TABLET | Refills: 3 | Status: SHIPPED | OUTPATIENT
Start: 2024-03-18

## 2024-03-18 RX ORDER — DULAGLUTIDE 1.5 MG/.5ML
1.5 INJECTION, SOLUTION SUBCUTANEOUS
Qty: 4 PEN | Refills: 5 | Status: SHIPPED | OUTPATIENT
Start: 2024-03-18 | End: 2024-04-26 | Stop reason: SDUPTHER

## 2024-03-18 RX ORDER — AMLODIPINE BESYLATE 10 MG/1
10 TABLET ORAL DAILY
Qty: 90 TABLET | Refills: 0 | Status: SHIPPED | OUTPATIENT
Start: 2024-03-18 | End: 2024-06-16

## 2024-03-18 RX ORDER — ALBUTEROL SULFATE 90 UG/1
2 AEROSOL, METERED RESPIRATORY (INHALATION) EVERY 6 HOURS PRN
Qty: 18 G | Refills: 12 | Status: SHIPPED | OUTPATIENT
Start: 2024-03-18

## 2024-03-18 RX ORDER — VALSARTAN AND HYDROCHLOROTHIAZIDE 320; 12.5 MG/1; MG/1
1 TABLET, FILM COATED ORAL DAILY
Qty: 90 TABLET | Refills: 3 | Status: SHIPPED | OUTPATIENT
Start: 2024-03-18

## 2024-03-18 RX ORDER — PREDNISONE 20 MG/1
40 TABLET ORAL DAILY
Qty: 10 TABLET | Refills: 0 | Status: SHIPPED | OUTPATIENT
Start: 2024-03-18 | End: 2024-03-23

## 2024-03-18 NOTE — PROGRESS NOTES
INTERNAL MEDICINE [facility]IDENT [facility]INIC  CLINIC NOTE    Name: Calos Rios  : 1970  Date of Service: 2024   PCP: Adria Doll Jr., MD    PRESENTING HISTORY       History of Present Illness:  Ms. Calos Rios is a 53 y.o. female with a medical history of:     HTN, metabolic syndrome, GERD, asthma, presenting here with 1 week chest tightness, nasal congestion.  Patient reported 1 week ago she was preparing a crawfish ball.  The next day she developed hot flashes, shortness for breath, nasal congestion, and a dry cough.  She reports taking Advil sinus and cold with minimal relief of her symptoms.  She decided to come in today because she works developing chest tightness at work.  Few were an asthma attack last year test similar in nature.  Her chest tightness is associated and worsened with a cough.  No dyspnea on exertion, orthopnea, leg swelling.    HTN - not taking amlodipine, currently on Coreg, spironolactone, valsartan-hydrochlorothiazide.  Blood pressure 170/110.  States that her blood pressure usually runs really high.  Does not take her blood pressure home.    Obesity- on Trulicity, ran out for the past 3 weeks requesting a refill.    Anxiety - on Cymbalta, he refills.      Patient has a mother who recently diagnosed with colon cancer, so she has been having difficulty going to her own appointments.  Some of her medications has been out.    Review of Systems:   Constitution: Negative for fever, chills  HENT: Negative for sore throat, rhinorrhea, or headache. Positive for nasal congestion  Eyes: Negative for blurred or double vision.   Cardiovascular: Negative for chest pain, leg swelling, palpitations, positive chest tightness  Pulmonary: positive for SOB, cough   Gastrointestinal: Negative for abdominal pain, nausea, vomiting, or diarrhea.   : Negative for dysuria.   Neurological: Negative for focal weakness or sensory changes.    Labs:     Lab Results   Component Value Date     " 05/23/2023    K 3.0 (L) 05/23/2023     05/23/2023    CO2 22 (L) 05/23/2023    BUN 15 05/23/2023    CREATININE 1.3 05/23/2023    GLUCOSE 95 03/15/2021    ANIONGAP 15 05/23/2023     Lab Results   Component Value Date    BILITOT 0.5 05/23/2023    AST 15 05/23/2023    ALT 13 05/23/2023    ALKPHOS 81 05/23/2023     Lab Results   Component Value Date    HGBA1C 5.1 09/09/2022                                                Lab Results   Component Value Date    WBC 9.66 05/23/2023    HGB 16.7 (H) 05/23/2023    HCT 49.8 (H) 05/23/2023    HCT 40 06/21/2018     05/23/2023    GRAN 5.4 05/23/2023    GRAN 56.2 05/23/2023     Lab Results   Component Value Date    CHOL 170 01/12/2021    HDL 42 01/12/2021    LDLCALC 102.4 01/12/2021    TRIG 128 01/12/2021      Lab Results   Component Value Date    TSH 1.207 10/15/2019     No results found for: "PSA"           PAST HISTORY:     Past Medical History:   Diagnosis Date    Anxiety disorder, unspecified     Hypertension     Migraine headache     Obesity        Past Surgical History:   Procedure Laterality Date    CYSTOURETEROSCOPY,WITH HOLMIUM LASER LITHOTRIPSY OF URETERAL CALCULUS - Bilateral Bilateral 11/09/2022    ESOPHAGOGASTRODUODENOSCOPY N/A 12/23/2022    Procedure: EGD (ESOPHAGOGASTRODUODENOSCOPY);  Surgeon: Anaid Cohen MD;  Location: [facility];  Service: Endoscopy;  Laterality: N/A;    HYSTERECTOMY, TOTAL, LAPAROSCOPIC, WITH SALPINGO-OOPHORECTOMY Bilateral 03/17/2021    LAPAROSCOPIC TOTAL HYSTERECTOMY Bilateral 03/17/2021    PYELOSCOPY Bilateral 10/25/2022    Procedure: PYELOSCOPY;  Surgeon: Raz Cantrell MD;  Location: [facility];  Service: Urology;  Laterality: Bilateral;    TUBAL LIGATION      URETEROSCOPY Bilateral 10/25/2022    Procedure: URETEROSCOPY;  Surgeon: Raz Cantrell MD;  Location: [facility];  Service: Urology;  Laterality: Bilateral;       Family History   Problem Relation Age of Onset    Heart disease Mother     Drug abuse Father     " Heart disease Father     Kidney disease Father     Diabetes Maternal Uncle     Heart disease Maternal Grandmother     Ovarian cancer Maternal Aunt     Cancer Neg Hx        Social History     Socioeconomic History    Marital status: Single   Occupational History     Employer: [employer]   Tobacco Use    Smoking status: Every Day     Current packs/day: 0.25     Average packs/day: 0.3 packs/day for 15.0 years (3.8 ttl pk-yrs)     Types: Cigarettes    Smokeless tobacco: Never   Substance and Sexual Activity    Alcohol use: Yes     Alcohol/week: 3.0 standard drinks of alcohol     Types: 3 Shots of liquor per week     Comment: scocially on weekends    Drug use: Yes     Types: Marijuana    Sexual activity: Yes     Partners: Male     Birth control/protection: None     Social Determinants of Health     Financial Resource Strain: Medium Risk (1/5/2021)    Overall Financial Resource Strain (CARDIA)     Difficulty of Paying Living Expenses: Somewhat hard   Food Insecurity: Food Insecurity Present (1/5/2021)    Hunger Vital Sign     Worried About Running Out of Food in the Last Year: Sometimes true     Ran Out of Food in the Last Year: Sometimes true   Transportation Needs: Unmet Transportation Needs (1/5/2021)    PRAPARE - Transportation     Lack of Transportation (Medical): Yes     Lack of Transportation (Non-Medical): Yes   Social Connections: Moderately Isolated (1/5/2021)    Social Connection and Isolation Panel [NHANES]     Frequency of Communication with Friends and Family: More than three times a week     Frequency of Social Gatherings with Friends and Family: More than three times a week     Attends [employer] Services: More than 4 times per year     Active Member of Clubs or Organizations: No     Attends Club or Organization Meetings: Never     Marital Status: Never        MEDICATIONS & ALLERGIES:     Current Outpatient Medications on File Prior to Visit   Medication Sig    blood pressure monitor (IHEALTH EASE)  "LG arm size (OP) by Other route as needed for High Blood Pressure.    dicyclomine (BENTYL) 20 mg tablet Take 1 tablet (20 mg total) by mouth 3 (three) times daily as needed (abdominal pain).    ibuprofen (ADVIL,MOTRIN) 600 MG tablet Take 1 tablet (600 mg total) by mouth every 6 (six) hours as needed.    omeprazole (PRILOSEC) 40 MG capsule TAKE 1 CAPSULE BY MOUTH EVERY DAY    ondansetron (ZOFRAN-ODT) 4 MG TbDL Take 1 tablet (4 mg total) by mouth 2 (two) times daily.    oxybutynin (DITROPAN) 5 MG Tab Take 1 tablet (5 mg total) by mouth 3 (three) times daily as needed (bladder spasms).    polyethylene glycol (GLYCOLAX) 17 gram/dose powder Take 17 g by mouth once daily.    [DISCONTINUED] albuterol (ACCUNEB) 1.25 mg/3 mL Nebu Take 3 mLs (1.25 mg total) by nebulization every 6 (six) hours as needed. Rescue    [DISCONTINUED] albuterol (PROVENTIL/VENTOLIN HFA) 90 mcg/actuation inhaler Inhale 2 puffs into the lungs every 6 (six) hours as needed for Wheezing.    [DISCONTINUED] amLODIPine (NORVASC) 10 MG tablet TAKE 1 TABLET BY MOUTH EVERY DAY    [DISCONTINUED] carvediloL (COREG) 12.5 MG tablet Take 1 tablet (12.5 mg total) by mouth 2 (two) times daily with meals.    [DISCONTINUED] dulaglutide (TRULICITY) 1.5 mg/0.5 mL pen injector Inject 1.5 mg into the skin every 7 days.    [DISCONTINUED] DULoxetine (CYMBALTA) 60 MG capsule Take 2 capsules (120 mg total) by mouth once daily.    [DISCONTINUED] spironolactone (ALDACTONE) 25 MG tablet Take 1 tablet (25 mg total) by mouth once daily.    [DISCONTINUED] valsartan-hydrochlorothiazide (DIOVAN-HCT) 320-12.5 mg per tablet Take 1 tablet by mouth once daily.     No current facility-administered medications on file prior to visit.       Review of patient's allergies indicates:  No Known Allergies    OBJECTIVE:   Vital Signs:  Vitals:    03/18/24 1356   BP: (!) 170/110   Pulse: 64   Weight: 83.1 kg (183 lb 3.2 oz)   Height: 5' 4" (1.626 m)       No results found for this or any previous " visit (from the past 24 hour(s)).     Gen/Constitutional: No acute distress, obese  Head: Normocephalic, Atraumatic  Neck: supple, no masses or LAD, no JVD  Eyes:conjunctiva clear  Ears, Nose and Throat: No rhinorrhea, TM intact clear, no erythema  Cardiac:  Regular rate, Reg Rhythm, No murmur  Pulmonary:  Bilateral lung fields wheezes appreciated.    GI: Abdomen soft, non-tender, non-distended; no rebound or guarding  : No CVA tenderness.  Musculoskeletal: Extremities warm, well perfused, no erythema, no edema  Skin: No rashes, cyanosis or jaundice.  Neuro: Alert and Oriented x 3; No focal motor or sensory deficits.    Psych: Normal affect      ASSESSMENT & PLAN:     1. Exacerbation of asthma, unspecified asthma severity, unspecified whether persistent  -     albuterol (PROVENTIL/VENTOLIN HFA) 90 mcg/actuation inhaler; Inhale 2 puffs into the lungs every 6 (six) hours as needed for Wheezing.  Dispense: 18 g; Refill: 0  -     CBC W/ AUTO DIFFERENTIAL; Future; Expected date: 03/18/2024  -     COMPREHENSIVE METABOLIC PANEL; Future; Expected date: 03/18/2024  -     IN OFFICE EKG 12-LEAD (to Muse)  -     predniSONE (DELTASONE) 20 MG tablet; Take 2 tablets (40 mg total) by mouth once daily. for 5 days  Dispense: 10 tablet; Refill: 0  -     fluticasone propionate (FLONASE) 50 mcg/actuation nasal spray; 2 sprays (100 mcg total) by Each Nostril route once daily.  Dispense: 11.1 mL; Refill: 0    2. Screening mammogram for breast cancer  -     Mammo Digital Screening Bilat; Future; Expected date: 03/18/2024    3. Anxiety  -     DULoxetine (CYMBALTA) 60 MG capsule; Take 2 capsules (120 mg total) by mouth once daily.  Dispense: 180 capsule; Refill: 2    4. Essential hypertension  -     spironolactone (ALDACTONE) 25 MG tablet; Take 1 tablet (25 mg total) by mouth once daily.  Dispense: 90 tablet; Refill: 3  -     valsartan-hydrochlorothiazide (DIOVAN-HCT) 320-12.5 mg per tablet; Take 1 tablet by mouth once daily.  Dispense: 90  tablet; Refill: 3  -     carvediloL (COREG) 12.5 MG tablet; Take 1 tablet (12.5 mg total) by mouth 2 (two) times daily with meals.  Dispense: 180 tablet; Refill: 3  -     HEMOGLOBIN A1C; Future; Expected date: 03/18/2024  -     amLODIPine (NORVASC) 10 MG tablet; Take 1 tablet (10 mg total) by mouth once daily.  Dispense: 90 tablet; Refill: 0  -     Lipid Panel; Future; Expected date: 03/18/2024    5. Class 2 obesity without serious comorbidity with body mass index (BMI) of 36.0 to 36.9 in adult, unspecified obesity type  -     dulaglutide (TRULICITY) 1.5 mg/0.5 mL pen injector; Inject 1.5 mg into the skin every 7 days.  Dispense: 4 pen ; Refill: 5    6. Primary hypertension        Discussed with Dr. Doll    C 1 month    53-year-old female with history of hypertension, asthma presenting here with signs and symptoms of an asthma exacerbation.  Prescribed a course of prednisone for 5 days along with albuterol inhaler as needed.    #HTN  Patient also here for a six-month follow-up, blood pressure has not been well controlled.  She has been off of her amlodipine.    - EKG sinus rhythm, with a rate 56 beats per minute, normal axis, QRS, KS, QTC within limits. LVH No ischemic changes.  - F/U in 1 month for HTN check  - on amlodipine, valsartan-HCTZ, spironolactone    #Obesity  - continue Trulicity  - follow up A1c  - follow up lipid panel  - follow up weight    Miguel Regalado,    Internal Medicine PGY-1  [facility]

## 2024-03-18 NOTE — PATIENT INSTRUCTIONS
Asthma exacerbation  - Please take the prednisone 40 mg for 5 days  - use your albuterol inhaler as needed  - flonase for your congestion       Blood Pressure:    Normal Blood Pressure: 120/80    Your take taking these medications for your blood pressure: amlodipine 10 mg, coreg 12.5 mg, valsartan-HTCZ 320mg-12.5mg, spirolactone.    Preferred blood pressure cuff brand: Omron    Things to help with you pressure: reduce your salt intake, go for a 30 minute walk every day, and stay on a healthy diet.    How to take blood pressure:   Measure your blood pressure twice a day--morning and late afternoon--at about the same times every day.   For best results, sit comfortably with both feet on the floor for at least two minutes before taking a measurement.   When you measure your blood pressure, rest your arm on a table so the blood pressure cuff is at about the same height as  your heart.    Please follow up with Dr. Doll in 1 month

## 2024-03-18 NOTE — PROGRESS NOTES
I have personally discussed  this patient and agree with the resident, and agree with  their note as stated above with the following thoughts:    We are going to get her back on her blood pressure medication and treat for asthmatic exacerbation.  Follow-up again in a month rechecked the blood pressure.  If she has any worsening shortness of breath or chest pain she needs come to the emergency room.

## 2024-03-19 LAB
OHS QRS DURATION: 74 MS
OHS QTC CALCULATION: 465 MS

## 2024-03-20 ENCOUNTER — TELEPHONE (OUTPATIENT)
Dept: INTERNAL MEDICINE | Facility: CLINIC | Age: 54
End: 2024-03-20
Payer: MEDICAID

## 2024-03-20 DIAGNOSIS — D75.1 POLYCYTHEMIA: Primary | ICD-10-CM

## 2024-03-20 NOTE — TELEPHONE ENCOUNTER
Please call--her blood count is high.  It is probably nothing but I like to recheck her labs and add a couple other test to rule out anything serious.  Can you please get these labs and I will look at  them and reviewed them with her.

## 2024-03-22 ENCOUNTER — TELEPHONE (OUTPATIENT)
Dept: INTERNAL MEDICINE | Facility: CLINIC | Age: 54
End: 2024-03-22
Payer: MEDICAID

## 2024-03-22 NOTE — TELEPHONE ENCOUNTER
Called regarding her blood results.   She is scheduled to get a repeated CBC on 2/28.  Her breathing is better s/p prednisone. Albuterol is currently out of stock.  She has not started taking amlodipine, has not measured her blood pressure due to taking care of her mother.     She will come see Dr. Doll in a month.

## 2024-03-28 ENCOUNTER — LAB VISIT (OUTPATIENT)
Dept: LAB | Facility: HOSPITAL | Age: 54
End: 2024-03-28
Attending: INTERNAL MEDICINE
Payer: MEDICAID

## 2024-03-28 DIAGNOSIS — D75.1 POLYCYTHEMIA: ICD-10-CM

## 2024-03-28 LAB
BASOPHILS # BLD AUTO: 0.05 K/UL (ref 0–0.2)
BASOPHILS NFR BLD: 0.7 % (ref 0–1.9)
DIFFERENTIAL METHOD BLD: ABNORMAL
EOSINOPHIL # BLD AUTO: 0.5 K/UL (ref 0–0.5)
EOSINOPHIL NFR BLD: 7.1 % (ref 0–8)
ERYTHROCYTE [DISTWIDTH] IN BLOOD BY AUTOMATED COUNT: 14.6 % (ref 11.5–14.5)
HCT VFR BLD AUTO: 43.3 % (ref 37–48.5)
HGB BLD-MCNC: 14.4 G/DL (ref 12–16)
IMM GRANULOCYTES # BLD AUTO: 0.02 K/UL (ref 0–0.04)
IMM GRANULOCYTES NFR BLD AUTO: 0.3 % (ref 0–0.5)
LYMPHOCYTES # BLD AUTO: 2.5 K/UL (ref 1–4.8)
LYMPHOCYTES NFR BLD: 34.1 % (ref 18–48)
MCH RBC QN AUTO: 32.1 PG (ref 27–31)
MCHC RBC AUTO-ENTMCNC: 33.3 G/DL (ref 32–36)
MCV RBC AUTO: 96 FL (ref 82–98)
MONOCYTES # BLD AUTO: 0.4 K/UL (ref 0.3–1)
MONOCYTES NFR BLD: 4.8 % (ref 4–15)
NEUTROPHILS # BLD AUTO: 3.9 K/UL (ref 1.8–7.7)
NEUTROPHILS NFR BLD: 53 % (ref 38–73)
NRBC BLD-RTO: 0 /100 WBC
PLATELET # BLD AUTO: 218 K/UL (ref 150–450)
PMV BLD AUTO: 11.3 FL (ref 9.2–12.9)
RBC # BLD AUTO: 4.49 M/UL (ref 4–5.4)
WBC # BLD AUTO: 7.36 K/UL (ref 3.9–12.7)

## 2024-03-28 PROCEDURE — 85025 COMPLETE CBC W/AUTO DIFF WBC: CPT | Performed by: INTERNAL MEDICINE

## 2024-03-28 PROCEDURE — 81270 JAK2 GENE: CPT | Performed by: INTERNAL MEDICINE

## 2024-03-28 PROCEDURE — 81339 MPL GENE SEQ ALYS EXON 10: CPT | Performed by: INTERNAL MEDICINE

## 2024-03-28 PROCEDURE — 82668 ASSAY OF ERYTHROPOIETIN: CPT | Performed by: INTERNAL MEDICINE

## 2024-03-28 PROCEDURE — 36415 COLL VENOUS BLD VENIPUNCTURE: CPT | Performed by: INTERNAL MEDICINE

## 2024-03-28 PROCEDURE — 81219 CALR GENE COM VARIANTS: CPT | Performed by: INTERNAL MEDICINE

## 2024-04-01 LAB — EPO SERPL-ACNC: 19.7 MIU/ML (ref 2.6–18.5)

## 2024-04-03 ENCOUNTER — TELEPHONE (OUTPATIENT)
Dept: INTERNAL MEDICINE | Facility: CLINIC | Age: 54
End: 2024-04-03
Payer: MEDICAID

## 2024-04-03 DIAGNOSIS — D75.1 ERYTHROPOIETIN POLYCYTHEMIA: Primary | ICD-10-CM

## 2024-04-03 NOTE — TELEPHONE ENCOUNTER
Please call and help schedule ct of abd and pelvis-- I spoke to her just now.  She has elevated erythropoietin levels- need to rule on renal or hepatic epo secreting tumor

## 2024-04-04 ENCOUNTER — HOSPITAL ENCOUNTER (OUTPATIENT)
Dept: RADIOLOGY | Facility: HOSPITAL | Age: 54
Discharge: HOME | End: 2024-04-04
Attending: INTERNAL MEDICINE
Payer: MEDICAID

## 2024-04-04 DIAGNOSIS — D75.1 ERYTHROPOIETIN POLYCYTHEMIA: ICD-10-CM

## 2024-04-04 LAB
MPNR  FINAL DIAGNOSIS: NORMAL
MPNR  SPECIMEN TYPE: NORMAL
MPNR RESULT: NORMAL

## 2024-04-04 PROCEDURE — 25500020 PHARM REV CODE 255: Performed by: INTERNAL MEDICINE

## 2024-04-04 PROCEDURE — 74177 CT ABD & PELVIS W/CONTRAST: CPT | Mod: 26,,, | Performed by: RADIOLOGY

## 2024-04-04 PROCEDURE — A9698 NON-RAD CONTRAST MATERIALNOC: HCPCS | Performed by: INTERNAL MEDICINE

## 2024-04-04 PROCEDURE — 74177 CT ABD & PELVIS W/CONTRAST: CPT | Mod: TC

## 2024-04-04 RX ADMIN — IOHEXOL 75 ML: 350 INJECTION, SOLUTION INTRAVENOUS at 07:04

## 2024-04-04 RX ADMIN — BARIUM SULFATE 450 ML: 20 SUSPENSION ORAL at 07:04

## 2024-04-10 ENCOUNTER — TELEPHONE (OUTPATIENT)
Dept: INTERNAL MEDICINE | Facility: CLINIC | Age: 54
End: 2024-04-10
Payer: MEDICAID

## 2024-04-10 NOTE — TELEPHONE ENCOUNTER
Ct reviewed-- no signs of renal or hepatic masses-- cbc has returned to normal erange-- will follow -- I left message on pt's voice mail.

## 2024-04-26 ENCOUNTER — PATIENT MESSAGE (OUTPATIENT)
Dept: INTERNAL MEDICINE | Facility: CLINIC | Age: 54
End: 2024-04-26
Payer: MEDICAID

## 2024-04-26 DIAGNOSIS — E66.9 CLASS 2 OBESITY WITHOUT SERIOUS COMORBIDITY WITH BODY MASS INDEX (BMI) OF 36.0 TO 36.9 IN ADULT, UNSPECIFIED OBESITY TYPE: ICD-10-CM

## 2024-04-26 NOTE — TELEPHONE ENCOUNTER
No care due was identified.  [facility] Embedded Care Due Messages. Reference number: [id number].   4/26/2024 3:21:20 PM CDT

## 2024-04-29 RX ORDER — DULAGLUTIDE 1.5 MG/.5ML
1.5 INJECTION, SOLUTION SUBCUTANEOUS
Qty: 4 PEN | Refills: 5 | Status: SHIPPED | OUTPATIENT
Start: 2024-04-29

## 2024-05-01 ENCOUNTER — TELEPHONE (OUTPATIENT)
Dept: INTERNAL MEDICINE | Facility: CLINIC | Age: 54
End: 2024-05-01
Payer: MEDICAID

## 2024-05-01 NOTE — TELEPHONE ENCOUNTER
Tried contacting pt via phone, no answer nor voicemail set up. Portal message sent to pt.    Mukund POSEY

## 2024-05-01 NOTE — TELEPHONE ENCOUNTER
Pharmacy requesting diagnostic code. Tried prior code that you have given, pharmacist stated that it isn't approved under E88.81.    Mukund POSEY

## 2024-05-01 NOTE — TELEPHONE ENCOUNTER
----- Message from Denisse Santanabodaux sent at 5/1/2024 12:11 PM CDT -----  Contact: [phone] Patient  Patient would like to get medical advice.  Symptoms (please be specific):   Pt is asking the office to send over the correct diagnosis code for the dulaglutide (TRULICITY) 1.5 mg/0.5 mL pen injector when the Rx is to be filled.   How long have you had these symptoms: N/A  Would you like a call back, or a response through your [facility] portal?:   pt portal   Pharmacy name and phone # (copy from chart):     [id number] - [place], [place] - [address]  [address]  [place] [address]  Phone: [phone] Fax: [phone]     Comments:

## 2024-05-25 ENCOUNTER — HOSPITAL ENCOUNTER (OUTPATIENT)
Facility: HOSPITAL | Age: 54
Discharge: HOME | End: 2024-05-27
Attending: EMERGENCY MEDICINE | Admitting: STUDENT IN AN ORGANIZED HEALTH CARE EDUCATION/TRAINING PROGRAM
Payer: MEDICAID

## 2024-05-25 DIAGNOSIS — R94.31 QT PROLONGATION: ICD-10-CM

## 2024-05-25 DIAGNOSIS — R07.9 CHEST PAIN: ICD-10-CM

## 2024-05-25 DIAGNOSIS — I16.1 HYPERTENSIVE EMERGENCY: Primary | ICD-10-CM

## 2024-05-25 PROBLEM — F17.210 CIGARETTE NICOTINE DEPENDENCE WITHOUT COMPLICATION: Status: ACTIVE | Noted: 2024-05-25

## 2024-05-25 PROBLEM — J45.20 MILD INTERMITTENT ASTHMA WITHOUT COMPLICATION: Status: ACTIVE | Noted: 2024-05-25

## 2024-05-25 PROBLEM — E87.6 HYPOKALEMIA: Status: ACTIVE | Noted: 2024-05-25

## 2024-05-25 PROBLEM — N17.9 AKI (ACUTE KIDNEY INJURY): Status: ACTIVE | Noted: 2024-05-25

## 2024-05-25 PROBLEM — R79.89 ELEVATED TROPONIN: Status: ACTIVE | Noted: 2024-05-25

## 2024-05-25 PROBLEM — N18.9 CKD (CHRONIC KIDNEY DISEASE): Status: ACTIVE | Noted: 2024-05-25

## 2024-05-25 LAB
ALBUMIN SERPL BCP-MCNC: 3.1 G/DL (ref 3.5–5.2)
ALP SERPL-CCNC: 64 U/L (ref 55–135)
ALT SERPL W/O P-5'-P-CCNC: 18 U/L (ref 10–44)
ANION GAP SERPL CALC-SCNC: 11 MMOL/L (ref 8–16)
AST SERPL-CCNC: 20 U/L (ref 10–40)
BASOPHILS # BLD AUTO: 0.03 K/UL (ref 0–0.2)
BASOPHILS NFR BLD: 0.4 % (ref 0–1.9)
BILIRUB SERPL-MCNC: 0.4 MG/DL (ref 0.1–1)
BNP SERPL-MCNC: 63 PG/ML (ref 0–99)
BUN SERPL-MCNC: 16 MG/DL (ref 6–20)
BUN SERPL-MCNC: 17 MG/DL (ref 6–30)
CALCIUM SERPL-MCNC: 9 MG/DL (ref 8.7–10.5)
CHLORIDE SERPL-SCNC: 104 MMOL/L (ref 95–110)
CHLORIDE SERPL-SCNC: 99 MMOL/L (ref 95–110)
CO2 SERPL-SCNC: 24 MMOL/L (ref 23–29)
CREAT SERPL-MCNC: 1.5 MG/DL (ref 0.5–1.4)
CREAT SERPL-MCNC: 1.6 MG/DL (ref 0.5–1.4)
DIFFERENTIAL METHOD BLD: ABNORMAL
EOSINOPHIL # BLD AUTO: 0.4 K/UL (ref 0–0.5)
EOSINOPHIL NFR BLD: 5.2 % (ref 0–8)
ERYTHROCYTE [DISTWIDTH] IN BLOOD BY AUTOMATED COUNT: 15.6 % (ref 11.5–14.5)
EST. GFR  (NO RACE VARIABLE): 41.7 ML/MIN/1.73 M^2
GLUCOSE SERPL-MCNC: 95 MG/DL (ref 70–110)
GLUCOSE SERPL-MCNC: 96 MG/DL (ref 70–110)
HCT VFR BLD AUTO: 42.7 % (ref 37–48.5)
HCT VFR BLD CALC: 43 %PCV (ref 36–54)
HCV AB SERPL QL IA: NORMAL
HGB BLD-MCNC: 14.6 G/DL (ref 12–16)
HIV 1+2 AB+HIV1 P24 AG SERPL QL IA: NORMAL
IMM GRANULOCYTES # BLD AUTO: 0.01 K/UL (ref 0–0.04)
IMM GRANULOCYTES NFR BLD AUTO: 0.1 % (ref 0–0.5)
LYMPHOCYTES # BLD AUTO: 1.3 K/UL (ref 1–4.8)
LYMPHOCYTES NFR BLD: 19.1 % (ref 18–48)
MAGNESIUM SERPL-MCNC: 1.8 MG/DL (ref 1.6–2.6)
MCH RBC QN AUTO: 32.7 PG (ref 27–31)
MCHC RBC AUTO-ENTMCNC: 34.2 G/DL (ref 32–36)
MCV RBC AUTO: 96 FL (ref 82–98)
MONOCYTES # BLD AUTO: 0.6 K/UL (ref 0.3–1)
MONOCYTES NFR BLD: 8.1 % (ref 4–15)
NEUTROPHILS # BLD AUTO: 4.5 K/UL (ref 1.8–7.7)
NEUTROPHILS NFR BLD: 67.1 % (ref 38–73)
NRBC BLD-RTO: 0 /100 WBC
PLATELET # BLD AUTO: 219 K/UL (ref 150–450)
PMV BLD AUTO: 12.4 FL (ref 9.2–12.9)
POC IONIZED CALCIUM: 1.18 MMOL/L (ref 1.06–1.42)
POC TCO2 (MEASURED): 28 MMOL/L (ref 23–29)
POTASSIUM BLD-SCNC: 3 MMOL/L (ref 3.5–5.1)
POTASSIUM SERPL-SCNC: 3.1 MMOL/L (ref 3.5–5.1)
PROT SERPL-MCNC: 6.6 G/DL (ref 6–8.4)
RBC # BLD AUTO: 4.46 M/UL (ref 4–5.4)
SAMPLE: ABNORMAL
SODIUM BLD-SCNC: 139 MMOL/L (ref 136–145)
SODIUM SERPL-SCNC: 139 MMOL/L (ref 136–145)
TROPONIN I SERPL DL<=0.01 NG/ML-MCNC: 0.12 NG/ML (ref 0–0.03)
TROPONIN I SERPL DL<=0.01 NG/ML-MCNC: 0.13 NG/ML (ref 0–0.03)
WBC # BLD AUTO: 6.76 K/UL (ref 3.9–12.7)

## 2024-05-25 PROCEDURE — 93010 ELECTROCARDIOGRAM REPORT: CPT | Mod: ,,, | Performed by: INTERNAL MEDICINE

## 2024-05-25 PROCEDURE — 94761 N-INVAS EAR/PLS OXIMETRY MLT: CPT

## 2024-05-25 PROCEDURE — G0378 HOSPITAL OBSERVATION PER HR: HCPCS

## 2024-05-25 PROCEDURE — 63600175 PHARM REV CODE 636 W HCPCS: Performed by: EMERGENCY MEDICINE

## 2024-05-25 PROCEDURE — 82330 ASSAY OF CALCIUM: CPT

## 2024-05-25 PROCEDURE — 63600175 PHARM REV CODE 636 W HCPCS: Performed by: NURSE PRACTITIONER

## 2024-05-25 PROCEDURE — 96365 THER/PROPH/DIAG IV INF INIT: CPT

## 2024-05-25 PROCEDURE — 86803 HEPATITIS C AB TEST: CPT | Performed by: PHYSICIAN ASSISTANT

## 2024-05-25 PROCEDURE — 83880 ASSAY OF NATRIURETIC PEPTIDE: CPT | Performed by: EMERGENCY MEDICINE

## 2024-05-25 PROCEDURE — 96375 TX/PRO/DX INJ NEW DRUG ADDON: CPT

## 2024-05-25 PROCEDURE — 83735 ASSAY OF MAGNESIUM: CPT | Performed by: EMERGENCY MEDICINE

## 2024-05-25 PROCEDURE — 93005 ELECTROCARDIOGRAM TRACING: CPT

## 2024-05-25 PROCEDURE — 99291 CRITICAL CARE FIRST HOUR: CPT

## 2024-05-25 PROCEDURE — 94640 AIRWAY INHALATION TREATMENT: CPT

## 2024-05-25 PROCEDURE — 87389 HIV-1 AG W/HIV-1&-2 AB AG IA: CPT | Performed by: PHYSICIAN ASSISTANT

## 2024-05-25 PROCEDURE — 85025 COMPLETE CBC W/AUTO DIFF WBC: CPT | Performed by: EMERGENCY MEDICINE

## 2024-05-25 PROCEDURE — 25000003 PHARM REV CODE 250: Performed by: NURSE PRACTITIONER

## 2024-05-25 PROCEDURE — 80047 BASIC METABLC PNL IONIZED CA: CPT

## 2024-05-25 PROCEDURE — 80053 COMPREHEN METABOLIC PANEL: CPT | Performed by: EMERGENCY MEDICINE

## 2024-05-25 PROCEDURE — 84484 ASSAY OF TROPONIN QUANT: CPT | Mod: 91 | Performed by: EMERGENCY MEDICINE

## 2024-05-25 RX ORDER — DULOXETIN HYDROCHLORIDE 60 MG/1
120 CAPSULE, DELAYED RELEASE ORAL DAILY
Status: DISCONTINUED | OUTPATIENT
Start: 2024-05-26 | End: 2024-05-27 | Stop reason: HOSPADM

## 2024-05-25 RX ORDER — ENOXAPARIN SODIUM 100 MG/ML
40 INJECTION SUBCUTANEOUS EVERY 24 HOURS
Status: DISCONTINUED | OUTPATIENT
Start: 2024-05-26 | End: 2024-05-27 | Stop reason: HOSPADM

## 2024-05-25 RX ORDER — MAGNESIUM SULFATE 1 G/100ML
1 INJECTION INTRAVENOUS ONCE
Status: COMPLETED | OUTPATIENT
Start: 2024-05-25 | End: 2024-05-26

## 2024-05-25 RX ORDER — GLUCAGON 1 MG
1 KIT INJECTION
Status: DISCONTINUED | OUTPATIENT
Start: 2024-05-25 | End: 2024-05-27 | Stop reason: HOSPADM

## 2024-05-25 RX ORDER — PROCHLORPERAZINE EDISYLATE 5 MG/ML
5 INJECTION INTRAMUSCULAR; INTRAVENOUS EVERY 6 HOURS PRN
Status: DISCONTINUED | OUTPATIENT
Start: 2024-05-25 | End: 2024-05-27 | Stop reason: HOSPADM

## 2024-05-25 RX ORDER — MORPHINE SULFATE 4 MG/ML
4 INJECTION, SOLUTION INTRAMUSCULAR; INTRAVENOUS
Status: COMPLETED | OUTPATIENT
Start: 2024-05-25 | End: 2024-05-25

## 2024-05-25 RX ORDER — LABETALOL HYDROCHLORIDE 5 MG/ML
10 INJECTION, SOLUTION INTRAVENOUS
Status: COMPLETED | OUTPATIENT
Start: 2024-05-25 | End: 2024-05-25

## 2024-05-25 RX ORDER — IPRATROPIUM BROMIDE AND ALBUTEROL SULFATE 2.5; .5 MG/3ML; MG/3ML
3 SOLUTION RESPIRATORY (INHALATION) ONCE
Status: COMPLETED | OUTPATIENT
Start: 2024-05-26 | End: 2024-05-25

## 2024-05-25 RX ORDER — CARVEDILOL 12.5 MG/1
12.5 TABLET ORAL 2 TIMES DAILY WITH MEALS
Status: DISCONTINUED | OUTPATIENT
Start: 2024-05-25 | End: 2024-05-27 | Stop reason: HOSPADM

## 2024-05-25 RX ORDER — SODIUM CHLORIDE 0.9 % (FLUSH) 0.9 %
10 SYRINGE (ML) INJECTION EVERY 12 HOURS PRN
Status: DISCONTINUED | OUTPATIENT
Start: 2024-05-25 | End: 2024-05-27 | Stop reason: HOSPADM

## 2024-05-25 RX ORDER — AMLODIPINE BESYLATE 10 MG/1
10 TABLET ORAL DAILY
Status: DISCONTINUED | OUTPATIENT
Start: 2024-05-26 | End: 2024-05-27 | Stop reason: HOSPADM

## 2024-05-25 RX ORDER — ACETAMINOPHEN 325 MG/1
650 TABLET ORAL EVERY 6 HOURS PRN
Status: DISCONTINUED | OUTPATIENT
Start: 2024-05-25 | End: 2024-05-27 | Stop reason: HOSPADM

## 2024-05-25 RX ORDER — IBUPROFEN 200 MG
24 TABLET ORAL
Status: DISCONTINUED | OUTPATIENT
Start: 2024-05-25 | End: 2024-05-27 | Stop reason: HOSPADM

## 2024-05-25 RX ORDER — PANTOPRAZOLE SODIUM 40 MG/1
40 TABLET, DELAYED RELEASE ORAL DAILY
Status: DISCONTINUED | OUTPATIENT
Start: 2024-05-26 | End: 2024-05-27 | Stop reason: HOSPADM

## 2024-05-25 RX ORDER — ALBUTEROL SULFATE 90 UG/1
2 AEROSOL, METERED RESPIRATORY (INHALATION) EVERY 6 HOURS PRN
Status: DISCONTINUED | OUTPATIENT
Start: 2024-05-25 | End: 2024-05-27 | Stop reason: HOSPADM

## 2024-05-25 RX ORDER — TALC
6 POWDER (GRAM) TOPICAL NIGHTLY PRN
Status: DISCONTINUED | OUTPATIENT
Start: 2024-05-25 | End: 2024-05-27 | Stop reason: HOSPADM

## 2024-05-25 RX ORDER — CETIRIZINE HYDROCHLORIDE 5 MG/1
5 TABLET ORAL NIGHTLY
Status: DISCONTINUED | OUTPATIENT
Start: 2024-05-26 | End: 2024-05-27 | Stop reason: HOSPADM

## 2024-05-25 RX ORDER — HYDRALAZINE HYDROCHLORIDE 25 MG/1
25 TABLET, FILM COATED ORAL EVERY 8 HOURS
Status: DISCONTINUED | OUTPATIENT
Start: 2024-05-25 | End: 2024-05-26

## 2024-05-25 RX ORDER — ACETAMINOPHEN 500 MG
1000 TABLET ORAL EVERY 8 HOURS PRN
Status: DISCONTINUED | OUTPATIENT
Start: 2024-05-25 | End: 2024-05-27 | Stop reason: HOSPADM

## 2024-05-25 RX ORDER — NALOXONE HCL 0.4 MG/ML
0.02 VIAL (ML) INJECTION
Status: DISCONTINUED | OUTPATIENT
Start: 2024-05-25 | End: 2024-05-27 | Stop reason: HOSPADM

## 2024-05-25 RX ORDER — ERGOCALCIFEROL 1.25 MG/1
50000 CAPSULE ORAL
COMMUNITY
Start: 2024-02-21

## 2024-05-25 RX ORDER — POLYETHYLENE GLYCOL 3350 17 G/17G
17 POWDER, FOR SOLUTION ORAL 2 TIMES DAILY PRN
Status: DISCONTINUED | OUTPATIENT
Start: 2024-05-25 | End: 2024-05-27 | Stop reason: HOSPADM

## 2024-05-25 RX ORDER — IBUPROFEN 200 MG
16 TABLET ORAL
Status: DISCONTINUED | OUTPATIENT
Start: 2024-05-25 | End: 2024-05-27 | Stop reason: HOSPADM

## 2024-05-25 RX ORDER — LABETALOL HCL 20 MG/4 ML
20 SYRINGE (ML) INTRAVENOUS
Status: DISCONTINUED | OUTPATIENT
Start: 2024-05-25 | End: 2024-05-25

## 2024-05-25 RX ORDER — DULOXETIN HYDROCHLORIDE 60 MG/1
60 CAPSULE, DELAYED RELEASE ORAL DAILY
Status: DISCONTINUED | OUTPATIENT
Start: 2024-05-26 | End: 2024-05-25

## 2024-05-25 RX ADMIN — IPRATROPIUM BROMIDE AND ALBUTEROL SULFATE 3 ML: 2.5; .5 SOLUTION RESPIRATORY (INHALATION) at 11:05

## 2024-05-25 RX ADMIN — LABETALOL HYDROCHLORIDE 10 MG: 5 INJECTION, SOLUTION INTRAVENOUS at 08:05

## 2024-05-25 RX ADMIN — MORPHINE SULFATE 4 MG: 4 INJECTION INTRAVENOUS at 07:05

## 2024-05-25 RX ADMIN — MAGNESIUM SULFATE HEPTAHYDRATE 1 G: 500 INJECTION, SOLUTION INTRAMUSCULAR; INTRAVENOUS at 11:05

## 2024-05-25 RX ADMIN — CARVEDILOL 12.5 MG: 12.5 TABLET, FILM COATED ORAL at 10:05

## 2024-05-25 RX ADMIN — HYDRALAZINE HYDROCHLORIDE 25 MG: 25 TABLET, FILM COATED ORAL at 10:05

## 2024-05-25 RX ADMIN — POTASSIUM BICARBONATE 50 MEQ: 978 TABLET, EFFERVESCENT ORAL at 10:05

## 2024-05-25 NOTE — LETTER
May 27, 2024         [address]  [place] [address]  Phone: [phone]  Fax: [phone]       Patient: Calos Rios   YOB: 1970  Date of Visit: 05/27/2024    To Whom It May Concern:    Bladimir Rios  was at [facility] on 05/27/2024. The patient may return to work/school on 05/31 with no restrictions. If you have any questions or concerns, or if I can be of further assistance, please do not hesitate to contact me.    Sincerely,    Clover Paul MD

## 2024-05-26 LAB
ALBUMIN SERPL BCP-MCNC: 3.1 G/DL (ref 3.5–5.2)
ALP SERPL-CCNC: 66 U/L (ref 55–135)
ALT SERPL W/O P-5'-P-CCNC: 17 U/L (ref 10–44)
ANION GAP SERPL CALC-SCNC: 10 MMOL/L (ref 8–16)
ASCENDING AORTA: 2.93 CM
AST SERPL-CCNC: 19 U/L (ref 10–40)
AV INDEX (PROSTH): 0.95
AV MEAN GRADIENT: 8 MMHG
AV PEAK GRADIENT: 10 MMHG
AV VALVE AREA BY VELOCITY RATIO: 2.07 CM²
AV VALVE AREA: 2.6 CM²
AV VELOCITY RATIO: 0.75
BACTERIA #/AREA URNS AUTO: ABNORMAL /HPF
BASOPHILS # BLD AUTO: 0.03 K/UL (ref 0–0.2)
BASOPHILS NFR BLD: 0.5 % (ref 0–1.9)
BILIRUB SERPL-MCNC: 0.6 MG/DL (ref 0.1–1)
BILIRUB UR QL STRIP: NEGATIVE
BSA FOR ECHO PROCEDURE: 1.94 M2
BUN SERPL-MCNC: 16 MG/DL (ref 6–20)
CALCIUM SERPL-MCNC: 9.2 MG/DL (ref 8.7–10.5)
CHLORIDE SERPL-SCNC: 101 MMOL/L (ref 95–110)
CLARITY UR REFRACT.AUTO: CLEAR
CO2 SERPL-SCNC: 26 MMOL/L (ref 23–29)
COLOR UR AUTO: YELLOW
CREAT SERPL-MCNC: 1.6 MG/DL (ref 0.5–1.4)
CREAT UR-MCNC: 254 MG/DL (ref 15–325)
CV ECHO LV RWT: 0.57 CM
D DIMER PPP IA.FEU-MCNC: 0.37 MG/L FEU
DIFFERENTIAL METHOD BLD: ABNORMAL
DOP CALC AO PEAK VEL: 1.62 M/S
DOP CALC AO VTI: 30.24 CM
DOP CALC LVOT AREA: 2.7 CM2
DOP CALC LVOT DIAMETER: 1.87 CM
DOP CALC LVOT PEAK VEL: 1.22 M/S
DOP CALC LVOT STROKE VOLUME: 78.65 CM3
DOP CALCLVOT PEAK VEL VTI: 28.65 CM
E WAVE DECELERATION TIME: 285.61 MSEC
E/A RATIO: 0.63
E/E' RATIO: 13 M/S
ECHO LV POSTERIOR WALL: 1.2 CM (ref 0.6–1.1)
EOSINOPHIL # BLD AUTO: 0.3 K/UL (ref 0–0.5)
EOSINOPHIL NFR BLD: 5.9 % (ref 0–8)
ERYTHROCYTE [DISTWIDTH] IN BLOOD BY AUTOMATED COUNT: 15.3 % (ref 11.5–14.5)
EST. GFR  (NO RACE VARIABLE): 38.6 ML/MIN/1.73 M^2
FERRITIN SERPL-MCNC: 114 NG/ML (ref 20–300)
FRACTIONAL SHORTENING: 41 % (ref 28–44)
GLUCOSE SERPL-MCNC: 88 MG/DL (ref 70–110)
GLUCOSE UR QL STRIP: NEGATIVE
HCT VFR BLD AUTO: 42.5 % (ref 37–48.5)
HGB BLD-MCNC: 14 G/DL (ref 12–16)
HGB UR QL STRIP: NEGATIVE
HYALINE CASTS UR QL AUTO: 0 /LPF
IMM GRANULOCYTES # BLD AUTO: 0.02 K/UL (ref 0–0.04)
IMM GRANULOCYTES NFR BLD AUTO: 0.4 % (ref 0–0.5)
INFLUENZA A, MOLECULAR: NOT DETECTED
INFLUENZA B, MOLECULAR: NOT DETECTED
INTERVENTRICULAR SEPTUM: 1.1 CM (ref 0.6–1.1)
IVRT: 119.89 MSEC
KETONES UR QL STRIP: NEGATIVE
LA MAJOR: 5.72 CM
LA MINOR: 5.67 CM
LA WIDTH: 4.27 CM
LEFT ATRIUM SIZE: 3.22 CM
LEFT ATRIUM VOLUME INDEX MOD: 34.6 ML/M2
LEFT ATRIUM VOLUME INDEX: 35.4 ML/M2
LEFT ATRIUM VOLUME MOD: 65 CM3
LEFT ATRIUM VOLUME: 66.56 CM3
LEFT INTERNAL DIMENSION IN SYSTOLE: 2.48 CM (ref 2.1–4)
LEFT VENTRICLE DIASTOLIC VOLUME INDEX: 34.72 ML/M2
LEFT VENTRICLE DIASTOLIC VOLUME: 65.27 ML
LEFT VENTRICLE MASS INDEX: 89 G/M2
LEFT VENTRICLE SYSTOLIC VOLUME INDEX: 11.7 ML/M2
LEFT VENTRICLE SYSTOLIC VOLUME: 21.98 ML
LEFT VENTRICULAR INTERNAL DIMENSION IN DIASTOLE: 4.2 CM (ref 3.5–6)
LEFT VENTRICULAR MASS: 167.45 G
LEUKOCYTE ESTERASE UR QL STRIP: NEGATIVE
LV LATERAL E/E' RATIO: 13 M/S
LV SEPTAL E/E' RATIO: 13 M/S
LYMPHOCYTES # BLD AUTO: 1.3 K/UL (ref 1–4.8)
LYMPHOCYTES NFR BLD: 23.5 % (ref 18–48)
MAGNESIUM SERPL-MCNC: 2.3 MG/DL (ref 1.6–2.6)
MCH RBC QN AUTO: 32.1 PG (ref 27–31)
MCHC RBC AUTO-ENTMCNC: 32.9 G/DL (ref 32–36)
MCV RBC AUTO: 98 FL (ref 82–98)
MICROSCOPIC COMMENT: ABNORMAL
MONOCYTES # BLD AUTO: 0.6 K/UL (ref 0.3–1)
MONOCYTES NFR BLD: 10.2 % (ref 4–15)
MV PEAK A VEL: 1.03 M/S
MV PEAK E VEL: 0.65 M/S
MV STENOSIS PRESSURE HALF TIME: 82.83 MS
MV VALVE AREA P 1/2 METHOD: 2.66 CM2
NEUTROPHILS # BLD AUTO: 3.3 K/UL (ref 1.8–7.7)
NEUTROPHILS NFR BLD: 59.5 % (ref 38–73)
NITRITE UR QL STRIP: NEGATIVE
NRBC BLD-RTO: 0 /100 WBC
OHS CV RV/LV RATIO: 0.74 CM
OHS QRS DURATION: 80 MS
OHS QRS DURATION: 92 MS
OHS QTC CALCULATION: 491 MS
OHS QTC CALCULATION: 515 MS
PH UR STRIP: 7 [PH] (ref 5–8)
PISA TR MAX VEL: 1.97 M/S
PLATELET # BLD AUTO: 164 K/UL (ref 150–450)
PMV BLD AUTO: 11.2 FL (ref 9.2–12.9)
POTASSIUM SERPL-SCNC: 3 MMOL/L (ref 3.5–5.1)
PROT SERPL-MCNC: 6.5 G/DL (ref 6–8.4)
PROT UR QL STRIP: ABNORMAL
PROT UR-MCNC: 42 MG/DL (ref 0–15)
RA MAJOR: 4.3 CM
RA PRESSURE ESTIMATED: 3 MMHG
RA WIDTH: 3.45 CM
RBC # BLD AUTO: 4.36 M/UL (ref 4–5.4)
RBC #/AREA URNS AUTO: 10 /HPF (ref 0–4)
RIGHT VENTRICULAR END-DIASTOLIC DIMENSION: 3.1 CM
RSV AG BY MOLECULAR METHOD: NOT DETECTED
RV TB RVSP: 5 MMHG
SARS-COV-2 RNA RESP QL NAA+PROBE: DETECTED
SINUS: 2.77 CM
SODIUM SERPL-SCNC: 137 MMOL/L (ref 136–145)
SODIUM UR-SCNC: 111 MMOL/L (ref 20–250)
SP GR UR STRIP: 1.02 (ref 1–1.03)
SQUAMOUS #/AREA URNS AUTO: 1 /HPF
STJ: 2.51 CM
TDI LATERAL: 0.05 M/S
TDI SEPTAL: 0.05 M/S
TDI: 0.05 M/S
TR MAX PG: 16 MMHG
TRICUSPID ANNULAR PLANE SYSTOLIC EXCURSION: 2.26 CM
TROPONIN I SERPL DL<=0.01 NG/ML-MCNC: 0.12 NG/ML (ref 0–0.03)
TV REST PULMONARY ARTERY PRESSURE: 19 MMHG
URN SPEC COLLECT METH UR: ABNORMAL
WBC # BLD AUTO: 5.57 K/UL (ref 3.9–12.7)
WBC #/AREA URNS AUTO: 3 /HPF (ref 0–5)
Z-SCORE OF LEFT VENTRICULAR DIMENSION IN END DIASTOLE: -2.23
Z-SCORE OF LEFT VENTRICULAR DIMENSION IN END SYSTOLE: -2.11

## 2024-05-26 PROCEDURE — 36415 COLL VENOUS BLD VENIPUNCTURE: CPT | Performed by: STUDENT IN AN ORGANIZED HEALTH CARE EDUCATION/TRAINING PROGRAM

## 2024-05-26 PROCEDURE — 25000003 PHARM REV CODE 250: Performed by: STUDENT IN AN ORGANIZED HEALTH CARE EDUCATION/TRAINING PROGRAM

## 2024-05-26 PROCEDURE — 63600175 PHARM REV CODE 636 W HCPCS: Performed by: NURSE PRACTITIONER

## 2024-05-26 PROCEDURE — 25000242 PHARM REV CODE 250 ALT 637 W/ HCPCS: Performed by: NURSE PRACTITIONER

## 2024-05-26 PROCEDURE — 84300 ASSAY OF URINE SODIUM: CPT | Performed by: NURSE PRACTITIONER

## 2024-05-26 PROCEDURE — 82728 ASSAY OF FERRITIN: CPT | Performed by: STUDENT IN AN ORGANIZED HEALTH CARE EDUCATION/TRAINING PROGRAM

## 2024-05-26 PROCEDURE — 83735 ASSAY OF MAGNESIUM: CPT | Performed by: NURSE PRACTITIONER

## 2024-05-26 PROCEDURE — 93005 ELECTROCARDIOGRAM TRACING: CPT

## 2024-05-26 PROCEDURE — 93010 ELECTROCARDIOGRAM REPORT: CPT | Mod: ,,, | Performed by: INTERNAL MEDICINE

## 2024-05-26 PROCEDURE — 25000003 PHARM REV CODE 250: Performed by: NURSE PRACTITIONER

## 2024-05-26 PROCEDURE — 85379 FIBRIN DEGRADATION QUANT: CPT | Performed by: STUDENT IN AN ORGANIZED HEALTH CARE EDUCATION/TRAINING PROGRAM

## 2024-05-26 PROCEDURE — 82570 ASSAY OF URINE CREATININE: CPT | Performed by: NURSE PRACTITIONER

## 2024-05-26 PROCEDURE — 81001 URINALYSIS AUTO W/SCOPE: CPT | Performed by: NURSE PRACTITIONER

## 2024-05-26 PROCEDURE — 80053 COMPREHEN METABOLIC PANEL: CPT | Performed by: NURSE PRACTITIONER

## 2024-05-26 PROCEDURE — 84156 ASSAY OF PROTEIN URINE: CPT | Performed by: NURSE PRACTITIONER

## 2024-05-26 PROCEDURE — 36415 COLL VENOUS BLD VENIPUNCTURE: CPT | Performed by: NURSE PRACTITIONER

## 2024-05-26 PROCEDURE — 85025 COMPLETE CBC W/AUTO DIFF WBC: CPT | Performed by: NURSE PRACTITIONER

## 2024-05-26 PROCEDURE — 0241U SARS-COV2 (COVID) WITH FLU/RSV BY PCR: CPT | Performed by: NURSE PRACTITIONER

## 2024-05-26 PROCEDURE — 84484 ASSAY OF TROPONIN QUANT: CPT | Performed by: NURSE PRACTITIONER

## 2024-05-26 PROCEDURE — 96372 THER/PROPH/DIAG INJ SC/IM: CPT | Performed by: NURSE PRACTITIONER

## 2024-05-26 PROCEDURE — G0378 HOSPITAL OBSERVATION PER HR: HCPCS

## 2024-05-26 RX ORDER — GUAIFENESIN 600 MG/1
600 TABLET, EXTENDED RELEASE ORAL 2 TIMES DAILY
Status: DISCONTINUED | OUTPATIENT
Start: 2024-05-26 | End: 2024-05-27 | Stop reason: HOSPADM

## 2024-05-26 RX ORDER — BENZONATATE 100 MG/1
100 CAPSULE ORAL 3 TIMES DAILY PRN
Status: DISCONTINUED | OUTPATIENT
Start: 2024-05-26 | End: 2024-05-27 | Stop reason: HOSPADM

## 2024-05-26 RX ORDER — HYDRALAZINE HYDROCHLORIDE 50 MG/1
50 TABLET, FILM COATED ORAL EVERY 8 HOURS
Status: DISCONTINUED | OUTPATIENT
Start: 2024-05-26 | End: 2024-05-27 | Stop reason: HOSPADM

## 2024-05-26 RX ADMIN — POTASSIUM BICARBONATE 50 MEQ: 978 TABLET, EFFERVESCENT ORAL at 09:05

## 2024-05-26 RX ADMIN — HYDRALAZINE HYDROCHLORIDE 25 MG: 25 TABLET, FILM COATED ORAL at 05:05

## 2024-05-26 RX ADMIN — CETIRIZINE HYDROCHLORIDE 5 MG: 5 TABLET, FILM COATED ORAL at 08:05

## 2024-05-26 RX ADMIN — ENOXAPARIN SODIUM 40 MG: 40 INJECTION SUBCUTANEOUS at 05:05

## 2024-05-26 RX ADMIN — ACETAMINOPHEN 650 MG: 325 TABLET ORAL at 05:05

## 2024-05-26 RX ADMIN — CARVEDILOL 12.5 MG: 12.5 TABLET, FILM COATED ORAL at 08:05

## 2024-05-26 RX ADMIN — DULOXETINE HYDROCHLORIDE 120 MG: 60 CAPSULE, DELAYED RELEASE ORAL at 08:05

## 2024-05-26 RX ADMIN — GUAIFENESIN 600 MG: 600 TABLET, EXTENDED RELEASE ORAL at 08:05

## 2024-05-26 RX ADMIN — POTASSIUM BICARBONATE 25 MEQ: 978 TABLET, EFFERVESCENT ORAL at 06:05

## 2024-05-26 RX ADMIN — AMLODIPINE BESYLATE 10 MG: 10 TABLET ORAL at 08:05

## 2024-05-26 RX ADMIN — HYDRALAZINE HYDROCHLORIDE 50 MG: 50 TABLET ORAL at 02:05

## 2024-05-26 RX ADMIN — HYDRALAZINE HYDROCHLORIDE 50 MG: 50 TABLET ORAL at 10:05

## 2024-05-26 RX ADMIN — PANTOPRAZOLE SODIUM 40 MG: 40 TABLET, DELAYED RELEASE ORAL at 08:05

## 2024-05-26 RX ADMIN — CARVEDILOL 12.5 MG: 12.5 TABLET, FILM COATED ORAL at 05:05

## 2024-05-26 NOTE — ED PROVIDER NOTES
"Encounter Date: 5/25/2024       History     Chief Complaint   Patient presents with    Shortness of Breath     Hx of asthma, having chest heaviness since this morning, albuterol not helping. No wheezing or tachypnea in triage     52 yo W with pmhx asthma, migraines, HTN, obesity, anxiety presents with a chief complaint of chest pain.  Patient describes a "bubble" in the substernal region.  Began yesterday mid day.  It has been intermittent.  No history of similar symptoms.  She notes associated bifrontal headache.  No nausea, vomiting.  She reports compliance with blood pressure medications but was quite hypertensive.  Blood pressure at triage with documented as 200/194 but in the room she was 238/120.  She notes that she missed her blood pressure medications on Monday and Tuesday, 4 and 5 days ago, but has been compliant with them since that time.        Review of patient's allergies indicates:  No Known Allergies  Past Medical History:   Diagnosis Date    Anxiety disorder, unspecified     Hypertension     Migraine headache     Obesity      Past Surgical History:   Procedure Laterality Date    CYSTOURETEROSCOPY,WITH HOLMIUM LASER LITHOTRIPSY OF URETERAL CALCULUS - Bilateral Bilateral 11/09/2022    ESOPHAGOGASTRODUODENOSCOPY N/A 12/23/2022    Procedure: EGD (ESOPHAGOGASTRODUODENOSCOPY);  Surgeon: Anaid Cohen MD;  Location: [facility];  Service: Endoscopy;  Laterality: N/A;    HYSTERECTOMY, TOTAL, LAPAROSCOPIC, WITH SALPINGO-OOPHORECTOMY Bilateral 03/17/2021    LAPAROSCOPIC TOTAL HYSTERECTOMY Bilateral 03/17/2021    PYELOSCOPY Bilateral 10/25/2022    Procedure: PYELOSCOPY;  Surgeon: Raz Cantrell MD;  Location: [facility];  Service: Urology;  Laterality: Bilateral;    TUBAL LIGATION      URETEROSCOPY Bilateral 10/25/2022    Procedure: URETEROSCOPY;  Surgeon: Raz Cantrell MD;  Location: [facility];  Service: Urology;  Laterality: Bilateral;     Family History   Problem Relation Name Age of Onset    Heart " disease Mother      Drug abuse Father      Heart disease Father      Kidney disease Father      Diabetes Maternal Uncle      Heart disease Maternal Grandmother      Ovarian cancer Maternal Aunt      Cancer Neg Hx       Social History     Tobacco Use    Smoking status: Every Day     Current packs/day: 0.25     Average packs/day: 0.3 packs/day for 15.0 years (3.8 ttl pk-yrs)     Types: Cigarettes    Smokeless tobacco: Never   Substance Use Topics    Alcohol use: Yes     Alcohol/week: 3.0 standard drinks of alcohol     Types: 3 Shots of liquor per week     Comment: scocially on weekends    Drug use: Yes     Types: Marijuana     Review of Systems    Physical Exam     Initial Vitals [05/25/24 1859]   BP Pulse Resp Temp SpO2   (!) 200/194 92 20 99.4 °F (37.4 °C) 96 %      MAP       --         Physical Exam    Nursing note and vitals reviewed.  Constitutional: She appears well-developed and well-nourished. She is not diaphoretic. No distress.   HENT:   Head: Normocephalic and atraumatic.   Eyes: Right eye exhibits no discharge. Left eye exhibits no discharge. No scleral icterus.   Neck: Neck supple. No JVD present.   Normal range of motion.  Cardiovascular:  Normal rate, regular rhythm and normal heart sounds.     Exam reveals no gallop and no friction rub.       No murmur heard.  Pulmonary/Chest: Breath sounds normal. No respiratory distress. She has no wheezes. She has no rhonchi. She has no rales.   Abdominal: Abdomen is soft. She exhibits no distension and no mass. There is no abdominal tenderness. There is no rebound and no guarding.   Musculoskeletal:         General: No tenderness or edema. Normal range of motion.      Cervical back: Normal range of motion and neck supple.     Neurological: She is alert and oriented to person, place, and time. She has normal strength. No cranial nerve deficit or sensory deficit.   Skin: Skin is warm and dry. Capillary refill takes less than 2 seconds.   Psychiatric: Thought content  normal.   A bit anxious appearing         ED Course   Procedures  Labs Reviewed   CBC W/ AUTO DIFFERENTIAL - Abnormal; Notable for the following components:       Result Value    MCH 32.7 (*)     RDW 15.6 (*)     All other components within normal limits   TROPONIN I - Abnormal; Notable for the following components:    Troponin I 0.131 (*)     All other components within normal limits   ISTAT PROCEDURE - Abnormal; Notable for the following components:    POC Creatinine 1.6 (*)     POC Potassium 3.0 (*)     All other components within normal limits   HIV 1 / 2 ANTIBODY    Narrative:     Release to patient->Immediate   HEPATITIS C ANTIBODY    Narrative:     Release to patient->Immediate   B-TYPE NATRIURETIC PEPTIDE   COMPREHENSIVE METABOLIC PANEL   ISTAT CHEM8     EKG Readings: (Independently Interpreted)   Initial Reading: No STEMI. Rhythm: Sinus Arrhythmia. Heart Rate: 89. ST Segments: Normal ST Segments. T Waves Flipped: AVL. Axis: Normal. [facility] ED EKG2 - Other Findings: LVH.       Imaging Results              X-Ray Chest AP Portable (Final result)  Result time 05/25/24 20:05:47      Final result by Jarocho Pacheco MD (05/25/24 20:05:47)                   Impression:      1. No acute cardiopulmonary process.      Electronically signed by: Jarocho Pacheco MD  Date:    05/25/2024  Time:    20:05               Narrative:    EXAMINATION:  XR CHEST AP PORTABLE    CLINICAL HISTORY:  Chest Pain;    TECHNIQUE:  Single frontal view of the chest was performed.    COMPARISON:  05/23/2023    FINDINGS:  The cardiomediastinal silhouette is prominent, magnified by technique..  There is no pleural effusion.  The trachea is midline.  The lungs are symmetrically expanded bilaterally with coarse interstitial attenuation, accentuated by overlying habitus..  No large focal consolidation seen.  There is no pneumothorax.  The osseous structures are unremarkable.                                       Medications   labetalol 20 mg/4 mL  (5 mg/mL) IV syring (20 mg Intravenous Not Given 5/25/24 1930)   morphine injection 4 mg (4 mg Intravenous Given 5/25/24 1943)   labetaloL injection 10 mg (10 mg Intravenous Given 5/25/24 2033)     Medical Decision Making  54 yo W with pmhx asthma, migraines, HTN, obesity, anxiety presents with a chief complaint of intermittent chest pain and frontal headache since yesterday.    Differential includes, but not limited to:  Hypertensive emergency, ACS, ICH, migraine    Will obtain labs, chest x-ray, CT head.  Given severity of hypertension, I ordered 20 mg of labetalol but blood pressure improved to 183/112 without any intervention.  This being the case, will administer morphine for analgesia prior to any IV antihypertensives.    Reassessment:  CBC without leukocytosis or anemia.  Chest x-ray without acute process.  BNP normal.  Troponin elevated at 0.131. On repeat assessment, blood pressure 203/102.  Pt concerned about hypertensive emergency.  Will administer labetalol IV.  CMP hemolyzed, will repeat and obtain i-STAT Chem 8.    Reassessment: /97. Map decrease by 25%. On eval she notes pain resolved.  I-STAT Chem 8 with creatinine 1.6 up from baseline of 1.1.  Suspect this is also did hypertensive emergency.  Will admit for hypertensive emergency.    Amount and/or Complexity of Data Reviewed  Labs: ordered.  Radiology: ordered.    Risk  Prescription drug management.              Attending Attestation:         Attending Critical Care:   Critical Care Times:   ==============================================================  Total Critical Care Time - exclusive of procedural time: 30 minutes.  ==============================================================  Critical care was necessary to treat or prevent imminent or life-threatening deterioration of the following conditions: hypertensive urgency.                                  Clinical Impression:  Final diagnoses:  [I16.1] Hypertensive emergency (Primary)           ED Disposition Condition    Observation Stable                Yunier Manuel MD  05/25/24 5575

## 2024-05-26 NOTE — ED NOTES
I-STAT Chem-8+ Results:   Value Reference Range   Sodium 139 136-145 mmol/L   Potassium  3.0 3.5-5.1 mmol/L   Chloride 99  mmol/L   Ionized Calcium 1.18 1.06-1.42 mmol/L   CO2 (measured) 28 23-29 mmol/L   Glucose 96  mg/dL   BUN 17 6-30 mg/dL   Creatinine 1.6 0.5-1.4 mg/dL   Hematocrit 43 36-54%

## 2024-05-26 NOTE — ASSESSMENT & PLAN NOTE
Patient has hypokalemia which is Acute and currently uncontrolled. Most recent potassium levels reviewed-   Lab Results   Component Value Date    K 3.1 (L) 05/25/2024   . Will continue potassium replacement per protocol and recheck repeat levels after replacement completed.

## 2024-05-26 NOTE — NURSING
Nurses Note -- 4 Eyes      5/26/2024   7:01 AM      Skin assessed during: Admit      [x] No Altered Skin Integrity Present    [x]Prevention Measures Documented      [] Yes- Altered Skin Integrity Present or Discovered   [] LDA Added if Not in Epic (Describe Wound)   [] New Altered Skin Integrity was Present on Admit and Documented in LDA   [] Wound Image Taken    Wound Care Consulted? No    Attending Nurse:  Kait Benson RN/Staff Member:   Karely

## 2024-05-26 NOTE — ASSESSMENT & PLAN NOTE
Patient has a current diagnosis of Hypertensive emergency with end organ damage evidenced by acute kidney injury which is uncontrolled.  Latest blood pressure and vitals reviewed-   Temp:  [99.4 °F (37.4 °C)]   Pulse:  [69-92]   Resp:  [14-22]   BP: (168-238)/()   SpO2:  [95 %-97 %] .   Patient currently off IV antihypertensives.   Home meds for hypertension were reviewed and noted below.   Hypertension Medications               amLODIPine (NORVASC) 10 MG tablet Take 1 tablet (10 mg total) by mouth once daily.    carvediloL (COREG) 12.5 MG tablet Take 1 tablet (12.5 mg total) by mouth 2 (two) times daily with meals.    spironolactone (ALDACTONE) 25 MG tablet Take 1 tablet (25 mg total) by mouth once daily.    valsartan-hydrochlorothiazide (DIOVAN-HCT) 320-12.5 mg per tablet Take 1 tablet by mouth once daily.            Medication adjustment for hospital antihypertensives is as follows-    -Continue carvedilol and amlodipine. Will add scheduled hydralazine as well. Hold aldactone, valsartan, and hctz due to bump in creatinine.   -ECHO pending.  -Cardiac diet.    Will aim for controlled BP reduction by medications noted above. Monitor and mitigate end organ damage as indicated.

## 2024-05-26 NOTE — ASSESSMENT & PLAN NOTE
Patient has a current diagnosis of Hypertensive emergency with end organ damage evidenced by acute kidney injury which is uncontrolled.  Latest blood pressure and vitals reviewed-   Temp:  [98.9 °F (37.2 °C)-99.5 °F (37.5 °C)]   Pulse:  [62-92]   Resp:  [14-22]   BP: (139-238)/()   SpO2:  [91 %-97 %] .   Patient currently off IV antihypertensives.   Home meds for hypertension were reviewed and noted below.   Hypertension Medications               amLODIPine (NORVASC) 10 MG tablet Take 1 tablet (10 mg total) by mouth once daily.    carvediloL (COREG) 12.5 MG tablet Take 1 tablet (12.5 mg total) by mouth 2 (two) times daily with meals.    spironolactone (ALDACTONE) 25 MG tablet Take 1 tablet (25 mg total) by mouth once daily.    valsartan-hydrochlorothiazide (DIOVAN-HCT) 320-12.5 mg per tablet Take 1 tablet by mouth once daily.            Medication adjustment for hospital antihypertensives is as follows-    -Continue carvedilol and amlodipine. Will add scheduled hydralazine as well. Hold aldactone, valsartan, and hctz due to bump in creatinine.   -ECHO pending.  -Cardiac diet.    Will aim for controlled BP reduction by medications noted above. Monitor and mitigate end organ damage as indicated.

## 2024-05-26 NOTE — HOSPITAL COURSE
Echo showed . Mildly increased wall thickness. There is concentric remodeling. Normal wall motion. There is normal systolic function with a visually estimated ejection fraction of 65 - 70%. Grade I diastolic dysfunction.   LATOYA worsening creatinine trended up to 1.6.  Urine lytes ordered.  Blood pressure continues to remain elevated, holding valsartan-HCTZ and spironolactone in the setting of LATOYA.  increased hydralazine dose to 50 mg t.i.d..  LATOYA resolved.  Discontinued hydralazine.  Patient was saturating well on room air.  Inflammatory markers were within normal limits.    Adryanfabiola Yung Gabriel was deemed appropriate for discharge.  At the time of discharge, the plan of care was discussed with patient/family, who were agreeable and amenable.  All medications were verbally reviewed and discussed with the patient/family.  They endorsed understanding and compliance.  Informed them that these changes will be available on their discharge paperwork, as well.  Outpatient follow-ups were scheduled, or if unable to be scheduled ambulatory referrals were placed, and ER return precautions were given.  All questions were answered to the patient/family's satisfaction.  She was subsequently discharged in stable condition.

## 2024-05-26 NOTE — ED NOTES
Telemetry Verification   Patient placed on Telemetry Box  Verified with War Room  Box # [id number]   Monitor Tech War room    Rate 83    Rhythm NSR

## 2024-05-26 NOTE — ASSESSMENT & PLAN NOTE
Appears to have mild CKD at bl with acute worsening of kidney function likely in the setting of hypertensive emergency. Hold valsartan, hctz, aldactone.   -Cr 1.5 on admit, bl ~1.0-1.3?  -Follow renal panel and electrolytes closely.  -Adjust renal dose medications for Estimated Creatinine Clearance: 43 mL/min (A) (based on SCr of 1.6 mg/dL (H)).   -Avoid NSAIDs, Fierro-II inhibitors, ACE-I, Angiotensin Receptor Blockers, or Aminoglycosides.  -UA and Urine Na, Cr, Protein pending.  -Consider renal US if kidney function doesn't improve or continues to worsen.  -Would likely benefit from outpatient referral to nephrology.

## 2024-05-26 NOTE — PLAN OF CARE
Pt arrived to unit via w/c per ED nurses. AAOX3. Able to make needs known. Ambulated in room w/o assistance. Resp even and unlabored. Skin w/d to touch. Tele monitor intact. Cont of B/B. Hankamer to room/staff. Instructed to notify staff for assistance. NAD noted at present. Will continue to monitor.  Problem: Adult Inpatient Plan of Care  Goal: Plan of Care Review  5/26/2024 0051 by Kait Aaron, RN  Outcome: Progressing  5/26/2024 0050 by Kait Aaron, RN  Flowsheets (Taken 5/26/2024 0050)  Plan of Care Reviewed With: patient  Goal: Patient-Specific Goal (Individualized)  Outcome: Progressing  Goal: Absence of Hospital-Acquired Illness or Injury  Outcome: Progressing  Goal: Optimal Comfort and Wellbeing  Outcome: Progressing  Goal: Readiness for Transition of Care  Outcome: Progressing     Problem: Acute Kidney Injury/Impairment  Goal: Fluid and Electrolyte Balance  Outcome: Progressing  Goal: Improved Oral Intake  Outcome: Progressing  Goal: Effective Renal Function  Outcome: Progressing     Problem: Hypertension Acute  Goal: Blood Pressure Within Desired Range  Outcome: Progressing

## 2024-05-26 NOTE — PLAN OF CARE
[facility] - Emergency Dept  Initial Discharge Assessment       Primary Care Provider: Adria Doll Jr., MD    Admission Diagnosis: No admission diagnoses are documented for this encounter.    Admission Date: 5/25/2024  Expected Discharge Date: 5-    SW met pt at bedside. Pt lives with her mother and ambulates without assistance. Pt has no acute case management needs at this time  Pt added her brother Adria Rios to her list of emergency contacts  [phone]. Pt [facility]  Emergency Department  [phone]     Transition of Care Barriers: (P) None    Payor: MEDICAID / Plan: [place] [place] / Product Type: Managed Medicaid /     Extended Emergency Contact Information  Primary Emergency Contact: Laquita Rios  [address] [facility] [place] [address] [facility]  Home Phone: [phone]  Mobile Phone: [phone]  Relation: Mother    Discharge Plan A: (P) Home  Discharge Plan B: (P) Home      [id number] - [place], [place] - [address]  [address]  [facility] [place] [address]  Phone: [phone] Fax: [phone]      Initial Assessment (most recent)       Adult Discharge Assessment - 05/25/24 2129          Discharge Assessment    Assessment Type Discharge Planning Assessment (P)      Confirmed/corrected address, phone number and insurance Yes (P)      Confirmed Demographics Correct on Facesheet (P)      Source of Information patient (P)      Does patient/caregiver understand observation status Yes (P)      Communicated JEFF with patient/caregiver Date not available/Unable to determine (P)      People in Home parent(s) (P)      Facility Arrived From: home (P)      Do you expect to return to your current living situation? Yes (P)      Do you have help at home or someone to help you manage your care at home? Yes (P)      Prior to hospitilization cognitive  status: Alert/Oriented (P)      Current cognitive status: Alert/Oriented (P)      Walking or Climbing Stairs Difficulty no (P)      Dressing/Bathing Difficulty no (P)      Equipment Currently Used at Home other (see comments) (P)    heart monitor    Readmission within 30 days? No (P)      Patient currently being followed by outpatient case management? No (P)      Do you currently have service(s) that help you manage your care at home? No (P)      Do you take prescription medications? Yes (P)      Do you have prescription coverage? Yes (P)      Do you have any problems affording any of your prescribed medications? No (P)      Is the patient taking medications as prescribed? yes (P)      Who is going to help you get home at discharge? Adria Rios  [phone] (P)      How do you get to doctors appointments? car, drives self (P)      Are you on dialysis? No (P)      Do you take coumadin? No (P)      Discharge Plan A Home (P)      Discharge Plan B Home (P)      DME Needed Upon Discharge  none (P)      Discharge Plan discussed with: Patient (P)      Transition of Care Barriers None (P)         Physical Activity    On average, how many days per week do you engage in moderate to strenuous exercise (like a brisk walk)? 0 days (P)      On average, how many minutes do you engage in exercise at this level? 0 min (P)         Financial Resource Strain    How hard is it for you to pay for the very basics like food, housing, medical care, and heating? Not hard at all (P)         Housing Stability    In the last 12 months, was there a time when you were not able to pay the mortgage or rent on time? No (P)      At any time in the past 12 months, were you homeless or living in a shelter (including now)? No (P)         Transportation Needs    Has the lack of transportation kept you from medical appointments, meetings, work or from getting things needed for daily living? No (P)         Food Insecurity    Within the past 12 months, you  worried that your food would run out before you got the money to buy more. Never true (P)      Within the past 12 months, the food you bought just didn't last and you didn't have money to get more. Never true (P)         Stress    Do you feel stress - tense, restless, nervous, or anxious, or unable to sleep at night because your mind is troubled all the time - these days? To some extent (P)         Social Isolation    How often do you feel lonely or isolated from those around you?  Never (P)         Alcohol Use    Q1: How often do you have a drink containing alcohol? 2-3 times a week (P)      Q2: How many drinks containing alcohol do you have on a typical day when you are drinking? 3 or 4 (P)      Q3: How often do you have six or more drinks on one occasion? Never (P)         Utilities    In the past 12 months has the electric, gas, oil, or water company threatened to shut off services in your home? No (P)         Health Literacy    How often do you need to have someone help you when you read instructions, pamphlets, or other written material from your doctor or pharmacy? Never (P)

## 2024-05-26 NOTE — ASSESSMENT & PLAN NOTE
Body mass index is 31.41 kg/m². Obesity complicates all aspects of disease management from diagnostic modalities to treatment. Weight loss encouraged and health benefits explained to patient.

## 2024-05-26 NOTE — H&P
"[facility] - Emergency Dept  [facility] Medicine  History & Physical    Patient Name: Calos Rios  MRN: [id number]  Patient Class: OP- Observation  Admission Date: 5/25/2024  Attending Physician: Clover Paul MD   Primary Care Provider: Adria Doll Jr., MD         Patient information was obtained from patient, past medical records, and ER records.     Subjective:     Principal Problem:Hypertensive emergency    Chief Complaint:   Chief Complaint   Patient presents with    Shortness of Breath     Hx of asthma, having chest heaviness since this morning, albuterol not helping. No wheezing or tachypnea in triage        HPI: Calos Rios is a 53 y.o. female with a PMHx of anxiety, obesity, asthma, migraine, and HTN who presents to the ED with complaints of chest pain and headache. The patient reports intermittent substernal chest pain that began yesterday around lunch, described as a "bubble" tightness/pressure. No associated SOB, N/V, or palpitations. Denies any radiation or aggravating on alleviating factors. She reports the pain returned today and was constant. Endorses an associated bifrontal headache as well. Denies vision changes, numbness/tingling, or focal weakness. She reports chest pain and headache have both resolved since receiving treatment in the ER. She does note her BP runs high although does not check it often. Reports missing several doses of her antihypertensives Monday and Tuesday but has since been compliant. Reports not adding salt to food but does not follow a low sodium diet. She also notes a mild cough for the past several days which is non productive. Denies fever/chills, abdominal pain, or dysuria.    In the ED, patient significantly hypertensive otherwise vitals stable, afebrile. CBC unremarkable. Cr 1.5 (bl~1.0-1.3). K + 3.1. Albumin 3.1. BNP 63. Troponin 0.131. EKG shows SR w/ PACs and prolonged QTc (515), 89 bpm, no ST elevation or depression. CXR negative for acute " process. CTH negative for acute intracranial process. The patient received IV labetalol 10mg x1 and IV morphine 4mg x1.    Past Medical History:   Diagnosis Date    Anxiety disorder, unspecified     Hypertension     Migraine headache     Obesity        Past Surgical History:   Procedure Laterality Date    CYSTOURETEROSCOPY,WITH HOLMIUM LASER LITHOTRIPSY OF URETERAL CALCULUS - Bilateral Bilateral 11/09/2022    ESOPHAGOGASTRODUODENOSCOPY N/A 12/23/2022    Procedure: EGD (ESOPHAGOGASTRODUODENOSCOPY);  Surgeon: Anaid Cohen MD;  Location: [facility] ENDO;  Service: Endoscopy;  Laterality: N/A;    HYSTERECTOMY, TOTAL, LAPAROSCOPIC, WITH SALPINGO-OOPHORECTOMY Bilateral 03/17/2021    LAPAROSCOPIC TOTAL HYSTERECTOMY Bilateral 03/17/2021    PYELOSCOPY Bilateral 10/25/2022    Procedure: PYELOSCOPY;  Surgeon: Raz Cantrell MD;  Location: [facility] OR;  Service: Urology;  Laterality: Bilateral;    TUBAL LIGATION      URETEROSCOPY Bilateral 10/25/2022    Procedure: URETEROSCOPY;  Surgeon: Raz Cantrell MD;  Location: [facility] OR;  Service: Urology;  Laterality: Bilateral;       Review of patient's allergies indicates:  No Known Allergies    No current facility-administered medications on file prior to encounter.     Current Outpatient Medications on File Prior to Encounter   Medication Sig    ergocalciferol (ERGOCALCIFEROL) 50,000 unit Cap Take 50,000 Units by mouth every 7 days.    albuterol (PROVENTIL/VENTOLIN HFA) 90 mcg/actuation inhaler Inhale 2 puffs into the lungs every 6 (six) hours as needed for Wheezing.    amLODIPine (NORVASC) 10 MG tablet Take 1 tablet (10 mg total) by mouth once daily.    blood pressure monitor (IHEALTH EASE) LG arm size (OP) by Other route as needed for High Blood Pressure.    carvediloL (COREG) 12.5 MG tablet Take 1 tablet (12.5 mg total) by mouth 2 (two) times daily with meals.    dicyclomine (BENTYL) 20 mg tablet Take 1 tablet (20 mg total) by mouth 3 (three) times daily as needed (abdominal  pain).    dulaglutide (TRULICITY) 1.5 mg/0.5 mL pen injector Inject 1.5 mg into the skin every 7 days.    DULoxetine (CYMBALTA) 60 MG capsule Take 2 capsules (120 mg total) by mouth once daily.    ibuprofen (ADVIL,MOTRIN) 600 MG tablet Take 1 tablet (600 mg total) by mouth every 6 (six) hours as needed.    omeprazole (PRILOSEC) 40 MG capsule TAKE 1 CAPSULE BY MOUTH EVERY DAY    ondansetron (ZOFRAN-ODT) 4 MG TbDL Take 1 tablet (4 mg total) by mouth 2 (two) times daily.    oxybutynin (DITROPAN) 5 MG Tab Take 1 tablet (5 mg total) by mouth 3 (three) times daily as needed (bladder spasms).    polyethylene glycol (GLYCOLAX) 17 gram/dose powder Take 17 g by mouth once daily.    spironolactone (ALDACTONE) 25 MG tablet Take 1 tablet (25 mg total) by mouth once daily.    valsartan-hydrochlorothiazide (DIOVAN-HCT) 320-12.5 mg per tablet Take 1 tablet by mouth once daily.     Family History       Problem Relation (Age of Onset)    Diabetes Maternal Uncle    Drug abuse Father    Heart disease Mother, Father, Maternal Grandmother    Kidney disease Father    Ovarian cancer Maternal Aunt          Tobacco Use    Smoking status: Every Day     Current packs/day: 0.25     Average packs/day: 0.3 packs/day for 15.0 years (3.8 ttl pk-yrs)     Types: Cigarettes    Smokeless tobacco: Never   Substance and Sexual Activity    Alcohol use: Yes     Alcohol/week: 3.0 standard drinks of alcohol     Types: 3 Shots of liquor per week     Comment: scocially on weekends    Drug use: Yes     Types: Marijuana    Sexual activity: Yes     Partners: Male     Birth control/protection: None     Review of Systems   Constitutional:  Positive for appetite change (decreased). Negative for chills, fatigue and fever.   HENT:  Negative for ear pain, postnasal drip and rhinorrhea.    Eyes:  Negative for photophobia and visual disturbance.   Respiratory:  Positive for cough. Negative for shortness of breath and wheezing.    Cardiovascular:  Positive for chest pain  (resolved) and leg swelling (intermittent, denies currently). Negative for palpitations.   Gastrointestinal:  Negative for abdominal distention, abdominal pain, diarrhea, nausea and vomiting.   Genitourinary:  Negative for dysuria, frequency and hematuria.   Musculoskeletal:  Negative for back pain, myalgias and neck pain.   Skin:  Negative for color change, pallor, rash and wound.   Neurological:  Positive for headaches (resolved). Negative for dizziness, syncope, weakness, light-headedness and numbness.   Psychiatric/Behavioral:  Negative for confusion and hallucinations. The patient is not nervous/anxious.      Objective:     Vital Signs (Most Recent):  Temp: 99.4 °F (37.4 °C) (05/25/24 1859)  Pulse: 68 (05/25/24 2130)  Resp: 16 (05/25/24 2130)  BP: (!) 171/98 (05/25/24 2130)  SpO2: 95 % (05/25/24 2130) Vital Signs (24h Range):  Temp:  [99.4 °F (37.4 °C)] 99.4 °F (37.4 °C)  Pulse:  [68-92] 68  Resp:  [14-22] 16  SpO2:  [95 %-97 %] 95 %  BP: (168-238)/() 171/98        There is no height or weight on file to calculate BMI.     Physical Exam  Vitals and nursing note reviewed.   Constitutional:       General: She is not in acute distress.     Appearance: She is obese. She is not toxic-appearing or diaphoretic.   HENT:      Head: Normocephalic and atraumatic.      Nose: Nose normal.      Mouth/Throat:      Mouth: Mucous membranes are moist.   Eyes:      Extraocular Movements: Extraocular movements intact.   Cardiovascular:      Rate and Rhythm: Normal rate and regular rhythm.      Pulses: Normal pulses.      Heart sounds: No murmur heard.  Pulmonary:      Effort: Pulmonary effort is normal. No respiratory distress.      Breath sounds: No wheezing, rhonchi or rales.   Abdominal:      General: Bowel sounds are normal. There is no distension.      Palpations: Abdomen is soft.      Tenderness: There is no abdominal tenderness. There is no guarding.   Musculoskeletal:         General: Normal range of motion.       Cervical back: Normal range of motion.      Right lower leg: No edema.      Left lower leg: Edema (trace) present.   Skin:     General: Skin is warm and dry.      Capillary Refill: Capillary refill takes less than 2 seconds.   Neurological:      General: No focal deficit present.      Mental Status: She is alert and oriented to person, place, and time.      Sensory: No sensory deficit.      Motor: No weakness.   Psychiatric:         Mood and Affect: Mood normal.         Behavior: Behavior normal.                Significant Labs: All pertinent labs within the past 24 hours have been reviewed.  CBC:   Recent Labs   Lab 05/25/24 1934 05/25/24 2122   WBC 6.76  --    HGB 14.6  --    HCT 42.7 43     --      CMP:   Recent Labs   Lab 05/25/24 2118      K 3.1*      CO2 24   GLU 95   BUN 16   CREATININE 1.5*   CALCIUM 9.0   PROT 6.6   ALBUMIN 3.1*   BILITOT 0.4   ALKPHOS 64   AST 20   ALT 18   ANIONGAP 11     Cardiac Markers:   Recent Labs   Lab 05/25/24 1934   BNP 63     Troponin:   Recent Labs   Lab 05/25/24 1934   TROPONINI 0.131*       Significant Imaging: I have reviewed all pertinent imaging results/findings within the past 24 hours.  Imaging Results              CT Head Without Contrast (In process)                      X-Ray Chest AP Portable (Final result)  Result time 05/25/24 20:05:47      Final result by Jarocho Pacheco MD (05/25/24 20:05:47)                   Impression:      1. No acute cardiopulmonary process.      Electronically signed by: Jarocho Pacheco MD  Date:    05/25/2024  Time:    20:05               Narrative:    EXAMINATION:  XR CHEST AP PORTABLE    CLINICAL HISTORY:  Chest Pain;    TECHNIQUE:  Single frontal view of the chest was performed.    COMPARISON:  05/23/2023    FINDINGS:  The cardiomediastinal silhouette is prominent, magnified by technique..  There is no pleural effusion.  The trachea is midline.  The lungs are symmetrically expanded bilaterally with coarse  interstitial attenuation, accentuated by overlying habitus..  No large focal consolidation seen.  There is no pneumothorax.  The osseous structures are unremarkable.                                      Assessment/Plan:     * Hypertensive emergency  Patient has a current diagnosis of Hypertensive emergency with end organ damage evidenced by acute kidney injury which is uncontrolled.  Latest blood pressure and vitals reviewed-   Temp:  [99.4 °F (37.4 °C)]   Pulse:  [69-92]   Resp:  [14-22]   BP: (168-238)/()   SpO2:  [95 %-97 %] .   Patient currently off IV antihypertensives.   Home meds for hypertension were reviewed and noted below.   Hypertension Medications               amLODIPine (NORVASC) 10 MG tablet Take 1 tablet (10 mg total) by mouth once daily.    carvediloL (COREG) 12.5 MG tablet Take 1 tablet (12.5 mg total) by mouth 2 (two) times daily with meals.    spironolactone (ALDACTONE) 25 MG tablet Take 1 tablet (25 mg total) by mouth once daily.    valsartan-hydrochlorothiazide (DIOVAN-HCT) 320-12.5 mg per tablet Take 1 tablet by mouth once daily.            Medication adjustment for hospital antihypertensives is as follows-    -Continue carvedilol and amlodipine. Will add scheduled hydralazine as well. Hold aldactone, valsartan, and hctz due to bump in creatinine.   -ECHO pending.  -Cardiac diet.    Will aim for controlled BP reduction by medications noted above. Monitor and mitigate end organ damage as indicated.    LATOYA (acute kidney injury)  Appears to have mild CKD at bl with acute worsening of kidney function likely in the setting of hypertensive emergency. Hold valsartan, hctz, aldactone.   -Cr 1.5 on admit, bl ~1.0-1.3?  -Follow renal panel and electrolytes closely.  -Adjust renal dose medications for Estimated Creatinine Clearance: 45.7 mL/min (A) (based on SCr of 1.5 mg/dL (H)).   -Avoid NSAIDs, Fierro-II inhibitors, ACE-I, Angiotensin Receptor Blockers, or Aminoglycosides.  -UA and Urine Na, Cr,  Protein pending.  -Consider renal US if kidney function doesn't improve or continues to worsen.  -Would likely benefit from outpatient referral to nephrology.    Elevated troponin  Likely 2/2 hypertensive emergency. Pt reports chest pain has resolved.  -Initial troponin 0.131, continue to trend.  -EKG with no ST elevation or depression.  -Cardiac monitoring.   -PRN EKG for chest pain.  -ECHO pending.    Cigarette nicotine dependence without complication  Dangers of cigarette smoking were reviewed with patient in detail. Patient was Counseled for 3-10 minutes. Nicotine replacement options were discussed. Nicotine replacement was discussed- not prescribed per patient's request    Prolonged Q-T interval on ECG  QTc 515 on admit.  -Cardiac monitoring and daily EKG.  -Avoid qt prolonging medications.    Hypokalemia  Patient has hypokalemia which is Acute and currently uncontrolled. Most recent potassium levels reviewed-   Lab Results   Component Value Date    K 3.1 (L) 05/25/2024   . Will continue potassium replacement per protocol and recheck repeat levels after replacement completed.     Mild intermittent asthma without complication  No s/s of acute exacerbation.  -PRN albuterol inhaler.    Gastroesophageal reflux disease without esophagitis  -Chronic, stable.  -Reports she is not currently taking medication.    Anxiety  -Chronic issue.  -Continue cymbalta.    Obesity  Body mass index is 31.41 kg/m². Obesity complicates all aspects of disease management from diagnostic modalities to treatment. Weight loss encouraged and health benefits explained to patient.       VTE Risk Mitigation (From admission, onward)           Ordered     enoxaparin injection 40 mg  Every 24 hours         05/25/24 2348     IP VTE HIGH RISK PATIENT  Once         05/25/24 2154     Place sequential compression device  Until discontinued         05/25/24 2154                         On 05/25/2024, patient should be placed in hospital observation  services under my care in collaboration with Nakul Plaza MD.           Diana Proctor NP  Department of Hospital Medicine  [facility] - Emergency Dept

## 2024-05-26 NOTE — ASSESSMENT & PLAN NOTE
Appears to have mild CKD at bl with acute worsening of kidney function likely in the setting of hypertensive emergency. Hold valsartan, hctz, aldactone.   -Cr 1.5 on admit, bl ~1.0-1.3?  -Follow renal panel and electrolytes closely.  -Adjust renal dose medications for Estimated Creatinine Clearance: 45.7 mL/min (A) (based on SCr of 1.5 mg/dL (H)).   -Avoid NSAIDs, Fierro-II inhibitors, ACE-I, Angiotensin Receptor Blockers, or Aminoglycosides.  -UA and Urine Na, Cr, Protein pending.  -Consider renal US if kidney function doesn't improve or continues to worsen.  -Would likely benefit from outpatient referral to nephrology.

## 2024-05-26 NOTE — SUBJECTIVE & OBJECTIVE
Interval history: No overnight events.  Patient reports improvement in her breathing.  Continues to cough.  Denies chest pain or palpitations.    Review of Systems   Constitutional:  Positive for appetite change (decreased). Negative for chills, fatigue and fever.   HENT:  Negative for ear pain, postnasal drip and rhinorrhea.    Eyes:  Negative for photophobia and visual disturbance.   Respiratory:  Positive for cough. Negative for shortness of breath and wheezing.    Cardiovascular:  Positive for chest pain (resolved) and leg swelling (intermittent, denies currently). Negative for palpitations.   Gastrointestinal:  Negative for abdominal distention, abdominal pain, diarrhea, nausea and vomiting.   Genitourinary:  Negative for dysuria, frequency and hematuria.   Musculoskeletal:  Negative for back pain, myalgias and neck pain.   Skin:  Negative for color change, pallor, rash and wound.   Neurological:  Positive for headaches (resolved). Negative for dizziness, syncope, weakness, light-headedness and numbness.   Psychiatric/Behavioral:  Negative for confusion and hallucinations. The patient is not nervous/anxious.      Objective:     Vital Signs (Most Recent):  Temp: 98.9 °F (37.2 °C) (05/26/24 0848)  Pulse: 62 (05/26/24 0848)  Resp: 18 (05/26/24 0444)  BP: (!) 163/86 (05/26/24 0849)  SpO2: 96 % (05/26/24 0848) Vital Signs (24h Range):  Temp:  [98.9 °F (37.2 °C)-99.5 °F (37.5 °C)] 98.9 °F (37.2 °C)  Pulse:  [62-92] 62  Resp:  [14-22] 18  SpO2:  [91 %-97 %] 96 %  BP: (139-238)/() 163/86     Weight: 83.6 kg (184 lb 4.9 oz)  Body mass index is 31.64 kg/m².     Physical Exam  Vitals and nursing note reviewed.   Constitutional:       General: She is not in acute distress.     Appearance: She is obese. She is not toxic-appearing or diaphoretic.   HENT:      Head: Normocephalic and atraumatic.      Nose: Nose normal.      Mouth/Throat:      Mouth: Mucous membranes are moist.   Eyes:      Extraocular Movements:  Extraocular movements intact.   Cardiovascular:      Rate and Rhythm: Normal rate and regular rhythm.      Pulses: Normal pulses.      Heart sounds: No murmur heard.  Pulmonary:      Effort: Pulmonary effort is normal. No respiratory distress.      Breath sounds: No wheezing, rhonchi or rales.   Abdominal:      General: Bowel sounds are normal. There is no distension.      Palpations: Abdomen is soft.      Tenderness: There is no abdominal tenderness. There is no guarding.   Musculoskeletal:         General: Normal range of motion.      Cervical back: Normal range of motion.      Right lower leg: No edema.      Left lower leg: Edema (trace) present.   Skin:     General: Skin is warm and dry.      Capillary Refill: Capillary refill takes less than 2 seconds.   Neurological:      General: No focal deficit present.      Mental Status: She is alert and oriented to person, place, and time.      Sensory: No sensory deficit.      Motor: No weakness.   Psychiatric:         Mood and Affect: Mood normal.         Behavior: Behavior normal.                Significant Labs: All pertinent labs within the past 24 hours have been reviewed.  CBC:   Recent Labs   Lab 05/25/24 1934 05/25/24 2122 05/26/24  0500   WBC 6.76  --  5.57   HGB 14.6  --  14.0   HCT 42.7 43 42.5     --  164     CMP:   Recent Labs   Lab 05/25/24 2118 05/26/24  0500    137   K 3.1* 3.0*    101   CO2 24 26   GLU 95 88   BUN 16 16   CREATININE 1.5* 1.6*   CALCIUM 9.0 9.2   PROT 6.6 6.5   ALBUMIN 3.1* 3.1*   BILITOT 0.4 0.6   ALKPHOS 64 66   AST 20 19   ALT 18 17   ANIONGAP 11 10     Cardiac Markers:   Recent Labs   Lab 05/25/24 1934   BNP 63     Troponin:   Recent Labs   Lab 05/25/24 1934 05/25/24 2307 05/26/24  0500   TROPONINI 0.131* 0.124* 0.117*       Significant Imaging: I have reviewed all pertinent imaging results/findings within the past 24 hours.  Imaging Results              CT Head Without Contrast (Final result)  Result time  05/25/24 22:18:27      Final result by José Miguel Webber MD (05/25/24 22:18:27)                   Impression:      No CT evidence of acute intracranial abnormality.      Electronically signed by: José Miguel Webber MD  Date:    05/25/2024  Time:    22:18               Narrative:    EXAMINATION:  CT HEAD WITHOUT CONTRAST    CLINICAL HISTORY:  Headache, new or worsening (Age >= 50y);    TECHNIQUE:  Low dose axial images were obtained through the head.  Coronal and sagittal reformations were also performed. Contrast was not administered.    COMPARISON:  CT, 03/13/2023.    FINDINGS:  No evidence of acute territorial infarct, hemorrhage, mass effect, or midline shift.    Ventricles are normal in size and configuration.    No displaced calvarial fracture.    Minimal mucosal thickening in the right maxillary sinus.  Otherwise, the visualized paranasal sinuses and mastoid air cells are essentially clear.                                       X-Ray Chest AP Portable (Final result)  Result time 05/25/24 20:05:47      Final result by Jarocho Pacheco MD (05/25/24 20:05:47)                   Impression:      1. No acute cardiopulmonary process.      Electronically signed by: Jarocho Pacheco MD  Date:    05/25/2024  Time:    20:05               Narrative:    EXAMINATION:  XR CHEST AP PORTABLE    CLINICAL HISTORY:  Chest Pain;    TECHNIQUE:  Single frontal view of the chest was performed.    COMPARISON:  05/23/2023    FINDINGS:  The cardiomediastinal silhouette is prominent, magnified by technique..  There is no pleural effusion.  The trachea is midline.  The lungs are symmetrically expanded bilaterally with coarse interstitial attenuation, accentuated by overlying habitus..  No large focal consolidation seen.  There is no pneumothorax.  The osseous structures are unremarkable.

## 2024-05-26 NOTE — ASSESSMENT & PLAN NOTE
Patient has hypokalemia which is Acute and currently uncontrolled. Most recent potassium levels reviewed-   Lab Results   Component Value Date    K 3.0 (L) 05/26/2024   . Will continue potassium replacement per protocol and recheck repeat levels after replacement completed.

## 2024-05-26 NOTE — PROGRESS NOTES
"[facility] [address] [place] [address] Medicine  Progress Note    Patient Name: Calos Rios  MRN: [id number]  Patient Class: OP- Observation   Admission Date: 5/25/2024  Length of Stay: 0 days  Attending Physician: Clover Paul MD  Primary Care Provider: Adria Doll Jr., MD        Subjective:     Principal Problem:Hypertensive emergency        HPI:  Calos Rios is a 53 y.o. female with a PMHx of anxiety, obesity, asthma, migraine, and HTN who presents to the ED with complaints of chest pain and headache. The patient reports intermittent substernal chest pain that began yesterday around lunch, described as a "bubble" tightness/pressure. No associated SOB, N/V, or palpitations. Denies any radiation or aggravating on alleviating factors. She reports the pain returned today and was constant. Endorses an associated bifrontal headache as well. Denies vision changes, numbness/tingling, or focal weakness. She reports chest pain and headache have both resolved since receiving treatment in the ER. She does note her BP runs high although does not check it often. Reports missing several doses of her antihypertensives Monday and Tuesday but has since been compliant. Reports not adding salt to food but does not follow a low sodium diet. She also notes a mild cough for the past several days which is non productive. Denies fever/chills, abdominal pain, or dysuria.    In the ED, patient significantly hypertensive otherwise vitals stable, afebrile. CBC unremarkable. Cr 1.5 (bl~1.0-1.3). K + 3.1. Albumin 3.1. BNP 63. Troponin 0.131. EKG shows SR w/ PACs and prolonged QTc (515), 89 bpm, no ST elevation or depression. CXR negative for acute process. CTH negative for acute intracranial process. The patient received IV labetalol 10mg x1 and IV morphine 4mg x1.    Overview/Hospital Course:  Echo pending.  LATOYA worsening creatinine trended up to 1.6.  Urine lytes ordered.  Blood pressure continues to remain elevated, " holding valsartan-HCTZ and spironolactone.  increased hydralazine dose to 50 mg t.i.d..    Interval history: No overnight events.  Patient reports improvement in her breathing.  Continues to cough.  Denies chest pain or palpitations.    Review of Systems   Constitutional:  Positive for appetite change (decreased). Negative for chills, fatigue and fever.   HENT:  Negative for ear pain, postnasal drip and rhinorrhea.    Eyes:  Negative for photophobia and visual disturbance.   Respiratory:  Positive for cough. Negative for shortness of breath and wheezing.    Cardiovascular:  Positive for chest pain (resolved) and leg swelling (intermittent, denies currently). Negative for palpitations.   Gastrointestinal:  Negative for abdominal distention, abdominal pain, diarrhea, nausea and vomiting.   Genitourinary:  Negative for dysuria, frequency and hematuria.   Musculoskeletal:  Negative for back pain, myalgias and neck pain.   Skin:  Negative for color change, pallor, rash and wound.   Neurological:  Positive for headaches (resolved). Negative for dizziness, syncope, weakness, light-headedness and numbness.   Psychiatric/Behavioral:  Negative for confusion and hallucinations. The patient is not nervous/anxious.      Objective:     Vital Signs (Most Recent):  Temp: 98.9 °F (37.2 °C) (05/26/24 0848)  Pulse: 62 (05/26/24 0848)  Resp: 18 (05/26/24 0444)  BP: (!) 163/86 (05/26/24 0849)  SpO2: 96 % (05/26/24 0848) Vital Signs (24h Range):  Temp:  [98.9 °F (37.2 °C)-99.5 °F (37.5 °C)] 98.9 °F (37.2 °C)  Pulse:  [62-92] 62  Resp:  [14-22] 18  SpO2:  [91 %-97 %] 96 %  BP: (139-238)/() 163/86     Weight: 83.6 kg (184 lb 4.9 oz)  Body mass index is 31.64 kg/m².     Physical Exam  Vitals and nursing note reviewed.   Constitutional:       General: She is not in acute distress.     Appearance: She is obese. She is not toxic-appearing or diaphoretic.   HENT:      Head: Normocephalic and atraumatic.      Nose: Nose normal.       Mouth/Throat:      Mouth: Mucous membranes are moist.   Eyes:      Extraocular Movements: Extraocular movements intact.   Cardiovascular:      Rate and Rhythm: Normal rate and regular rhythm.      Pulses: Normal pulses.      Heart sounds: No murmur heard.  Pulmonary:      Effort: Pulmonary effort is normal. No respiratory distress.      Breath sounds: No wheezing, rhonchi or rales.   Abdominal:      General: Bowel sounds are normal. There is no distension.      Palpations: Abdomen is soft.      Tenderness: There is no abdominal tenderness. There is no guarding.   Musculoskeletal:         General: Normal range of motion.      Cervical back: Normal range of motion.      Right lower leg: No edema.      Left lower leg: Edema (trace) present.   Skin:     General: Skin is warm and dry.      Capillary Refill: Capillary refill takes less than 2 seconds.   Neurological:      General: No focal deficit present.      Mental Status: She is alert and oriented to person, place, and time.      Sensory: No sensory deficit.      Motor: No weakness.   Psychiatric:         Mood and Affect: Mood normal.         Behavior: Behavior normal.                Significant Labs: All pertinent labs within the past 24 hours have been reviewed.  CBC:   Recent Labs   Lab 05/25/24 1934 05/25/24 2122 05/26/24  0500   WBC 6.76  --  5.57   HGB 14.6  --  14.0   HCT 42.7 43 42.5     --  164     CMP:   Recent Labs   Lab 05/25/24 2118 05/26/24  0500    137   K 3.1* 3.0*    101   CO2 24 26   GLU 95 88   BUN 16 16   CREATININE 1.5* 1.6*   CALCIUM 9.0 9.2   PROT 6.6 6.5   ALBUMIN 3.1* 3.1*   BILITOT 0.4 0.6   ALKPHOS 64 66   AST 20 19   ALT 18 17   ANIONGAP 11 10     Cardiac Markers:   Recent Labs   Lab 05/25/24 1934   BNP 63     Troponin:   Recent Labs   Lab 05/25/24 1934 05/25/24 2307 05/26/24  0500   TROPONINI 0.131* 0.124* 0.117*       Significant Imaging: I have reviewed all pertinent imaging results/findings within the past 24  hours.  Imaging Results              CT Head Without Contrast (Final result)  Result time 05/25/24 22:18:27      Final result by José Miguel Webber MD (05/25/24 22:18:27)                   Impression:      No CT evidence of acute intracranial abnormality.      Electronically signed by: José Miguel Webber MD  Date:    05/25/2024  Time:    22:18               Narrative:    EXAMINATION:  CT HEAD WITHOUT CONTRAST    CLINICAL HISTORY:  Headache, new or worsening (Age >= 50y);    TECHNIQUE:  Low dose axial images were obtained through the head.  Coronal and sagittal reformations were also performed. Contrast was not administered.    COMPARISON:  CT, 03/13/2023.    FINDINGS:  No evidence of acute territorial infarct, hemorrhage, mass effect, or midline shift.    Ventricles are normal in size and configuration.    No displaced calvarial fracture.    Minimal mucosal thickening in the right maxillary sinus.  Otherwise, the visualized paranasal sinuses and mastoid air cells are essentially clear.                                       X-Ray Chest AP Portable (Final result)  Result time 05/25/24 20:05:47      Final result by Jarocho Pacheco MD (05/25/24 20:05:47)                   Impression:      1. No acute cardiopulmonary process.      Electronically signed by: Jarocho Pacheco MD  Date:    05/25/2024  Time:    20:05               Narrative:    EXAMINATION:  XR CHEST AP PORTABLE    CLINICAL HISTORY:  Chest Pain;    TECHNIQUE:  Single frontal view of the chest was performed.    COMPARISON:  05/23/2023    FINDINGS:  The cardiomediastinal silhouette is prominent, magnified by technique..  There is no pleural effusion.  The trachea is midline.  The lungs are symmetrically expanded bilaterally with coarse interstitial attenuation, accentuated by overlying habitus..  No large focal consolidation seen.  There is no pneumothorax.  The osseous structures are unremarkable.                                        Assessment/Plan:      *  Hypertensive emergency  Patient has a current diagnosis of Hypertensive emergency with end organ damage evidenced by acute kidney injury which is uncontrolled.  Latest blood pressure and vitals reviewed-   Temp:  [98.9 °F (37.2 °C)-99.5 °F (37.5 °C)]   Pulse:  [62-92]   Resp:  [14-22]   BP: (139-238)/()   SpO2:  [91 %-97 %] .   Patient currently off IV antihypertensives.   Home meds for hypertension were reviewed and noted below.   Hypertension Medications               amLODIPine (NORVASC) 10 MG tablet Take 1 tablet (10 mg total) by mouth once daily.    carvediloL (COREG) 12.5 MG tablet Take 1 tablet (12.5 mg total) by mouth 2 (two) times daily with meals.    spironolactone (ALDACTONE) 25 MG tablet Take 1 tablet (25 mg total) by mouth once daily.    valsartan-hydrochlorothiazide (DIOVAN-HCT) 320-12.5 mg per tablet Take 1 tablet by mouth once daily.            Medication adjustment for hospital antihypertensives is as follows-    -Continue carvedilol and amlodipine. Will add scheduled hydralazine as well. Hold aldactone, valsartan, and hctz due to bump in creatinine.   -ECHO pending.  -Cardiac diet.    Will aim for controlled BP reduction by medications noted above. Monitor and mitigate end organ damage as indicated.    LATOYA (acute kidney injury)  Appears to have mild CKD at bl with acute worsening of kidney function likely in the setting of hypertensive emergency. Hold valsartan, hctz, aldactone.   -Cr 1.5 on admit, bl ~1.0-1.3?  -Follow renal panel and electrolytes closely.  -Adjust renal dose medications for Estimated Creatinine Clearance: 43 mL/min (A) (based on SCr of 1.6 mg/dL (H)).   -Avoid NSAIDs, Fierro-II inhibitors, ACE-I, Angiotensin Receptor Blockers, or Aminoglycosides.  -UA and Urine Na, Cr, Protein pending.  -Consider renal US if kidney function doesn't improve or continues to worsen.  -Would likely benefit from outpatient referral to nephrology.    Cigarette nicotine dependence without  complication  Dangers of cigarette smoking were reviewed with patient in detail. Patient was Counseled for 3-10 minutes. Nicotine replacement options were discussed. Nicotine replacement was discussed- not prescribed per patient's request    Prolonged Q-T interval on ECG  QTc 515 on admit.  -Cardiac monitoring and daily EKG.  -Avoid qt prolonging medications.    Elevated troponin  Likely 2/2 hypertensive emergency. Pt reports chest pain has resolved.  -Initial troponin 0.131, continue to trend.  -EKG with no ST elevation or depression.  -Cardiac monitoring.   -PRN EKG for chest pain.  -ECHO pending.    Hypokalemia  Patient has hypokalemia which is Acute and currently uncontrolled. Most recent potassium levels reviewed-   Lab Results   Component Value Date    K 3.0 (L) 05/26/2024   . Will continue potassium replacement per protocol and recheck repeat levels after replacement completed.     Mild intermittent asthma without complication  No s/s of acute exacerbation.  -PRN albuterol inhaler.    Gastroesophageal reflux disease without esophagitis  -Chronic, stable.  -Reports she is not currently taking medication.    Anxiety  -Chronic issue.  -Continue cymbalta.    Obesity  Body mass index is 31.64 kg/m². Obesity complicates all aspects of disease management from diagnostic modalities to treatment. Weight loss encouraged and health benefits explained to patient.       VTE Risk Mitigation (From admission, onward)           Ordered     enoxaparin injection 40 mg  Every 24 hours         05/25/24 2348     IP VTE HIGH RISK PATIENT  Once         05/25/24 2154     Place sequential compression device  Until discontinued         05/25/24 2154                    Discharge Planning   JEFF: 5/27/2024     Code Status: Full Code   Is the patient medically ready for discharge?:     Reason for patient still in hospital (select all that apply): Patient trending condition, Laboratory test, and Treatment  Discharge Plan A: Home                   Clover Paul MD  Department of Hospital Medicine   [facility] - Observation 11H

## 2024-05-26 NOTE — ED TRIAGE NOTES
"Pt presents to the ED complaining of shortness of breath, chest "tightness", and a headache. Pt states she has a history of asthma and attempted to use her inhaler, however, it did not work. Pt also has a history of hypertension. Pt's BP is 238/112 at this time.   "

## 2024-05-26 NOTE — ASSESSMENT & PLAN NOTE
Likely 2/2 hypertensive emergency. Pt reports chest pain has resolved.  -Initial troponin 0.131, continue to trend.  -EKG with no ST elevation or depression.  -Cardiac monitoring.   -PRN EKG for chest pain.  -ECHO pending.

## 2024-05-26 NOTE — HPI
"Calos Rios is a 53 y.o. female with a PMHx of anxiety, obesity, asthma, migraine, and HTN who presents to the ED with complaints of chest pain and headache. The patient reports intermittent substernal chest pain that began yesterday around lunch, described as a "bubble" tightness/pressure. No associated SOB, N/V, or palpitations. Denies any radiation or aggravating on alleviating factors. She reports the pain returned today and was constant. Endorses an associated bifrontal headache as well. Denies vision changes, numbness/tingling, or focal weakness. She reports chest pain and headache have both resolved since receiving treatment in the ER. She does note her BP runs high although does not check it often. Reports missing several doses of her antihypertensives Monday and Tuesday but has since been compliant. Reports not adding salt to food but does not follow a low sodium diet. She also notes a mild cough for the past several days which is non productive. Denies fever/chills, abdominal pain, or dysuria.    In the ED, patient significantly hypertensive otherwise vitals stable, afebrile. CBC unremarkable. Cr 1.5 (bl~1.0-1.3). K + 3.1. Albumin 3.1. BNP 63. Troponin 0.131. EKG shows SR w/ PACs and prolonged QTc (515), 89 bpm, no ST elevation or depression. CXR negative for acute process. CTH negative for acute intracranial process. The patient received IV labetalol 10mg x1 and IV morphine 4mg x1.  "

## 2024-05-26 NOTE — SUBJECTIVE & OBJECTIVE
Past Medical History:   Diagnosis Date    Anxiety disorder, unspecified     Hypertension     Migraine headache     Obesity        Past Surgical History:   Procedure Laterality Date    CYSTOURETEROSCOPY,WITH HOLMIUM LASER LITHOTRIPSY OF URETERAL CALCULUS - Bilateral Bilateral 11/09/2022    ESOPHAGOGASTRODUODENOSCOPY N/A 12/23/2022    Procedure: EGD (ESOPHAGOGASTRODUODENOSCOPY);  Surgeon: Anaid Cohen MD;  Location: [facility] ENDO;  Service: Endoscopy;  Laterality: N/A;    HYSTERECTOMY, TOTAL, LAPAROSCOPIC, WITH SALPINGO-OOPHORECTOMY Bilateral 03/17/2021    LAPAROSCOPIC TOTAL HYSTERECTOMY Bilateral 03/17/2021    PYELOSCOPY Bilateral 10/25/2022    Procedure: PYELOSCOPY;  Surgeon: Raz Cantrell MD;  Location: [facility] OR;  Service: Urology;  Laterality: Bilateral;    TUBAL LIGATION      URETEROSCOPY Bilateral 10/25/2022    Procedure: URETEROSCOPY;  Surgeon: Raz Cantrell MD;  Location: [facility] OR;  Service: Urology;  Laterality: Bilateral;       Review of patient's allergies indicates:  No Known Allergies    No current facility-administered medications on file prior to encounter.     Current Outpatient Medications on File Prior to Encounter   Medication Sig    ergocalciferol (ERGOCALCIFEROL) 50,000 unit Cap Take 50,000 Units by mouth every 7 days.    albuterol (PROVENTIL/VENTOLIN HFA) 90 mcg/actuation inhaler Inhale 2 puffs into the lungs every 6 (six) hours as needed for Wheezing.    amLODIPine (NORVASC) 10 MG tablet Take 1 tablet (10 mg total) by mouth once daily.    blood pressure monitor (IHEALTH EASE) LG arm size (OP) by Other route as needed for High Blood Pressure.    carvediloL (COREG) 12.5 MG tablet Take 1 tablet (12.5 mg total) by mouth 2 (two) times daily with meals.    dicyclomine (BENTYL) 20 mg tablet Take 1 tablet (20 mg total) by mouth 3 (three) times daily as needed (abdominal pain).    dulaglutide (TRULICITY) 1.5 mg/0.5 mL pen injector Inject 1.5 mg into the skin every 7 days.    DULoxetine  (CYMBALTA) 60 MG capsule Take 2 capsules (120 mg total) by mouth once daily.    ibuprofen (ADVIL,MOTRIN) 600 MG tablet Take 1 tablet (600 mg total) by mouth every 6 (six) hours as needed.    omeprazole (PRILOSEC) 40 MG capsule TAKE 1 CAPSULE BY MOUTH EVERY DAY    ondansetron (ZOFRAN-ODT) 4 MG TbDL Take 1 tablet (4 mg total) by mouth 2 (two) times daily.    oxybutynin (DITROPAN) 5 MG Tab Take 1 tablet (5 mg total) by mouth 3 (three) times daily as needed (bladder spasms).    polyethylene glycol (GLYCOLAX) 17 gram/dose powder Take 17 g by mouth once daily.    spironolactone (ALDACTONE) 25 MG tablet Take 1 tablet (25 mg total) by mouth once daily.    valsartan-hydrochlorothiazide (DIOVAN-HCT) 320-12.5 mg per tablet Take 1 tablet by mouth once daily.     Family History       Problem Relation (Age of Onset)    Diabetes Maternal Uncle    Drug abuse Father    Heart disease Mother, Father, Maternal Grandmother    Kidney disease Father    Ovarian cancer Maternal Aunt          Tobacco Use    Smoking status: Every Day     Current packs/day: 0.25     Average packs/day: 0.3 packs/day for 15.0 years (3.8 ttl pk-yrs)     Types: Cigarettes    Smokeless tobacco: Never   Substance and Sexual Activity    Alcohol use: Yes     Alcohol/week: 3.0 standard drinks of alcohol     Types: 3 Shots of liquor per week     Comment: scocially on weekends    Drug use: Yes     Types: Marijuana    Sexual activity: Yes     Partners: Male     Birth control/protection: None     Review of Systems   Constitutional:  Positive for appetite change (decreased). Negative for chills, fatigue and fever.   HENT:  Negative for ear pain, postnasal drip and rhinorrhea.    Eyes:  Negative for photophobia and visual disturbance.   Respiratory:  Positive for cough. Negative for shortness of breath and wheezing.    Cardiovascular:  Positive for chest pain (resolved) and leg swelling (intermittent, denies currently). Negative for palpitations.   Gastrointestinal:   Negative for abdominal distention, abdominal pain, diarrhea, nausea and vomiting.   Genitourinary:  Negative for dysuria, frequency and hematuria.   Musculoskeletal:  Negative for back pain, myalgias and neck pain.   Skin:  Negative for color change, pallor, rash and wound.   Neurological:  Positive for headaches (resolved). Negative for dizziness, syncope, weakness, light-headedness and numbness.   Psychiatric/Behavioral:  Negative for confusion and hallucinations. The patient is not nervous/anxious.      Objective:     Vital Signs (Most Recent):  Temp: 99.4 °F (37.4 °C) (05/25/24 1859)  Pulse: 68 (05/25/24 2130)  Resp: 16 (05/25/24 2130)  BP: (!) 171/98 (05/25/24 2130)  SpO2: 95 % (05/25/24 2130) Vital Signs (24h Range):  Temp:  [99.4 °F (37.4 °C)] 99.4 °F (37.4 °C)  Pulse:  [68-92] 68  Resp:  [14-22] 16  SpO2:  [95 %-97 %] 95 %  BP: (168-238)/() 171/98        There is no height or weight on file to calculate BMI.     Physical Exam  Vitals and nursing note reviewed.   Constitutional:       General: She is not in acute distress.     Appearance: She is obese. She is not toxic-appearing or diaphoretic.   HENT:      Head: Normocephalic and atraumatic.      Nose: Nose normal.      Mouth/Throat:      Mouth: Mucous membranes are moist.   Eyes:      Extraocular Movements: Extraocular movements intact.   Cardiovascular:      Rate and Rhythm: Normal rate and regular rhythm.      Pulses: Normal pulses.      Heart sounds: No murmur heard.  Pulmonary:      Effort: Pulmonary effort is normal. No respiratory distress.      Breath sounds: No wheezing, rhonchi or rales.   Abdominal:      General: Bowel sounds are normal. There is no distension.      Palpations: Abdomen is soft.      Tenderness: There is no abdominal tenderness. There is no guarding.   Musculoskeletal:         General: Normal range of motion.      Cervical back: Normal range of motion.      Right lower leg: No edema.      Left lower leg: Edema (trace)  present.   Skin:     General: Skin is warm and dry.      Capillary Refill: Capillary refill takes less than 2 seconds.   Neurological:      General: No focal deficit present.      Mental Status: She is alert and oriented to person, place, and time.      Sensory: No sensory deficit.      Motor: No weakness.   Psychiatric:         Mood and Affect: Mood normal.         Behavior: Behavior normal.                Significant Labs: All pertinent labs within the past 24 hours have been reviewed.  CBC:   Recent Labs   Lab 05/25/24 1934 05/25/24 2122   WBC 6.76  --    HGB 14.6  --    HCT 42.7 43     --      CMP:   Recent Labs   Lab 05/25/24 2118      K 3.1*      CO2 24   GLU 95   BUN 16   CREATININE 1.5*   CALCIUM 9.0   PROT 6.6   ALBUMIN 3.1*   BILITOT 0.4   ALKPHOS 64   AST 20   ALT 18   ANIONGAP 11     Cardiac Markers:   Recent Labs   Lab 05/25/24 1934   BNP 63     Troponin:   Recent Labs   Lab 05/25/24 1934   TROPONINI 0.131*       Significant Imaging: I have reviewed all pertinent imaging results/findings within the past 24 hours.  Imaging Results              CT Head Without Contrast (In process)                      X-Ray Chest AP Portable (Final result)  Result time 05/25/24 20:05:47      Final result by Jarocho Pacheco MD (05/25/24 20:05:47)                   Impression:      1. No acute cardiopulmonary process.      Electronically signed by: Jarocho Pacheco MD  Date:    05/25/2024  Time:    20:05               Narrative:    EXAMINATION:  XR CHEST AP PORTABLE    CLINICAL HISTORY:  Chest Pain;    TECHNIQUE:  Single frontal view of the chest was performed.    COMPARISON:  05/23/2023    FINDINGS:  The cardiomediastinal silhouette is prominent, magnified by technique..  There is no pleural effusion.  The trachea is midline.  The lungs are symmetrically expanded bilaterally with coarse interstitial attenuation, accentuated by overlying habitus..  No large focal consolidation seen.  There is no  pneumothorax.  The osseous structures are unremarkable.

## 2024-05-26 NOTE — ASSESSMENT & PLAN NOTE
Body mass index is 31.64 kg/m². Obesity complicates all aspects of disease management from diagnostic modalities to treatment. Weight loss encouraged and health benefits explained to patient.

## 2024-05-27 VITALS
DIASTOLIC BLOOD PRESSURE: 74 MMHG | OXYGEN SATURATION: 97 % | RESPIRATION RATE: 18 BRPM | WEIGHT: 183 LBS | BODY MASS INDEX: 31.24 KG/M2 | HEART RATE: 69 BPM | HEIGHT: 64 IN | SYSTOLIC BLOOD PRESSURE: 122 MMHG | TEMPERATURE: 97 F

## 2024-05-27 LAB
ALBUMIN SERPL BCP-MCNC: 3 G/DL (ref 3.5–5.2)
ALP SERPL-CCNC: 58 U/L (ref 55–135)
ALT SERPL W/O P-5'-P-CCNC: 16 U/L (ref 10–44)
ANION GAP SERPL CALC-SCNC: 10 MMOL/L (ref 8–16)
AST SERPL-CCNC: 18 U/L (ref 10–40)
BASOPHILS # BLD AUTO: 0.02 K/UL (ref 0–0.2)
BASOPHILS NFR BLD: 0.5 % (ref 0–1.9)
BILIRUB SERPL-MCNC: 0.4 MG/DL (ref 0.1–1)
BUN SERPL-MCNC: 17 MG/DL (ref 6–20)
CALCIUM SERPL-MCNC: 9.2 MG/DL (ref 8.7–10.5)
CHLORIDE SERPL-SCNC: 102 MMOL/L (ref 95–110)
CO2 SERPL-SCNC: 27 MMOL/L (ref 23–29)
CREAT SERPL-MCNC: 1.3 MG/DL (ref 0.5–1.4)
DIFFERENTIAL METHOD BLD: ABNORMAL
EOSINOPHIL # BLD AUTO: 0.3 K/UL (ref 0–0.5)
EOSINOPHIL NFR BLD: 7.8 % (ref 0–8)
ERYTHROCYTE [DISTWIDTH] IN BLOOD BY AUTOMATED COUNT: 14.9 % (ref 11.5–14.5)
EST. GFR  (NO RACE VARIABLE): 49.5 ML/MIN/1.73 M^2
GLUCOSE SERPL-MCNC: 86 MG/DL (ref 70–110)
HCT VFR BLD AUTO: 41.5 % (ref 37–48.5)
HGB BLD-MCNC: 14.2 G/DL (ref 12–16)
IMM GRANULOCYTES # BLD AUTO: 0 K/UL (ref 0–0.04)
IMM GRANULOCYTES NFR BLD AUTO: 0 % (ref 0–0.5)
LYMPHOCYTES # BLD AUTO: 1.3 K/UL (ref 1–4.8)
LYMPHOCYTES NFR BLD: 31.1 % (ref 18–48)
MAGNESIUM SERPL-MCNC: 2.1 MG/DL (ref 1.6–2.6)
MCH RBC QN AUTO: 32.9 PG (ref 27–31)
MCHC RBC AUTO-ENTMCNC: 34.2 G/DL (ref 32–36)
MCV RBC AUTO: 96 FL (ref 82–98)
MONOCYTES # BLD AUTO: 0.5 K/UL (ref 0.3–1)
MONOCYTES NFR BLD: 11.7 % (ref 4–15)
NEUTROPHILS # BLD AUTO: 2 K/UL (ref 1.8–7.7)
NEUTROPHILS NFR BLD: 48.9 % (ref 38–73)
NRBC BLD-RTO: 0 /100 WBC
OHS QRS DURATION: 88 MS
OHS QTC CALCULATION: 493 MS
PLATELET # BLD AUTO: 146 K/UL (ref 150–450)
PMV BLD AUTO: 11.9 FL (ref 9.2–12.9)
POTASSIUM SERPL-SCNC: 3.5 MMOL/L (ref 3.5–5.1)
PROT SERPL-MCNC: 6.4 G/DL (ref 6–8.4)
RBC # BLD AUTO: 4.32 M/UL (ref 4–5.4)
SODIUM SERPL-SCNC: 139 MMOL/L (ref 136–145)
WBC # BLD AUTO: 4.11 K/UL (ref 3.9–12.7)

## 2024-05-27 PROCEDURE — 83735 ASSAY OF MAGNESIUM: CPT | Performed by: NURSE PRACTITIONER

## 2024-05-27 PROCEDURE — 80053 COMPREHEN METABOLIC PANEL: CPT | Performed by: NURSE PRACTITIONER

## 2024-05-27 PROCEDURE — 25000003 PHARM REV CODE 250: Performed by: NURSE PRACTITIONER

## 2024-05-27 PROCEDURE — 93010 ELECTROCARDIOGRAM REPORT: CPT | Mod: ,,, | Performed by: INTERNAL MEDICINE

## 2024-05-27 PROCEDURE — 93005 ELECTROCARDIOGRAM TRACING: CPT

## 2024-05-27 PROCEDURE — 85025 COMPLETE CBC W/AUTO DIFF WBC: CPT | Performed by: NURSE PRACTITIONER

## 2024-05-27 PROCEDURE — G0378 HOSPITAL OBSERVATION PER HR: HCPCS

## 2024-05-27 PROCEDURE — 25000003 PHARM REV CODE 250: Performed by: STUDENT IN AN ORGANIZED HEALTH CARE EDUCATION/TRAINING PROGRAM

## 2024-05-27 PROCEDURE — 36415 COLL VENOUS BLD VENIPUNCTURE: CPT | Performed by: NURSE PRACTITIONER

## 2024-05-27 RX ORDER — GUAIFENESIN 600 MG/1
600 TABLET, EXTENDED RELEASE ORAL 2 TIMES DAILY
Qty: 20 TABLET | Refills: 0 | Status: SHIPPED | OUTPATIENT
Start: 2024-05-27 | End: 2024-06-06

## 2024-05-27 RX ORDER — VALSARTAN 160 MG/1
160 TABLET ORAL 2 TIMES DAILY
Status: DISCONTINUED | OUTPATIENT
Start: 2024-05-27 | End: 2024-05-27

## 2024-05-27 RX ORDER — SPIRONOLACTONE 25 MG/1
25 TABLET ORAL DAILY
Status: DISCONTINUED | OUTPATIENT
Start: 2024-05-27 | End: 2024-05-27 | Stop reason: HOSPADM

## 2024-05-27 RX ORDER — HYDRALAZINE HYDROCHLORIDE 50 MG/1
50 TABLET, FILM COATED ORAL EVERY 8 HOURS
Qty: 90 TABLET | Refills: 1 | Status: SHIPPED | OUTPATIENT
Start: 2024-05-27 | End: 2024-05-27 | Stop reason: HOSPADM

## 2024-05-27 RX ORDER — CETIRIZINE HYDROCHLORIDE 5 MG/1
5 TABLET ORAL NIGHTLY
Qty: 10 TABLET | Refills: 0 | Status: SHIPPED | OUTPATIENT
Start: 2024-05-27 | End: 2024-06-06

## 2024-05-27 RX ORDER — BENZONATATE 100 MG/1
100 CAPSULE ORAL 3 TIMES DAILY PRN
Qty: 30 CAPSULE | Refills: 0 | Status: SHIPPED | OUTPATIENT
Start: 2024-05-27 | End: 2024-06-06

## 2024-05-27 RX ADMIN — PANTOPRAZOLE SODIUM 40 MG: 40 TABLET, DELAYED RELEASE ORAL at 08:05

## 2024-05-27 RX ADMIN — AMLODIPINE BESYLATE 10 MG: 10 TABLET ORAL at 08:05

## 2024-05-27 RX ADMIN — BENZONATATE 100 MG: 100 CAPSULE ORAL at 08:05

## 2024-05-27 RX ADMIN — CARVEDILOL 12.5 MG: 12.5 TABLET, FILM COATED ORAL at 08:05

## 2024-05-27 RX ADMIN — ACETAMINOPHEN 1000 MG: 500 TABLET ORAL at 08:05

## 2024-05-27 RX ADMIN — HYDRALAZINE HYDROCHLORIDE 50 MG: 50 TABLET ORAL at 05:05

## 2024-05-27 RX ADMIN — SPIRONOLACTONE 25 MG: 25 TABLET ORAL at 10:05

## 2024-05-27 RX ADMIN — GUAIFENESIN 600 MG: 600 TABLET, EXTENDED RELEASE ORAL at 08:05

## 2024-05-27 NOTE — PLAN OF CARE
05/27/24 1030   Final Note   Assessment Type Final Discharge Note   Anticipated Discharge Disposition Home   Hospital Resources/Appts/Education Provided Provided patient/caregiver with written discharge plan information   Post-Acute Status   Patient choice form signed by patient/caregiver List with quality metrics by geographic area provided   Discharge Delays None known at this time     [facility]  Discharge Final Note    Primary Care Provider: Adria Doll Jr., MD    Expected Discharge Date: 5/27/2024    Patient cleared for discharge from case management standpoint.     Discharge Plan A and Plan B have been determined by review of patient's clinical status, future medical and therapeutic needs, and coverage/benefits for post-acute care in coordination with multidisciplinary team members.        Final Discharge Note (most recent)       Final Note - 05/27/24 1030          Final Note    Assessment Type Final Discharge Note (P)      Anticipated Discharge Disposition Home or Self Care (P)      Hospital Resources/Appts/Education Provided Provided patient/caregiver with written discharge plan information (P)         Post-Acute Status    Patient choice form signed by patient/caregiver List with quality metrics by geographic area provided (P)      Discharge Delays None known at this time (P)                      Important Message from Medicare                 Future Appointments   Date Time Provider Department Center   6/3/2024  9:45 AM Rosamaria Nye MD [facility]        scheduled post-discharge follow-up appointment and information added to AVS.      MIKE Mckenna  [facility]  Ext. [phone]

## 2024-05-27 NOTE — DISCHARGE SUMMARY
"[facility] [address] [place] [address] Medicine  Discharge Summary      Patient Name: Calos Rios  MRN: [id number]  ASTRID: [id number]  Patient Class: OP- Observation  Admission Date: 5/25/2024  Hospital Length of Stay: 0 days  Discharge Date and Time: No discharge date for patient encounter.  Attending Physician: Clover Paul MD   Discharging Provider: Clover Paul MD  Primary Care Provider: Adria Doll Jr., MD  [facility] Medicine Team: [facility] [place] [place] HOSP MED  Clover Paul MD  Primary Care Team: [facility]    HPI:   Calos Rios is a 53 y.o. female with a PMHx of anxiety, obesity, asthma, migraine, and HTN who presents to the ED with complaints of chest pain and headache. The patient reports intermittent substernal chest pain that began yesterday around lunch, described as a "bubble" tightness/pressure. No associated SOB, N/V, or palpitations. Denies any radiation or aggravating on alleviating factors. She reports the pain returned today and was constant. Endorses an associated bifrontal headache as well. Denies vision changes, numbness/tingling, or focal weakness. She reports chest pain and headache have both resolved since receiving treatment in the ER. She does note her BP runs high although does not check it often. Reports missing several doses of her antihypertensives Monday and Tuesday but has since been compliant. Reports not adding salt to food but does not follow a low sodium diet. She also notes a mild cough for the past several days which is non productive. Denies fever/chills, abdominal pain, or dysuria.    In the ED, patient significantly hypertensive otherwise vitals stable, afebrile. CBC unremarkable. Cr 1.5 (bl~1.0-1.3). K + 3.1. Albumin 3.1. BNP 63. Troponin 0.131. EKG shows SR w/ PACs and prolonged QTc (515), 89 bpm, no ST elevation or depression. CXR negative for acute process. CTH negative for acute intracranial process. The patient received IV labetalol 10mg x1 " and IV morphine 4mg x1.    * No surgery found *      Hospital Course:   Echo showed . Mildly increased wall thickness. There is concentric remodeling. Normal wall motion. There is normal systolic function with a visually estimated ejection fraction of 65 - 70%. Grade I diastolic dysfunction.   LATOYA worsening creatinine trended up to 1.6.  Urine lytes ordered.  Blood pressure continues to remain elevated, holding valsartan-HCTZ and spironolactone in the setting of LATOYA.  increased hydralazine dose to 50 mg t.i.d..  LATOYA resolved.  Discontinued hydralazine.  Patient was saturating well on room air.  Inflammatory markers were within normal limits.    Calos Rios was deemed appropriate for discharge.  At the time of discharge, the plan of care was discussed with patient/family, who were agreeable and amenable.  All medications were verbally reviewed and discussed with the patient/family.  They endorsed understanding and compliance.  Informed them that these changes will be available on their discharge paperwork, as well.  Outpatient follow-ups were scheduled, or if unable to be scheduled ambulatory referrals were placed, and ER return precautions were given.  All questions were answered to the patient/family's satisfaction.  She was subsequently discharged in stable condition.       Goals of Care Treatment Preferences:  Code Status: Full Code      Consults:     No new Assessment & Plan notes have been filed under this hospital service since the last note was generated.  Service: Hospital Medicine    Final Active Diagnoses:    Diagnosis Date Noted POA    PRINCIPAL PROBLEM:  Hypertensive emergency [I16.1] 05/25/2024 Yes    Mild intermittent asthma without complication [J45.20] 05/25/2024 Yes    Hypokalemia [E87.6] 05/25/2024 Yes    Elevated troponin [R79.89] 05/25/2024 Yes    Prolonged Q-T interval on ECG [R94.31] 05/25/2024 Yes    Cigarette nicotine dependence without complication [F17.210] 05/25/2024 Yes    LATOYA  "(acute kidney injury) [N17.9] 05/25/2024 Yes    Gastroesophageal reflux disease without esophagitis [K21.9] 11/27/2017 Yes    Anxiety [F41.9] 11/27/2017 Yes    Obesity [E66.9]  Yes      Problems Resolved During this Admission:       Discharged Condition: good    Disposition: Home or Self Care    Follow Up:    Patient Instructions:   No discharge procedures on file.    Significant Diagnostic Studies: Labs: BMP:   Recent Labs   Lab 05/25/24 2118 05/26/24  0500 05/27/24  0518   GLU 95 88 86    137 139   K 3.1* 3.0* 3.5    101 102   CO2 24 26 27   BUN 16 16 17   CREATININE 1.5* 1.6* 1.3   CALCIUM 9.0 9.2 9.2   MG 1.8 2.3 2.1   , CMP   Recent Labs   Lab 05/25/24 2118 05/26/24 0500 05/27/24  0518    137 139   K 3.1* 3.0* 3.5    101 102   CO2 24 26 27   GLU 95 88 86   BUN 16 16 17   CREATININE 1.5* 1.6* 1.3   CALCIUM 9.0 9.2 9.2   PROT 6.6 6.5 6.4   ALBUMIN 3.1* 3.1* 3.0*   BILITOT 0.4 0.6 0.4   ALKPHOS 64 66 58   AST 20 19 18   ALT 18 17 16   ANIONGAP 11 10 10   , CBC   Recent Labs   Lab 05/25/24 1934 05/25/24 2122 05/26/24 0500 05/27/24  0518   WBC 6.76  --  5.57 4.11   HGB 14.6  --  14.0 14.2   HCT 42.7   < > 42.5 41.5     --  164 146*    < > = values in this interval not displayed.   , INR   Lab Results   Component Value Date    INR 1.0 04/05/2010   , Lipid Panel   Lab Results   Component Value Date    CHOL 180 03/18/2024    HDL 43 03/18/2024    LDLCALC 105.6 03/18/2024    TRIG 157 (H) 03/18/2024    CHOLHDL 23.9 03/18/2024   , Troponin   Recent Labs   Lab 05/25/24 1934 05/25/24 2307 05/26/24  0500   TROPONINI 0.131* 0.124* 0.117*   , and A1C:   Recent Labs   Lab 03/18/24  1533   HGBA1C 5.2     Microbiology: Blood Culture   Lab Results   Component Value Date    LABBLOO No growth after 5 days. 02/16/2020    and Sputum Culture No results found for: "GSRESP", "RESPIRATORYC"  Radiology: X-Ray: CXR: X-Ray Chest 1 View (CXR): No results found for this visit on 05/25/24. and X-Ray Chest " PA and Lateral (CXR): No results found for this visit on 05/25/24. and KUB: X-Ray Abdomen AP 1 View (KUB): No results found for this visit on 05/25/24.  CT scan: CT ABDOMEN PELVIS WITH CONTRAST: No results found for this visit on 05/25/24. and CT ABDOMEN PELVIS WITHOUT CONTRAST: No results found for this visit on 05/25/24.  Cardiac Graphics: Echocardiogram: 2D echo with color flow doppler: No results found for this or any previous visit. and Transthoracic echo (TTE) complete (Cupid Only):   Results for orders placed or performed during the hospital encounter of 05/25/24   Echo   Result Value Ref Range    RA Width 3.45 cm    LA volume (mod) 65.00 cm3    Left Atrium Major Axis 5.72 cm    Left Atrium Minor Axis 5.67 cm    RA Major Axis 4.30 cm    LV Diastolic Volume 65.27 mL    LV Systolic Volume 21.98 mL    MV Peak A Allen 1.03 m/s    MV stenosis pressure 1/2 time 82.83 ms    TR Max Allen 1.97 m/s    MV Peak E Allen 0.65 m/s    Ao VTI 30.24 cm    Ao peak allen 1.62 m/s    LVOT peak VTI 28.65 cm    LVOT peak allen 1.22 m/s    LVOT diameter 1.87 cm    IVRT 119.89 msec    E wave deceleration time 285.61 msec    AV mean gradient 8 mmHg    TAPSE 2.26 cm    RVDD 3.10 cm    LA size 3.22 cm    Ascending aorta 2.93 cm    STJ 2.51 cm    Sinus 2.77 cm    LVIDs 2.48 2.1 - 4.0 cm    Posterior Wall 1.2 (A) 0.6 - 1.1 cm    IVS 1.1 0.6 - 1.1 cm    LVIDd 4.2 3.5 - 6.0 cm    TDI LATERAL 0.05 m/s    LA WIDTH 4.27 cm    TDI SEPTAL 0.05 m/s    LV LATERAL E/E' RATIO 13.00 m/s    LV SEPTAL E/E' RATIO 13.00 m/s    RV/LV Ratio 0.74 cm    FS 41 28 - 44 %    LA volume 66.56 cm3    LV mass 167.45 g    ZLVIDD -2.23     ZLVIDS -2.11     Left Ventricle Relative Wall Thickness 0.57 cm    AV valve area 2.60 cm²    AV Velocity Ratio 0.75     AV index (prosthetic) 0.95     MV valve area p 1/2 method 2.66 cm2    E/A ratio 0.63     Mean e' 0.05 m/s    LVOT area 2.7 cm2    LVOT stroke volume 78.65 cm3    AV peak gradient 10 mmHg    E/E' ratio 13.00 m/s    LV  Systolic Volume Index 11.7 mL/m2    LV Diastolic Volume Index 34.72 mL/m2    LA Volume Index 35.4 mL/m2    LV Mass Index 89 g/m2    Triscuspid Valve Regurgitation Peak Gradient 16 mmHg    LA Volume Index (Mod) 34.6 mL/m2    ALEXEI by Velocity Ratio 2.07 cm²    BSA 1.94 m2    TV resting pulmonary artery pressure 19 mmHg    RV TB RVSP 5 mmHg    Est. RA pres 3 mmHg    Narrative      Left Ventricle: The left ventricle is normal in size. Ventricular mass   is normal. Mildly increased wall thickness. There is concentric   remodeling. Normal wall motion. There is normal systolic function with a   visually estimated ejection fraction of 65 - 70%. Grade I diastolic   dysfunction.    Right Ventricle: Normal right ventricular cavity size. Wall thickness   is normal. Systolic function is normal.    Left Atrium: Left atrium is mildly dilated.    Mitral Valve: There is anterior leaflet sclerosis.    Pulmonary Artery: The estimated pulmonary artery systolic pressure is   19 mmHg.    IVC/SVC: Normal venous pressure at 3 mmHg.    Pericardium: There is a small effusion adjacent to the right ventricle.         Pending Diagnostic Studies:       None           Medications:  Reconciled Home Medications:      Medication List        START taking these medications      benzonatate 100 MG capsule  Commonly known as: TESSALON  Take 1 capsule (100 mg total) by mouth 3 (three) times daily as needed for Cough.     cetirizine 5 MG tablet  Commonly known as: ZYRTEC  Take 1 tablet (5 mg total) by mouth every evening. for 10 days     guaiFENesin 600 mg 12 hr tablet  Commonly known as: MUCINEX  Take 1 tablet (600 mg total) by mouth 2 (two) times daily. for 10 days            CONTINUE taking these medications      albuterol 90 mcg/actuation inhaler  Commonly known as: PROVENTIL/VENTOLIN HFA  Inhale 2 puffs into the lungs every 6 (six) hours as needed for Wheezing.     amLODIPine 10 MG tablet  Commonly known as: NORVASC  Take 1 tablet (10 mg total) by  mouth once daily.     blood pressure monitor LG arm size (OP)  Commonly known as: iHEALTH EASE  by Other route as needed for High Blood Pressure.     carvediloL 12.5 MG tablet  Commonly known as: COREG  Take 1 tablet (12.5 mg total) by mouth 2 (two) times daily with meals.     dicyclomine 20 mg tablet  Commonly known as: BENTYL  Take 1 tablet (20 mg total) by mouth 3 (three) times daily as needed (abdominal pain).     DULoxetine 60 MG capsule  Commonly known as: CYMBALTA  Take 2 capsules (120 mg total) by mouth once daily.     ergocalciferol 50,000 unit Cap  Commonly known as: ERGOCALCIFEROL  Take 50,000 Units by mouth every 7 days.     ibuprofen 600 MG tablet  Commonly known as: ADVIL,MOTRIN  Take 1 tablet (600 mg total) by mouth every 6 (six) hours as needed.     omeprazole 40 MG capsule  Commonly known as: PRILOSEC  TAKE 1 CAPSULE BY MOUTH EVERY DAY     ondansetron 4 MG Tbdl  Commonly known as: ZOFRAN-ODT  Take 1 tablet (4 mg total) by mouth 2 (two) times daily.     oxybutynin 5 MG Tab  Commonly known as: DITROPAN  Take 1 tablet (5 mg total) by mouth 3 (three) times daily as needed (bladder spasms).     polyethylene glycol 17 gram/dose powder  Commonly known as: GLYCOLAX  Take 17 g by mouth once daily.     spironolactone 25 MG tablet  Commonly known as: ALDACTONE  Take 1 tablet (25 mg total) by mouth once daily.     TRULICITY 1.5 mg/0.5 mL pen injector  Generic drug: dulaglutide  Inject 1.5 mg into the skin every 7 days.     valsartan-hydrochlorothiazide 320-12.5 mg per tablet  Commonly known as: DIOVAN-HCT  Take 1 tablet by mouth once daily.              Indwelling Lines/Drains at time of discharge:   Lines/Drains/Airways       None                   Time spent on the discharge of patient: 39 minutes         Clover Paul MD  Department of Hospital Medicine  [facility] - Observation 11H

## 2024-06-10 ENCOUNTER — PATIENT MESSAGE (OUTPATIENT)
Dept: INTERNAL MEDICINE | Facility: CLINIC | Age: 54
End: 2024-06-10
Payer: MEDICAID

## 2024-08-26 PROBLEM — N17.9 AKI (ACUTE KIDNEY INJURY): Status: RESOLVED | Noted: 2024-05-25 | Resolved: 2024-08-26

## 2024-09-25 ENCOUNTER — TELEPHONE (OUTPATIENT)
Dept: INTERNAL MEDICINE | Facility: CLINIC | Age: 54
End: 2024-09-25
Payer: MEDICAID

## 2024-09-25 NOTE — TELEPHONE ENCOUNTER
Left pt a voicemail instructing her to contact the office so that it can be scheduled.    Mukund SLAUGHTER.

## 2024-09-25 NOTE — TELEPHONE ENCOUNTER
----- Message from Raquel Rojo sent at 9/25/2024 11:40 AM CDT -----  Contact: [phone]  No blue slot available to schedule an appointment for the patient.  Patient is established with which PCP: [facility]   Reason for the visit: fu and med refills and missed a lot of appt cause her mother passed   Would the patient like a call back, or a response through their [facility] portal?:  call back

## 2024-10-02 ENCOUNTER — TELEPHONE (OUTPATIENT)
Dept: INTERNAL MEDICINE | Facility: CLINIC | Age: 54
End: 2024-10-02
Payer: MEDICAID

## 2024-10-02 NOTE — TELEPHONE ENCOUNTER
----- Message from Anaid sent at 10/2/2024 10:43 AM CDT -----  Contact: Pt [phone]  Caller is requesting an earlier appointment then we can schedule.  Caller is requesting a message be sent to the provider.    If this is for urgent care symptoms, did you offer other providers at this location, providers at other locations, or [facility]? (yes, no, n/a):  N/A    If this is for the patients physical, did you offer to schedule next available and put on wait list, or to see NP or PA for their physical?  (yes, no, n/a):  N/A    When is the next available appointment with their provider:  unknown    Reason for the appointment:  Out of all of her meds, really needs the albuterol    Patient preference of timeframe to be scheduled:  as soon as possible     Would the patient like a call back, or a response through their [facility] portal?:   Call back     Comments:  Patient stated her mother recently passed away. She is out of all of her meds and is only comfortable seeing Dr. Doll.

## 2024-10-11 ENCOUNTER — TELEPHONE (OUTPATIENT)
Dept: INTERNAL MEDICINE | Facility: CLINIC | Age: 54
End: 2024-10-11
Payer: MEDICAID

## 2024-10-11 NOTE — TELEPHONE ENCOUNTER
----- Message from Bin Romano sent at 10/11/2024 11:34 AM CDT -----  Patient is returning a phone call.    Who left a message for the patient: Staff    Does patient know what this is regarding: Rescheduling upcoming appt    Would you like a call back, or a response through your [facility] portal?: Calos at [phone]    Comments:

## 2024-10-13 ENCOUNTER — HOSPITAL ENCOUNTER (INPATIENT)
Facility: HOSPITAL | Age: 54
LOS: 2 days | Discharge: HOME | DRG: 304 | End: 2024-10-15
Attending: EMERGENCY MEDICINE | Admitting: HOSPITALIST
Payer: MEDICAID

## 2024-10-13 DIAGNOSIS — N17.9 ACUTE KIDNEY INJURY SUPERIMPOSED ON CHRONIC KIDNEY DISEASE: ICD-10-CM

## 2024-10-13 DIAGNOSIS — I16.1 HYPERTENSIVE EMERGENCY: Primary | ICD-10-CM

## 2024-10-13 DIAGNOSIS — R07.9 CHEST PAIN: ICD-10-CM

## 2024-10-13 DIAGNOSIS — R06.02 SHORTNESS OF BREATH: ICD-10-CM

## 2024-10-13 DIAGNOSIS — N18.9 ACUTE KIDNEY INJURY SUPERIMPOSED ON CHRONIC KIDNEY DISEASE: ICD-10-CM

## 2024-10-13 DIAGNOSIS — J45.901 MODERATE ASTHMA WITH EXACERBATION, UNSPECIFIED WHETHER PERSISTENT: ICD-10-CM

## 2024-10-13 DIAGNOSIS — I10 ESSENTIAL HYPERTENSION: ICD-10-CM

## 2024-10-13 PROBLEM — J45.21 MILD INTERMITTENT ASTHMA WITH ACUTE EXACERBATION: Status: ACTIVE | Noted: 2024-05-25

## 2024-10-13 LAB
ALBUMIN SERPL BCP-MCNC: 3.4 G/DL (ref 3.5–5.2)
ALP SERPL-CCNC: 80 U/L (ref 55–135)
ALT SERPL W/O P-5'-P-CCNC: 14 U/L (ref 10–44)
ANION GAP SERPL CALC-SCNC: 11 MMOL/L (ref 8–16)
AST SERPL-CCNC: 23 U/L (ref 10–40)
BACTERIA #/AREA URNS AUTO: ABNORMAL /HPF
BASOPHILS # BLD AUTO: 0.06 K/UL (ref 0–0.2)
BASOPHILS NFR BLD: 0.7 % (ref 0–1.9)
BILIRUB SERPL-MCNC: 0.5 MG/DL (ref 0.1–1)
BILIRUB UR QL STRIP: NEGATIVE
BNP SERPL-MCNC: 51 PG/ML (ref 0–99)
BUN SERPL-MCNC: 15 MG/DL (ref 6–20)
CALCIUM SERPL-MCNC: 9.2 MG/DL (ref 8.7–10.5)
CHLORIDE SERPL-SCNC: 104 MMOL/L (ref 95–110)
CLARITY UR REFRACT.AUTO: CLEAR
CO2 SERPL-SCNC: 25 MMOL/L (ref 23–29)
COLOR UR AUTO: YELLOW
CREAT SERPL-MCNC: 1.2 MG/DL (ref 0.5–1.4)
DIFFERENTIAL METHOD BLD: ABNORMAL
EOSINOPHIL # BLD AUTO: 0.4 K/UL (ref 0–0.5)
EOSINOPHIL NFR BLD: 4.2 % (ref 0–8)
ERYTHROCYTE [DISTWIDTH] IN BLOOD BY AUTOMATED COUNT: 15.9 % (ref 11.5–14.5)
EST. GFR  (NO RACE VARIABLE): 54.5 ML/MIN/1.73 M^2
GLUCOSE SERPL-MCNC: 108 MG/DL (ref 70–110)
GLUCOSE UR QL STRIP: ABNORMAL
HCT VFR BLD AUTO: 50.5 % (ref 37–48.5)
HGB BLD-MCNC: 17.4 G/DL (ref 12–16)
HGB UR QL STRIP: ABNORMAL
HYALINE CASTS UR QL AUTO: 0 /LPF
IMM GRANULOCYTES # BLD AUTO: 0.02 K/UL (ref 0–0.04)
IMM GRANULOCYTES NFR BLD AUTO: 0.2 % (ref 0–0.5)
KETONES UR QL STRIP: NEGATIVE
LEUKOCYTE ESTERASE UR QL STRIP: NEGATIVE
LYMPHOCYTES # BLD AUTO: 2 K/UL (ref 1–4.8)
LYMPHOCYTES NFR BLD: 21.7 % (ref 18–48)
MAGNESIUM SERPL-MCNC: 1.9 MG/DL (ref 1.6–2.6)
MCH RBC QN AUTO: 32.1 PG (ref 27–31)
MCHC RBC AUTO-ENTMCNC: 34.5 G/DL (ref 32–36)
MCV RBC AUTO: 93 FL (ref 82–98)
MICROSCOPIC COMMENT: ABNORMAL
MONOCYTES # BLD AUTO: 0.4 K/UL (ref 0.3–1)
MONOCYTES NFR BLD: 4.7 % (ref 4–15)
NEUTROPHILS # BLD AUTO: 6.2 K/UL (ref 1.8–7.7)
NEUTROPHILS NFR BLD: 68.5 % (ref 38–73)
NITRITE UR QL STRIP: NEGATIVE
NRBC BLD-RTO: 0 /100 WBC
OHS QRS DURATION: 78 MS
OHS QRS DURATION: 82 MS
OHS QTC CALCULATION: 477 MS
OHS QTC CALCULATION: 479 MS
PH UR STRIP: 7 [PH] (ref 5–8)
PLATELET # BLD AUTO: 207 K/UL (ref 150–450)
PMV BLD AUTO: 12.2 FL (ref 9.2–12.9)
POCT GLUCOSE: 139 MG/DL (ref 70–110)
POTASSIUM SERPL-SCNC: 3.2 MMOL/L (ref 3.5–5.1)
PROT SERPL-MCNC: 7.5 G/DL (ref 6–8.4)
PROT UR QL STRIP: ABNORMAL
RBC # BLD AUTO: 5.42 M/UL (ref 4–5.4)
RBC #/AREA URNS AUTO: 16 /HPF (ref 0–4)
SARS-COV-2 RDRP RESP QL NAA+PROBE: NEGATIVE
SODIUM SERPL-SCNC: 140 MMOL/L (ref 136–145)
SP GR UR STRIP: 1.01 (ref 1–1.03)
SQUAMOUS #/AREA URNS AUTO: 2 /HPF
TROPONIN I SERPL DL<=0.01 NG/ML-MCNC: 0.07 NG/ML (ref 0–0.03)
TROPONIN I SERPL DL<=0.01 NG/ML-MCNC: 0.07 NG/ML (ref 0–0.03)
URN SPEC COLLECT METH UR: ABNORMAL
WBC # BLD AUTO: 8.99 K/UL (ref 3.9–12.7)
WBC #/AREA URNS AUTO: 1 /HPF (ref 0–5)

## 2024-10-13 PROCEDURE — 96365 THER/PROPH/DIAG IV INF INIT: CPT

## 2024-10-13 PROCEDURE — 93010 ELECTROCARDIOGRAM REPORT: CPT | Mod: ,,, | Performed by: INTERNAL MEDICINE

## 2024-10-13 PROCEDURE — 85025 COMPLETE CBC W/AUTO DIFF WBC: CPT

## 2024-10-13 PROCEDURE — 99900035 HC TECH TIME PER 15 MIN (STAT)

## 2024-10-13 PROCEDURE — 63600175 PHARM REV CODE 636 W HCPCS

## 2024-10-13 PROCEDURE — 25000242 PHARM REV CODE 250 ALT 637 W/ HCPCS

## 2024-10-13 PROCEDURE — 83735 ASSAY OF MAGNESIUM: CPT | Performed by: EMERGENCY MEDICINE

## 2024-10-13 PROCEDURE — U0002 COVID-19 LAB TEST NON-CDC: HCPCS

## 2024-10-13 PROCEDURE — 83880 ASSAY OF NATRIURETIC PEPTIDE: CPT

## 2024-10-13 PROCEDURE — 84484 ASSAY OF TROPONIN QUANT: CPT

## 2024-10-13 PROCEDURE — 25000003 PHARM REV CODE 250: Performed by: STUDENT IN AN ORGANIZED HEALTH CARE EDUCATION/TRAINING PROGRAM

## 2024-10-13 PROCEDURE — 27100107 HC POCKET PEAK FLOW METER

## 2024-10-13 PROCEDURE — 93010 ELECTROCARDIOGRAM REPORT: CPT | Mod: 59,,, | Performed by: INTERNAL MEDICINE

## 2024-10-13 PROCEDURE — 25000003 PHARM REV CODE 250: Performed by: NURSE PRACTITIONER

## 2024-10-13 PROCEDURE — 93005 ELECTROCARDIOGRAM TRACING: CPT

## 2024-10-13 PROCEDURE — 80053 COMPREHEN METABOLIC PANEL: CPT | Performed by: EMERGENCY MEDICINE

## 2024-10-13 PROCEDURE — 94640 AIRWAY INHALATION TREATMENT: CPT | Mod: XB

## 2024-10-13 PROCEDURE — 25000003 PHARM REV CODE 250

## 2024-10-13 PROCEDURE — 81001 URINALYSIS AUTO W/SCOPE: CPT

## 2024-10-13 PROCEDURE — 94640 AIRWAY INHALATION TREATMENT: CPT

## 2024-10-13 PROCEDURE — 99285 EMERGENCY DEPT VISIT HI MDM: CPT | Mod: 25

## 2024-10-13 PROCEDURE — 27000221 HC OXYGEN, UP TO 24 HOURS

## 2024-10-13 PROCEDURE — 20600001 HC STEP DOWN PRIVATE ROOM

## 2024-10-13 PROCEDURE — 94761 N-INVAS EAR/PLS OXIMETRY MLT: CPT

## 2024-10-13 RX ORDER — VALSARTAN 160 MG/1
320 TABLET ORAL DAILY
Status: DISCONTINUED | OUTPATIENT
Start: 2024-10-13 | End: 2024-10-14

## 2024-10-13 RX ORDER — HYDROCHLOROTHIAZIDE 12.5 MG/1
12.5 TABLET ORAL DAILY
Status: DISCONTINUED | OUTPATIENT
Start: 2024-10-13 | End: 2024-10-13

## 2024-10-13 RX ORDER — NIFEDIPINE 30 MG/1
30 TABLET, EXTENDED RELEASE ORAL DAILY
Status: DISCONTINUED | OUTPATIENT
Start: 2024-10-13 | End: 2024-10-14

## 2024-10-13 RX ORDER — SODIUM CHLORIDE 0.9 % (FLUSH) 0.9 %
10 SYRINGE (ML) INJECTION EVERY 12 HOURS PRN
Status: DISCONTINUED | OUTPATIENT
Start: 2024-10-13 | End: 2024-10-15 | Stop reason: HOSPADM

## 2024-10-13 RX ORDER — SPIRONOLACTONE 25 MG/1
25 TABLET ORAL
Status: COMPLETED | OUTPATIENT
Start: 2024-10-13 | End: 2024-10-13

## 2024-10-13 RX ORDER — HYDRALAZINE HYDROCHLORIDE 25 MG/1
25 TABLET, FILM COATED ORAL EVERY 8 HOURS PRN
Status: DISCONTINUED | OUTPATIENT
Start: 2024-10-13 | End: 2024-10-13

## 2024-10-13 RX ORDER — PREDNISONE 20 MG/1
60 TABLET ORAL
Status: COMPLETED | OUTPATIENT
Start: 2024-10-13 | End: 2024-10-13

## 2024-10-13 RX ORDER — HYDRALAZINE HYDROCHLORIDE 20 MG/ML
10 INJECTION INTRAMUSCULAR; INTRAVENOUS EVERY 6 HOURS PRN
Status: DISCONTINUED | OUTPATIENT
Start: 2024-10-13 | End: 2024-10-13

## 2024-10-13 RX ORDER — AMLODIPINE BESYLATE 10 MG/1
10 TABLET ORAL
Status: COMPLETED | OUTPATIENT
Start: 2024-10-13 | End: 2024-10-13

## 2024-10-13 RX ORDER — ALUMINUM HYDROXIDE, MAGNESIUM HYDROXIDE, AND SIMETHICONE 2400; 240; 2400 MG/30ML; MG/30ML; MG/30ML
30 SUSPENSION ORAL EVERY 6 HOURS PRN
Status: DISCONTINUED | OUTPATIENT
Start: 2024-10-13 | End: 2024-10-15 | Stop reason: HOSPADM

## 2024-10-13 RX ORDER — NIFEDIPINE 30 MG/1
30 TABLET, EXTENDED RELEASE ORAL DAILY
Status: DISCONTINUED | OUTPATIENT
Start: 2024-10-14 | End: 2024-10-13

## 2024-10-13 RX ORDER — IBUPROFEN 200 MG
24 TABLET ORAL
Status: DISCONTINUED | OUTPATIENT
Start: 2024-10-13 | End: 2024-10-15 | Stop reason: HOSPADM

## 2024-10-13 RX ORDER — BISACODYL 10 MG/1
10 SUPPOSITORY RECTAL DAILY PRN
Status: DISCONTINUED | OUTPATIENT
Start: 2024-10-13 | End: 2024-10-15 | Stop reason: HOSPADM

## 2024-10-13 RX ORDER — ACETAMINOPHEN 500 MG
1000 TABLET ORAL ONCE
Status: COMPLETED | OUTPATIENT
Start: 2024-10-13 | End: 2024-10-13

## 2024-10-13 RX ORDER — AMLODIPINE BESYLATE 10 MG/1
10 TABLET ORAL DAILY
Status: DISCONTINUED | OUTPATIENT
Start: 2024-10-14 | End: 2024-10-13

## 2024-10-13 RX ORDER — IBUPROFEN 200 MG
16 TABLET ORAL
Status: DISCONTINUED | OUTPATIENT
Start: 2024-10-13 | End: 2024-10-15 | Stop reason: HOSPADM

## 2024-10-13 RX ORDER — CLONIDINE HYDROCHLORIDE 0.1 MG/1
0.1 TABLET ORAL EVERY 8 HOURS PRN
Status: DISCONTINUED | OUTPATIENT
Start: 2024-10-13 | End: 2024-10-13

## 2024-10-13 RX ORDER — CARVEDILOL 12.5 MG/1
12.5 TABLET ORAL 2 TIMES DAILY WITH MEALS
Status: DISCONTINUED | OUTPATIENT
Start: 2024-10-13 | End: 2024-10-13

## 2024-10-13 RX ORDER — HYDROCHLOROTHIAZIDE 25 MG/1
50 TABLET ORAL DAILY
Status: DISCONTINUED | OUTPATIENT
Start: 2024-10-13 | End: 2024-10-13

## 2024-10-13 RX ORDER — PREDNISONE 20 MG/1
40 TABLET ORAL DAILY
Status: DISCONTINUED | OUTPATIENT
Start: 2024-10-14 | End: 2024-10-13

## 2024-10-13 RX ORDER — HYDRALAZINE HYDROCHLORIDE 25 MG/1
100 TABLET, FILM COATED ORAL EVERY 8 HOURS
Status: DISCONTINUED | OUTPATIENT
Start: 2024-10-13 | End: 2024-10-13

## 2024-10-13 RX ORDER — HYDRALAZINE HYDROCHLORIDE 20 MG/ML
10 INJECTION INTRAMUSCULAR; INTRAVENOUS EVERY 8 HOURS PRN
Status: DISCONTINUED | OUTPATIENT
Start: 2024-10-13 | End: 2024-10-15 | Stop reason: HOSPADM

## 2024-10-13 RX ORDER — SPIRONOLACTONE 25 MG/1
50 TABLET ORAL DAILY
Status: DISCONTINUED | OUTPATIENT
Start: 2024-10-13 | End: 2024-10-13

## 2024-10-13 RX ORDER — HYDROCHLOROTHIAZIDE 12.5 MG/1
50 TABLET ORAL DAILY
Status: DISCONTINUED | OUTPATIENT
Start: 2024-10-14 | End: 2024-10-14

## 2024-10-13 RX ORDER — HYDROCHLOROTHIAZIDE 25 MG/1
100 TABLET ORAL DAILY
Status: DISCONTINUED | OUTPATIENT
Start: 2024-10-14 | End: 2024-10-13

## 2024-10-13 RX ORDER — SODIUM CHLORIDE 0.9 % (FLUSH) 0.9 %
10 SYRINGE (ML) INJECTION
Status: DISCONTINUED | OUTPATIENT
Start: 2024-10-13 | End: 2024-10-15 | Stop reason: HOSPADM

## 2024-10-13 RX ORDER — IPRATROPIUM BROMIDE AND ALBUTEROL SULFATE 2.5; .5 MG/3ML; MG/3ML
3 SOLUTION RESPIRATORY (INHALATION) EVERY 4 HOURS PRN
Status: DISCONTINUED | OUTPATIENT
Start: 2024-10-13 | End: 2024-10-13

## 2024-10-13 RX ORDER — PREDNISONE 20 MG/1
40 TABLET ORAL DAILY
Status: DISCONTINUED | OUTPATIENT
Start: 2024-10-14 | End: 2024-10-15 | Stop reason: HOSPADM

## 2024-10-13 RX ORDER — CARVEDILOL 12.5 MG/1
12.5 TABLET ORAL
Status: COMPLETED | OUTPATIENT
Start: 2024-10-13 | End: 2024-10-13

## 2024-10-13 RX ORDER — TALC
6 POWDER (GRAM) TOPICAL NIGHTLY PRN
Status: DISCONTINUED | OUTPATIENT
Start: 2024-10-13 | End: 2024-10-15 | Stop reason: HOSPADM

## 2024-10-13 RX ORDER — CLONIDINE HYDROCHLORIDE 0.1 MG/1
0.1 TABLET ORAL ONCE
Status: DISCONTINUED | OUTPATIENT
Start: 2024-10-13 | End: 2024-10-13

## 2024-10-13 RX ORDER — CLONIDINE HYDROCHLORIDE 0.1 MG/1
0.1 TABLET ORAL 2 TIMES DAILY
Status: DISCONTINUED | OUTPATIENT
Start: 2024-10-13 | End: 2024-10-13

## 2024-10-13 RX ORDER — GLUCAGON 1 MG
1 KIT INJECTION
Status: DISCONTINUED | OUTPATIENT
Start: 2024-10-13 | End: 2024-10-15 | Stop reason: HOSPADM

## 2024-10-13 RX ORDER — SPIRONOLACTONE 25 MG/1
100 TABLET ORAL DAILY
Status: DISCONTINUED | OUTPATIENT
Start: 2024-10-14 | End: 2024-10-13

## 2024-10-13 RX ORDER — POLYETHYLENE GLYCOL 3350 17 G/17G
17 POWDER, FOR SOLUTION ORAL DAILY PRN
Status: DISCONTINUED | OUTPATIENT
Start: 2024-10-13 | End: 2024-10-15 | Stop reason: HOSPADM

## 2024-10-13 RX ORDER — ACETAMINOPHEN 325 MG/1
650 TABLET ORAL EVERY 4 HOURS PRN
Status: DISCONTINUED | OUTPATIENT
Start: 2024-10-13 | End: 2024-10-15 | Stop reason: HOSPADM

## 2024-10-13 RX ORDER — HYDRALAZINE HYDROCHLORIDE 50 MG/1
100 TABLET, FILM COATED ORAL EVERY 8 HOURS PRN
Status: DISCONTINUED | OUTPATIENT
Start: 2024-10-13 | End: 2024-10-15 | Stop reason: HOSPADM

## 2024-10-13 RX ORDER — DULOXETIN HYDROCHLORIDE 60 MG/1
120 CAPSULE, DELAYED RELEASE ORAL DAILY
Status: DISCONTINUED | OUTPATIENT
Start: 2024-10-13 | End: 2024-10-15 | Stop reason: HOSPADM

## 2024-10-13 RX ORDER — POTASSIUM CHLORIDE 20 MEQ/1
20 TABLET, EXTENDED RELEASE ORAL ONCE
Status: COMPLETED | OUTPATIENT
Start: 2024-10-13 | End: 2024-10-13

## 2024-10-13 RX ORDER — SPIRONOLACTONE 25 MG/1
50 TABLET ORAL DAILY
Status: DISCONTINUED | OUTPATIENT
Start: 2024-10-14 | End: 2024-10-14

## 2024-10-13 RX ORDER — ONDANSETRON 4 MG/1
8 TABLET, ORALLY DISINTEGRATING ORAL EVERY 8 HOURS PRN
Status: DISCONTINUED | OUTPATIENT
Start: 2024-10-13 | End: 2024-10-15 | Stop reason: HOSPADM

## 2024-10-13 RX ORDER — SPIRONOLACTONE 25 MG/1
25 TABLET ORAL DAILY
Status: DISCONTINUED | OUTPATIENT
Start: 2024-10-14 | End: 2024-10-13

## 2024-10-13 RX ORDER — IPRATROPIUM BROMIDE AND ALBUTEROL SULFATE 2.5; .5 MG/3ML; MG/3ML
3 SOLUTION RESPIRATORY (INHALATION)
Status: COMPLETED | OUTPATIENT
Start: 2024-10-13 | End: 2024-10-13

## 2024-10-13 RX ORDER — ISOSORBIDE MONONITRATE 30 MG/1
30 TABLET, EXTENDED RELEASE ORAL DAILY
Status: DISCONTINUED | OUTPATIENT
Start: 2024-10-13 | End: 2024-10-13

## 2024-10-13 RX ORDER — ENOXAPARIN SODIUM 100 MG/ML
40 INJECTION SUBCUTANEOUS EVERY 24 HOURS
Status: DISCONTINUED | OUTPATIENT
Start: 2024-10-13 | End: 2024-10-15 | Stop reason: HOSPADM

## 2024-10-13 RX ORDER — SPIRONOLACTONE 25 MG/1
50 TABLET ORAL ONCE
Status: DISCONTINUED | OUTPATIENT
Start: 2024-10-13 | End: 2024-10-13

## 2024-10-13 RX ORDER — PROMETHAZINE HYDROCHLORIDE 25 MG/1
25 TABLET ORAL EVERY 6 HOURS PRN
Status: DISCONTINUED | OUTPATIENT
Start: 2024-10-13 | End: 2024-10-15 | Stop reason: HOSPADM

## 2024-10-13 RX ORDER — IPRATROPIUM BROMIDE AND ALBUTEROL SULFATE 2.5; .5 MG/3ML; MG/3ML
3 SOLUTION RESPIRATORY (INHALATION)
Status: DISCONTINUED | OUTPATIENT
Start: 2024-10-13 | End: 2024-10-15 | Stop reason: HOSPADM

## 2024-10-13 RX ORDER — NICARDIPINE HYDROCHLORIDE 0.2 MG/ML
0-15 INJECTION INTRAVENOUS CONTINUOUS
Status: DISCONTINUED | OUTPATIENT
Start: 2024-10-13 | End: 2024-10-13

## 2024-10-13 RX ORDER — NALOXONE HCL 0.4 MG/ML
0.02 VIAL (ML) INJECTION
Status: DISCONTINUED | OUTPATIENT
Start: 2024-10-13 | End: 2024-10-15 | Stop reason: HOSPADM

## 2024-10-13 RX ORDER — HYDROCHLOROTHIAZIDE 25 MG/1
50 TABLET ORAL ONCE
Status: DISCONTINUED | OUTPATIENT
Start: 2024-10-13 | End: 2024-10-13

## 2024-10-13 RX ADMIN — IPRATROPIUM BROMIDE AND ALBUTEROL SULFATE 3 ML: 2.5; .5 SOLUTION RESPIRATORY (INHALATION) at 07:10

## 2024-10-13 RX ADMIN — POTASSIUM CHLORIDE 20 MEQ: 1500 TABLET, EXTENDED RELEASE ORAL at 11:10

## 2024-10-13 RX ADMIN — SPIRONOLACTONE 50 MG: 25 TABLET ORAL at 02:10

## 2024-10-13 RX ADMIN — HYDRALAZINE HYDROCHLORIDE 100 MG: 25 TABLET ORAL at 02:10

## 2024-10-13 RX ADMIN — VALSARTAN 320 MG: 160 TABLET, FILM COATED ORAL at 11:10

## 2024-10-13 RX ADMIN — SPIRONOLACTONE 25 MG: 25 TABLET ORAL at 07:10

## 2024-10-13 RX ADMIN — NICARDIPINE HYDROCHLORIDE 5 MG/HR: 0.2 INJECTION, SOLUTION INTRAVENOUS at 07:10

## 2024-10-13 RX ADMIN — POTASSIUM BICARBONATE 20 MEQ: 391 TABLET, EFFERVESCENT ORAL at 09:10

## 2024-10-13 RX ADMIN — HYDROCHLOROTHIAZIDE 12.5 MG: 12.5 TABLET ORAL at 11:10

## 2024-10-13 RX ADMIN — CARVEDILOL 12.5 MG: 12.5 TABLET, FILM COATED ORAL at 07:10

## 2024-10-13 RX ADMIN — NIFEDIPINE 30 MG: 30 TABLET, FILM COATED, EXTENDED RELEASE ORAL at 10:10

## 2024-10-13 RX ADMIN — HYDROCHLOROTHIAZIDE 50 MG: 25 TABLET ORAL at 02:10

## 2024-10-13 RX ADMIN — HYDRALAZINE HYDROCHLORIDE 25 MG: 25 TABLET ORAL at 11:10

## 2024-10-13 RX ADMIN — IPRATROPIUM BROMIDE AND ALBUTEROL SULFATE 3 ML: 2.5; .5 SOLUTION RESPIRATORY (INHALATION) at 04:10

## 2024-10-13 RX ADMIN — DULOXETINE HYDROCHLORIDE 120 MG: 60 CAPSULE, DELAYED RELEASE ORAL at 11:10

## 2024-10-13 RX ADMIN — IPRATROPIUM BROMIDE AND ALBUTEROL SULFATE 3 ML: 2.5; .5 SOLUTION RESPIRATORY (INHALATION) at 12:10

## 2024-10-13 RX ADMIN — ENOXAPARIN SODIUM 40 MG: 40 INJECTION SUBCUTANEOUS at 05:10

## 2024-10-13 RX ADMIN — ACETAMINOPHEN 1000 MG: 500 TABLET ORAL at 10:10

## 2024-10-13 RX ADMIN — AMLODIPINE BESYLATE 10 MG: 10 TABLET ORAL at 07:10

## 2024-10-13 RX ADMIN — PREDNISONE 60 MG: 20 TABLET ORAL at 06:10

## 2024-10-13 NOTE — ED NOTES
Have been continuing in communications with Pam Navarrete, [facility] ED Charge, and Floor Charge Bren Thorne RN about patient's BP.

## 2024-10-13 NOTE — ASSESSMENT & PLAN NOTE
Hypertensive emergency on arrival in setting of missed BP meds x 1 month. EKG and CXR reviewed.    - arrival - /159, , improved to  s/p cardene gtt  - s/p cardene gtt, coreg, amlodipine, aldactone  - avoid beta blockade given asthma exacerbation  - cont amlodipine, aldactone, valsartan, hctz  - prn hydralazine  - tele

## 2024-10-13 NOTE — ED NOTES
Response received from Dr Plaza to wait and see if oral medications bring BP level down. Recheck in 15 minutes and update her, she may need to update PO meds.

## 2024-10-13 NOTE — ASSESSMENT & PLAN NOTE
Likely in setting of demand ischemia given hypertensive emergency on arrival.  - trop 0.068, trend to peak  - EKG reviewed  - tele

## 2024-10-13 NOTE — H&P
[facility] - Emergency Dept  [facility] Medicine  History & Physical    Patient Name: Calos Rios  MRN: [id number]  Patient Class: IP- Inpatient  Admission Date: 10/13/2024  Attending Physician: Leann Plaza MD   Primary Care Provider: Adria Doll Jr., MD         Patient information was obtained from patient and ER records.     Subjective:     Principal Problem:Hypertensive emergency    Chief Complaint:   Chief Complaint   Patient presents with    Asthma     Wheezing with SOB for 2 wks but worse today; cough        HPI: Calos Rios is a 53 y.o. female with PMHx of HTN, GERD, obesity, asthma who presents to [facility] ED with chief complaint of shortness of breath x 2 weeks with associated productive cough. She has not been taking home BP meds x 1 month and has not gotten refills as she reports she has been taking care of her mother who has been sick. She has tried albuterol inhaler at home without improvement. She denies HA, vision changes, fever, chills, chest pain, abdominal pain, n/v/d, urinary symptoms, numbness or tingling.     In the ED: Afebrile, hypertensive to 292/159, , otherwise normal HR, placed on 2L NC. MAP improved to 126 s/p cardene gtt. CBC without leukocytosis. CMP with K 3.2. Troponin 0.068. Covid negative. EKG no STEMI. CXR without acute findings. Received cardene gtt, duonebs, amlodipine, coreg, aldactone, potassium bicarb, and prednisone. Admitted to .    Past Medical History:   Diagnosis Date    Anxiety disorder, unspecified     Hypertension     Migraine headache     Obesity        Past Surgical History:   Procedure Laterality Date    CYSTOURETEROSCOPY,WITH HOLMIUM LASER LITHOTRIPSY OF URETERAL CALCULUS - Bilateral Bilateral 11/09/2022    ESOPHAGOGASTRODUODENOSCOPY N/A 12/23/2022    Procedure: EGD (ESOPHAGOGASTRODUODENOSCOPY);  Surgeon: Anaid Cohen MD;  Location: [facility];  Service: Endoscopy;  Laterality: N/A;    HYSTERECTOMY, TOTAL, LAPAROSCOPIC, WITH  SALPINGO-OOPHORECTOMY Bilateral 03/17/2021    LAPAROSCOPIC TOTAL HYSTERECTOMY Bilateral 03/17/2021    PYELOSCOPY Bilateral 10/25/2022    Procedure: PYELOSCOPY;  Surgeon: Raz Cantrell MD;  Location: [facility] OR;  Service: Urology;  Laterality: Bilateral;    TUBAL LIGATION      URETEROSCOPY Bilateral 10/25/2022    Procedure: URETEROSCOPY;  Surgeon: Raz Cantrell MD;  Location: [facility] OR;  Service: Urology;  Laterality: Bilateral;       Review of patient's allergies indicates:  No Known Allergies    No current facility-administered medications on file prior to encounter.     Current Outpatient Medications on File Prior to Encounter   Medication Sig    albuterol (PROVENTIL/VENTOLIN HFA) 90 mcg/actuation inhaler Inhale 2 puffs into the lungs every 6 (six) hours as needed for Wheezing.    amLODIPine (NORVASC) 10 MG tablet Take 1 tablet (10 mg total) by mouth once daily.    blood pressure monitor (IHEALTH EASE) LG arm size (OP) by Other route as needed for High Blood Pressure.    carvediloL (COREG) 12.5 MG tablet Take 1 tablet (12.5 mg total) by mouth 2 (two) times daily with meals.    cetirizine (ZYRTEC) 5 MG tablet Take 1 tablet (5 mg total) by mouth every evening. for 10 days    dicyclomine (BENTYL) 20 mg tablet Take 1 tablet (20 mg total) by mouth 3 (three) times daily as needed (abdominal pain).    dulaglutide (TRULICITY) 1.5 mg/0.5 mL pen injector Inject 1.5 mg into the skin every 7 days.    DULoxetine (CYMBALTA) 60 MG capsule Take 2 capsules (120 mg total) by mouth once daily.    ergocalciferol (ERGOCALCIFEROL) 50,000 unit Cap Take 50,000 Units by mouth every 7 days.    ibuprofen (ADVIL,MOTRIN) 600 MG tablet Take 1 tablet (600 mg total) by mouth every 6 (six) hours as needed.    omeprazole (PRILOSEC) 40 MG capsule TAKE 1 CAPSULE BY MOUTH EVERY DAY    ondansetron (ZOFRAN-ODT) 4 MG TbDL Take 1 tablet (4 mg total) by mouth 2 (two) times daily.    oxybutynin (DITROPAN) 5 MG Tab Take 1 tablet (5 mg total) by mouth 3  (three) times daily as needed (bladder spasms).    polyethylene glycol (GLYCOLAX) 17 gram/dose powder Take 17 g by mouth once daily.    spironolactone (ALDACTONE) 25 MG tablet Take 1 tablet (25 mg total) by mouth once daily.    valsartan-hydrochlorothiazide (DIOVAN-HCT) 320-12.5 mg per tablet Take 1 tablet by mouth once daily.     Family History       Problem Relation (Age of Onset)    Diabetes Maternal Uncle    Drug abuse Father    Heart disease Mother, Father, Maternal Grandmother    Kidney disease Father    Ovarian cancer Maternal Aunt          Tobacco Use    Smoking status: Every Day     Current packs/day: 0.25     Average packs/day: 0.3 packs/day for 15.0 years (3.8 ttl pk-yrs)     Types: Cigarettes    Smokeless tobacco: Never   Substance and Sexual Activity    Alcohol use: Yes     Alcohol/week: 3.0 standard drinks of alcohol     Types: 3 Shots of liquor per week     Comment: scocially on weekends    Drug use: Yes     Types: Marijuana    Sexual activity: Yes     Partners: Male     Birth control/protection: None     Review of Systems   Constitutional:  Negative for activity change, chills and fever.   HENT:  Negative for trouble swallowing.    Eyes:  Negative for photophobia and visual disturbance.   Respiratory:  Positive for cough, chest tightness and shortness of breath.    Cardiovascular:  Negative for chest pain, palpitations and leg swelling.   Gastrointestinal:  Negative for abdominal pain, constipation, diarrhea, nausea and vomiting.   Genitourinary:  Negative for dysuria, frequency and hematuria.   Musculoskeletal:  Negative for back pain, gait problem and neck pain.   Skin:  Negative for rash and wound.   Neurological:  Negative for dizziness, syncope, speech difficulty and light-headedness.   Psychiatric/Behavioral:  Negative for agitation and confusion. The patient is not nervous/anxious.      Objective:     Vital Signs (Most Recent):  Temp: 98.1 °F (36.7 °C) (10/13/24 0607)  Pulse: 75 (10/13/24  1016)  Resp: (!) 22 (10/13/24 1016)  BP: (!) 181/118 (10/13/24 1016)  SpO2: 97 % (10/13/24 1016) Vital Signs (24h Range):  Temp:  [98.1 °F (36.7 °C)] 98.1 °F (36.7 °C)  Pulse:  [73-96] 75  Resp:  [18-24] 22  SpO2:  [89 %-99 %] 97 %  BP: (142-292)/() 181/118     Weight: 83 kg (182 lb 15.7 oz)  Body mass index is 31.41 kg/m².     Physical Exam  Vitals and nursing note reviewed.   Constitutional:       General: She is not in acute distress.     Appearance: She is well-developed. She is obese.   HENT:      Head: Normocephalic and atraumatic.      Nose:      Comments: NC bilateral nares.     Mouth/Throat:      Pharynx: No oropharyngeal exudate.   Eyes:      Conjunctiva/sclera: Conjunctivae normal.      Pupils: Pupils are equal, round, and reactive to light.   Cardiovascular:      Rate and Rhythm: Normal rate and regular rhythm.      Heart sounds: Normal heart sounds.   Pulmonary:      Effort: Pulmonary effort is normal. No respiratory distress.      Breath sounds: Wheezing and rhonchi present. No rales.   Abdominal:      General: Bowel sounds are normal. There is no distension.      Palpations: Abdomen is soft.      Tenderness: There is no abdominal tenderness.   Musculoskeletal:         General: No tenderness. Normal range of motion.      Cervical back: Normal range of motion and neck supple.   Lymphadenopathy:      Cervical: No cervical adenopathy.   Skin:     General: Skin is warm and dry.      Capillary Refill: Capillary refill takes less than 2 seconds.      Findings: No rash.   Neurological:      Mental Status: She is alert and oriented to person, place, and time.      Cranial Nerves: No cranial nerve deficit.      Sensory: No sensory deficit.      Coordination: Coordination normal.   Psychiatric:         Behavior: Behavior normal.         Thought Content: Thought content normal.         Judgment: Judgment normal.              CRANIAL NERVES     CN III, IV, VI   Pupils are equal, round, and reactive to  light.       Significant Labs: All pertinent labs within the past 24 hours have been reviewed.  CBC:   Recent Labs   Lab 10/13/24  0649   WBC 8.99   HGB 17.4*   HCT 50.5*        CMP:   Recent Labs   Lab 10/13/24  0756      K 3.2*      CO2 25      BUN 15   CREATININE 1.2   CALCIUM 9.2   PROT 7.5   ALBUMIN 3.4*   BILITOT 0.5   ALKPHOS 80   AST 23   ALT 14   ANIONGAP 11     Troponin:   Recent Labs   Lab 10/13/24  0649   TROPONINI 0.068*       Significant Imaging: I have reviewed all pertinent imaging results/findings within the past 24 hours.  Imaging Results              X-Ray Chest AP Portable (Final result)  Result time 10/13/24 08:00:44      Final result by Julito Whaley MD (10/13/24 08:00:44)                   Impression:      No acute abnormality.      Electronically signed by: Julito Whaley MD  Date:    10/13/2024  Time:    08:00               Narrative:    EXAMINATION:  XR CHEST AP PORTABLE    CLINICAL HISTORY:  Asthma;    TECHNIQUE:  Single frontal view of the chest was performed.    COMPARISON:  None    FINDINGS:  The lungs are clear with normal appearance of pulmonary vasculature. No pleural effusion. No evident pneumothorax.    The cardiac silhouette is prominent.  The hilar and mediastinal contours are unremarkable.    Bones are intact.                                      Assessment/Plan:     * Hypertensive emergency  Hypertensive emergency on arrival in setting of missed BP meds x 1 month. EKG and CXR reviewed.    - arrival - /159, , improved to  s/p cardene gtt  - s/p cardene gtt, coreg, amlodipine, aldactone  - avoid beta blockade given asthma exacerbation  - cont amlodipine, aldactone, valsartan, hctz  - prn hydralazine  - tele    Mild intermittent asthma with acute exacerbation  Reports SOB and productive cough x 2 weeks. CXR without acute findings.    - AF, no leukocytosis  - received duonebs and prednisone in ED  - cont prednisone x 4 more  days  - duonebs q4h wake  - oxygen prn, wean as tolerated  - avoid BB in asthma exacerbation  - pulm f/u at d/c    Cigarette nicotine dependence without complication  Dangers of cigarette smoking were reviewed with patient in detail. Patient was Counseled for 3-10 minutes. Nicotine replacement options were discussed. Nicotine replacement was discussed.    Elevated troponin  Likely in setting of demand ischemia given hypertensive emergency on arrival.  - trop 0.068, trend to peak  - EKG reviewed  - tele    Hypokalemia  - K 3.2, replaced  - trend daily    Gastroesophageal reflux disease without esophagitis  No acute issues. Maalox prn.    Anxiety  No acute issues. Continue cymbalta.    Obesity  Body mass index is 31.41 kg/m². Morbid obesity complicates all aspects of disease management from diagnostic modalities to treatment. Weight loss encouraged and health benefits explained to patient.    Hypertension  See principal problem.      VTE Risk Mitigation (From admission, onward)           Ordered     enoxaparin injection 40 mg  Daily         10/13/24 1043     IP VTE HIGH RISK PATIENT  Once         10/13/24 1043     Place sequential compression device  Until discontinued         10/13/24 1043                                    ELIAS TeixeiraC  Department of Hospital Medicine  [facility] - Emergency Dept

## 2024-10-13 NOTE — ED NOTES
Continuing to message Dr Plaza about the patient's BP that is not improving with oral meds. Charge CRISTY Orozco is being kept UTD on patient's condition.

## 2024-10-13 NOTE — ED PROVIDER NOTES
Encounter Date: 10/13/2024       History     Chief Complaint   Patient presents with    Asthma     Wheezing with SOB for 2 wks but worse today; cough     HPI  53-year-old female with past medical history of hypertension and asthma presents with chief complaint of dyspnea over the last 2 weeks.  The patient states that her mom has been recently sick then passed away, which is why she has not been taking her home medications gotten refills. She has been using albuterol treatments at home without relief.  She denies fevers or recent sick contacts apart from her mom with cancer.  She denies any chest pain, headache, nausea/vomiting, vision changes, abdominal pain, leg swelling, decreased UOP.   Review of patient's allergies indicates:  No Known Allergies  Past Medical History:   Diagnosis Date    Anxiety disorder, unspecified     Hypertension     Migraine headache     Obesity      Past Surgical History:   Procedure Laterality Date    CYSTOURETEROSCOPY,WITH HOLMIUM LASER LITHOTRIPSY OF URETERAL CALCULUS - Bilateral Bilateral 11/09/2022    ESOPHAGOGASTRODUODENOSCOPY N/A 12/23/2022    Procedure: EGD (ESOPHAGOGASTRODUODENOSCOPY);  Surgeon: Anaid Cohen MD;  Location: [facility];  Service: Endoscopy;  Laterality: N/A;    HYSTERECTOMY, TOTAL, LAPAROSCOPIC, WITH SALPINGO-OOPHORECTOMY Bilateral 03/17/2021    LAPAROSCOPIC TOTAL HYSTERECTOMY Bilateral 03/17/2021    PYELOSCOPY Bilateral 10/25/2022    Procedure: PYELOSCOPY;  Surgeon: Raz Cantrell MD;  Location: [facility];  Service: Urology;  Laterality: Bilateral;    TUBAL LIGATION      URETEROSCOPY Bilateral 10/25/2022    Procedure: URETEROSCOPY;  Surgeon: Raz Cantrell MD;  Location: [facility];  Service: Urology;  Laterality: Bilateral;     Family History   Problem Relation Name Age of Onset    Heart disease Mother      Drug abuse Father      Heart disease Father      Kidney disease Father      Diabetes Maternal Uncle      Heart disease Maternal Grandmother       Ovarian cancer Maternal Aunt      Cancer Neg Hx       Social History     Tobacco Use    Smoking status: Every Day     Current packs/day: 0.25     Average packs/day: 0.3 packs/day for 15.0 years (3.8 ttl pk-yrs)     Types: Cigarettes    Smokeless tobacco: Never   Substance Use Topics    Alcohol use: Yes     Alcohol/week: 3.0 standard drinks of alcohol     Types: 3 Shots of liquor per week     Comment: scocially on weekends    Drug use: Yes     Types: Marijuana     Review of Systems    Physical Exam     Initial Vitals   BP Pulse Resp Temp SpO2   10/13/24 0608 10/13/24 0607 10/13/24 0607 10/13/24 0607 10/13/24 0608   (!) 292/159 96 20 98.1 °F (36.7 °C) 95 %      MAP       --                Physical Exam  Physical Exam:  CONSTITUTIONAL: Ill-appearing, diaphoretic.  HENT: Normocephalic, atraumatic    NECK: Supple, no thyroid enlargement  CARDIOVASCULAR: Regular rate and rhythm without any murmurs, gallops, rubs.  RESPIRATORY: Speaking in full sentences. Auscultation of the lungs revealed bilateral inspiratory and expiratory wheezes.  ABDOMEN: Soft and nontender, no masses, no rebound or guarding   NEUROLOGIC: Alert, interacting normally. No facial droop. Voice is clear. Speech is fluent.  MSK: Moving all four extremities.  SKIN: Cool skin. 2+ distal pulses.   Psych: Mood and affect normal.       ED Course   Procedures  Labs Reviewed   CBC W/ AUTO DIFFERENTIAL - Abnormal       Result Value    WBC 8.99      RBC 5.42 (*)     Hemoglobin 17.4 (*)     Hematocrit 50.5 (*)     MCV 93      MCH 32.1 (*)     MCHC 34.5      RDW 15.9 (*)     Platelets 207      MPV 12.2      Immature Granulocytes 0.2      Gran # (ANC) 6.2      Immature Grans (Abs) 0.02      Lymph # 2.0      Mono # 0.4      Eos # 0.4      Baso # 0.06      nRBC 0      Gran % 68.5      Lymph % 21.7      Mono % 4.7      Eosinophil % 4.2      Basophil % 0.7      Differential Method Automated     TROPONIN I - Abnormal    Troponin I 0.068 (*)     Narrative:     add on  [id number] per Michela Mcfarland MD  10/13/2024  07:34   [place]   TROPONIN I - Abnormal    Troponin I 0.068 (*)    COMPREHENSIVE METABOLIC PANEL - Abnormal    Sodium 140      Potassium 3.2 (*)     Chloride 104      CO2 25      Glucose 108      BUN 15      Creatinine 1.2      Calcium 9.2      Total Protein 7.5      Albumin 3.4 (*)     Total Bilirubin 0.5      Alkaline Phosphatase 80      AST 23      ALT 14      eGFR 54.5 (*)     Anion Gap 11     SARS-COV-2 RNA AMPLIFICATION, QUAL    SARS-CoV-2 RNA, Amplification, Qual Negative     B-TYPE NATRIURETIC PEPTIDE    BNP 51      Narrative:     add on [id number] per Michela Mcfarland MD  10/13/2024  07:34   [place]   MAGNESIUM    Magnesium 1.9     URINALYSIS, REFLEX TO URINE CULTURE   POCT GLUCOSE, HAND-HELD DEVICE     EKG Readings: (Independently Interpreted)   Initial Reading: No STEMI. Rhythm: Normal Sinus Rhythm. Heart Rate: 83. ST Segments: Normal ST Segments. T Waves: Normal. Axis: Normal.     ECG Results              EKG 12-lead (Final result)        Collection Time Result Time QRS Duration OHS QTC Calculation    10/13/24 07:07:36 10/13/24 09:51:13 78 477                     Final result by Interface, Lab In [facility] (10/13/24 09:51:22)                   Narrative:    Test Reason : R06.02,    Vent. Rate : 076 BPM     Atrial Rate : 076 BPM     P-R Int : 162 ms          QRS Dur : 078 ms      QT Int : 424 ms       P-R-T Axes : 031 050 083 degrees     QTc Int : 477 ms    Normal sinus rhythm  Possible Left atrial enlargement  Low septal forces and septal Q V2  Abnormal ECG  When compared with ECG of 13-OCT-2024 06:11,  No significant change was found  Confirmed by Yair TANNER MD ([id number]) on 10/13/2024 9:51:08 AM    Referred By: [email or web address]   SELF           Confirmed By:Yair TANNER MD                                  Imaging Results              X-Ray Chest AP Portable (Final result)  Result time 10/13/24 08:00:44      Final result by Julito Whaley MD (10/13/24  08:00:44)                   Impression:      No acute abnormality.      Electronically signed by: Julito Whaley MD  Date:    10/13/2024  Time:    08:00               Narrative:    EXAMINATION:  XR CHEST AP PORTABLE    CLINICAL HISTORY:  Asthma;    TECHNIQUE:  Single frontal view of the chest was performed.    COMPARISON:  None    FINDINGS:  The lungs are clear with normal appearance of pulmonary vasculature. No pleural effusion. No evident pneumothorax.    The cardiac silhouette is prominent.  The hilar and mediastinal contours are unremarkable.    Bones are intact.                                       Medications   amLODIPine tablet 10 mg (has no administration in time range)   DULoxetine DR capsule 120 mg (has no administration in time range)   spironolactone tablet 25 mg (has no administration in time range)   valsartan tablet 320 mg (has no administration in time range)   hydroCHLOROthiazide tablet 12.5 mg (has no administration in time range)   sodium chloride 0.9% flush 10 mL (has no administration in time range)   enoxaparin injection 40 mg (has no administration in time range)   melatonin tablet 6 mg (has no administration in time range)   ondansetron disintegrating tablet 8 mg (has no administration in time range)   promethazine tablet 25 mg (has no administration in time range)   polyethylene glycol packet 17 g (has no administration in time range)   bisacodyL suppository 10 mg (has no administration in time range)   acetaminophen tablet 650 mg (has no administration in time range)   naloxone 0.4 mg/mL injection 0.02 mg (has no administration in time range)   glucose chewable tablet 16 g (has no administration in time range)   glucose chewable tablet 24 g (has no administration in time range)   glucagon (human recombinant) injection 1 mg (has no administration in time range)   sodium chloride 0.9% flush 10 mL (has no administration in time range)   dextrose 10% bolus 125 mL 125 mL (has no administration  in time range)   dextrose 10% bolus 250 mL 250 mL (has no administration in time range)   albuterol-ipratropium 2.5 mg-0.5 mg/3 mL nebulizer solution 3 mL (has no administration in time range)   predniSONE tablet 40 mg (has no administration in time range)   aluminum & magnesium hydroxide-simethicone 400-400-40 mg/5 mL suspension 30 mL (has no administration in time range)   potassium chloride SA CR tablet 20 mEq (has no administration in time range)   hydrALAZINE tablet 25 mg (has no administration in time range)   albuterol-ipratropium 2.5 mg-0.5 mg/3 mL nebulizer solution 3 mL (3 mLs Nebulization Given 10/13/24 0714)   predniSONE tablet 60 mg (60 mg Oral Given 10/13/24 0657)   amLODIPine tablet 10 mg (10 mg Oral Given 10/13/24 0715)   carvediloL tablet 12.5 mg (12.5 mg Oral Given 10/13/24 0716)   spironolactone tablet 25 mg (25 mg Oral Given 10/13/24 0715)   potassium bicarbonate disintegrating tablet 20 mEq (20 mEq Oral Given 10/13/24 0918)     Medical Decision Making  53-year-old female presenting with chief complaint of dyspnea x2 weeks with a productive cough.  She has not been taking her home medications for the last month.  She is severely hypertensive with a blood pressure of 272/146.  She is satting appropriately on room air at 95%.  She is diaphoretic on exam with cool and clammy skin with 2+ distal pulses.  There are inspiratory and expiratory wheezing bilaterally.    Differential diagnosis includes but isn't limited to hypertensive emergency, asthma exacerbation, ACS, heart failure, pneumonia, PE (but per well's criteria 1.3% chance).    The patient was started on a nicardipine drip with a map goal of 150.  Her home blood pressure medications were ordered also, as well as duo nebs 3 times back to back and oral prednisone.  Patient's MAP decreased to 110.  Nicardipine drip was paused.  Patient with minimal wheezing after duo nebs.  Chest x-ray without evidence of pulmonary edema or other acute process.   EKG shows normal sinus rhythm with a rate of 83 beats per minute. No STEMI. Troponin .068. Scheduled repeat ordered. CBC largely unremarkable. CMP remarkable for K of 3.2. Repletion ordered.    Admission to Hospital Medicine for hypertensive emergency and asthma exacerbation.     Amount and/or Complexity of Data Reviewed  Labs: ordered. Decision-making details documented in ED Course.  Radiology: ordered. Decision-making details documented in ED Course.    Risk  Prescription drug management.  Decision regarding hospitalization.              Attending Attestation:   Physician Attestation Statement for Resident:  As the supervising MD   Physician Attestation Statement: I have personally seen and examined this patient.   I agree with the above history.  -: 53-year-old woman with a history of migraines, obesity, hypertension, anxiety, GERD presents with 2 weeks of shortness of breath.  Worsened overnight last night.  She notes that she has not taken any of her medications for the past 6 weeks since the loss of her mother.  She believes this dyspnea is due to her asthma.  She denies any hospitalizations for asthma.  On exam she was severely hypertensive with a systolic of 295.  She was diaphoretic.  She ha increased work of breathing on room air and was tachypneic but able to speaking complete sentences.  There were inspiratory and expiratory wheezing for throughout.   We administered steroids and breathing treatments for apparent asthma exacerbation.  We started her on nicardipine drip for hypertensive emergency, as well as giving her home blood pressure medications and blood pressure dropped significantly.  We targeted a MAP decrease of 25% but she dropped more aggressively.  Nicardipine drip was stopped.  Labs revealed mild troponin elevation of 0.068, will trend.  Mild hypokalemia.  BNP was normal.  Chest x-ray was negative for acute process.  On repeat evaluation, she noted feeling improved.  She still has some  wheezing but improved air movement.  No longer diaphoretic.  She remains neuro intact.  After 1 hour off drip, she was stable for admission to step-down.   As the supervising MD I agree with the above PE.     As the supervising MD I agree with the above treatment, course, plan, and disposition.            Attending Critical Care:   Critical Care Times:   ==============================================================  Total Critical Care Time - exclusive of procedural time: 30 minutes.  ==============================================================  Critical care was necessary to treat or prevent imminent or life-threatening deterioration of the following conditions: hypertensive urgency.           ED Course as of 10/13/24 1127   Sun Oct 13, 2024   0736 Troponin I(!): 0.068 [MK]   0812 X-Ray Chest AP Portable  Impression:     No acute abnormality.   [MK]   0906 Potassium(!): 3.2 [MK]   0906 Magnesium [MK]      ED Course User Index  [MK] Michela Mcfarland MD                             Clinical Impression:  Final diagnoses:  [R06.02] Shortness of breath  [I16.1] Hypertensive emergency (Primary)  [J45.901] Moderate asthma with exacerbation, unspecified whether persistent          ED Disposition Condition    Admit Stable                Michela Mcfarland MD  Resident  10/13/24 1027       Yunier Manuel MD  10/13/24 1127

## 2024-10-13 NOTE — HPI
Calos Rios is a 53 y.o. female with PMHx of HTN, GERD, obesity, asthma who presents to [facility] ED with chief complaint of shortness of breath x 2 weeks with associated productive cough. She has not been taking home BP meds x 1 month and has not gotten refills as she reports she has been taking care of her mother who has been sick. She has tried albuterol inhaler at home without improvement. She denies HA, vision changes, fever, chills, chest pain, abdominal pain, n/v/d, urinary symptoms, numbness or tingling.     In the ED: Afebrile, hypertensive to 292/159, , otherwise normal HR, placed on 2L NC. MAP improved to 126 s/p cardene gtt. CBC without leukocytosis. CMP with K 3.2. Troponin 0.068. Covid negative. EKG no STEMI. CXR without acute findings. Received cardene gtt, duonebs, amlodipine, coreg, aldactone, potassium bicarb, and prednisone. Admitted to .

## 2024-10-13 NOTE — ED NOTES
Msg sent to Dr Plaza to see if prn BP meds can be ordered IV or restart the pt's Cardene because pressure is continuing to stay elevated in the range of 192/101. Awaiting response. Charge CRISTY Orozco also updated on pt's BP control issues.

## 2024-10-13 NOTE — Clinical Note
Diagnosis: Shortness of breath [786.05.ICD-9-CM]   Bed request comments: CSU or cardiac stepdown in case pt needs to be put back on nicardipine drip   Reason for IP Medical Treatment  (Clinical interventions that can only be accomplished in the IP setting? ) :: hypertensive emergency   Plans for Post-Acute care--if anticipated (pick the single best option):: A. No post acute care anticipated at this time

## 2024-10-13 NOTE — ED NOTES
Received report from CRISTY Vargas. Pt resting on the stretcher watching TV. Connected to cardiac monitor, PO, and BP cuff. Closely monitoring BP. Call light in reach, bed in lowest position, and rails up x2. Purewick in place.

## 2024-10-13 NOTE — ASSESSMENT & PLAN NOTE
Reports SOB and productive cough x 2 weeks. CXR without acute findings.    - AF, no leukocytosis  - received duonebs and prednisone in ED  - cont prednisone x 4 more days  - duonebs q4h wake  - oxygen prn, wean as tolerated  - avoid BB in asthma exacerbation  - pulm f/u at d/c

## 2024-10-13 NOTE — ASSESSMENT & PLAN NOTE
Dangers of cigarette smoking were reviewed with patient in detail. Patient was Counseled for 3-10 minutes. Nicotine replacement options were discussed. Nicotine replacement was discussed.

## 2024-10-13 NOTE — SUBJECTIVE & OBJECTIVE
Past Medical History:   Diagnosis Date    Anxiety disorder, unspecified     Hypertension     Migraine headache     Obesity        Past Surgical History:   Procedure Laterality Date    CYSTOURETEROSCOPY,WITH HOLMIUM LASER LITHOTRIPSY OF URETERAL CALCULUS - Bilateral Bilateral 11/09/2022    ESOPHAGOGASTRODUODENOSCOPY N/A 12/23/2022    Procedure: EGD (ESOPHAGOGASTRODUODENOSCOPY);  Surgeon: Anaid Cohen MD;  Location: [facility];  Service: Endoscopy;  Laterality: N/A;    HYSTERECTOMY, TOTAL, LAPAROSCOPIC, WITH SALPINGO-OOPHORECTOMY Bilateral 03/17/2021    LAPAROSCOPIC TOTAL HYSTERECTOMY Bilateral 03/17/2021    PYELOSCOPY Bilateral 10/25/2022    Procedure: PYELOSCOPY;  Surgeon: Raz Cantrell MD;  Location: [facility] OR;  Service: Urology;  Laterality: Bilateral;    TUBAL LIGATION      URETEROSCOPY Bilateral 10/25/2022    Procedure: URETEROSCOPY;  Surgeon: Raz Cantrell MD;  Location: [facility] OR;  Service: Urology;  Laterality: Bilateral;       Review of patient's allergies indicates:  No Known Allergies    No current facility-administered medications on file prior to encounter.     Current Outpatient Medications on File Prior to Encounter   Medication Sig    albuterol (PROVENTIL/VENTOLIN HFA) 90 mcg/actuation inhaler Inhale 2 puffs into the lungs every 6 (six) hours as needed for Wheezing.    amLODIPine (NORVASC) 10 MG tablet Take 1 tablet (10 mg total) by mouth once daily.    blood pressure monitor (IHEALTH EASE) LG arm size (OP) by Other route as needed for High Blood Pressure.    carvediloL (COREG) 12.5 MG tablet Take 1 tablet (12.5 mg total) by mouth 2 (two) times daily with meals.    cetirizine (ZYRTEC) 5 MG tablet Take 1 tablet (5 mg total) by mouth every evening. for 10 days    dicyclomine (BENTYL) 20 mg tablet Take 1 tablet (20 mg total) by mouth 3 (three) times daily as needed (abdominal pain).    dulaglutide (TRULICITY) 1.5 mg/0.5 mL pen injector Inject 1.5 mg into the skin every 7 days.    DULoxetine  (CYMBALTA) 60 MG capsule Take 2 capsules (120 mg total) by mouth once daily.    ergocalciferol (ERGOCALCIFEROL) 50,000 unit Cap Take 50,000 Units by mouth every 7 days.    ibuprofen (ADVIL,MOTRIN) 600 MG tablet Take 1 tablet (600 mg total) by mouth every 6 (six) hours as needed.    omeprazole (PRILOSEC) 40 MG capsule TAKE 1 CAPSULE BY MOUTH EVERY DAY    ondansetron (ZOFRAN-ODT) 4 MG TbDL Take 1 tablet (4 mg total) by mouth 2 (two) times daily.    oxybutynin (DITROPAN) 5 MG Tab Take 1 tablet (5 mg total) by mouth 3 (three) times daily as needed (bladder spasms).    polyethylene glycol (GLYCOLAX) 17 gram/dose powder Take 17 g by mouth once daily.    spironolactone (ALDACTONE) 25 MG tablet Take 1 tablet (25 mg total) by mouth once daily.    valsartan-hydrochlorothiazide (DIOVAN-HCT) 320-12.5 mg per tablet Take 1 tablet by mouth once daily.     Family History       Problem Relation (Age of Onset)    Diabetes Maternal Uncle    Drug abuse Father    Heart disease Mother, Father, Maternal Grandmother    Kidney disease Father    Ovarian cancer Maternal Aunt          Tobacco Use    Smoking status: Every Day     Current packs/day: 0.25     Average packs/day: 0.3 packs/day for 15.0 years (3.8 ttl pk-yrs)     Types: Cigarettes    Smokeless tobacco: Never   Substance and Sexual Activity    Alcohol use: Yes     Alcohol/week: 3.0 standard drinks of alcohol     Types: 3 Shots of liquor per week     Comment: scocially on weekends    Drug use: Yes     Types: Marijuana    Sexual activity: Yes     Partners: Male     Birth control/protection: None     Review of Systems   Constitutional:  Negative for activity change, chills and fever.   HENT:  Negative for trouble swallowing.    Eyes:  Negative for photophobia and visual disturbance.   Respiratory:  Positive for cough, chest tightness and shortness of breath.    Cardiovascular:  Negative for chest pain, palpitations and leg swelling.   Gastrointestinal:  Negative for abdominal pain,  constipation, diarrhea, nausea and vomiting.   Genitourinary:  Negative for dysuria, frequency and hematuria.   Musculoskeletal:  Negative for back pain, gait problem and neck pain.   Skin:  Negative for rash and wound.   Neurological:  Negative for dizziness, syncope, speech difficulty and light-headedness.   Psychiatric/Behavioral:  Negative for agitation and confusion. The patient is not nervous/anxious.      Objective:     Vital Signs (Most Recent):  Temp: 98.1 °F (36.7 °C) (10/13/24 0607)  Pulse: 75 (10/13/24 1016)  Resp: (!) 22 (10/13/24 1016)  BP: (!) 181/118 (10/13/24 1016)  SpO2: 97 % (10/13/24 1016) Vital Signs (24h Range):  Temp:  [98.1 °F (36.7 °C)] 98.1 °F (36.7 °C)  Pulse:  [73-96] 75  Resp:  [18-24] 22  SpO2:  [89 %-99 %] 97 %  BP: (142-292)/() 181/118     Weight: 83 kg (182 lb 15.7 oz)  Body mass index is 31.41 kg/m².     Physical Exam  Vitals and nursing note reviewed.   Constitutional:       General: She is not in acute distress.     Appearance: She is well-developed. She is obese.   HENT:      Head: Normocephalic and atraumatic.      Nose:      Comments: NC bilateral nares.     Mouth/Throat:      Pharynx: No oropharyngeal exudate.   Eyes:      Conjunctiva/sclera: Conjunctivae normal.      Pupils: Pupils are equal, round, and reactive to light.   Cardiovascular:      Rate and Rhythm: Normal rate and regular rhythm.      Heart sounds: Normal heart sounds.   Pulmonary:      Effort: Pulmonary effort is normal. No respiratory distress.      Breath sounds: Wheezing and rhonchi present. No rales.   Abdominal:      General: Bowel sounds are normal. There is no distension.      Palpations: Abdomen is soft.      Tenderness: There is no abdominal tenderness.   Musculoskeletal:         General: No tenderness. Normal range of motion.      Cervical back: Normal range of motion and neck supple.   Lymphadenopathy:      Cervical: No cervical adenopathy.   Skin:     General: Skin is warm and dry.       Capillary Refill: Capillary refill takes less than 2 seconds.      Findings: No rash.   Neurological:      Mental Status: She is alert and oriented to person, place, and time.      Cranial Nerves: No cranial nerve deficit.      Sensory: No sensory deficit.      Coordination: Coordination normal.   Psychiatric:         Behavior: Behavior normal.         Thought Content: Thought content normal.         Judgment: Judgment normal.              CRANIAL NERVES     CN III, IV, VI   Pupils are equal, round, and reactive to light.       Significant Labs: All pertinent labs within the past 24 hours have been reviewed.  CBC:   Recent Labs   Lab 10/13/24  0649   WBC 8.99   HGB 17.4*   HCT 50.5*        CMP:   Recent Labs   Lab 10/13/24  0756      K 3.2*      CO2 25      BUN 15   CREATININE 1.2   CALCIUM 9.2   PROT 7.5   ALBUMIN 3.4*   BILITOT 0.5   ALKPHOS 80   AST 23   ALT 14   ANIONGAP 11     Troponin:   Recent Labs   Lab 10/13/24  0649   TROPONINI 0.068*       Significant Imaging: I have reviewed all pertinent imaging results/findings within the past 24 hours.  Imaging Results              X-Ray Chest AP Portable (Final result)  Result time 10/13/24 08:00:44      Final result by Julito Whaley MD (10/13/24 08:00:44)                   Impression:      No acute abnormality.      Electronically signed by: Julito Whaley MD  Date:    10/13/2024  Time:    08:00               Narrative:    EXAMINATION:  XR CHEST AP PORTABLE    CLINICAL HISTORY:  Asthma;    TECHNIQUE:  Single frontal view of the chest was performed.    COMPARISON:  None    FINDINGS:  The lungs are clear with normal appearance of pulmonary vasculature. No pleural effusion. No evident pneumothorax.    The cardiac silhouette is prominent.  The hilar and mediastinal contours are unremarkable.    Bones are intact.

## 2024-10-13 NOTE — ASSESSMENT & PLAN NOTE
Body mass index is 31.41 kg/m². Morbid obesity complicates all aspects of disease management from diagnostic modalities to treatment. Weight loss encouraged and health benefits explained to patient.

## 2024-10-14 ENCOUNTER — TELEPHONE (OUTPATIENT)
Dept: INTERNAL MEDICINE | Facility: CLINIC | Age: 54
End: 2024-10-14
Payer: MEDICAID

## 2024-10-14 PROBLEM — J96.01 ACUTE HYPOXIC RESPIRATORY FAILURE: Status: ACTIVE | Noted: 2024-05-25

## 2024-10-14 LAB
ALBUMIN SERPL BCP-MCNC: 3.5 G/DL (ref 3.5–5.2)
ALP SERPL-CCNC: 88 U/L (ref 55–135)
ALT SERPL W/O P-5'-P-CCNC: 17 U/L (ref 10–44)
ANION GAP SERPL CALC-SCNC: 15 MMOL/L (ref 8–16)
AST SERPL-CCNC: 20 U/L (ref 10–40)
BASOPHILS # BLD AUTO: 0.02 K/UL (ref 0–0.2)
BASOPHILS NFR BLD: 0.2 % (ref 0–1.9)
BILIRUB SERPL-MCNC: 0.4 MG/DL (ref 0.1–1)
BUN SERPL-MCNC: 24 MG/DL (ref 6–20)
CALCIUM SERPL-MCNC: 10.1 MG/DL (ref 8.7–10.5)
CHLORIDE SERPL-SCNC: 102 MMOL/L (ref 95–110)
CO2 SERPL-SCNC: 20 MMOL/L (ref 23–29)
CREAT SERPL-MCNC: 1.5 MG/DL (ref 0.5–1.4)
CREAT UR-MCNC: 183 MG/DL (ref 15–325)
CREAT UR-MCNC: 183 MG/DL (ref 15–325)
DIFFERENTIAL METHOD BLD: ABNORMAL
EOSINOPHIL # BLD AUTO: 0 K/UL (ref 0–0.5)
EOSINOPHIL NFR BLD: 0.3 % (ref 0–8)
ERYTHROCYTE [DISTWIDTH] IN BLOOD BY AUTOMATED COUNT: 14.4 % (ref 11.5–14.5)
EST. GFR  (NO RACE VARIABLE): 41.7 ML/MIN/1.73 M^2
GLUCOSE SERPL-MCNC: 93 MG/DL (ref 70–110)
HCT VFR BLD AUTO: 49.3 % (ref 37–48.5)
HGB BLD-MCNC: 17 G/DL (ref 12–16)
IMM GRANULOCYTES # BLD AUTO: 0.03 K/UL (ref 0–0.04)
IMM GRANULOCYTES NFR BLD AUTO: 0.3 % (ref 0–0.5)
LYMPHOCYTES # BLD AUTO: 2.4 K/UL (ref 1–4.8)
LYMPHOCYTES NFR BLD: 23 % (ref 18–48)
MAGNESIUM SERPL-MCNC: 2 MG/DL (ref 1.6–2.6)
MCH RBC QN AUTO: 32.2 PG (ref 27–31)
MCHC RBC AUTO-ENTMCNC: 34.5 G/DL (ref 32–36)
MCV RBC AUTO: 93 FL (ref 82–98)
MONOCYTES # BLD AUTO: 0.5 K/UL (ref 0.3–1)
MONOCYTES NFR BLD: 4.5 % (ref 4–15)
NEUTROPHILS # BLD AUTO: 7.5 K/UL (ref 1.8–7.7)
NEUTROPHILS NFR BLD: 71.7 % (ref 38–73)
NRBC BLD-RTO: 0 /100 WBC
PHOSPHATE SERPL-MCNC: 3.2 MG/DL (ref 2.7–4.5)
PLATELET # BLD AUTO: 224 K/UL (ref 150–450)
PMV BLD AUTO: 11.6 FL (ref 9.2–12.9)
POCT GLUCOSE: 100 MG/DL (ref 70–110)
POCT GLUCOSE: 106 MG/DL (ref 70–110)
POCT GLUCOSE: 131 MG/DL (ref 70–110)
POCT GLUCOSE: 132 MG/DL (ref 70–110)
POTASSIUM SERPL-SCNC: 3.3 MMOL/L (ref 3.5–5.1)
PROT SERPL-MCNC: 7.8 G/DL (ref 6–8.4)
RBC # BLD AUTO: 5.28 M/UL (ref 4–5.4)
SODIUM SERPL-SCNC: 137 MMOL/L (ref 136–145)
UUN UR-MCNC: 958 MG/DL (ref 140–1050)
WBC # BLD AUTO: 10.42 K/UL (ref 3.9–12.7)

## 2024-10-14 PROCEDURE — 85025 COMPLETE CBC W/AUTO DIFF WBC: CPT

## 2024-10-14 PROCEDURE — 94799 UNLISTED PULMONARY SVC/PX: CPT

## 2024-10-14 PROCEDURE — 84100 ASSAY OF PHOSPHORUS: CPT

## 2024-10-14 PROCEDURE — 36415 COLL VENOUS BLD VENIPUNCTURE: CPT

## 2024-10-14 PROCEDURE — 99900035 HC TECH TIME PER 15 MIN (STAT)

## 2024-10-14 PROCEDURE — 94640 AIRWAY INHALATION TREATMENT: CPT

## 2024-10-14 PROCEDURE — 25000003 PHARM REV CODE 250: Performed by: STUDENT IN AN ORGANIZED HEALTH CARE EDUCATION/TRAINING PROGRAM

## 2024-10-14 PROCEDURE — 63600175 PHARM REV CODE 636 W HCPCS

## 2024-10-14 PROCEDURE — 80053 COMPREHEN METABOLIC PANEL: CPT

## 2024-10-14 PROCEDURE — 83735 ASSAY OF MAGNESIUM: CPT

## 2024-10-14 PROCEDURE — 94761 N-INVAS EAR/PLS OXIMETRY MLT: CPT

## 2024-10-14 PROCEDURE — 25000242 PHARM REV CODE 250 ALT 637 W/ HCPCS

## 2024-10-14 PROCEDURE — 20600001 HC STEP DOWN PRIVATE ROOM

## 2024-10-14 PROCEDURE — 82570 ASSAY OF URINE CREATININE: CPT | Performed by: STUDENT IN AN ORGANIZED HEALTH CARE EDUCATION/TRAINING PROGRAM

## 2024-10-14 PROCEDURE — 25000003 PHARM REV CODE 250

## 2024-10-14 PROCEDURE — 84540 ASSAY OF URINE/UREA-N: CPT | Performed by: STUDENT IN AN ORGANIZED HEALTH CARE EDUCATION/TRAINING PROGRAM

## 2024-10-14 RX ORDER — POTASSIUM CHLORIDE 20 MEQ/1
40 TABLET, EXTENDED RELEASE ORAL
Status: COMPLETED | OUTPATIENT
Start: 2024-10-14 | End: 2024-10-14

## 2024-10-14 RX ORDER — GUAIFENESIN 600 MG/1
600 TABLET, EXTENDED RELEASE ORAL 2 TIMES DAILY
Status: DISCONTINUED | OUTPATIENT
Start: 2024-10-14 | End: 2024-10-15 | Stop reason: HOSPADM

## 2024-10-14 RX ORDER — BENZONATATE 100 MG/1
100 CAPSULE ORAL 3 TIMES DAILY PRN
Status: DISCONTINUED | OUTPATIENT
Start: 2024-10-14 | End: 2024-10-15 | Stop reason: HOSPADM

## 2024-10-14 RX ORDER — SPIRONOLACTONE 25 MG/1
25 TABLET ORAL DAILY
Status: DISCONTINUED | OUTPATIENT
Start: 2024-10-15 | End: 2024-10-14

## 2024-10-14 RX ORDER — HYDROCHLOROTHIAZIDE 12.5 MG/1
25 TABLET ORAL DAILY
Status: DISCONTINUED | OUTPATIENT
Start: 2024-10-15 | End: 2024-10-15 | Stop reason: HOSPADM

## 2024-10-14 RX ORDER — NIFEDIPINE 60 MG/1
60 TABLET, EXTENDED RELEASE ORAL DAILY
Status: DISCONTINUED | OUTPATIENT
Start: 2024-10-15 | End: 2024-10-15 | Stop reason: HOSPADM

## 2024-10-14 RX ORDER — VALSARTAN 160 MG/1
160 TABLET ORAL DAILY
Status: DISCONTINUED | OUTPATIENT
Start: 2024-10-15 | End: 2024-10-15 | Stop reason: HOSPADM

## 2024-10-14 RX ADMIN — IPRATROPIUM BROMIDE AND ALBUTEROL SULFATE 3 ML: 2.5; .5 SOLUTION RESPIRATORY (INHALATION) at 08:10

## 2024-10-14 RX ADMIN — GUAIFENESIN 600 MG: 600 TABLET, EXTENDED RELEASE ORAL at 09:10

## 2024-10-14 RX ADMIN — HYDROCHLOROTHIAZIDE 50 MG: 12.5 TABLET ORAL at 09:10

## 2024-10-14 RX ADMIN — DULOXETINE HYDROCHLORIDE 120 MG: 60 CAPSULE, DELAYED RELEASE ORAL at 09:10

## 2024-10-14 RX ADMIN — IPRATROPIUM BROMIDE AND ALBUTEROL SULFATE 3 ML: 2.5; .5 SOLUTION RESPIRATORY (INHALATION) at 01:10

## 2024-10-14 RX ADMIN — SPIRONOLACTONE 50 MG: 25 TABLET, FILM COATED ORAL at 09:10

## 2024-10-14 RX ADMIN — HYDRALAZINE HYDROCHLORIDE 100 MG: 50 TABLET ORAL at 12:10

## 2024-10-14 RX ADMIN — VALSARTAN 320 MG: 160 TABLET, FILM COATED ORAL at 09:10

## 2024-10-14 RX ADMIN — PREDNISONE 40 MG: 20 TABLET ORAL at 09:10

## 2024-10-14 RX ADMIN — POTASSIUM CHLORIDE 40 MEQ: 1500 TABLET, EXTENDED RELEASE ORAL at 02:10

## 2024-10-14 RX ADMIN — IPRATROPIUM BROMIDE AND ALBUTEROL SULFATE 3 ML: 2.5; .5 SOLUTION RESPIRATORY (INHALATION) at 09:10

## 2024-10-14 RX ADMIN — POTASSIUM CHLORIDE 40 MEQ: 1500 TABLET, EXTENDED RELEASE ORAL at 11:10

## 2024-10-14 RX ADMIN — ENOXAPARIN SODIUM 40 MG: 40 INJECTION SUBCUTANEOUS at 04:10

## 2024-10-14 RX ADMIN — IPRATROPIUM BROMIDE AND ALBUTEROL SULFATE 3 ML: 2.5; .5 SOLUTION RESPIRATORY (INHALATION) at 04:10

## 2024-10-14 RX ADMIN — GUAIFENESIN 600 MG: 600 TABLET, EXTENDED RELEASE ORAL at 11:10

## 2024-10-14 NOTE — PLAN OF CARE
Problem: Adult Inpatient Plan of Care  Goal: Patient-Specific Goal (Individualized)  Outcome: Progressing  Flowsheets (Taken 10/14/2024 1223)  Individualized Care Needs:   Monitor and manage blood pressure. Monitor electrolytes; replace as per ordered; potassium replaced. Monitor HR and rhythm. Maintain ACHS. Monitor I/O.   Anxieties, Fears or Concerns: Blood Plessure  Patient/Family-Specific Goals (Include Timeframe): Control BP

## 2024-10-14 NOTE — SUBJECTIVE & OBJECTIVE
Interval History: Reports feeling better since admit. Denies headache. Weaned to RA. Reporting cough.   Overnight BP elevated again despite aggressive oral regimen, improved this morning.    Review of Systems   Constitutional:  Negative for fever.   Respiratory:  Positive for cough. Negative for shortness of breath.    Cardiovascular:  Negative for chest pain and leg swelling.   Neurological:  Negative for light-headedness and headaches.     Objective:     Vital Signs (Most Recent):  Temp: 98.1 °F (36.7 °C) (10/14/24 1122)  Pulse: 88 (10/14/24 1311)  Resp: 17 (10/14/24 1311)  BP: (!) 143/88 (10/14/24 1122)  SpO2: (!) 94 % (10/14/24 1311) Vital Signs (24h Range):  Temp:  [97.3 °F (36.3 °C)-98.1 °F (36.7 °C)] 98.1 °F (36.7 °C)  Pulse:  [69-98] 88  Resp:  [16-23] 17  SpO2:  [93 %-96 %] 94 %  BP: (137-208)/() 143/88     Weight: 81.6 kg (179 lb 14.3 oz)  Body mass index is 30.88 kg/m².    Intake/Output Summary (Last 24 hours) at 10/14/2024 1510  Last data filed at 10/14/2024 1154  Gross per 24 hour   Intake 368 ml   Output 650 ml   Net -282 ml         Physical Exam  Vitals and nursing note reviewed.   Constitutional:       General: She is not in acute distress.     Appearance: Normal appearance.   HENT:      Head: Normocephalic and atraumatic.      Nose: Nose normal.      Mouth/Throat:      Mouth: Mucous membranes are moist.      Pharynx: Oropharynx is clear.   Eyes:      Extraocular Movements: Extraocular movements intact.      Conjunctiva/sclera: Conjunctivae normal.   Cardiovascular:      Rate and Rhythm: Normal rate and regular rhythm.      Pulses: Normal pulses.      Heart sounds: Murmur heard.   Pulmonary:      Effort: Pulmonary effort is normal. No respiratory distress.      Breath sounds: Rhonchi present.   Abdominal:      General: Abdomen is flat. Bowel sounds are normal.      Palpations: Abdomen is soft.   Musculoskeletal:         General: Normal range of motion.      Cervical back: Normal range of motion  and neck supple.      Right lower leg: No edema.      Left lower leg: No edema.   Skin:     General: Skin is warm and dry.   Neurological:      General: No focal deficit present.      Mental Status: She is alert and oriented to person, place, and time.   Psychiatric:         Mood and Affect: Mood normal.         Behavior: Behavior normal.             Significant Labs: All pertinent labs within the past 24 hours have been reviewed.    Significant Imaging: I have reviewed all pertinent imaging results/findings within the past 24 hours.

## 2024-10-14 NOTE — PLAN OF CARE
[facility] - Cardiology Stepdown  Initial Discharge Assessment       Primary Care Provider: Adria Doll Jr., MD    Admission Diagnosis: Shortness of breath [R06.02]  Chest pain [R07.9]  Hypertensive emergency [I16.1]  Moderate asthma with exacerbation, unspecified whether persistent [J45.901]    Admission Date: 10/13/2024  Expected Discharge Date: 10/15/2024    Transition of Care Barriers: None    Payor: MEDICAID / Plan: [place] / Product Type: Managed Medicaid /     Extended Emergency Contact Information  Primary Emergency Contact: Adria Rios  Mobile Phone: [phone]  Relation: Brother  Preferred language: English   needed? No  Secondary Emergency Contact: Lucero Rios  Mobile Phone: [phone]  Relation: Daughter    Discharge Plan A: Home with family  Discharge Plan B: Home      [id number] - [place], [place] - [address]  [address]  [place] [address]  Phone: [phone] Fax: [phone]      Initial Assessment (most recent)       Adult Discharge Assessment - 10/14/24 1235          Discharge Assessment    Assessment Type Discharge Planning Assessment     Confirmed/corrected address, phone number and insurance Yes     Confirmed Demographics Correct on Facesheet     Source of Information patient;family;health record     Communicated JEFF with patient/caregiver Yes     Reason For Admission Hypertensive emergency     People in Home sibling(s)     Facility Arrived From: Home     Do you expect to return to your current living situation? Yes     Do you have help at home or someone to help you manage your care at home? Yes     Who are your caregiver(s) and their phone number(s)? Adria Rios (brother) [phone], Lucero Rios (daughter) [phone]     Prior to hospitilization cognitive status: Alert/Oriented     Current cognitive status: Alert/Oriented     Walking or Climbing Stairs Difficulty no     Dressing/Bathing Difficulty no      Equipment Currently Used at Home nebulizer     Readmission within 30 days? No     Patient currently being followed by outpatient case management? No     Do you currently have service(s) that help you manage your care at home? No     Do you take prescription medications? Yes     Do you have prescription coverage? Yes     Do you have any problems affording any of your prescribed medications? No     Is the patient taking medications as prescribed? no     Who is going to help you get home at discharge? Self     How do you get to doctors appointments? car, drives self     Are you on dialysis? No     Do you take coumadin? No     Discharge Plan A Home with family     Discharge Plan B Home     DME Needed Upon Discharge  nebulizer     Discharge Plan discussed with: Patient     Transition of Care Barriers None                   SW met with pt and brother Adria at bedside to discuss discharge planning.  Pt lives with Adria and is independent with ambulation and ADLs.  Pt reported that she uses an broken nebulizer at home that belings to her mom and is requesting a new one at discharge.  No HH, dialysis, or coumadin.  PCP is Dr Doll.  Pt will have drive herself home and will have assistance from family at discharge.  Discharge Plan A and Plan B have been determined by review of patient's clinical status, future medical and therapeutic needs, and coverage/benefits for post-acute care in coordination with multidisciplinary team members.  ELVIN name and ext on whiteboard; discharge planning booklet provided.  Will continue to follow.      Thelma Smith LMSW  [facility]  [id number]

## 2024-10-14 NOTE — ASSESSMENT & PLAN NOTE
Likely in setting of demand ischemia given hypertensive emergency on arrival.  - trop 0.068, trend to peak- flat   - EKG reviewed  - tele

## 2024-10-14 NOTE — TELEPHONE ENCOUNTER
----- Message from Maggie sent at 10/14/2024  8:46 AM CDT -----  Contact: [phone]  Patient called, want to inform that she was admitted to the emergency room yesterday 10/13/24.   Patient arrives with c/o headache x 4 days. Reports nausea, anxious, weakness, blurry vision, dizzy   A/O on arrival

## 2024-10-14 NOTE — HOSPITAL COURSE
Patient admitted for hypoxic resp failure and hypertensive urgency. Noncompliance with antihypertensive regimen at home. Resumed and increased oral regimen with improvement in BP.   Started on supplemental O2. +Smoker, cough with mucus and rhonchi on exam- started on duonebs, steroids for asthma versus likely COPD exacerbation. Does have history of asthma but given smoking history likely undiagnosed COPD component as well, refer to pulm outpt for PFT. Weaned to RA. Afebrile, no leukocytosis or consolidation on XR, hold abx. Mucolytics and antitussives.   Worsening LATOYA but reviewing prior labs she has CKD, likely from uncontrolled HTN. Renal u/s - Left nonobstructive nephrolithiasis, no hydronephrosis and urine studies prerenal- encourage PO hydration. LATOYA stable. Stable for discharge. F/u renal, cards, pulm at discharge.

## 2024-10-14 NOTE — ASSESSMENT & PLAN NOTE
Reports SOB and productive cough x 2 weeks. CXR without acute findings.  - hypoxia in ED started on supplemental O2. Not on home O2  - history of asthma, but also reports long smoking history so likely component of COPD as well    - AF, no leukocytosis, no consolidation on CXR  - received duonebs and prednisone in ED  - cont prednisone x 5 day course  - duonebs q4h wake  - oxygen prn, wean as tolerated- weaned to RA, walk test ordered  - avoid BB in asthma exacerbation  - pulm f/u at d/c for PFT  - smoking cessation counseling, nicotine patches PRN    - mucolytics and antitussives

## 2024-10-14 NOTE — ASSESSMENT & PLAN NOTE
Patient's most recent potassium results are listed below.   Recent Labs     10/13/24  0756 10/14/24  0418   K 3.2* 3.3*     Plan  - Replete potassium per protocol  - Monitor potassium Daily  - Patient's hypokalemia is stable  - replace PRN

## 2024-10-14 NOTE — ASSESSMENT & PLAN NOTE
LATOYA/CKD is likely due to  HTN . Baseline creatinine is  1.2 . Most recent creatinine and eGFR are listed below.  Recent Labs     10/13/24  0756 10/14/24  0418   CREATININE 1.2 1.5*   EGFRNORACEVR 54.5* 41.7*      Plan  - LATOYA is worsening. Will adjust treatment as follows: renal u/s, urine studies   - Avoid nephrotoxins and renally dose meds for GFR listed above  - Monitor urine output, serial BMP, and adjust therapy as needed  - BP control   - F/U nephrology outpt

## 2024-10-14 NOTE — NURSING
Latest Reference Range & Units 10/14/24 04:18   Potassium 3.5 - 5.1 mmol/L 3.3 (L)   (L): Data is abnormally low      Provider notified via secure chat. Replaced as per order.

## 2024-10-14 NOTE — PROGRESS NOTES
[facility] - Cardiology [facility] Medicine  Progress Note    Patient Name: Calos Rios  MRN: [id number]  Patient Class: IP- Inpatient   Admission Date: 10/13/2024  Length of Stay: 1 days  Attending Physician: Leann Plaza MD  Primary Care Provider: Adria Doll Jr., MD        Subjective:     Principal Problem:Hypertensive emergency        HPI:  Calos Rios is a 53 y.o. female with PMHx of HTN, GERD, obesity, asthma who presents to [facility] ED with chief complaint of shortness of breath x 2 weeks with associated productive cough. She has not been taking home BP meds x 1 month and has not gotten refills as she reports she has been taking care of her mother who has been sick. She has tried albuterol inhaler at home without improvement. She denies HA, vision changes, fever, chills, chest pain, abdominal pain, n/v/d, urinary symptoms, numbness or tingling.     In the ED: Afebrile, hypertensive to 292/159, , otherwise normal HR, placed on 2L NC. MAP improved to 126 s/p cardene gtt. CBC without leukocytosis. CMP with K 3.2. Troponin 0.068. Covid negative. EKG no STEMI. CXR without acute findings. Received cardene gtt, duonebs, amlodipine, coreg, aldactone, potassium bicarb, and prednisone. Admitted to .    Overview/Hospital Course:  Patient admitted for hypoxic resp failure and hypertensive urgency. Noncompliance with antihypertensive regimen at home. Resumed and increased oral regimen with improvement in BP.   Started on supplemental O2. +Smoker, cough with mucus and rhonchi on exam- started on duonebs, steroids for asthma versus likely COPD exacerbation. Does have history of asthma but given smoking history likely undiagnosed COPD component as well, refer to pulm outpt for PFT. Weaned to RA. Afebrile, no leukocytosis or consolidation on XR, hold abx. Mucolytics and antitussives.   Worsening LATOYA but reviewing prior labs she has CKD, likely from uncontrolled HTN. Renal u/s and urine studies  ordered.     Interval History: Reports feeling better since admit. Denies headache. Weaned to RA. Reporting cough.   Overnight BP elevated again despite aggressive oral regimen, improved this morning.    Review of Systems   Constitutional:  Negative for fever.   Respiratory:  Positive for cough. Negative for shortness of breath.    Cardiovascular:  Negative for chest pain and leg swelling.   Neurological:  Negative for light-headedness and headaches.     Objective:     Vital Signs (Most Recent):  Temp: 98.1 °F (36.7 °C) (10/14/24 1122)  Pulse: 88 (10/14/24 1311)  Resp: 17 (10/14/24 1311)  BP: (!) 143/88 (10/14/24 1122)  SpO2: (!) 94 % (10/14/24 1311) Vital Signs (24h Range):  Temp:  [97.3 °F (36.3 °C)-98.1 °F (36.7 °C)] 98.1 °F (36.7 °C)  Pulse:  [69-98] 88  Resp:  [16-23] 17  SpO2:  [93 %-96 %] 94 %  BP: (137-208)/() 143/88     Weight: 81.6 kg (179 lb 14.3 oz)  Body mass index is 30.88 kg/m².    Intake/Output Summary (Last 24 hours) at 10/14/2024 1510  Last data filed at 10/14/2024 1154  Gross per 24 hour   Intake 368 ml   Output 650 ml   Net -282 ml         Physical Exam  Vitals and nursing note reviewed.   Constitutional:       General: She is not in acute distress.     Appearance: Normal appearance.   HENT:      Head: Normocephalic and atraumatic.      Nose: Nose normal.      Mouth/Throat:      Mouth: Mucous membranes are moist.      Pharynx: Oropharynx is clear.   Eyes:      Extraocular Movements: Extraocular movements intact.      Conjunctiva/sclera: Conjunctivae normal.   Cardiovascular:      Rate and Rhythm: Normal rate and regular rhythm.      Pulses: Normal pulses.      Heart sounds: Murmur heard.   Pulmonary:      Effort: Pulmonary effort is normal. No respiratory distress.      Breath sounds: Rhonchi present.   Abdominal:      General: Abdomen is flat. Bowel sounds are normal.      Palpations: Abdomen is soft.   Musculoskeletal:         General: Normal range of motion.      Cervical back: Normal  range of motion and neck supple.      Right lower leg: No edema.      Left lower leg: No edema.   Skin:     General: Skin is warm and dry.   Neurological:      General: No focal deficit present.      Mental Status: She is alert and oriented to person, place, and time.   Psychiatric:         Mood and Affect: Mood normal.         Behavior: Behavior normal.             Significant Labs: All pertinent labs within the past 24 hours have been reviewed.    Significant Imaging: I have reviewed all pertinent imaging results/findings within the past 24 hours.    Assessment/Plan:      * Hypertensive emergency  - due to med noncompliance, counseled on compliance and low sodium diet  - resistant HTN, with end organ damage- LATOYA on CKD     Patients blood pressure range in the last 24 hours was: BP  Min: 137/81  Max: 292/159.The patient's inpatient anti-hypertensive regimen is listed below:  Current Antihypertensives  hydrALAZINE tablet 100 mg, Every 8 hours PRN, Oral  hydrALAZINE injection 10 mg, Every 8 hours PRN, Intravenous  hydroCHLOROthiazide tablet 25 mg, Daily, Oral  valsartan tablet 160 mg, Daily, Oral  NIFEdipine 24 hr tablet 60 mg, Daily, Oral    Plan  - will cont to monitor and adjust regimen as needed  - decrease ARB and increased CCB given LATOYA       Acute kidney injury superimposed on chronic kidney disease  LATOYA/CKD is likely due to  HTN . Baseline creatinine is  1.2 . Most recent creatinine and eGFR are listed below.  Recent Labs     10/13/24  0756 10/14/24  0418   CREATININE 1.2 1.5*   EGFRNORACEVR 54.5* 41.7*      Plan  - LATOYA is worsening. Will adjust treatment as follows: renal u/s, urine studies   - Avoid nephrotoxins and renally dose meds for GFR listed above  - Monitor urine output, serial BMP, and adjust therapy as needed  - BP control   - F/U nephrology outpt    Cigarette nicotine dependence without complication  Dangers of cigarette smoking were reviewed with patient in detail. Patient was Counseled for 3-10  minutes. Nicotine replacement options were discussed. Nicotine replacement was discussed.    Elevated troponin  Likely in setting of demand ischemia given hypertensive emergency on arrival.  - trop 0.068, trend to peak- flat   - EKG reviewed  - tele    Hypokalemia  Patient's most recent potassium results are listed below.   Recent Labs     10/13/24  0756 10/14/24  0418   K 3.2* 3.3*     Plan  - Replete potassium per protocol  - Monitor potassium Daily  - Patient's hypokalemia is stable  - replace PRN      Acute hypoxic respiratory failure  Reports SOB and productive cough x 2 weeks. CXR without acute findings.  - hypoxia in ED started on supplemental O2. Not on home O2  - history of asthma, but also reports long smoking history so likely component of COPD as well    - AF, no leukocytosis, no consolidation on CXR  - received duonebs and prednisone in ED  - cont prednisone x 5 day course  - duonebs q4h wake  - oxygen prn, wean as tolerated- weaned to RA, walk test ordered  - avoid BB in asthma exacerbation  - pulm f/u at d/c for PFT  - smoking cessation counseling, nicotine patches PRN    - mucolytics and antitussives     Gastroesophageal reflux disease without esophagitis  No acute issues. Maalox prn.    Anxiety  No acute issues. Continue cymbalta.    Obesity  Body mass index is 30.88 kg/m². Morbid obesity complicates all aspects of disease management from diagnostic modalities to treatment. Weight loss encouraged and health benefits explained to patient.    Hypertension  See principal problem.      VTE Risk Mitigation (From admission, onward)           Ordered     enoxaparin injection 40 mg  Daily         10/13/24 1043     IP VTE HIGH RISK PATIENT  Once         10/13/24 1043     Place sequential compression device  Until discontinued         10/13/24 1043                    Discharge Planning   JEFF: 10/15/2024     Code Status: Full Code   Is the patient medically ready for discharge?:     Reason for patient still in  hospital (select all that apply): Patient trending condition  Discharge Plan A: Home with family                  Leann Plaza MD  Department of Hospital Medicine   [facility] - [facility]

## 2024-10-14 NOTE — ASSESSMENT & PLAN NOTE
Body mass index is 30.88 kg/m². Morbid obesity complicates all aspects of disease management from diagnostic modalities to treatment. Weight loss encouraged and health benefits explained to patient.

## 2024-10-14 NOTE — ASSESSMENT & PLAN NOTE
- due to med noncompliance, counseled on compliance and low sodium diet  - resistant HTN, with end organ damage- LATOYA on CKD     Patients blood pressure range in the last 24 hours was: BP  Min: 137/81  Max: 292/159.The patient's inpatient anti-hypertensive regimen is listed below:  Current Antihypertensives  hydrALAZINE tablet 100 mg, Every 8 hours PRN, Oral  hydrALAZINE injection 10 mg, Every 8 hours PRN, Intravenous  hydroCHLOROthiazide tablet 25 mg, Daily, Oral  valsartan tablet 160 mg, Daily, Oral  NIFEdipine 24 hr tablet 60 mg, Daily, Oral    Plan  - will cont to monitor and adjust regimen as needed  - decrease ARB and increased CCB given LATOYA

## 2024-10-15 VITALS
HEIGHT: 64 IN | TEMPERATURE: 98 F | SYSTOLIC BLOOD PRESSURE: 140 MMHG | BODY MASS INDEX: 30.71 KG/M2 | RESPIRATION RATE: 17 BRPM | DIASTOLIC BLOOD PRESSURE: 83 MMHG | WEIGHT: 179.88 LBS | OXYGEN SATURATION: 99 % | HEART RATE: 90 BPM

## 2024-10-15 LAB
ALBUMIN SERPL BCP-MCNC: 3.4 G/DL (ref 3.5–5.2)
ALP SERPL-CCNC: 77 U/L (ref 55–135)
ALT SERPL W/O P-5'-P-CCNC: 15 U/L (ref 10–44)
ANION GAP SERPL CALC-SCNC: 10 MMOL/L (ref 8–16)
ANION GAP SERPL CALC-SCNC: 11 MMOL/L (ref 8–16)
AST SERPL-CCNC: 16 U/L (ref 10–40)
BASOPHILS # BLD AUTO: 0.01 K/UL (ref 0–0.2)
BASOPHILS NFR BLD: 0.1 % (ref 0–1.9)
BILIRUB SERPL-MCNC: 0.3 MG/DL (ref 0.1–1)
BUN SERPL-MCNC: 31 MG/DL (ref 6–20)
BUN SERPL-MCNC: 33 MG/DL (ref 6–20)
CALCIUM SERPL-MCNC: 10 MG/DL (ref 8.7–10.5)
CALCIUM SERPL-MCNC: 10 MG/DL (ref 8.7–10.5)
CHLORIDE SERPL-SCNC: 102 MMOL/L (ref 95–110)
CHLORIDE SERPL-SCNC: 104 MMOL/L (ref 95–110)
CO2 SERPL-SCNC: 21 MMOL/L (ref 23–29)
CO2 SERPL-SCNC: 25 MMOL/L (ref 23–29)
CREAT SERPL-MCNC: 1.6 MG/DL (ref 0.5–1.4)
CREAT SERPL-MCNC: 1.8 MG/DL (ref 0.5–1.4)
CREAT UR-MCNC: 140 MG/DL (ref 15–325)
DIFFERENTIAL METHOD BLD: ABNORMAL
EOSINOPHIL # BLD AUTO: 0 K/UL (ref 0–0.5)
EOSINOPHIL NFR BLD: 0 % (ref 0–8)
ERYTHROCYTE [DISTWIDTH] IN BLOOD BY AUTOMATED COUNT: 14.3 % (ref 11.5–14.5)
EST. GFR  (NO RACE VARIABLE): 33.5 ML/MIN/1.73 M^2
EST. GFR  (NO RACE VARIABLE): 38.6 ML/MIN/1.73 M^2
ESTIMATED AVG GLUCOSE: 103 MG/DL (ref 68–131)
GLUCOSE SERPL-MCNC: 102 MG/DL (ref 70–110)
GLUCOSE SERPL-MCNC: 108 MG/DL (ref 70–110)
HBA1C MFR BLD: 5.2 % (ref 4–5.6)
HCT VFR BLD AUTO: 47.8 % (ref 37–48.5)
HGB BLD-MCNC: 16.3 G/DL (ref 12–16)
IMM GRANULOCYTES # BLD AUTO: 0.03 K/UL (ref 0–0.04)
IMM GRANULOCYTES NFR BLD AUTO: 0.3 % (ref 0–0.5)
LYMPHOCYTES # BLD AUTO: 1.8 K/UL (ref 1–4.8)
LYMPHOCYTES NFR BLD: 16.7 % (ref 18–48)
MAGNESIUM SERPL-MCNC: 2.3 MG/DL (ref 1.6–2.6)
MCH RBC QN AUTO: 32 PG (ref 27–31)
MCHC RBC AUTO-ENTMCNC: 34.1 G/DL (ref 32–36)
MCV RBC AUTO: 94 FL (ref 82–98)
MONOCYTES # BLD AUTO: 0.4 K/UL (ref 0.3–1)
MONOCYTES NFR BLD: 3.6 % (ref 4–15)
NEUTROPHILS # BLD AUTO: 8.6 K/UL (ref 1.8–7.7)
NEUTROPHILS NFR BLD: 79.3 % (ref 38–73)
NRBC BLD-RTO: 0 /100 WBC
PHOSPHATE SERPL-MCNC: 3.6 MG/DL (ref 2.7–4.5)
PLATELET # BLD AUTO: 240 K/UL (ref 150–450)
PMV BLD AUTO: 11.2 FL (ref 9.2–12.9)
POCT GLUCOSE: 130 MG/DL (ref 70–110)
POCT GLUCOSE: 147 MG/DL (ref 70–110)
POTASSIUM SERPL-SCNC: 3.6 MMOL/L (ref 3.5–5.1)
POTASSIUM SERPL-SCNC: 4.2 MMOL/L (ref 3.5–5.1)
PROT SERPL-MCNC: 7.3 G/DL (ref 6–8.4)
RBC # BLD AUTO: 5.09 M/UL (ref 4–5.4)
SODIUM SERPL-SCNC: 136 MMOL/L (ref 136–145)
SODIUM SERPL-SCNC: 137 MMOL/L (ref 136–145)
SODIUM UR-SCNC: 89 MMOL/L (ref 20–250)
WBC # BLD AUTO: 10.86 K/UL (ref 3.9–12.7)

## 2024-10-15 PROCEDURE — 82570 ASSAY OF URINE CREATININE: CPT | Performed by: STUDENT IN AN ORGANIZED HEALTH CARE EDUCATION/TRAINING PROGRAM

## 2024-10-15 PROCEDURE — 80053 COMPREHEN METABOLIC PANEL: CPT

## 2024-10-15 PROCEDURE — 80048 BASIC METABOLIC PNL TOTAL CA: CPT | Mod: XB | Performed by: STUDENT IN AN ORGANIZED HEALTH CARE EDUCATION/TRAINING PROGRAM

## 2024-10-15 PROCEDURE — 25000003 PHARM REV CODE 250

## 2024-10-15 PROCEDURE — 84300 ASSAY OF URINE SODIUM: CPT | Performed by: STUDENT IN AN ORGANIZED HEALTH CARE EDUCATION/TRAINING PROGRAM

## 2024-10-15 PROCEDURE — 83036 HEMOGLOBIN GLYCOSYLATED A1C: CPT | Performed by: STUDENT IN AN ORGANIZED HEALTH CARE EDUCATION/TRAINING PROGRAM

## 2024-10-15 PROCEDURE — 94761 N-INVAS EAR/PLS OXIMETRY MLT: CPT

## 2024-10-15 PROCEDURE — 99900035 HC TECH TIME PER 15 MIN (STAT)

## 2024-10-15 PROCEDURE — 63600175 PHARM REV CODE 636 W HCPCS

## 2024-10-15 PROCEDURE — 85025 COMPLETE CBC W/AUTO DIFF WBC: CPT

## 2024-10-15 PROCEDURE — 94640 AIRWAY INHALATION TREATMENT: CPT

## 2024-10-15 PROCEDURE — 25000003 PHARM REV CODE 250: Performed by: STUDENT IN AN ORGANIZED HEALTH CARE EDUCATION/TRAINING PROGRAM

## 2024-10-15 PROCEDURE — 83735 ASSAY OF MAGNESIUM: CPT

## 2024-10-15 PROCEDURE — 36415 COLL VENOUS BLD VENIPUNCTURE: CPT | Performed by: STUDENT IN AN ORGANIZED HEALTH CARE EDUCATION/TRAINING PROGRAM

## 2024-10-15 PROCEDURE — 25000242 PHARM REV CODE 250 ALT 637 W/ HCPCS

## 2024-10-15 PROCEDURE — 84100 ASSAY OF PHOSPHORUS: CPT

## 2024-10-15 RX ORDER — IBUPROFEN 200 MG
1 TABLET ORAL DAILY
Qty: 28 PATCH | Refills: 11 | Status: SHIPPED | OUTPATIENT
Start: 2024-10-15 | End: 2025-10-15

## 2024-10-15 RX ORDER — PREDNISONE 20 MG/1
40 TABLET ORAL DAILY
Qty: 4 TABLET | Refills: 0 | Status: SHIPPED | OUTPATIENT
Start: 2024-10-16 | End: 2024-10-18

## 2024-10-15 RX ORDER — NIFEDIPINE 90 MG/1
90 TABLET, EXTENDED RELEASE ORAL DAILY
Qty: 30 TABLET | Refills: 11 | Status: SHIPPED | OUTPATIENT
Start: 2024-10-16 | End: 2025-10-16

## 2024-10-15 RX ORDER — VALSARTAN 80 MG/1
80 TABLET ORAL DAILY
Qty: 90 TABLET | Refills: 3 | Status: SHIPPED | OUTPATIENT
Start: 2024-10-16 | End: 2025-10-16

## 2024-10-15 RX ORDER — GUAIFENESIN 600 MG/1
600 TABLET, EXTENDED RELEASE ORAL 2 TIMES DAILY
Qty: 20 TABLET | Refills: 0 | Status: SHIPPED | OUTPATIENT
Start: 2024-10-15 | End: 2024-10-25

## 2024-10-15 RX ORDER — CARVEDILOL 12.5 MG/1
12.5 TABLET ORAL 2 TIMES DAILY WITH MEALS
Qty: 60 TABLET | Refills: 11 | Status: SHIPPED | OUTPATIENT
Start: 2024-10-15 | End: 2025-10-15

## 2024-10-15 RX ORDER — DOCUSATE SODIUM 100 MG
400 CAPSULE ORAL
Status: DISCONTINUED | OUTPATIENT
Start: 2024-10-15 | End: 2024-10-15 | Stop reason: HOSPADM

## 2024-10-15 RX ORDER — HYDROCHLOROTHIAZIDE 25 MG/1
25 TABLET ORAL DAILY
Qty: 30 TABLET | Refills: 11 | Status: SHIPPED | OUTPATIENT
Start: 2024-10-16 | End: 2025-10-16

## 2024-10-15 RX ORDER — POTASSIUM CHLORIDE 20 MEQ/1
40 TABLET, EXTENDED RELEASE ORAL ONCE
Status: COMPLETED | OUTPATIENT
Start: 2024-10-15 | End: 2024-10-15

## 2024-10-15 RX ORDER — HYDRALAZINE HYDROCHLORIDE 100 MG/1
100 TABLET, FILM COATED ORAL EVERY 8 HOURS PRN
Qty: 30 TABLET | Refills: 11 | Status: SHIPPED | OUTPATIENT
Start: 2024-10-15 | End: 2025-10-15

## 2024-10-15 RX ORDER — BENZONATATE 100 MG/1
100 CAPSULE ORAL 3 TIMES DAILY PRN
Qty: 30 CAPSULE | Refills: 0 | Status: SHIPPED | OUTPATIENT
Start: 2024-10-15 | End: 2024-10-25

## 2024-10-15 RX ADMIN — NIFEDIPINE 60 MG: 60 TABLET, FILM COATED, EXTENDED RELEASE ORAL at 08:10

## 2024-10-15 RX ADMIN — DULOXETINE HYDROCHLORIDE 120 MG: 60 CAPSULE, DELAYED RELEASE ORAL at 08:10

## 2024-10-15 RX ADMIN — POTASSIUM CHLORIDE 40 MEQ: 1500 TABLET, EXTENDED RELEASE ORAL at 10:10

## 2024-10-15 RX ADMIN — PREDNISONE 40 MG: 20 TABLET ORAL at 08:10

## 2024-10-15 RX ADMIN — Medication 400 ML: at 10:10

## 2024-10-15 RX ADMIN — Medication 400 ML: at 03:10

## 2024-10-15 RX ADMIN — HYDRALAZINE HYDROCHLORIDE 100 MG: 50 TABLET ORAL at 05:10

## 2024-10-15 RX ADMIN — IPRATROPIUM BROMIDE AND ALBUTEROL SULFATE 3 ML: 2.5; .5 SOLUTION RESPIRATORY (INHALATION) at 11:10

## 2024-10-15 RX ADMIN — GUAIFENESIN 600 MG: 600 TABLET, EXTENDED RELEASE ORAL at 08:10

## 2024-10-15 RX ADMIN — IPRATROPIUM BROMIDE AND ALBUTEROL SULFATE 3 ML: 2.5; .5 SOLUTION RESPIRATORY (INHALATION) at 08:10

## 2024-10-15 RX ADMIN — VALSARTAN 160 MG: 160 TABLET, FILM COATED ORAL at 08:10

## 2024-10-15 RX ADMIN — HYDROCHLOROTHIAZIDE 25 MG: 12.5 TABLET ORAL at 08:10

## 2024-10-15 RX ADMIN — IPRATROPIUM BROMIDE AND ALBUTEROL SULFATE 3 ML: 2.5; .5 SOLUTION RESPIRATORY (INHALATION) at 04:10

## 2024-10-15 NOTE — NURSING
Home Oxygen Evaluation    Date Performed:     1) Patient's Home O2 Sat on room air, while at rest: 99%        If O2 sats on room air at rest are 88% or below, patient qualifies. No additional testing needed. Document N/A in steps 2 and 3. If 89% or above, complete steps 2.      2) Patient's O2 Sat on room air while exercisin%        If O2 sats on room air while exercising remain 89% or above patient does not qualify, no further testing needed Document N/A in step 3. If O2 sats on room air while exercising are 88% or below, continue to step 3.      3) Patient's O2 Sat while exercising on O2: N/A at N/A LPM         (Must show improvement from #2 for patients to qualify)    If O2 sats improve on oxygen, patient qualifies for portable oxygen. If not, the patient does not qualify.

## 2024-10-15 NOTE — NURSING
Patient stable alert and oriented. IVs removed. No complaints of pain. Discussed discharge plan. Reviewed medications and side effects, appointments, and answered questions with patient and family.    Patient instructed to call  once she receives her prescription, and staff will put in transport request to take patient downstairs.    1730- RN went in the room to check on patient. Patient is not in room/bathroom. Patient's personal cellphone left on bedside table. RN checked with , patient has not called  for transportation. RN checked with EDGAR Bhandari, EDGAR stated she saw the patient had walked out of the room and left the unit at least an hour ago.    1800- Charge RN made aware of patient left unit by herself and left phone behind. Patient's phone is labeled and secured in the narcotics lockbox in the back supply room.

## 2024-10-15 NOTE — PLAN OF CARE
Future Appointments   Date Time Provider Department Center   10/16/2024  7:45 AM TROY GOMEZ [facility]   10/28/2024  9:00 AM Norman Schmitz MD [facility]         HOSP f/u scheduled 10/28. Patient placed on waitlist for sooner appointment. PCP booked, patient scheduled with another provider.      Unable to schedule Gen Cards appointment.    UPDATE:  Future Appointments   Date Time Provider Department Center   10/24/2024 11:30 AM Sha Parrish MD [facility]   10/28/2024  9:00 AM Norman Schmitz MD [facility]     Nephrology appointment scheduled.          Donna Merida PATY  Case Management  [phone]

## 2024-10-15 NOTE — PLAN OF CARE
[facility] - Cardiology Stepdown  Discharge Final Note    Primary Care Provider: Adria Doll Jr., MD    Expected Discharge Date: 10/15/2024    Final Discharge Note (most recent)       Final Note - 10/15/24 1625          Final Note    Assessment Type Final Discharge Note     Anticipated Discharge Disposition Home or Self Care     Hospital Resources/Appts/Education Provided Appointments scheduled and added to AVS                   CHW notified of need for PCP, gen cards, and nephrology follow-ups.    Thelma Smith LMSW  [facility]  [phone]      Future Appointments   Date Time Provider Department Center   10/16/2024  7:45 AM TROY [facility]   10/28/2024  9:00 AM Norman Schmitz MD [facility]

## 2024-10-16 ENCOUNTER — HOSPITAL ENCOUNTER (OUTPATIENT)
Dept: RADIOLOGY | Facility: HOSPITAL | Age: 54
Discharge: HOME | End: 2024-10-16
Attending: INTERNAL MEDICINE
Payer: MEDICAID

## 2024-10-16 DIAGNOSIS — Z12.31 OTHER SCREENING MAMMOGRAM: ICD-10-CM

## 2024-10-16 PROCEDURE — 77063 BREAST TOMOSYNTHESIS BI: CPT | Mod: TC

## 2024-10-16 PROCEDURE — 77067 SCR MAMMO BI INCL CAD: CPT | Mod: TC

## 2024-10-17 NOTE — ASSESSMENT & PLAN NOTE
- due to med noncompliance, counseled on compliance and low sodium diet  - resistant HTN, with end organ damage- LATOYA on CKD     Patients blood pressure range in the last 24 hours was: BP  Min: 125/72  Max: 292/159.The patient's inpatient anti-hypertensive regimen is listed below:  Current Antihypertensives  hydrALAZINE (APRESOLINE) tablet, Every 8 hours PRN, Oral  hydrochlorothiazide (HYDRODIURIL) tablet, Daily, Oral  NIFEdipine (PROCARDIA-XL) 24 hr tablet, Daily, Oral  valsartan (DIOVAN) tablet, Daily, Oral  carvedilol (COREG) tablet, 2 times daily with meals, Oral    Plan  - will cont to monitor and adjust regimen as needed  - decrease ARB and increased CCB given LATOYA   - f/u cardiology

## 2024-10-17 NOTE — ASSESSMENT & PLAN NOTE
LATOYA/CKD is likely due to  HTN . Baseline creatinine is  1.2 . Most recent creatinine and eGFR are listed below.  Recent Labs     10/14/24  0418 10/15/24  0223 10/15/24  1325   CREATININE 1.5* 1.8* 1.6*   EGFRNORACEVR 41.7* 33.5* 38.6*        Plan  - LATOYA is worsening. Will adjust treatment as follows: renal u/s- showing nonobstructing stones, urine studies- prerenal   - Avoid nephrotoxins and renally dose meds for GFR listed above  - Monitor urine output, serial BMP, and adjust therapy as needed  - BP control   - encourage PO hydration given prerenal LATOYA - improving on repeat BMP- encouraged patient to cont oral hydration at [place]  - F/U nephrology outpt

## 2024-10-17 NOTE — ASSESSMENT & PLAN NOTE
Reports SOB and productive cough x 2 weeks. CXR without acute findings.  - hypoxia in ED started on supplemental O2. Not on home O2  - history of asthma, but also reports long smoking history so likely component of COPD as well    - AF, no leukocytosis, no consolidation on CXR  - received duonebs and prednisone in ED  - cont prednisone x 5 day course  - duonebs q4h wake  - oxygen prn, wean as tolerated- weaned to RA, walk test passed  - avoid BB in asthma exacerbation  - pulm f/u at d/c for PFT  - smoking cessation counseling, nicotine patches PRN    - mucolytics and antitussives

## 2024-10-17 NOTE — ASSESSMENT & PLAN NOTE
Patient's most recent potassium results are listed below.   Recent Labs     10/14/24  0418 10/15/24  0223 10/15/24  1325   K 3.3* 3.6 4.2       Plan  - Replete potassium per protocol  - Monitor potassium Daily  - Patient's hypokalemia is stable  - replace PRN

## 2024-10-17 NOTE — DISCHARGE SUMMARY
[facility] - Cardiology [facility]  Discharge Summary      Patient Name: Calos Rios  MRN: [id number]  ASTRID: [id number]  Patient Class: IP- Inpatient  Admission Date: 10/13/2024  Hospital Length of Stay: 2 days  Discharge Date and Time: 10/15/2024  5:45 PM  Attending Physician: No att. providers found   Discharging Provider: Leann Plaza MD  Primary Care Provider: Adria Doll Jr., MD  [facility] [place] Medicine Team: [facility]OSP MED C Leann Plaza MD  Primary Care Team: [facility] HOSP MED     HPI:   Calos Rios is a 53 y.o. female with PMHx of HTN, GERD, obesity, asthma who presents to [facility] ED with chief complaint of shortness of breath x 2 weeks with associated productive cough. She has not been taking home BP meds x 1 month and has not gotten refills as she reports she has been taking care of her mother who has been sick. She has tried albuterol inhaler at home without improvement. She denies HA, vision changes, fever, chills, chest pain, abdominal pain, n/v/d, urinary symptoms, numbness or tingling.     In the ED: Afebrile, hypertensive to 292/159, , otherwise normal HR, placed on 2L NC. MAP improved to 126 s/p cardene gtt. CBC without leukocytosis. CMP with K 3.2. Troponin 0.068. Covid negative. EKG no STEMI. CXR without acute findings. Received cardene gtt, duonebs, amlodipine, coreg, aldactone, potassium bicarb, and prednisone. Admitted to .    * No surgery found *      Hospital Course:   Patient admitted for hypoxic resp failure and hypertensive urgency. Noncompliance with antihypertensive regimen at home. Resumed and increased oral regimen with improvement in BP.   Started on supplemental O2. +Smoker, cough with mucus and rhonchi on exam- started on duonebs, steroids for asthma versus likely COPD exacerbation. Does have history of asthma but given smoking history likely undiagnosed COPD component as well, refer to pulm outpt for PFT. Weaned to RA. Afebrile, no leukocytosis or  consolidation on XR, hold abx. Mucolytics and antitussives.   Worsening LATOYA but reviewing prior labs she has CKD, likely from uncontrolled HTN. Renal u/s - Left nonobstructive nephrolithiasis, no hydronephrosis and urine studies prerenal- encourage PO hydration. LATOYA stable. Stable for discharge. F/u renal, cards, pulm at discharge.      Goals of Care Treatment Preferences:  Code Status: Full Code      SDOH Screening:  The patient was screened for utility difficulties, food insecurity, transport difficulties, housing insecurity, and interpersonal safety and there were no concerns identified this admission.     Consults:     Psychiatric  Anxiety  No acute issues. Continue cymbalta.    Pulmonary  Acute hypoxic respiratory failure  Reports SOB and productive cough x 2 weeks. CXR without acute findings.  - hypoxia in ED started on supplemental O2. Not on home O2  - history of asthma, but also reports long smoking history so likely component of COPD as well    - AF, no leukocytosis, no consolidation on CXR  - received duonebs and prednisone in ED  - cont prednisone x 5 day course  - duonebs q4h wake  - oxygen prn, wean as tolerated- weaned to RA, walk test passed  - avoid BB in asthma exacerbation  - pulm f/u at d/c for PFT  - smoking cessation counseling, nicotine patches PRN    - mucolytics and antitussives     Cardiac/Vascular  * Hypertensive emergency  - due to med noncompliance, counseled on compliance and low sodium diet  - resistant HTN, with end organ damage- LATOYA on CKD     Patients blood pressure range in the last 24 hours was: BP  Min: 125/72  Max: 292/159.The patient's inpatient anti-hypertensive regimen is listed below:  Current Antihypertensives  hydrALAZINE (APRESOLINE) tablet, Every 8 hours PRN, Oral  hydrochlorothiazide (HYDRODIURIL) tablet, Daily, Oral  NIFEdipine (PROCARDIA-XL) 24 hr tablet, Daily, Oral  valsartan (DIOVAN) tablet, Daily, Oral  carvedilol (COREG) tablet, 2 times daily with meals,  Oral    Plan  - will cont to monitor and adjust regimen as needed  - decrease ARB and increased CCB given LATOYA   - f/u cardiology       Elevated troponin  Likely in setting of demand ischemia given hypertensive emergency on arrival.  - trop 0.068, trend to peak- flat   - EKG reviewed  - tele    Hypertension  See principal problem.    Renal/  Acute kidney injury superimposed on chronic kidney disease  LATOYA/CKD is likely due to  HTN . Baseline creatinine is  1.2 . Most recent creatinine and eGFR are listed below.  Recent Labs     10/14/24  0418 10/15/24  0223 10/15/24  1325   CREATININE 1.5* 1.8* 1.6*   EGFRNORACEVR 41.7* 33.5* 38.6*        Plan  - LATOYA is worsening. Will adjust treatment as follows: renal u/s- showing nonobstructing stones, urine studies- prerenal   - Avoid nephrotoxins and renally dose meds for GFR listed above  - Monitor urine output, serial BMP, and adjust therapy as needed  - BP control   - encourage PO hydration given prerenal LATOYA - improving on repeat BMP- encouraged patient to cont oral hydration at MT  - F/U nephrology outpt    Hypokalemia  Patient's most recent potassium results are listed below.   Recent Labs     10/14/24  0418 10/15/24  0223 10/15/24  1325   K 3.3* 3.6 4.2       Plan  - Replete potassium per protocol  - Monitor potassium Daily  - Patient's hypokalemia is stable  - replace PRN      Endocrine  Obesity  Body mass index is 30.88 kg/m². Morbid obesity complicates all aspects of disease management from diagnostic modalities to treatment. Weight loss encouraged and health benefits explained to patient.    GI  Gastroesophageal reflux disease without esophagitis  No acute issues. Maalox prn.    Other  Cigarette nicotine dependence without complication  Dangers of cigarette smoking were reviewed with patient in detail. Patient was Counseled for 3-10 minutes. Nicotine replacement options were discussed. Nicotine replacement was discussed.      Final Active Diagnoses:    Diagnosis Date  Noted POA    PRINCIPAL PROBLEM:  Hypertensive emergency [I16.1] 05/25/2024 Yes    Elevated troponin [R79.89] 05/25/2024 Yes    Hypokalemia [E87.6] 05/25/2024 Yes    Acute hypoxic respiratory failure [J96.01] 05/25/2024 Yes    Cigarette nicotine dependence without complication [F17.210] 05/25/2024 Yes    Acute kidney injury superimposed on chronic kidney disease [N17.9, N18.9] 05/25/2024 Yes    Gastroesophageal reflux disease without esophagitis [K21.9] 11/27/2017 Yes    Anxiety [F41.9] 11/27/2017 Yes    Obesity [E66.9]  Yes    Hypertension [I10]  Yes      Problems Resolved During this Admission:       Discharged Condition: stable    Disposition: Home or Self Care    Follow Up:    Patient Instructions:      BASIC METABOLIC PANEL   Standing Status: Future Standing Exp. Date: 01/13/26     Order Specific Question Answer Comments   Send normal result to authorizing provider's In Basket if patient is active on MyChart: Yes      Ambulatory referral/consult to Nephrology   Standing Status: Future   Referral Priority: Urgent Referral Type: Consultation   Referral Reason: Specialty Services Required   Requested Specialty: Nephrology   Number of Visits Requested: 1     Ambulatory referral/consult to Cardiology   Standing Status: Future   Referral Priority: Urgent Referral Type: Consultation   Referral Reason: Specialty Services Required   Requested Specialty: Cardiology   Number of Visits Requested: 1     Ambulatory referral/consult to Smoking Cessation Program   Standing Status: Future   Referral Priority: Routine Referral Type: Consultation   Referral Reason: Specialty Services Required   Requested Specialty: CTTS   Number of Visits Requested: 1     Ambulatory referral/consult to Pulmonology   Standing Status: Future   Referral Priority: Urgent Referral Type: Consultation   Referral Reason: Specialty Services Required   Requested Specialty: Pulmonary Disease   Number of Visits Requested: 1     Diet Cardiac     Diet renal      Notify your health care provider if you experience any of the following:  temperature >100.4     Notify your health care provider if you experience any of the following:  persistent nausea and vomiting or diarrhea     Notify your health care provider if you experience any of the following:  severe uncontrolled pain     Notify your health care provider if you experience any of the following:  redness, tenderness, or signs of infection (pain, swelling, redness, odor or green/yellow discharge around incision site)     Notify your health care provider if you experience any of the following:  difficulty breathing or increased cough     Notify your health care provider if you experience any of the following:  increased confusion or weakness     Notify your health care provider if you experience any of the following:  persistent dizziness, light-headedness, or visual disturbances     Notify your health care provider if you experience any of the following:  worsening rash     Notify your health care provider if you experience any of the following:  severe persistent headache     Activity as tolerated       Significant Diagnostic Studies: Labs: All labs within the past 24 hours have been reviewed    Pending Diagnostic Studies:       None           Medications:  Reconciled Home Medications:      Medication List        START taking these medications      benzonatate 100 MG capsule  Commonly known as: TESSALON  Take 1 capsule (100 mg total) by mouth 3 (three) times daily as needed for Cough.     hydrALAZINE 100 MG tablet  Commonly known as: APRESOLINE  Take 1 tablet (100 mg total) by mouth every 8 (eight) hours as needed (SBP >180).     hydroCHLOROthiazide 25 MG tablet  Commonly known as: HYDRODIURIL  Take 1 tablet (25 mg total) by mouth once daily.     MUCUS RELIEF  mg 12 hr tablet  Generic drug: guaiFENesin  Take 1 tablet (600 mg total) by mouth 2 (two) times daily. for 10 days     nicotine 14 mg/24 hr  Commonly  known as: NICODERM CQ  Place 1 patch onto the skin once daily.     NIFEdipine 90 MG (OSM) 24 hr tablet  Commonly known as: PROCARDIA-XL  Take 1 tablet (90 mg total) by mouth once daily.     predniSONE 20 MG tablet  Commonly known as: DELTASONE  Take 2 tablets (40 mg total) by mouth once daily. for 2 days     valsartan 80 MG tablet  Commonly known as: DIOVAN  Take 1 tablet (80 mg total) by mouth once daily.            CONTINUE taking these medications      albuterol 90 mcg/actuation inhaler  Commonly known as: PROVENTIL/VENTOLIN HFA  Inhale 2 puffs into the lungs every 6 (six) hours as needed for Wheezing.     blood pressure monitor LG arm size (OP)  Commonly known as: [employer] EASE  by Other route as needed for High Blood Pressure.     carvediloL 12.5 MG tablet  Commonly known as: COREG  Take 1 tablet (12.5 mg total) by mouth 2 (two) times daily with meals.     dicyclomine 20 mg tablet  Commonly known as: BENTYL  Take 1 tablet (20 mg total) by mouth 3 (three) times daily as needed (abdominal pain).     DULoxetine 60 MG capsule  Commonly known as: CYMBALTA  Take 2 capsules (120 mg total) by mouth once daily.     ergocalciferol 50,000 unit Cap  Commonly known as: ERGOCALCIFEROL  Take 50,000 Units by mouth every 7 days.     omeprazole 40 MG capsule  Commonly known as: PRILOSEC  TAKE 1 CAPSULE BY MOUTH EVERY DAY     ondansetron 4 MG Tbdl  Commonly known as: ZOFRAN-ODT  Take 1 tablet (4 mg total) by mouth 2 (two) times daily.     polyethylene glycol 17 gram/dose powder  Commonly known as: GLYCOLAX  Take 17 g by mouth once daily.     TRULICITY 1.5 mg/0.5 mL pen injector  Generic drug: dulaglutide  Inject 1.5 mg into the skin every 7 days.            STOP taking these medications      amLODIPine 10 MG tablet  Commonly known as: NORVASC     cetirizine 5 MG tablet  Commonly known as: ZYRTEC     ibuprofen 600 MG tablet  Commonly known as: ADVIL,MOTRIN     oxybutynin 5 MG Tab  Commonly known as: DITROPAN     spironolactone 25  MG tablet  Commonly known as: ALDACTONE     valsartan-hydrochlorothiazide 320-12.5 mg per tablet  Commonly known as: DIOVAN-HCT              Indwelling Lines/Drains at time of discharge:   Lines/Drains/Airways       None                   Time spent on the discharge of patient: 45 minutes of time spent on discharge, including examining the patient, providing discharge instructions, arranging followup and documentation.            Leann Plaza MD  Department of Hospital Medicine  [facility] - Cardiology Stepdown

## 2024-10-23 ENCOUNTER — LAB VISIT (OUTPATIENT)
Dept: LAB | Facility: HOSPITAL | Age: 54
End: 2024-10-23
Attending: STUDENT IN AN ORGANIZED HEALTH CARE EDUCATION/TRAINING PROGRAM
Payer: MEDICAID

## 2024-10-23 DIAGNOSIS — N17.9 ACUTE KIDNEY INJURY SUPERIMPOSED ON CHRONIC KIDNEY DISEASE: Primary | ICD-10-CM

## 2024-10-23 DIAGNOSIS — N17.9 ACUTE KIDNEY INJURY SUPERIMPOSED ON CHRONIC KIDNEY DISEASE: ICD-10-CM

## 2024-10-23 DIAGNOSIS — N18.9 ACUTE KIDNEY INJURY SUPERIMPOSED ON CHRONIC KIDNEY DISEASE: ICD-10-CM

## 2024-10-23 DIAGNOSIS — N18.9 ACUTE KIDNEY INJURY SUPERIMPOSED ON CHRONIC KIDNEY DISEASE: Primary | ICD-10-CM

## 2024-10-23 LAB
25(OH)D3+25(OH)D2 SERPL-MCNC: 18 NG/ML (ref 30–96)
ALBUMIN SERPL BCP-MCNC: 3.2 G/DL (ref 3.5–5.2)
ANION GAP SERPL CALC-SCNC: 8 MMOL/L (ref 8–16)
BASOPHILS # BLD AUTO: 0.03 K/UL (ref 0–0.2)
BASOPHILS NFR BLD: 0.3 % (ref 0–1.9)
BUN SERPL-MCNC: 25 MG/DL (ref 6–20)
CALCIUM SERPL-MCNC: 9.2 MG/DL (ref 8.7–10.5)
CHLORIDE SERPL-SCNC: 103 MMOL/L (ref 95–110)
CO2 SERPL-SCNC: 28 MMOL/L (ref 23–29)
CREAT SERPL-MCNC: 1.7 MG/DL (ref 0.5–1.4)
DIFFERENTIAL METHOD BLD: ABNORMAL
EOSINOPHIL # BLD AUTO: 0.2 K/UL (ref 0–0.5)
EOSINOPHIL NFR BLD: 2 % (ref 0–8)
ERYTHROCYTE [DISTWIDTH] IN BLOOD BY AUTOMATED COUNT: 14.3 % (ref 11.5–14.5)
EST. GFR  (NO RACE VARIABLE): 36 ML/MIN/1.73 M^2
FERRITIN SERPL-MCNC: 172 NG/ML (ref 20–300)
GLUCOSE SERPL-MCNC: 110 MG/DL (ref 70–110)
HCT VFR BLD AUTO: 44.6 % (ref 37–48.5)
HGB BLD-MCNC: 15.2 G/DL (ref 12–16)
IMM GRANULOCYTES # BLD AUTO: 0.02 K/UL (ref 0–0.04)
IMM GRANULOCYTES NFR BLD AUTO: 0.2 % (ref 0–0.5)
IRON SERPL-MCNC: 88 UG/DL (ref 30–160)
LYMPHOCYTES # BLD AUTO: 2.7 K/UL (ref 1–4.8)
LYMPHOCYTES NFR BLD: 28.4 % (ref 18–48)
MCH RBC QN AUTO: 32.8 PG (ref 27–31)
MCHC RBC AUTO-ENTMCNC: 34.1 G/DL (ref 32–36)
MCV RBC AUTO: 96 FL (ref 82–98)
MONOCYTES # BLD AUTO: 0.8 K/UL (ref 0.3–1)
MONOCYTES NFR BLD: 8.3 % (ref 4–15)
NEUTROPHILS # BLD AUTO: 5.7 K/UL (ref 1.8–7.7)
NEUTROPHILS NFR BLD: 60.8 % (ref 38–73)
NRBC BLD-RTO: 0 /100 WBC
PHOSPHATE SERPL-MCNC: 3.1 MG/DL (ref 2.7–4.5)
PLATELET # BLD AUTO: 233 K/UL (ref 150–450)
PMV BLD AUTO: 10.5 FL (ref 9.2–12.9)
POTASSIUM SERPL-SCNC: 3.1 MMOL/L (ref 3.5–5.1)
PTH-INTACT SERPL-MCNC: 102.7 PG/ML (ref 9–77)
RBC # BLD AUTO: 4.64 M/UL (ref 4–5.4)
SATURATED IRON: 34 % (ref 20–50)
SODIUM SERPL-SCNC: 139 MMOL/L (ref 136–145)
TOTAL IRON BINDING CAPACITY: 259 UG/DL (ref 250–450)
TRANSFERRIN SERPL-MCNC: 175 MG/DL (ref 200–375)
URATE SERPL-MCNC: 10.3 MG/DL (ref 2.4–5.7)
WBC # BLD AUTO: 9.38 K/UL (ref 3.9–12.7)

## 2024-10-23 PROCEDURE — 83970 ASSAY OF PARATHORMONE: CPT | Performed by: STUDENT IN AN ORGANIZED HEALTH CARE EDUCATION/TRAINING PROGRAM

## 2024-10-23 PROCEDURE — 84550 ASSAY OF BLOOD/URIC ACID: CPT | Performed by: STUDENT IN AN ORGANIZED HEALTH CARE EDUCATION/TRAINING PROGRAM

## 2024-10-23 PROCEDURE — 82728 ASSAY OF FERRITIN: CPT | Performed by: STUDENT IN AN ORGANIZED HEALTH CARE EDUCATION/TRAINING PROGRAM

## 2024-10-23 PROCEDURE — 82306 VITAMIN D 25 HYDROXY: CPT | Performed by: STUDENT IN AN ORGANIZED HEALTH CARE EDUCATION/TRAINING PROGRAM

## 2024-10-23 PROCEDURE — 80069 RENAL FUNCTION PANEL: CPT | Performed by: STUDENT IN AN ORGANIZED HEALTH CARE EDUCATION/TRAINING PROGRAM

## 2024-10-23 PROCEDURE — 36415 COLL VENOUS BLD VENIPUNCTURE: CPT | Performed by: STUDENT IN AN ORGANIZED HEALTH CARE EDUCATION/TRAINING PROGRAM

## 2024-10-23 PROCEDURE — 85025 COMPLETE CBC W/AUTO DIFF WBC: CPT | Performed by: STUDENT IN AN ORGANIZED HEALTH CARE EDUCATION/TRAINING PROGRAM

## 2024-10-23 PROCEDURE — 84466 ASSAY OF TRANSFERRIN: CPT | Performed by: STUDENT IN AN ORGANIZED HEALTH CARE EDUCATION/TRAINING PROGRAM

## 2024-10-23 NOTE — PROGRESS NOTES
Subjective     Chief Complaint: Hosp follow up    History of Present Illness:  Ms. Calos Rios is a 53 y.o. female with active medical diagnosis of HTN, GERD, asthma, nephrolithasis    Admitted to [facility] on 10/15/2024 - admitted for sob and cough, did not have HTN meds and had been taking care of mother. Presented with bp of 292/159, was put on cardine and eventually discharged with BP regiment. Also treated for COPD  (undiagnosed). Had an LATOYA as well.    Kidney stones - cystoscopy removal x 1 year. Follows with Dr. Cantrell - last seen 11/2022. Previous stone was 100% calcium oxalate    #LATOYA   Discharged with sCr of 1.6 - hypertensive emergency. sCr increased from 1.5 on admission to 1.8 and decreased to 1.6 at discharge. Presents today with sCr of 1.7.     #CKD3a  Baseline creatinine 1.2 - 1.4 (prior to hospitalization in 5/2024 - may possibly have progression now to 1.6-1.7)  UA negative  UPCR 0.10    Creatinine   Date Value Ref Range Status   10/23/2024 1.7 (H) 0.5 - 1.4 mg/dL Final   10/15/2024 1.6 (H) 0.5 - 1.4 mg/dL Final   10/15/2024 1.8 (H) 0.5 - 1.4 mg/dL Final     eGFR   Date Value Ref Range Status   10/23/2024 36 (A) >60 mL/min/1.73 m^2 Final   10/15/2024 38.6 (A) >60 mL/min/1.73 m^2 Final   10/15/2024 33.5 (A) >60 mL/min/1.73 m^2 Final     Prot/Creat Ratio, Urine   Date Value Ref Range Status   10/23/2024 0.10 0.00 - 0.20 Final       #Uric acid  10.3, no history of gout.     #HTN  Home medications: carvediol 12.5 bid, hydralazine 100 TID, hydrochlorothiazide 25 qd, nifedipine 90 qd, valsartan 80  Checks once a week - states that her pressure is 140s systolics at home. Additionally reports some dizziness when her pressure is in the 120s.       Review of Systems   Constitutional:  Negative for chills and fever.   HENT:  Negative for ear discharge and ear pain.    Eyes:  Negative for blurred vision and double vision.   Respiratory:  Negative for cough and shortness of breath.     Cardiovascular:  Negative for chest pain and palpitations.   Gastrointestinal:  Negative for abdominal pain, constipation, diarrhea, nausea and vomiting.   Genitourinary:  Negative for dysuria and frequency.   Musculoskeletal:  Negative for myalgias and neck pain.   Skin:  Negative for itching and rash.   Neurological:  Negative for tingling and headaches.             PAST HISTORY:     Past Medical History:   Diagnosis Date    Anxiety disorder, unspecified     Hypertension     Migraine headache     Obesity        Past Surgical History:   Procedure Laterality Date    CYSTOURETEROSCOPY,WITH HOLMIUM LASER LITHOTRIPSY OF URETERAL CALCULUS - Bilateral Bilateral 11/09/2022    ESOPHAGOGASTRODUODENOSCOPY N/A 12/23/2022    Procedure: EGD (ESOPHAGOGASTRODUODENOSCOPY);  Surgeon: Anaid Cohen MD;  Location: [facility];  Service: Endoscopy;  Laterality: N/A;    HYSTERECTOMY, TOTAL, LAPAROSCOPIC, WITH SALPINGO-OOPHORECTOMY Bilateral 03/17/2021    LAPAROSCOPIC TOTAL HYSTERECTOMY Bilateral 03/17/2021    PYELOSCOPY Bilateral 10/25/2022    Procedure: PYELOSCOPY;  Surgeon: Raz Cantrell MD;  Location: [facility];  Service: Urology;  Laterality: Bilateral;    TUBAL LIGATION      URETEROSCOPY Bilateral 10/25/2022    Procedure: URETEROSCOPY;  Surgeon: Raz Cantrell MD;  Location: [facility];  Service: Urology;  Laterality: Bilateral;       Family History   Problem Relation Name Age of Onset    Heart disease Mother      Drug abuse Father      Heart disease Father      Kidney disease Father      Diabetes Maternal Uncle      Heart disease Maternal Grandmother      Ovarian cancer Maternal Aunt      Cancer Neg Hx         Social History     Socioeconomic History    Marital status: Single   Occupational History     Employer: [employer]   Tobacco Use    Smoking status: Every Day     Current packs/day: 0.25     Average packs/day: 0.3 packs/day for 15.0 years (3.8 ttl pk-yrs)     Types: Cigarettes    Smokeless tobacco: Never    Substance and Sexual Activity    Alcohol use: Yes     Alcohol/week: 3.0 standard drinks of alcohol     Types: 3 Shots of liquor per week     Comment: scocially on weekends    Drug use: Yes     Types: Marijuana    Sexual activity: Yes     Partners: Male     Birth control/protection: None     Social Drivers of Health     Financial Resource Strain: Medium Risk (10/13/2024)    Overall Financial Resource Strain (CARDIA)     Difficulty of Paying Living Expenses: Somewhat hard   Food Insecurity: No Food Insecurity (10/13/2024)    Hunger Vital Sign     Worried About Running Out of Food in the Last Year: Never true     Ran Out of Food in the Last Year: Never true   Transportation Needs: No Transportation Needs (10/13/2024)    TRANSPORTATION NEEDS     Transportation : No   Physical Activity: Insufficiently Active (10/11/2024)    Exercise Vital Sign     Days of Exercise per Week: 2 days     Minutes of Exercise per Session: 30 min   Stress: Stress Concern Present (10/13/2024)    [employer] [place] [employer] - Occupational Stress Questionnaire     Feeling of Stress : Very much   Housing Stability: Low Risk  (10/13/2024)    Housing Stability Vital Sign     Unable to Pay for Housing in the Last Year: No     Homeless in the Last Year: No       MEDICATIONS & ALLERGIES:     Current Outpatient Medications on File Prior to Visit   Medication Sig    albuterol (PROVENTIL/VENTOLIN HFA) 90 mcg/actuation inhaler Inhale 2 puffs into the lungs every 6 (six) hours as needed for Wheezing.    benzonatate (TESSALON) 100 MG capsule Take 1 capsule (100 mg total) by mouth 3 (three) times daily as needed for Cough.    blood pressure monitor (IHEALTH EASE) LG arm size (OP) by Other route as needed for High Blood Pressure.    carvediloL (COREG) 12.5 MG tablet Take 1 tablet (12.5 mg total) by mouth 2 (two) times daily with meals.    dicyclomine (BENTYL) 20 mg tablet Take 1 tablet (20 mg total) by mouth 3 (three) times daily as needed  (abdominal pain).    dulaglutide (TRULICITY) 1.5 mg/0.5 mL pen injector Inject 1.5 mg into the skin every 7 days.    DULoxetine (CYMBALTA) 60 MG capsule Take 2 capsules (120 mg total) by mouth once daily.    ergocalciferol (ERGOCALCIFEROL) 50,000 unit Cap Take 50,000 Units by mouth every 7 days.    guaiFENesin (MUCINEX) 600 mg 12 hr tablet Take 1 tablet (600 mg total) by mouth 2 (two) times daily. for 10 days    hydrALAZINE (APRESOLINE) 100 MG tablet Take 1 tablet (100 mg total) by mouth every 8 (eight) hours as needed (SBP >180).    hydroCHLOROthiazide (HYDRODIURIL) 25 MG tablet Take 1 tablet (25 mg total) by mouth once daily.    nicotine (NICODERM CQ) 14 mg/24 hr Place 1 patch onto the skin once daily.    NIFEdipine (PROCARDIA-XL) 90 MG (OSM) 24 hr tablet Take 1 tablet (90 mg total) by mouth once daily.    omeprazole (PRILOSEC) 40 MG capsule TAKE 1 CAPSULE BY MOUTH EVERY DAY    ondansetron (ZOFRAN-ODT) 4 MG TbDL Take 1 tablet (4 mg total) by mouth 2 (two) times daily.    polyethylene glycol (GLYCOLAX) 17 gram/dose powder Take 17 g by mouth once daily.    valsartan (DIOVAN) 80 MG tablet Take 1 tablet (80 mg total) by mouth once daily.     No current facility-administered medications on file prior to visit.       Review of patient's allergies indicates:  No Known Allergies    OBJECTIVE:     Vital Signs:  There were no vitals filed for this visit.    There is no height or weight on file to calculate BMI.     Physical Exam  Constitutional:       General: She is not in acute distress.     Appearance: Normal appearance. She is not ill-appearing or diaphoretic.   HENT:      Head: Normocephalic and atraumatic.      Mouth/Throat:      Pharynx: No oropharyngeal exudate or posterior oropharyngeal erythema.   Eyes:      General: No scleral icterus.        Right eye: No discharge.         Left eye: No discharge.      Extraocular Movements: Extraocular movements intact.      Conjunctiva/sclera: Conjunctivae normal.      Pupils:  Pupils are equal, round, and reactive to light.   Neck:      Vascular: No JVD.      Trachea: No tracheal deviation.   Cardiovascular:      Rate and Rhythm: Normal rate and regular rhythm.      Heart sounds: No murmur heard.  Pulmonary:      Effort: Pulmonary effort is normal. No respiratory distress.      Breath sounds: Normal breath sounds. No wheezing or rales.   Abdominal:      General: Abdomen is flat. Bowel sounds are normal. There is no distension.      Palpations: Abdomen is soft. There is no mass.      Tenderness: There is no abdominal tenderness.   Musculoskeletal:         General: No swelling, tenderness or deformity. Normal range of motion.      Cervical back: Normal range of motion and neck supple.      Right lower leg: No edema.      Left lower leg: No edema.   Lymphadenopathy:      Cervical: No cervical adenopathy.   Skin:     General: Skin is warm and dry.      Coloration: Skin is not jaundiced or pale.      Findings: No bruising, erythema or rash.   Neurological:      General: No focal deficit present.      Mental Status: She is alert and oriented to person, place, and time. Mental status is at baseline.      Cranial Nerves: No cranial nerve deficit.         Laboratory  Sodium   Date Value Ref Range Status   10/23/2024 139 136 - 145 mmol/L Final   10/15/2024 137 136 - 145 mmol/L Final   10/15/2024 136 136 - 145 mmol/L Final     Potassium   Date Value Ref Range Status   10/23/2024 3.1 (L) 3.5 - 5.1 mmol/L Final   10/15/2024 4.2 3.5 - 5.1 mmol/L Final   10/15/2024 3.6 3.5 - 5.1 mmol/L Final     Chloride   Date Value Ref Range Status   10/23/2024 103 95 - 110 mmol/L Final   10/15/2024 102 95 - 110 mmol/L Final   10/15/2024 104 95 - 110 mmol/L Final     CO2   Date Value Ref Range Status   10/23/2024 28 23 - 29 mmol/L Final   10/15/2024 25 23 - 29 mmol/L Final   10/15/2024 21 (L) 23 - 29 mmol/L Final     BUN   Date Value Ref Range Status   10/23/2024 25 (H) 6 - 20 mg/dL Final   10/15/2024 33 (H) 6 - 20  mg/dL Final   10/15/2024 31 (H) 6 - 20 mg/dL Final     Creatinine   Date Value Ref Range Status   10/23/2024 1.7 (H) 0.5 - 1.4 mg/dL Final   10/15/2024 1.6 (H) 0.5 - 1.4 mg/dL Final   10/15/2024 1.8 (H) 0.5 - 1.4 mg/dL Final     eGFR   Date Value Ref Range Status   10/23/2024 36 (A) >60 mL/min/1.73 m^2 Final   10/15/2024 38.6 (A) >60 mL/min/1.73 m^2 Final   10/15/2024 33.5 (A) >60 mL/min/1.73 m^2 Final     Glucose   Date Value Ref Range Status   03/15/2021 95 65 - 99 mg/dL Final     Calcium   Date Value Ref Range Status   10/23/2024 9.2 8.7 - 10.5 mg/dL Final   10/15/2024 10.0 8.7 - 10.5 mg/dL Final   10/15/2024 10.0 8.7 - 10.5 mg/dL Final     Phosphorus   Date Value Ref Range Status   10/23/2024 3.1 2.7 - 4.5 mg/dL Final   10/15/2024 3.6 2.7 - 4.5 mg/dL Final   10/14/2024 3.2 2.7 - 4.5 mg/dL Final     Albumin   Date Value Ref Range Status   10/23/2024 3.2 (L) 3.5 - 5.2 g/dL Final   10/15/2024 3.4 (L) 3.5 - 5.2 g/dL Final   10/14/2024 3.5 3.5 - 5.2 g/dL Final       Diagnostic Results:      Health Maintenance Due   Topic Date Due    TETANUS VACCINE  08/07/2003    Pneumococcal Vaccines (Age 0-64) (2 of 2 - PCV) 04/04/2019    Shingles Vaccine (1 of 2) Never done    Influenza Vaccine (1) 09/01/2024    COVID-19 Vaccine (4 - 2024-25 season) 09/01/2024         ASSESSMENT & PLAN:   Ms. Calos Rios is a 53 y.o. female     Secondary hypertension - strong family history, suspect hyperaldo. Rule out renal artery stenosis  Hypokalemia - replace with KCL  Bring blood pressure log after 1 week and will adjust accordingly.     CKD - longstanding poorly controlled hypertension, no proteinuria. Has a history of kidney stones as well  Discussed drinking enough water and low salt diet. Already on ACE/ARB, not on SGLT-2 or MRA  Uric acid also elevated to 10 - start allopurinol.    Stage 3b chronic kidney disease  -     Aldosterone/Renin Activity Ratio; Future; Expected date: 10/24/2024  -      Renal Artery Stenosis Hyperten  (xpd); Future; Expected date: 10/24/2024  -     CBC Auto Differential; Future; Expected date: 01/24/2025  -     RENAL FUNCTION PANEL; Future; Expected date: 01/24/2025  -     Urinalysis; Future; Expected date: 01/24/2025  -     Protein / creatinine ratio, urine; Future; Expected date: 01/24/2025  -     RENAL FUNCTION PANEL; Future; Expected date: 11/24/2024    Hypertensive emergency  -     Ambulatory referral/consult to Nephrology  -     Aldosterone/Renin Activity Ratio; Future; Expected date: 10/24/2024  -     US Renal Artery Stenosis Hyperten (xpd); Future; Expected date: 10/24/2024    Moderate asthma with exacerbation, unspecified whether persistent  -     Ambulatory referral/consult to Nephrology    Acute kidney injury superimposed on chronic kidney disease  -     Ambulatory referral/consult to Nephrology    Other orders  -     allopurinoL (ZYLOPRIM) 100 MG tablet; Take 1 tablet (100 mg total) by mouth once daily.  Dispense: 30 tablet; Refill: 3  -     potassium chloride SA (K-DUR,KLOR-CON M) 10 MEQ tablet; Take 2 tablets (20 mEq total) by mouth 2 (two) times daily. for 2 days  Dispense: 4 tablet; Refill: 0        RTC in 3 months, repeat lab 1 month      Sha Parrish MD  Nephrology [facility] [place]

## 2024-10-24 ENCOUNTER — OFFICE VISIT (OUTPATIENT)
Dept: NEPHROLOGY | Facility: CLINIC | Age: 54
End: 2024-10-24
Payer: MEDICAID

## 2024-10-24 ENCOUNTER — TELEPHONE (OUTPATIENT)
Dept: NEPHROLOGY | Facility: CLINIC | Age: 54
End: 2024-10-24

## 2024-10-24 VITALS
WEIGHT: 182.63 LBS | RESPIRATION RATE: 18 BRPM | HEIGHT: 64 IN | DIASTOLIC BLOOD PRESSURE: 88 MMHG | OXYGEN SATURATION: 98 % | SYSTOLIC BLOOD PRESSURE: 146 MMHG | BODY MASS INDEX: 31.18 KG/M2 | HEART RATE: 84 BPM

## 2024-10-24 DIAGNOSIS — I16.1 HYPERTENSIVE EMERGENCY: ICD-10-CM

## 2024-10-24 DIAGNOSIS — N18.32 STAGE 3B CHRONIC KIDNEY DISEASE: Primary | ICD-10-CM

## 2024-10-24 DIAGNOSIS — J45.901 MODERATE ASTHMA WITH EXACERBATION, UNSPECIFIED WHETHER PERSISTENT: ICD-10-CM

## 2024-10-24 DIAGNOSIS — N18.9 ACUTE KIDNEY INJURY SUPERIMPOSED ON CHRONIC KIDNEY DISEASE: ICD-10-CM

## 2024-10-24 DIAGNOSIS — N17.9 ACUTE KIDNEY INJURY SUPERIMPOSED ON CHRONIC KIDNEY DISEASE: ICD-10-CM

## 2024-10-24 PROCEDURE — 99215 OFFICE O/P EST HI 40 MIN: CPT | Mod: PBBFAC | Performed by: STUDENT IN AN ORGANIZED HEALTH CARE EDUCATION/TRAINING PROGRAM

## 2024-10-24 PROCEDURE — 99999 PR PBB SHADOW E&M-EST. PATIENT-LVL V: CPT | Mod: PBBFAC,,, | Performed by: STUDENT IN AN ORGANIZED HEALTH CARE EDUCATION/TRAINING PROGRAM

## 2024-10-24 RX ORDER — POTASSIUM CHLORIDE 750 MG/1
20 TABLET, EXTENDED RELEASE ORAL 2 TIMES DAILY
Qty: 4 TABLET | Refills: 0 | Status: SHIPPED | OUTPATIENT
Start: 2024-10-24 | End: 2024-10-26

## 2024-10-24 RX ORDER — ALPRAZOLAM 1 MG/1
0.5 TABLET ORAL 4 TIMES DAILY PRN
COMMUNITY
Start: 2024-08-14

## 2024-10-24 RX ORDER — ALLOPURINOL 100 MG/1
100 TABLET ORAL DAILY
Qty: 30 TABLET | Refills: 3 | Status: SHIPPED | OUTPATIENT
Start: 2024-10-24

## 2024-10-24 NOTE — TELEPHONE ENCOUNTER
I left a message for this patient in reference of scheduling ultrasound ordered today. Advised to contact the office at [phone]

## 2024-10-24 NOTE — PATIENT INSTRUCTIONS
Please check your blood pressure twice a day for the next week. Check first thing in the morning and right before bed. Keep a log either on your phone or on a piece of paper and write down what happened that day--it does not need to be extensive, but should include if it was a good day or a bad day. (We need to make sure your blood pressure isnt elevated due to stress, pain, etc)    You can either send it to me on De Correspondent or drop it off at the office, cc: Dr Parrish     You have chronic kidney disease, remember that this is a reflection that your kidneys have seen some use rather than a specific process. There are many uncontrollable factors that progress chronic kidney disease - mainly that we use our kidneys 24/7. We will do what we can to prolong the life of your kidney:    Stay plenty hydrated: unless otherwise directed aim for at minimum 2 liters a day  Low salt diet: goal is less than 2 grams of salt daily  Low animal protein diet: greater than 1.3 grams per kilograms bodyweight per day. (I.e if you weigh 100 kilograms (220 lbs) dont exceed greater than 130 grams of protein daily)  Avoid NSAIDS (nonsteroidal anti-inflammatories) like ibuprofen, motrin, goodies powder, advil. Tylenol is okay    Lastly, control of your other medical conditions, weight loss and exercise will always help as well.      I will see you back in 3 months

## 2024-10-28 ENCOUNTER — IMMUNIZATION (OUTPATIENT)
Dept: INTERNAL MEDICINE | Facility: CLINIC | Age: 54
End: 2024-10-28
Payer: MEDICAID

## 2024-10-28 ENCOUNTER — OFFICE VISIT (OUTPATIENT)
Dept: INTERNAL MEDICINE | Facility: CLINIC | Age: 54
End: 2024-10-28
Payer: MEDICAID

## 2024-10-28 VITALS
DIASTOLIC BLOOD PRESSURE: 92 MMHG | BODY MASS INDEX: 31.81 KG/M2 | HEIGHT: 64 IN | WEIGHT: 186.31 LBS | OXYGEN SATURATION: 99 % | HEART RATE: 84 BPM | SYSTOLIC BLOOD PRESSURE: 148 MMHG

## 2024-10-28 DIAGNOSIS — I10 ESSENTIAL HYPERTENSION: ICD-10-CM

## 2024-10-28 DIAGNOSIS — Z23 NEED FOR VACCINATION: Primary | ICD-10-CM

## 2024-10-28 DIAGNOSIS — Z00.00 ANNUAL PHYSICAL EXAM: Primary | ICD-10-CM

## 2024-10-28 DIAGNOSIS — I10 PRIMARY HYPERTENSION: ICD-10-CM

## 2024-10-28 DIAGNOSIS — R05.9 COUGH, UNSPECIFIED TYPE: ICD-10-CM

## 2024-10-28 DIAGNOSIS — N17.9 ACUTE KIDNEY INJURY SUPERIMPOSED ON CHRONIC KIDNEY DISEASE: ICD-10-CM

## 2024-10-28 DIAGNOSIS — E87.6 HYPOKALEMIA: ICD-10-CM

## 2024-10-28 DIAGNOSIS — N18.9 ACUTE KIDNEY INJURY SUPERIMPOSED ON CHRONIC KIDNEY DISEASE: ICD-10-CM

## 2024-10-28 PROBLEM — R11.2 NAUSEA AND VOMITING: Status: RESOLVED | Noted: 2022-12-15 | Resolved: 2024-10-28

## 2024-10-28 PROBLEM — J96.01 ACUTE HYPOXIC RESPIRATORY FAILURE: Status: RESOLVED | Noted: 2024-05-25 | Resolved: 2024-10-28

## 2024-10-28 PROBLEM — R79.89 ELEVATED TROPONIN: Status: RESOLVED | Noted: 2024-05-25 | Resolved: 2024-10-28

## 2024-10-28 PROBLEM — I16.1 HYPERTENSIVE EMERGENCY: Status: RESOLVED | Noted: 2024-05-25 | Resolved: 2024-10-28

## 2024-10-28 PROCEDURE — 99999 PR PBB SHADOW E&M-EST. PATIENT-LVL III: CPT | Mod: PBBFAC,,, | Performed by: INTERNAL MEDICINE

## 2024-10-28 PROCEDURE — 99999PBSHW INFLUENZA - TRIVALENT - PF (ADULT): Mod: PBBFAC,,,

## 2024-10-28 PROCEDURE — 90677 PCV20 VACCINE IM: CPT | Mod: PBBFAC

## 2024-10-28 PROCEDURE — 90656 IIV3 VACC NO PRSV 0.5 ML IM: CPT | Mod: PBBFAC

## 2024-10-28 PROCEDURE — 99213 OFFICE O/P EST LOW 20 MIN: CPT | Mod: PBBFAC | Performed by: INTERNAL MEDICINE

## 2024-10-28 PROCEDURE — 99999PBSHW PR PBB SHADOW TECHNICAL ONLY FILED TO HB: Mod: PBBFAC,,,

## 2024-10-28 PROCEDURE — 90471 IMMUNIZATION ADMIN: CPT | Mod: PBBFAC

## 2024-10-28 RX ORDER — NIFEDIPINE 90 MG/1
90 TABLET, EXTENDED RELEASE ORAL DAILY
Qty: 90 TABLET | Refills: 3 | Status: SHIPPED | OUTPATIENT
Start: 2024-10-28

## 2024-10-28 RX ORDER — CARVEDILOL 12.5 MG/1
12.5 TABLET ORAL 2 TIMES DAILY WITH MEALS
Qty: 180 TABLET | Refills: 3 | Status: SHIPPED | OUTPATIENT
Start: 2024-10-28

## 2024-10-28 RX ORDER — HYDROCHLOROTHIAZIDE 25 MG/1
25 TABLET ORAL DAILY
Qty: 90 TABLET | Refills: 3 | Status: SHIPPED | OUTPATIENT
Start: 2024-10-28

## 2024-10-28 RX ORDER — HYDRALAZINE HYDROCHLORIDE 100 MG/1
100 TABLET, FILM COATED ORAL EVERY 8 HOURS PRN
Qty: 90 TABLET | Refills: 2 | Status: SHIPPED | OUTPATIENT
Start: 2024-10-28

## 2024-10-28 RX ORDER — VALSARTAN 160 MG/1
160 TABLET ORAL DAILY
Qty: 90 TABLET | Refills: 3 | Status: SHIPPED | OUTPATIENT
Start: 2024-10-28

## 2024-10-28 RX ADMIN — PNEUMOCOCCAL 20-VALENT CONJUGATE VACCINE 0.5 ML
2.2; 2.2; 2.2; 2.2; 2.2; 2.2; 2.2; 2.2; 2.2; 2.2; 2.2; 2.2; 2.2; 2.2; 2.2; 2.2; 4.4; 2.2; 2.2; 2.2 INJECTION, SUSPENSION INTRAMUSCULAR at 11:10

## 2024-11-06 ENCOUNTER — TELEPHONE (OUTPATIENT)
Dept: SMOKING CESSATION | Facility: CLINIC | Age: 54
End: 2024-11-06
Payer: MEDICAID

## 2024-11-06 NOTE — TELEPHONE ENCOUNTER
Smoking Cessation clinic - Called pt after 5 mins no show to in-clinic intake appointment. Pt states she got called into work and will not be coming today. Pt asks to be rescheduled for next week at 1pm. Will reschedule for Thurs Nov 14th at 1 PM.  -Harley Packer, RDN, LDN, CTTS, [facility]  [phone]

## 2024-11-07 ENCOUNTER — HOSPITAL ENCOUNTER (OUTPATIENT)
Facility: HOSPITAL | Age: 54
Discharge: HOME | End: 2024-11-08
Attending: EMERGENCY MEDICINE | Admitting: EMERGENCY MEDICINE
Payer: MEDICAID

## 2024-11-07 ENCOUNTER — NURSE TRIAGE (OUTPATIENT)
Dept: ADMINISTRATIVE | Facility: CLINIC | Age: 54
End: 2024-11-07
Payer: MEDICAID

## 2024-11-07 DIAGNOSIS — R07.89 CHEST TIGHTNESS: Primary | ICD-10-CM

## 2024-11-07 DIAGNOSIS — R79.89 ELEVATED TROPONIN: ICD-10-CM

## 2024-11-07 DIAGNOSIS — N17.9 ACUTE KIDNEY INJURY SUPERIMPOSED ON CHRONIC KIDNEY DISEASE: ICD-10-CM

## 2024-11-07 DIAGNOSIS — I95.9 HYPOTENSION, UNSPECIFIED HYPOTENSION TYPE: ICD-10-CM

## 2024-11-07 DIAGNOSIS — N18.9 ACUTE KIDNEY INJURY SUPERIMPOSED ON CHRONIC KIDNEY DISEASE: ICD-10-CM

## 2024-11-07 DIAGNOSIS — R42 DIZZINESS: ICD-10-CM

## 2024-11-07 DIAGNOSIS — R07.9 CHEST PAIN: ICD-10-CM

## 2024-11-07 LAB
ALBUMIN SERPL BCP-MCNC: 3.3 G/DL (ref 3.5–5.2)
ALP SERPL-CCNC: 71 U/L (ref 40–150)
ALT SERPL W/O P-5'-P-CCNC: 14 U/L (ref 10–44)
ANION GAP SERPL CALC-SCNC: 10 MMOL/L (ref 8–16)
ASCENDING AORTA: 2.76 CM
AST SERPL-CCNC: 19 U/L (ref 10–40)
AV INDEX (PROSTH): 1.2
AV MEAN GRADIENT: 19.3 MMHG
AV PEAK GRADIENT: 33.6 MMHG
AV VALVE AREA BY VELOCITY RATIO: 2.3 CM²
AV VALVE AREA: 3.4 CM²
AV VELOCITY RATIO: 0.83
BASOPHILS # BLD AUTO: 0.03 K/UL (ref 0–0.2)
BASOPHILS NFR BLD: 0.4 % (ref 0–1.9)
BILIRUB SERPL-MCNC: 0.5 MG/DL (ref 0.1–1)
BNP SERPL-MCNC: 124 PG/ML (ref 0–99)
BSA FOR ECHO PROCEDURE: 1.95 M2
BUN SERPL-MCNC: 23 MG/DL (ref 6–20)
CALCIUM SERPL-MCNC: 9.2 MG/DL (ref 8.7–10.5)
CHLORIDE SERPL-SCNC: 107 MMOL/L (ref 95–110)
CHOLEST SERPL-MCNC: 186 MG/DL (ref 120–199)
CHOLEST/HDLC SERPL: 2.9 {RATIO} (ref 2–5)
CO2 SERPL-SCNC: 25 MMOL/L (ref 23–29)
CREAT SERPL-MCNC: 2 MG/DL (ref 0.5–1.4)
CV ECHO LV RWT: 0.97 CM
DIFFERENTIAL METHOD BLD: ABNORMAL
DOP CALC AO PEAK VEL: 2.9 M/S
DOP CALC AO VTI: 46.9 CM
DOP CALC LVOT AREA: 2.8 CM2
DOP CALC LVOT DIAMETER: 1.9 CM
DOP CALC LVOT PEAK VEL: 2.4 M/S
DOP CALC LVOT STROKE VOLUME: 159.3 CM3
DOP CALC MV VTI: 35.4 CM
DOP CALCLVOT PEAK VEL VTI: 56.2 CM
E WAVE DECELERATION TIME: 357.52 MSEC
E/A RATIO: 0.71
E/E' RATIO: 22.25 M/S
ECHO LV POSTERIOR WALL: 1.6 CM (ref 0.6–1.1)
EOSINOPHIL # BLD AUTO: 0.2 K/UL (ref 0–0.5)
EOSINOPHIL NFR BLD: 2.9 % (ref 0–8)
ERYTHROCYTE [DISTWIDTH] IN BLOOD BY AUTOMATED COUNT: 14.6 % (ref 11.5–14.5)
EST. GFR  (NO RACE VARIABLE): 30 ML/MIN/1.73 M^2
FRACTIONAL SHORTENING: 39.4 % (ref 28–44)
GLUCOSE SERPL-MCNC: 105 MG/DL (ref 70–110)
HCT VFR BLD AUTO: 44.1 % (ref 37–48.5)
HDLC SERPL-MCNC: 64 MG/DL (ref 40–75)
HDLC SERPL: 34.4 % (ref 20–50)
HGB BLD-MCNC: 14.9 G/DL (ref 12–16)
IMM GRANULOCYTES # BLD AUTO: 0.02 K/UL (ref 0–0.04)
IMM GRANULOCYTES NFR BLD AUTO: 0.2 % (ref 0–0.5)
INTERVENTRICULAR SEPTUM: 1.5 CM (ref 0.6–1.1)
IVC DIAMETER: 1.37 CM
IVRT: 123.69 MSEC
LA MAJOR: 4.65 CM
LA MINOR: 5.22 CM
LA WIDTH: 3.6 CM
LDLC SERPL CALC-MCNC: 107.6 MG/DL (ref 63–159)
LEFT ATRIUM SIZE: 3.86 CM
LEFT ATRIUM VOLUME INDEX: 30.6 ML/M2
LEFT ATRIUM VOLUME: 58.1 CM3
LEFT INTERNAL DIMENSION IN SYSTOLE: 2 CM (ref 2.1–4)
LEFT VENTRICLE DIASTOLIC VOLUME INDEX: 23.09 ML/M2
LEFT VENTRICLE DIASTOLIC VOLUME: 43.87 ML
LEFT VENTRICLE MASS INDEX: 99.4 G/M2
LEFT VENTRICLE SYSTOLIC VOLUME INDEX: 7 ML/M2
LEFT VENTRICLE SYSTOLIC VOLUME: 13.35 ML
LEFT VENTRICULAR INTERNAL DIMENSION IN DIASTOLE: 3.3 CM (ref 3.5–6)
LEFT VENTRICULAR MASS: 188.8 G
LV LATERAL E/E' RATIO: 17.8 M/S
LV SEPTAL E/E' RATIO: 29.67 M/S
LVED V (TEICH): 43.87 ML
LVES V (TEICH): 13.35 ML
LVOT MG: 19 MMHG
LVOT MV: 1.82 CM/S
LYMPHOCYTES # BLD AUTO: 1.5 K/UL (ref 1–4.8)
LYMPHOCYTES NFR BLD: 18.6 % (ref 18–48)
MCH RBC QN AUTO: 32.5 PG (ref 27–31)
MCHC RBC AUTO-ENTMCNC: 33.8 G/DL (ref 32–36)
MCV RBC AUTO: 96 FL (ref 82–98)
MONOCYTES # BLD AUTO: 0.6 K/UL (ref 0.3–1)
MONOCYTES NFR BLD: 7.7 % (ref 4–15)
MV MEAN GRADIENT: 2 MMHG
MV PEAK A VEL: 1.26 M/S
MV PEAK E VEL: 0.89 M/S
MV PEAK GRADIENT: 6 MMHG
MV STENOSIS PRESSURE HALF TIME: 103.68 MS
MV VALVE AREA BY CONTINUITY EQUATION: 4.5 CM2
MV VALVE AREA P 1/2 METHOD: 2.12 CM2
NEUTROPHILS # BLD AUTO: 5.6 K/UL (ref 1.8–7.7)
NEUTROPHILS NFR BLD: 70.2 % (ref 38–73)
NONHDLC SERPL-MCNC: 122 MG/DL
NRBC BLD-RTO: 0 /100 WBC
OHS CV RV/LV RATIO: 1.06 CM
OHS QRS DURATION: 100 MS
OHS QTC CALCULATION: 525 MS
PISA TR MAX VEL: 2.96 M/S
PLATELET # BLD AUTO: 201 K/UL (ref 150–450)
PMV BLD AUTO: 10.2 FL (ref 9.2–12.9)
POTASSIUM SERPL-SCNC: 3.1 MMOL/L (ref 3.5–5.1)
PROT SERPL-MCNC: 7.1 G/DL (ref 6–8.4)
PV PEAK GRADIENT: 4 MMHG
PV PEAK VELOCITY: 0.96 M/S
RA MAJOR: 4.01 CM
RA PRESSURE ESTIMATED: 3 MMHG
RA WIDTH: 2.5 CM
RBC # BLD AUTO: 4.59 M/UL (ref 4–5.4)
RIGHT VENTRICLE DIASTOLIC BASEL DIMENSION: 3.5 CM
RIGHT VENTRICULAR END-DIASTOLIC DIMENSION: 3.52 CM
RV TB RVSP: 6 MMHG
RV TISSUE DOPPLER FREE WALL SYSTOLIC VELOCITY 1 (APICAL 4 CHAMBER VIEW): 18 CM/S
SINUS: 3.14 CM
SODIUM SERPL-SCNC: 142 MMOL/L (ref 136–145)
STJ: 3.02 CM
TDI LATERAL: 0.05 M/S
TDI SEPTAL: 0.03 M/S
TDI: 0.04 M/S
TR MAX PG: 35 MMHG
TRICUSPID ANNULAR PLANE SYSTOLIC EXCURSION: 2.85 CM
TRIGL SERPL-MCNC: 72 MG/DL (ref 30–150)
TROPONIN I SERPL DL<=0.01 NG/ML-MCNC: 0.12 NG/ML (ref 0–0.03)
TROPONIN I SERPL DL<=0.01 NG/ML-MCNC: 0.12 NG/ML (ref 0–0.03)
TROPONIN I SERPL DL<=0.01 NG/ML-MCNC: 0.17 NG/ML (ref 0–0.03)
TSH SERPL DL<=0.005 MIU/L-ACNC: 0.77 UIU/ML (ref 0.4–4)
TV PEAK GRADIENT: 1 MMHG
TV REST PULMONARY ARTERY PRESSURE: 38 MMHG
WBC # BLD AUTO: 8.03 K/UL (ref 3.9–12.7)
Z-SCORE OF LEFT VENTRICULAR DIMENSION IN END DIASTOLE: -4.8
Z-SCORE OF LEFT VENTRICULAR DIMENSION IN END SYSTOLE: -3.92

## 2024-11-07 PROCEDURE — 85025 COMPLETE CBC W/AUTO DIFF WBC: CPT | Performed by: EMERGENCY MEDICINE

## 2024-11-07 PROCEDURE — G0378 HOSPITAL OBSERVATION PER HR: HCPCS

## 2024-11-07 PROCEDURE — 96361 HYDRATE IV INFUSION ADD-ON: CPT

## 2024-11-07 PROCEDURE — 80053 COMPREHEN METABOLIC PANEL: CPT | Performed by: EMERGENCY MEDICINE

## 2024-11-07 PROCEDURE — 93005 ELECTROCARDIOGRAM TRACING: CPT

## 2024-11-07 PROCEDURE — 63600175 PHARM REV CODE 636 W HCPCS

## 2024-11-07 PROCEDURE — 84484 ASSAY OF TROPONIN QUANT: CPT | Mod: 91

## 2024-11-07 PROCEDURE — 25000003 PHARM REV CODE 250: Performed by: EMERGENCY MEDICINE

## 2024-11-07 PROCEDURE — 99214 OFFICE O/P EST MOD 30 MIN: CPT | Mod: 25,,, | Performed by: INTERNAL MEDICINE

## 2024-11-07 PROCEDURE — 99285 EMERGENCY DEPT VISIT HI MDM: CPT | Mod: 25

## 2024-11-07 PROCEDURE — 25000003 PHARM REV CODE 250

## 2024-11-07 PROCEDURE — 83880 ASSAY OF NATRIURETIC PEPTIDE: CPT | Performed by: EMERGENCY MEDICINE

## 2024-11-07 PROCEDURE — 96372 THER/PROPH/DIAG INJ SC/IM: CPT

## 2024-11-07 PROCEDURE — 84443 ASSAY THYROID STIM HORMONE: CPT | Performed by: EMERGENCY MEDICINE

## 2024-11-07 PROCEDURE — 80061 LIPID PANEL: CPT | Performed by: EMERGENCY MEDICINE

## 2024-11-07 PROCEDURE — 96360 HYDRATION IV INFUSION INIT: CPT

## 2024-11-07 PROCEDURE — 93010 ELECTROCARDIOGRAM REPORT: CPT | Mod: ,,, | Performed by: INTERNAL MEDICINE

## 2024-11-07 PROCEDURE — 84484 ASSAY OF TROPONIN QUANT: CPT | Mod: 91 | Performed by: EMERGENCY MEDICINE

## 2024-11-07 RX ORDER — ASPIRIN 325 MG
325 TABLET ORAL
Status: DISCONTINUED | OUTPATIENT
Start: 2024-11-07 | End: 2024-11-07

## 2024-11-07 RX ORDER — DICYCLOMINE HYDROCHLORIDE 10 MG/1
20 CAPSULE ORAL 3 TIMES DAILY PRN
Status: DISCONTINUED | OUTPATIENT
Start: 2024-11-07 | End: 2024-11-08 | Stop reason: HOSPADM

## 2024-11-07 RX ORDER — ENOXAPARIN SODIUM 100 MG/ML
1 INJECTION SUBCUTANEOUS
Status: DISCONTINUED | OUTPATIENT
Start: 2024-11-07 | End: 2024-11-07

## 2024-11-07 RX ORDER — HYDROCHLOROTHIAZIDE 25 MG/1
25 TABLET ORAL DAILY
Status: DISCONTINUED | OUTPATIENT
Start: 2024-11-08 | End: 2024-11-08 | Stop reason: HOSPADM

## 2024-11-07 RX ORDER — DOCUSATE SODIUM 100 MG
400 CAPSULE ORAL
Status: DISCONTINUED | OUTPATIENT
Start: 2024-11-07 | End: 2024-11-08 | Stop reason: HOSPADM

## 2024-11-07 RX ORDER — SODIUM CHLORIDE 0.9 % (FLUSH) 0.9 %
10 SYRINGE (ML) INJECTION EVERY 12 HOURS PRN
Status: DISCONTINUED | OUTPATIENT
Start: 2024-11-07 | End: 2024-11-08 | Stop reason: HOSPADM

## 2024-11-07 RX ORDER — ALLOPURINOL 100 MG/1
100 TABLET ORAL DAILY
Status: DISCONTINUED | OUTPATIENT
Start: 2024-11-08 | End: 2024-11-08 | Stop reason: HOSPADM

## 2024-11-07 RX ORDER — IBUPROFEN 200 MG
16 TABLET ORAL
Status: DISCONTINUED | OUTPATIENT
Start: 2024-11-07 | End: 2024-11-08 | Stop reason: HOSPADM

## 2024-11-07 RX ORDER — PANTOPRAZOLE SODIUM 40 MG/1
40 TABLET, DELAYED RELEASE ORAL DAILY
Status: DISCONTINUED | OUTPATIENT
Start: 2024-11-07 | End: 2024-11-08 | Stop reason: HOSPADM

## 2024-11-07 RX ORDER — HYDRALAZINE HYDROCHLORIDE 25 MG/1
100 TABLET, FILM COATED ORAL EVERY 8 HOURS PRN
Status: DISCONTINUED | OUTPATIENT
Start: 2024-11-07 | End: 2024-11-08 | Stop reason: HOSPADM

## 2024-11-07 RX ORDER — NIFEDIPINE 30 MG/1
90 TABLET, EXTENDED RELEASE ORAL DAILY
Status: DISCONTINUED | OUTPATIENT
Start: 2024-11-08 | End: 2024-11-08 | Stop reason: HOSPADM

## 2024-11-07 RX ORDER — ENOXAPARIN SODIUM 100 MG/ML
40 INJECTION SUBCUTANEOUS EVERY 24 HOURS
Status: DISCONTINUED | OUTPATIENT
Start: 2024-11-07 | End: 2024-11-08 | Stop reason: HOSPADM

## 2024-11-07 RX ORDER — GLUCAGON 1 MG
1 KIT INJECTION
Status: DISCONTINUED | OUTPATIENT
Start: 2024-11-07 | End: 2024-11-08 | Stop reason: HOSPADM

## 2024-11-07 RX ORDER — IBUPROFEN 200 MG
24 TABLET ORAL
Status: DISCONTINUED | OUTPATIENT
Start: 2024-11-07 | End: 2024-11-08 | Stop reason: HOSPADM

## 2024-11-07 RX ORDER — ALPRAZOLAM 0.5 MG/1
0.5 TABLET ORAL 4 TIMES DAILY PRN
Status: DISCONTINUED | OUTPATIENT
Start: 2024-11-07 | End: 2024-11-08 | Stop reason: HOSPADM

## 2024-11-07 RX ORDER — CARVEDILOL 12.5 MG/1
12.5 TABLET ORAL 2 TIMES DAILY WITH MEALS
Status: DISCONTINUED | OUTPATIENT
Start: 2024-11-07 | End: 2024-11-07

## 2024-11-07 RX ORDER — NALOXONE HCL 0.4 MG/ML
0.02 VIAL (ML) INJECTION
Status: DISCONTINUED | OUTPATIENT
Start: 2024-11-07 | End: 2024-11-08 | Stop reason: HOSPADM

## 2024-11-07 RX ADMIN — ENOXAPARIN SODIUM 40 MG: 40 INJECTION SUBCUTANEOUS at 07:11

## 2024-11-07 RX ADMIN — POTASSIUM BICARBONATE 40 MEQ: 391 TABLET, EFFERVESCENT ORAL at 02:11

## 2024-11-07 RX ADMIN — PANTOPRAZOLE SODIUM 40 MG: 40 TABLET, DELAYED RELEASE ORAL at 07:11

## 2024-11-07 RX ADMIN — SODIUM CHLORIDE 1000 ML: 9 INJECTION, SOLUTION INTRAVENOUS at 10:11

## 2024-11-07 NOTE — CONSULTS
[facility] Emergency Dept  Cardiology  Consult Note    Patient Name: Calos Rios  MRN: [id number]  Admission Date: 11/7/2024  Hospital Length of Stay: 0 days  Code Status: Full Code   Attending Provider: Ramo Pretty MD   Consulting Provider: Doug Valle MD  Primary Care Physician: Adria Doll Jr., MD  Principal Problem:<principal problem not specified>    Patient information was obtained from patient and ER records.     Inpatient consult to Cardiology  Consult performed by: Doug Valle MD  Consult ordered by: Heather Cali PA-C  Reason for consult: CP        Subjective:     Chief Complaint:  CP     HPI:   53-year-old black female presents emergency room with a history of severe hypertension. She is very difficult to control. She took all her blood pressure medicines this morning including 100 of hydralazine. She is also on nifedipine XL 90, hydrochlorothiazide, lisinopril 160, and other medicines. Does have some dull heavy chest pressure. She has a history of an anxiety disorder. She has no head pain. She is without other injuries or problems. She reports dizziness which is a sensation of feeling like she was going to pass out.     Cardiology consulted for CP.    Pleasant 51-year-old woman with a history of hypertension.  She reports missing her medications yesterday, but took them this morning.  Thereafter, she felt very dizzy and her systolic pressure was in the 70s.  She describes some chest discomfort with this which has since abated.  She received IV fluids in the ER and is now back to normal.  Her EKG does note some inferolateral ST depression and her troponin is minimally elevated at 0.1 which is consistent with prior measurements.  She also has CKD 3.  Repeat troponin is pending.    Past Medical History:   Diagnosis Date    Anxiety disorder, unspecified     Hypertension     Migraine headache     Obesity        Past Surgical History:   Procedure Laterality Date     CYSTOURETEROSCOPY,WITH HOLMIUM LASER LITHOTRIPSY OF URETERAL CALCULUS - Bilateral Bilateral 11/09/2022    ESOPHAGOGASTRODUODENOSCOPY N/A 12/23/2022    Procedure: EGD (ESOPHAGOGASTRODUODENOSCOPY);  Surgeon: Anaid Cohen MD;  Location: [facility] ENDO;  Service: Endoscopy;  Laterality: N/A;    HYSTERECTOMY, TOTAL, LAPAROSCOPIC, WITH SALPINGO-OOPHORECTOMY Bilateral 03/17/2021    LAPAROSCOPIC TOTAL HYSTERECTOMY Bilateral 03/17/2021    PYELOSCOPY Bilateral 10/25/2022    Procedure: PYELOSCOPY;  Surgeon: Raz Cantrell MD;  Location: [facility] OR;  Service: Urology;  Laterality: Bilateral;    TUBAL LIGATION      URETEROSCOPY Bilateral 10/25/2022    Procedure: URETEROSCOPY;  Surgeon: Raz Cantrell MD;  Location: [facility] OR;  Service: Urology;  Laterality: Bilateral;       Review of patient's allergies indicates:  No Known Allergies    No current facility-administered medications on file prior to encounter.     Current Outpatient Medications on File Prior to Encounter   Medication Sig    albuterol (PROVENTIL/VENTOLIN HFA) 90 mcg/actuation inhaler Inhale 2 puffs into the lungs every 6 (six) hours as needed for Wheezing.    allopurinoL (ZYLOPRIM) 100 MG tablet Take 1 tablet (100 mg total) by mouth once daily.    ALPRAZolam (XANAX) 1 MG tablet Take 0.5 mg by mouth 4 (four) times daily as needed.    blood pressure monitor (IHEALTH EASE) LG arm size (OP) by Other route as needed for High Blood Pressure.    carvediloL (COREG) 12.5 MG tablet Take 1 tablet (12.5 mg total) by mouth 2 (two) times daily with meals.    dicyclomine (BENTYL) 20 mg tablet Take 1 tablet (20 mg total) by mouth 3 (three) times daily as needed (abdominal pain).    dulaglutide (TRULICITY) 1.5 mg/0.5 mL pen injector Inject 1.5 mg into the skin every 7 days. (Patient not taking: Reported on 10/28/2024)    DULoxetine (CYMBALTA) 60 MG capsule Take 2 capsules (120 mg total) by mouth once daily.    hydrALAZINE (APRESOLINE) 100 MG tablet Take 1 tablet (100 mg total)  by mouth every 8 (eight) hours as needed (SBP >180).    hydroCHLOROthiazide (HYDRODIURIL) 25 MG tablet Take 1 tablet (25 mg total) by mouth once daily.    NIFEdipine (PROCARDIA-XL) 90 MG (OSM) 24 hr tablet Take 1 tablet (90 mg total) by mouth once daily.    omeprazole (PRILOSEC) 40 MG capsule TAKE 1 CAPSULE BY MOUTH EVERY DAY    ondansetron (ZOFRAN-ODT) 4 MG TbDL Take 1 tablet (4 mg total) by mouth 2 (two) times daily.    valsartan (DIOVAN) 160 MG tablet Take 1 tablet (160 mg total) by mouth once daily.     Family History       Problem Relation (Age of Onset)    Diabetes Maternal Uncle    Drug abuse Father    Heart disease Mother, Father, Maternal Grandmother    Kidney disease Father    Ovarian cancer Maternal Aunt          Tobacco Use    Smoking status: Former     Current packs/day: 0.25     Average packs/day: 0.3 packs/day for 15.0 years (3.8 ttl pk-yrs)     Types: Cigarettes    Smokeless tobacco: Never    Tobacco comments:     Quite 10/2024.     Substance and Sexual Activity    Alcohol use: Yes     Alcohol/week: 3.0 standard drinks of alcohol     Types: 3 Shots of liquor per week     Comment: scocially on weekends    Drug use: Yes     Types: Marijuana    Sexual activity: Yes     Partners: Male     Birth control/protection: None     Review of Systems   Constitutional: Negative for chills, diaphoresis, fever and malaise/fatigue.   HENT:  Negative for nosebleeds.    Eyes:  Negative for blurred vision and double vision.   Cardiovascular:  Positive for chest pain. Negative for claudication, cyanosis, dyspnea on exertion, leg swelling, orthopnea, palpitations, paroxysmal nocturnal dyspnea and syncope.   Respiratory:  Negative for cough, shortness of breath and wheezing.    Skin:  Negative for dry skin and poor wound healing.   Musculoskeletal:  Negative for back pain, joint swelling and myalgias.   Gastrointestinal:  Negative for abdominal pain, nausea and vomiting.   Genitourinary:  Negative for hematuria.    Neurological:  Negative for dizziness, headaches, numbness, seizures and weakness.   Psychiatric/Behavioral:  Negative for altered mental status and depression.      Objective:     Vital Signs (Most Recent):  Temp: 98.1 °F (36.7 °C) (11/07/24 1203)  Pulse: 70 (11/07/24 1209)  Resp: 19 (11/07/24 1209)  BP: 124/67 (11/07/24 1203)  SpO2: 99 % (11/07/24 1209) Vital Signs (24h Range):  Temp:  [97.5 °F (36.4 °C)-98.1 °F (36.7 °C)] 98.1 °F (36.7 °C)  Pulse:  [66-75] 70  Resp:  [14-19] 19  SpO2:  [96 %-100 %] 99 %  BP: ()/(48-67) 124/67     Weight: 84.4 kg (186 lb)  Body mass index is 31.93 kg/m².    SpO2: 99 %         Intake/Output Summary (Last 24 hours) at 11/7/2024 1429  Last data filed at 11/7/2024 1209  Gross per 24 hour   Intake 1000 ml   Output --   Net 1000 ml       Lines/Drains/Airways       Peripheral Intravenous Line  Duration                  Peripheral IV - Single Lumen 11/07/24 1016 22 G Right Hand <1 day                     Physical Exam  Constitutional:       General: She is not in acute distress.     Appearance: She is well-developed. She is obese. She is not ill-appearing, toxic-appearing or diaphoretic.   HENT:      Head: Normocephalic and atraumatic.   Eyes:      General: No scleral icterus.     Extraocular Movements: Extraocular movements intact.      Conjunctiva/sclera: Conjunctivae normal.      Pupils: Pupils are equal, round, and reactive to light.   Neck:      Vascular: No JVD.      Trachea: No tracheal deviation.   Cardiovascular:      Rate and Rhythm: Normal rate and regular rhythm.      Heart sounds: S1 normal and S2 normal. Murmur heard.      Systolic murmur is present with a grade of 2/6.      No friction rub. No gallop.   Pulmonary:      Effort: Pulmonary effort is normal. No respiratory distress.      Breath sounds: Normal breath sounds. No stridor. No wheezing, rhonchi or rales.   Chest:      Chest wall: No tenderness.   Abdominal:      General: There is no distension.       Palpations: Abdomen is soft.   Musculoskeletal:         General: No swelling or tenderness. Normal range of motion.      Cervical back: Normal range of motion and neck supple. No rigidity.      Right lower leg: No edema.      Left lower leg: No edema.   Skin:     General: Skin is warm and dry.      Coloration: Skin is not jaundiced.   Neurological:      General: No focal deficit present.      Mental Status: She is alert and oriented to person, place, and time.      Cranial Nerves: No cranial nerve deficit.   Psychiatric:         Mood and Affect: Mood normal.         Behavior: Behavior normal.          Current Medications:   enoxparin  40 mg Subcutaneous Daily         Current Facility-Administered Medications:     glucagon (human recombinant), 1 mg, Intramuscular, PRN    glucose, 16 g, Oral, PRN    glucose, 24 g, Oral, PRN    naloxone, 0.02 mg, Intravenous, PRN    sodium chloride 0.9%, 10 mL, Intravenous, Q12H PRN    Laboratory (all labs reviewed):  CBC:  Recent Labs   Lab 10/13/24  0649 10/14/24  0418 10/15/24  0223 10/23/24  1215 11/07/24  1015   WBC 8.99 10.42 10.86 9.38 8.03   Hemoglobin 17.4 H 17.0 H 16.3 H 15.2 14.9   Hematocrit 50.5 H 49.3 H 47.8 44.6 44.1   Platelets 207 224 240 233 201       CHEMISTRIES:  Recent Labs   Lab 05/26/24  0500 05/27/24  0518 10/13/24  0756 10/14/24  0418 10/15/24  0223 10/15/24  1325 10/23/24  1215 11/07/24  1015   Glucose 88 86 108 93 102 108 110 105   Sodium 137 139 140 137 136 137 139 142   Potassium 3.0 L 3.5 3.2 L 3.3 L 3.6 4.2 3.1 L 3.1 L   BUN 16 17 15 24 H 31 H 33 H 25 H 23 H   Creatinine 1.6 H 1.3 1.2 1.5 H 1.8 H 1.6 H 1.7 H 2.0 H   eGFR 38.6 A 49.5 A 54.5 A 41.7 A 33.5 A 38.6 A 36 A 30 A   Calcium 9.2 9.2 9.2 10.1 10.0 10.0 9.2 9.2   Magnesium 2.3 2.1 1.9 2.0 2.3  --   --   --        CARDIAC BIOMARKERS:  Recent Labs   Lab 05/25/24  2307 05/26/24  0500 10/13/24  0649 10/13/24  0953 11/07/24  1015   Troponin I 0.124 H 0.117 H 0.068 H 0.068 H 0.119 H       COAGS:         LIPIDS/LFTS:  Recent Labs   Lab 03/18/24  1533 05/25/24  2118 05/27/24  0518 10/13/24  0756 10/14/24  0418 10/15/24  0223 11/07/24  1015   Cholesterol 180  --   --   --   --   --   --    Triglycerides 157 H  --   --   --   --   --   --    HDL 43  --   --   --   --   --   --    LDL Cholesterol 105.6  --   --   --   --   --   --    Non-HDL Cholesterol 137  --   --   --   --   --   --    AST 21   < > 18 23 20 16 19   ALT 18   < > 16 14 17 15 14    < > = values in this interval not displayed.       BNP:  Recent Labs   Lab 05/25/24  1934 10/13/24  0649 11/07/24  1015   BNP 63 51 124 H       TSH:        Free T4:        Diagnostic Results:  ECG (personally reviewed and interpreted tracing(s)):  11/7/24 0950 SR 72, LVH, QTc 525, inflat ST abnl ?isch, new vs 10/13/24    Chest X-Ray (personally reviewed and interpreted image(s)): 11/7/24 NAD    L MPI: ordered pending repeat troponin    Echo: 5/26/24 (repeat ordered)    Left Ventricle: The left ventricle is normal in size. Ventricular mass is normal. Mildly increased wall thickness. There is concentric remodeling. Normal wall motion. There is normal systolic function with a visually estimated ejection fraction of 65 - 70%. Grade I diastolic dysfunction.    Right Ventricle: Normal right ventricular cavity size. Wall thickness is normal. Systolic function is normal.    Left Atrium: Left atrium is mildly dilated.    Mitral Valve: There is anterior leaflet sclerosis.    Pulmonary Artery: The estimated pulmonary artery systolic pressure is 19 mmHg.    IVC/SVC: Normal venous pressure at 3 mmHg.    Pericardium: There is a small effusion adjacent to the right ventricle.        Assessment and Plan:     Chest pain  Chest discomfort in the setting of hypotension, abated with IV fluids.    Ischemic EKG changes noted with minimal troponin elevation consistent with prior measurements.  This is all in the setting of CKD 3B.  Echocardiogram has been ordered.    Repeat troponin pending.   Assuming no significant rise in troponin, plan nuclear stress test in the morning for ischemic assessment.    Elevated troponin level not due myocardial infarction  Chest pain with possible ischemic EKG changes in the setting of hypotension.    Symptoms have abated.    Troponin 0.1, consistent with prior measurements in the setting of CKD.    Check echocardiogram.    Assuming no significant rise in troponin, plan nuclear stress test in a.m. for ischemic assessment.    Acute kidney injury superimposed on chronic kidney disease  Management per Internal Medicine.    ELOY risk elevated.    Hypertension  Continue medical therapy.        VTE Risk Mitigation (From admission, onward)           Ordered     enoxaparin injection 40 mg  Daily         11/07/24 1422     IP VTE HIGH RISK PATIENT  Once         11/07/24 1422     Place sequential compression device  Until discontinued         11/07/24 1422                    Thank you for your consult. I will follow-up with patient. Please contact us if you have any additional questions.    Doug Valle MD  Cardiology   [facility] [place] - Emergency Dept

## 2024-11-07 NOTE — SUBJECTIVE & OBJECTIVE
Past Medical History:   Diagnosis Date    Anxiety disorder, unspecified     Hypertension     Migraine headache     Obesity        Past Surgical History:   Procedure Laterality Date    CYSTOURETEROSCOPY,WITH HOLMIUM LASER LITHOTRIPSY OF URETERAL CALCULUS - Bilateral Bilateral 11/09/2022    ESOPHAGOGASTRODUODENOSCOPY N/A 12/23/2022    Procedure: EGD (ESOPHAGOGASTRODUODENOSCOPY);  Surgeon: Anaid Cohen MD;  Location: [facility] ENDO;  Service: Endoscopy;  Laterality: N/A;    HYSTERECTOMY, TOTAL, LAPAROSCOPIC, WITH SALPINGO-OOPHORECTOMY Bilateral 03/17/2021    LAPAROSCOPIC TOTAL HYSTERECTOMY Bilateral 03/17/2021    PYELOSCOPY Bilateral 10/25/2022    Procedure: PYELOSCOPY;  Surgeon: Raz Cantrell MD;  Location: [facility] OR;  Service: Urology;  Laterality: Bilateral;    TUBAL LIGATION      URETEROSCOPY Bilateral 10/25/2022    Procedure: URETEROSCOPY;  Surgeon: Raz Cantrell MD;  Location: [facility] OR;  Service: Urology;  Laterality: Bilateral;       Review of patient's allergies indicates:  No Known Allergies    No current facility-administered medications on file prior to encounter.     Current Outpatient Medications on File Prior to Encounter   Medication Sig    albuterol (PROVENTIL/VENTOLIN HFA) 90 mcg/actuation inhaler Inhale 2 puffs into the lungs every 6 (six) hours as needed for Wheezing.    allopurinoL (ZYLOPRIM) 100 MG tablet Take 1 tablet (100 mg total) by mouth once daily.    ALPRAZolam (XANAX) 1 MG tablet Take 0.5 mg by mouth 4 (four) times daily as needed.    blood pressure monitor (EALTH EASE) LG arm size (OP) by Other route as needed for High Blood Pressure.    carvediloL (COREG) 12.5 MG tablet Take 1 tablet (12.5 mg total) by mouth 2 (two) times daily with meals.    dicyclomine (BENTYL) 20 mg tablet Take 1 tablet (20 mg total) by mouth 3 (three) times daily as needed (abdominal pain).    dulaglutide (TRULICITY) 1.5 mg/0.5 mL pen injector Inject 1.5 mg into the skin every 7 days. (Patient not taking:  Reported on 10/28/2024)    DULoxetine (CYMBALTA) 60 MG capsule Take 2 capsules (120 mg total) by mouth once daily.    hydrALAZINE (APRESOLINE) 100 MG tablet Take 1 tablet (100 mg total) by mouth every 8 (eight) hours as needed (SBP >180).    hydroCHLOROthiazide (HYDRODIURIL) 25 MG tablet Take 1 tablet (25 mg total) by mouth once daily.    NIFEdipine (PROCARDIA-XL) 90 MG (OSM) 24 hr tablet Take 1 tablet (90 mg total) by mouth once daily.    omeprazole (PRILOSEC) 40 MG capsule TAKE 1 CAPSULE BY MOUTH EVERY DAY    ondansetron (ZOFRAN-ODT) 4 MG TbDL Take 1 tablet (4 mg total) by mouth 2 (two) times daily.    valsartan (DIOVAN) 160 MG tablet Take 1 tablet (160 mg total) by mouth once daily.     Family History       Problem Relation (Age of Onset)    Diabetes Maternal Uncle    Drug abuse Father    Heart disease Mother, Father, Maternal Grandmother    Kidney disease Father    Ovarian cancer Maternal Aunt          Tobacco Use    Smoking status: Former     Current packs/day: 0.25     Average packs/day: 0.3 packs/day for 15.0 years (3.8 ttl pk-yrs)     Types: Cigarettes    Smokeless tobacco: Never    Tobacco comments:     Quite 10/2024.     Substance and Sexual Activity    Alcohol use: Yes     Alcohol/week: 3.0 standard drinks of alcohol     Types: 3 Shots of liquor per week     Comment: scocially on weekends    Drug use: Yes     Types: Marijuana    Sexual activity: Yes     Partners: Male     Birth control/protection: None     Review of Systems   Constitutional: Negative for chills, diaphoresis, fever and malaise/fatigue.   HENT:  Negative for nosebleeds.    Eyes:  Negative for blurred vision and double vision.   Cardiovascular:  Positive for chest pain. Negative for claudication, cyanosis, dyspnea on exertion, leg swelling, orthopnea, palpitations, paroxysmal nocturnal dyspnea and syncope.   Respiratory:  Negative for cough, shortness of breath and wheezing.    Skin:  Negative for dry skin and poor wound healing.    Musculoskeletal:  Negative for back pain, joint swelling and myalgias.   Gastrointestinal:  Negative for abdominal pain, nausea and vomiting.   Genitourinary:  Negative for hematuria.   Neurological:  Negative for dizziness, headaches, numbness, seizures and weakness.   Psychiatric/Behavioral:  Negative for altered mental status and depression.      Objective:     Vital Signs (Most Recent):  Temp: 98.1 °F (36.7 °C) (11/07/24 1203)  Pulse: 70 (11/07/24 1209)  Resp: 19 (11/07/24 1209)  BP: 124/67 (11/07/24 1203)  SpO2: 99 % (11/07/24 1209) Vital Signs (24h Range):  Temp:  [97.5 °F (36.4 °C)-98.1 °F (36.7 °C)] 98.1 °F (36.7 °C)  Pulse:  [66-75] 70  Resp:  [14-19] 19  SpO2:  [96 %-100 %] 99 %  BP: ()/(48-67) 124/67     Weight: 84.4 kg (186 lb)  Body mass index is 31.93 kg/m².    SpO2: 99 %         Intake/Output Summary (Last 24 hours) at 11/7/2024 1429  Last data filed at 11/7/2024 1209  Gross per 24 hour   Intake 1000 ml   Output --   Net 1000 ml       Lines/Drains/Airways       Peripheral Intravenous Line  Duration                  Peripheral IV - Single Lumen 11/07/24 1016 22 G Right Hand <1 day                     Physical Exam  Constitutional:       General: She is not in acute distress.     Appearance: She is well-developed. She is obese. She is not ill-appearing, toxic-appearing or diaphoretic.   HENT:      Head: Normocephalic and atraumatic.   Eyes:      General: No scleral icterus.     Extraocular Movements: Extraocular movements intact.      Conjunctiva/sclera: Conjunctivae normal.      Pupils: Pupils are equal, round, and reactive to light.   Neck:      Vascular: No JVD.      Trachea: No tracheal deviation.   Cardiovascular:      Rate and Rhythm: Normal rate and regular rhythm.      Heart sounds: S1 normal and S2 normal. Murmur heard.      Systolic murmur is present with a grade of 2/6.      No friction rub. No gallop.   Pulmonary:      Effort: Pulmonary effort is normal. No respiratory distress.       Breath sounds: Normal breath sounds. No stridor. No wheezing, rhonchi or rales.   Chest:      Chest wall: No tenderness.   Abdominal:      General: There is no distension.      Palpations: Abdomen is soft.   Musculoskeletal:         General: No swelling or tenderness. Normal range of motion.      Cervical back: Normal range of motion and neck supple. No rigidity.      Right lower leg: No edema.      Left lower leg: No edema.   Skin:     General: Skin is warm and dry.      Coloration: Skin is not jaundiced.   Neurological:      General: No focal deficit present.      Mental Status: She is alert and oriented to person, place, and time.      Cranial Nerves: No cranial nerve deficit.   Psychiatric:         Mood and Affect: Mood normal.         Behavior: Behavior normal.          Current Medications:   enoxparin  40 mg Subcutaneous Daily         Current Facility-Administered Medications:     glucagon (human recombinant), 1 mg, Intramuscular, PRN    glucose, 16 g, Oral, PRN    glucose, 24 g, Oral, PRN    naloxone, 0.02 mg, Intravenous, PRN    sodium chloride 0.9%, 10 mL, Intravenous, Q12H PRN    Laboratory (all labs reviewed):  CBC:  Recent Labs   Lab 10/13/24  0649 10/14/24  0418 10/15/24  0223 10/23/24  1215 11/07/24  1015   WBC 8.99 10.42 10.86 9.38 8.03   Hemoglobin 17.4 H 17.0 H 16.3 H 15.2 14.9   Hematocrit 50.5 H 49.3 H 47.8 44.6 44.1   Platelets 207 224 240 233 201       CHEMISTRIES:  Recent Labs   Lab 05/26/24  0500 05/27/24  0518 10/13/24  0756 10/14/24  0418 10/15/24  0223 10/15/24  1325 10/23/24  1215 11/07/24  1015   Glucose 88 86 108 93 102 108 110 105   Sodium 137 139 140 137 136 137 139 142   Potassium 3.0 L 3.5 3.2 L 3.3 L 3.6 4.2 3.1 L 3.1 L   BUN 16 17 15 24 H 31 H 33 H 25 H 23 H   Creatinine 1.6 H 1.3 1.2 1.5 H 1.8 H 1.6 H 1.7 H 2.0 H   eGFR 38.6 A 49.5 A 54.5 A 41.7 A 33.5 A 38.6 A 36 A 30 A   Calcium 9.2 9.2 9.2 10.1 10.0 10.0 9.2 9.2   Magnesium 2.3 2.1 1.9 2.0 2.3  --   --   --        CARDIAC  BIOMARKERS:  Recent Labs   Lab 05/25/24  2307 05/26/24  0500 10/13/24  0649 10/13/24  0953 11/07/24  1015   Troponin I 0.124 H 0.117 H 0.068 H 0.068 H 0.119 H       COAGS:        LIPIDS/LFTS:  Recent Labs   Lab 03/18/24  1533 05/25/24  2118 05/27/24  0518 10/13/24  0756 10/14/24  0418 10/15/24  0223 11/07/24  1015   Cholesterol 180  --   --   --   --   --   --    Triglycerides 157 H  --   --   --   --   --   --    HDL 43  --   --   --   --   --   --    LDL Cholesterol 105.6  --   --   --   --   --   --    Non-HDL Cholesterol 137  --   --   --   --   --   --    AST 21   < > 18 23 20 16 19   ALT 18   < > 16 14 17 15 14    < > = values in this interval not displayed.       BNP:  Recent Labs   Lab 05/25/24  1934 10/13/24  0649 11/07/24  1015   BNP 63 51 124 H       TSH:        Free T4:        Diagnostic Results:  ECG (personally reviewed and interpreted tracing(s)):  11/7/24 0950 SR 72, LVH, QTc 525, inflat ST abnl ?isch, new vs 10/13/24    Chest X-Ray (personally reviewed and interpreted image(s)): 11/7/24 NAD    L MPI: ordered pending repeat troponin    Echo: 5/26/24 (repeat ordered)    Left Ventricle: The left ventricle is normal in size. Ventricular mass is normal. Mildly increased wall thickness. There is concentric remodeling. Normal wall motion. There is normal systolic function with a visually estimated ejection fraction of 65 - 70%. Grade I diastolic dysfunction.    Right Ventricle: Normal right ventricular cavity size. Wall thickness is normal. Systolic function is normal.    Left Atrium: Left atrium is mildly dilated.    Mitral Valve: There is anterior leaflet sclerosis.    Pulmonary Artery: The estimated pulmonary artery systolic pressure is 19 mmHg.    IVC/SVC: Normal venous pressure at 3 mmHg.    Pericardium: There is a small effusion adjacent to the right ventricle.

## 2024-11-07 NOTE — ASSESSMENT & PLAN NOTE
Patients blood pressure range in the last 24 hours was: BP  Min: 86/48  Max: 135/78.The patient's inpatient anti-hypertensive regimen is listed below:  Current Antihypertensives  carvediloL tablet 12.5 mg, 2 times daily with meals, Oral  hydrALAZINE tablet 100 mg, Every 8 hours PRN, Oral  hydroCHLOROthiazide tablet 25 mg, Daily, Oral  NIFEdipine 24 hr tablet 90 mg, Daily, Oral    Plan  - BP is controlled, no changes needed to their regimen

## 2024-11-07 NOTE — ED PROVIDER NOTES
"Encounter Date: 11/7/2024       History     Chief Complaint   Patient presents with    Hypotension     Pt BIB EMS, c/o generalized weakness. Pt states she missed her daily meds on yesterday so she "doubled up" this morning. Pt denies CP, SOB, abd pain or n/v/d    Fatigue     HPI  This 53-year-old black female presents emergency room with a history of severe hypertension.  She is very difficult to control.  She took all her blood pressure medicines this morning including 100 of hydralazine.  She is also on nifedipine XL 90, hydrochlorothiazide, lisinopril 160, and other medicines.  Does have some dull heavy chest pressure.  She has a history of an anxiety disorder.  She has no head pain.  She is without other injuries or problems.  She reports dizziness which is a sensation of feeling like she was going to pass out.  Review of patient's allergies indicates:  No Known Allergies  Past Medical History:   Diagnosis Date    Anxiety disorder, unspecified     Hypertension     Migraine headache     Obesity      Past Surgical History:   Procedure Laterality Date    CYSTOURETEROSCOPY,WITH HOLMIUM LASER LITHOTRIPSY OF URETERAL CALCULUS - Bilateral Bilateral 11/09/2022    ESOPHAGOGASTRODUODENOSCOPY N/A 12/23/2022    Procedure: EGD (ESOPHAGOGASTRODUODENOSCOPY);  Surgeon: Anaid Cohen MD;  Location: [facility];  Service: Endoscopy;  Laterality: N/A;    HYSTERECTOMY, TOTAL, LAPAROSCOPIC, WITH SALPINGO-OOPHORECTOMY Bilateral 03/17/2021    LAPAROSCOPIC TOTAL HYSTERECTOMY Bilateral 03/17/2021    PYELOSCOPY Bilateral 10/25/2022    Procedure: PYELOSCOPY;  Surgeon: Raz Cantrell MD;  Location: [facility] OR;  Service: Urology;  Laterality: Bilateral;    TUBAL LIGATION      URETEROSCOPY Bilateral 10/25/2022    Procedure: URETEROSCOPY;  Surgeon: Raz Cantrell MD;  Location: [facility] OR;  Service: Urology;  Laterality: Bilateral;     Family History   Problem Relation Name Age of Onset    Heart disease Mother      Drug abuse Father   "    Heart disease Father      Kidney disease Father      Diabetes Maternal Uncle      Heart disease Maternal Grandmother      Ovarian cancer Maternal Aunt      Cancer Neg Hx       Social History     Tobacco Use    Smoking status: Former     Current packs/day: 0.25     Average packs/day: 0.3 packs/day for 15.0 years (3.8 ttl pk-yrs)     Types: Cigarettes    Smokeless tobacco: Never    Tobacco comments:     Quite 10/2024.     Substance Use Topics    Alcohol use: Yes     Alcohol/week: 3.0 standard drinks of alcohol     Types: 3 Shots of liquor per week     Comment: scocially on weekends    Drug use: Yes     Types: Marijuana     Review of Systems  The patient was questioned specifically with regard to the following.  General: Fever, chills, sweats. Neuro: Headache. Eyes: eye problems. ENT: Ear pain, sore throat. Cardiovascular: Chest pain. Respiratory: Cough, shortness of breath. Gastrointestinal: Abdominal pain, vomiting, diarrhea. Genitourinary: Painful urination.  Musculoskeletal: Arm and leg problems. Skin: Rash.  The review of systems was negative except for the following:  Weakness and dizziness, dull heavy chest pressure.  Physical Exam     Initial Vitals [11/07/24 0941]   BP Pulse Resp Temp SpO2   (!) 86/48 75 14 97.5 °F (36.4 °C) 96 %      MAP       --         Physical Exam  The patient was examined specifically for the following:   General:No significant distress, Good color, Warm and dry. Head and neck:Scalp atraumatic, Neck supple. Neurological:Appropriate conversation, Gross motor deficits. Eyes:Conjugate gaze, Clear corneas. ENT: No epistaxis. Cardiac: Regular rate and rhythm, Grossly normal heart tones. Pulmonary: Wheezing, Rales. Gastrointestinal: Abdominal tenderness, Abdominal distention. Musculoskeletal: Extremity deformity, Apparent pain with range of motion of the joints. Skin: Rash.   The findings on examination were normal except for the following:  The patient has a high pitched systolic murmur.   Lungs are clear.  The heart tones are normal.  The abdomen is soft.  The patient's blood pressure is 86/48.  Blood pressure is 99/55 supine.  A brisk right radial pulse.  ED Course   Critical Care    Date/Time: 11/7/2024 2:12 PM    Performed by: Tenzin Velazco MD  Authorized by: Tenzin Velazco MD  Direct patient critical care time: 22 minutes  Additional history critical care time: 11 minutes  Ordering / reviewing critical care time: 11 minutes  Documentation critical care time: 11 minutes  Consulting other physicians critical care time: 11 minutes  Total critical care time (exclusive of procedural time) : 66 minutes  Critical care time was exclusive of separately billable procedures and treating other patients and teaching time.  Critical care was necessary to treat or prevent imminent or life-threatening deterioration of the following conditions: circulatory failure and cardiac failure.  Critical care was time spent personally by me on the following activities: development of treatment plan with patient or surrogate, discussions with primary provider, evaluation of patient's response to treatment, examination of patient, obtaining history from patient or surrogate, ordering and performing treatments and interventions, ordering and review of laboratory studies, ordering and review of radiographic studies, pulse oximetry, re-evaluation of patient's condition and review of old charts.        Labs Reviewed   COMPREHENSIVE METABOLIC PANEL - Abnormal       Result Value    Sodium 142      Potassium 3.1 (*)     Chloride 107      CO2 25      Glucose 105      BUN 23 (*)     Creatinine 2.0 (*)     Calcium 9.2      Total Protein 7.1      Albumin 3.3 (*)     Total Bilirubin 0.5      Alkaline Phosphatase 71      AST 19      ALT 14      eGFR 30 (*)     Anion Gap 10     CBC W/ AUTO DIFFERENTIAL - Abnormal    WBC 8.03      RBC 4.59      Hemoglobin 14.9      Hematocrit 44.1      MCV 96      MCH 32.5 (*)     MCHC 33.8      RDW  14.6 (*)     Platelets 201      MPV 10.2      Immature Granulocytes 0.2      Gran # (ANC) 5.6      Immature Grans (Abs) 0.02      Lymph # 1.5      Mono # 0.6      Eos # 0.2      Baso # 0.03      nRBC 0      Gran % 70.2      Lymph % 18.6      Mono % 7.7      Eosinophil % 2.9      Basophil % 0.4      Differential Method Automated     B-TYPE NATRIURETIC PEPTIDE - Abnormal     (*)    TROPONIN I - Abnormal    Troponin I 0.119 (*)    TROPONIN I     EKG Readings: (Independently Interpreted)   This patient is in a normal sinus rhythm with a heart rate of 72.  There are nonspecific T-wave changes.  There are no significant ST segment elevation.  The patient may have left ventricular hypertrophy.  The patient has a long QT interval.  There is no definite evidence of acute myocardial infarction or malignant arrhythmia.       Imaging Results              X-Ray Chest AP Portable (Final result)  Result time 11/07/24 10:50:30      Final result by Carl Byrne MD (11/07/24 10:50:30)                   Impression:      Stable enlargement of the cardiac silhouette.      Electronically signed by: Carl Byrne MD  Date:    11/07/2024  Time:    10:50               Narrative:    EXAMINATION:  XR CHEST AP PORTABLE    CLINICAL HISTORY:  chest pain;    TECHNIQUE:  Single frontal view of the chest was performed.    COMPARISON:  Radiographs 10/13/2024.    FINDINGS:  Cardiac monitoring leads project over the bilateral hemithoraces.  Mediastinal structures are midline. Hilar contours are unremarkable.  Cardiac silhouette is enlarged.  Lung volumes are normal and symmetric.  No consolidation.  No pneumothorax or pleural effusion.  No free air beneath the diaphragm. No acute osseous abnormalities.  Thoracic dextroscoliosis.  Degenerative changes of the spine.                                       Medications   sodium chloride 0.9% flush 10 mL (has no administration in time range)   naloxone 0.4 mg/mL injection 0.02 mg (has no  administration in time range)   glucose chewable tablet 16 g (has no administration in time range)   glucose chewable tablet 24 g (has no administration in time range)   glucagon (human recombinant) injection 1 mg (has no administration in time range)   enoxaparin injection 40 mg (has no administration in time range)   sodium chloride 0.9% bolus 1,000 mL 1,000 mL (0 mLs Intravenous Stopped 11/7/24 1209)   potassium bicarbonate disintegrating tablet 40 mEq (40 mEq Oral Given 11/7/24 1422)     Medical Decision Making  Given the above this patient presents emergency room complaining of dizziness.  The patient is hypotensive on arrival.  She took her blood pressure medicines this morning.  Among her medicines is a hydralazine 100 mg tablet.  She takes it once a day.  There was no doubling up of medicines.  The patient reports her blood pressure has been difficult to control.  The patient was treated with a L and fluid in the emergency room and she was watched in the emergency room at discharge her standing blood pressure was 97/67 but she had no dizziness with standing.  The patient does have a troponin of 0.119.  The patient has had chronically elevated troponins but I find no stress testing.  I will admit for further evaluation and treatment.  We will do serial troponins to rule out myocardial infarction.  Think the dizziness is from the hypotension.  I doubt posterior circulation stroke.                                    Clinical Impression:  Final diagnoses:  [R42] Dizziness  [R07.89] Chest tightness (Primary)  [I95.9] Hypotension, unspecified hypotension type  [R79.89] Elevated troponin          ED Disposition Condition    Observation Stable                Tenzin Velazco MD  11/07/24 1426       Tenzin Velazco MD  11/07/24 [id number]

## 2024-11-07 NOTE — ASSESSMENT & PLAN NOTE
53 y.o. F presented to ED complaining of an episode of chest pain PTA. Also had episode of hypotension after taking home antihypertensive medications. EKG NSR with LVH and prolonged QT. BNP mild elevated (124). Troponin (0.119)    - Lipid panel and TSH pending.   - Echocardiogram ordered  - Cardiology consulted. Stress test tomorrow.   - NPO at midnight.

## 2024-11-07 NOTE — HPI
53 y.o. F with hypertension, migraines, and anxiety disorder presented to the ED after an episode of acute chest pain PTA. Patient states she missed her hypertension medications yesterday and resumed them today. Did not take more than what she was prescribed. She began to feel lightheaded and dizzy and had to go lie down. She then began to experience what she describes as substernal chest pressure which is when she called EMS. States the episode lasted 30 mins and ended spontaneously. Patient denies previous episodes of chest pressure or pain. Reports she was recently admitted for hypertensive emergency. She denies lower extremity edema, orthopnea, or SAHU.       In ED: Initially hypotensive. Vitals otherwise wnl. CBC unremarkable. CMP with hypokalemia (3.1), elevated Cr (2.0), and mild hypoalbuminemia. BNP mildly elevated at (124) and troponin of (0.119) CXR showed enlarged cardiac silhouette. EKG NSR with LVH and prolonged QT (525). Potassium replaced and administered 1L NS bolus. Patient admitted to hospital medicine for acute chest pain work-up.

## 2024-11-07 NOTE — ASSESSMENT & PLAN NOTE
Troponin (0.119) on admission. Possibly d/t demand ischemia from previous episode of hypotension. Cardiology has been consulted.

## 2024-11-07 NOTE — HPI
53-year-old black female presents emergency room with a history of severe hypertension. She is very difficult to control. She took all her blood pressure medicines this morning including 100 of hydralazine. She is also on nifedipine XL 90, hydrochlorothiazide, lisinopril 160, and other medicines. Does have some dull heavy chest pressure. She has a history of an anxiety disorder. She has no head pain. She is without other injuries or problems. She reports dizziness which is a sensation of feeling like she was going to pass out.     Cardiology consulted for CP.    Pleasant 51-year-old woman with a history of hypertension.  She reports missing her medications yesterday, but took them this morning.  Thereafter, she felt very dizzy and her systolic pressure was in the 70s.  She describes some chest discomfort with this which has since abated.  She received IV fluids in the ER and is now back to normal.  Her EKG does note some inferolateral ST depression and her troponin is minimally elevated at 0.1 which is consistent with prior measurements.  She also has CKD 3.  Repeat troponin is pending.

## 2024-11-07 NOTE — SUBJECTIVE & OBJECTIVE
Past Medical History:   Diagnosis Date    Anxiety disorder, unspecified     Hypertension     Migraine headache     Obesity        Past Surgical History:   Procedure Laterality Date    CYSTOURETEROSCOPY,WITH HOLMIUM LASER LITHOTRIPSY OF URETERAL CALCULUS - Bilateral Bilateral 11/09/2022    ESOPHAGOGASTRODUODENOSCOPY N/A 12/23/2022    Procedure: EGD (ESOPHAGOGASTRODUODENOSCOPY);  Surgeon: Anaid Cohen MD;  Location: [facility];  Service: Endoscopy;  Laterality: N/A;    HYSTERECTOMY, TOTAL, LAPAROSCOPIC, WITH SALPINGO-OOPHORECTOMY Bilateral 03/17/2021    LAPAROSCOPIC TOTAL HYSTERECTOMY Bilateral 03/17/2021    PYELOSCOPY Bilateral 10/25/2022    Procedure: PYELOSCOPY;  Surgeon: Raz Cantrell MD;  Location: [facility] OR;  Service: Urology;  Laterality: Bilateral;    TUBAL LIGATION      URETEROSCOPY Bilateral 10/25/2022    Procedure: URETEROSCOPY;  Surgeon: Raz Cantrell MD;  Location: [facility] OR;  Service: Urology;  Laterality: Bilateral;       Review of patient's allergies indicates:  No Known Allergies    No current facility-administered medications on file prior to encounter.     Current Outpatient Medications on File Prior to Encounter   Medication Sig    carvediloL (COREG) 12.5 MG tablet Take 1 tablet (12.5 mg total) by mouth 2 (two) times daily with meals.    albuterol (PROVENTIL/VENTOLIN HFA) 90 mcg/actuation inhaler Inhale 2 puffs into the lungs every 6 (six) hours as needed for Wheezing.    allopurinoL (ZYLOPRIM) 100 MG tablet Take 1 tablet (100 mg total) by mouth once daily.    ALPRAZolam (XANAX) 1 MG tablet Take 0.5 mg by mouth 4 (four) times daily as needed.    blood pressure monitor (IHEALTH EASE) LG arm size (OP) by Other route as needed for High Blood Pressure.    dulaglutide (TRULICITY) 1.5 mg/0.5 mL pen injector Inject 1.5 mg into the skin every 7 days. (Patient not taking: Reported on 10/28/2024)    DULoxetine (CYMBALTA) 60 MG capsule Take 2 capsules (120 mg total) by mouth once daily.     hydrALAZINE (APRESOLINE) 100 MG tablet Take 1 tablet (100 mg total) by mouth every 8 (eight) hours as needed (SBP >180).    hydroCHLOROthiazide (HYDRODIURIL) 25 MG tablet Take 1 tablet (25 mg total) by mouth once daily.    NIFEdipine (PROCARDIA-XL) 90 MG (OSM) 24 hr tablet Take 1 tablet (90 mg total) by mouth once daily.    omeprazole (PRILOSEC) 40 MG capsule TAKE 1 CAPSULE BY MOUTH EVERY DAY    ondansetron (ZOFRAN-ODT) 4 MG TbDL Take 1 tablet (4 mg total) by mouth 2 (two) times daily.    valsartan (DIOVAN) 160 MG tablet Take 1 tablet (160 mg total) by mouth once daily.    [DISCONTINUED] dicyclomine (BENTYL) 20 mg tablet Take 1 tablet (20 mg total) by mouth 3 (three) times daily as needed (abdominal pain).     Family History       Problem Relation (Age of Onset)    Diabetes Maternal Uncle    Drug abuse Father    Heart disease Mother, Father, Maternal Grandmother    Kidney disease Father    Ovarian cancer Maternal Aunt          Tobacco Use    Smoking status: Former     Current packs/day: 0.25     Average packs/day: 0.3 packs/day for 15.0 years (3.8 ttl pk-yrs)     Types: Cigarettes    Smokeless tobacco: Never    Tobacco comments:     Quite 10/2024.     Substance and Sexual Activity    Alcohol use: Yes     Alcohol/week: 3.0 standard drinks of alcohol     Types: 3 Shots of liquor per week     Comment: scocially on weekends    Drug use: Yes     Types: Marijuana    Sexual activity: Yes     Partners: Male     Birth control/protection: None     Review of Systems   Constitutional:  Negative for activity change, chills, fatigue and fever.   Respiratory:  Negative for cough, shortness of breath and wheezing.    Cardiovascular:  Positive for chest pain. Negative for palpitations and leg swelling.   Gastrointestinal:  Negative for abdominal pain, constipation, nausea and vomiting.   Genitourinary:  Negative for dysuria, frequency and urgency.   Neurological:  Positive for dizziness, weakness and light-headedness. Negative for  syncope.     Objective:     Vital Signs (Most Recent):  Temp: 98 °F (36.7 °C) (11/07/24 1432)  Pulse: 76 (11/07/24 1440)  Resp: 17 (11/07/24 1440)  BP: 135/78 (11/07/24 1432)  SpO2: 99 % (11/07/24 1440) Vital Signs (24h Range):  Temp:  [97.5 °F (36.4 °C)-98.1 °F (36.7 °C)] 98 °F (36.7 °C)  Pulse:  [66-76] 76  Resp:  [14-19] 17  SpO2:  [96 %-100 %] 99 %  BP: ()/(48-78) 135/78     Weight: 84.4 kg (186 lb)  Body mass index is 31.93 kg/m².     Physical Exam  Vitals and nursing note reviewed.   Constitutional:       Appearance: She is obese.   HENT:      Head: Normocephalic.      Mouth/Throat:      Mouth: Mucous membranes are moist.      Pharynx: Oropharynx is clear.   Eyes:      Conjunctiva/sclera: Conjunctivae normal.   Cardiovascular:      Rate and Rhythm: Normal rate and regular rhythm.      Pulses: Normal pulses.      Heart sounds: Normal heart sounds.   Pulmonary:      Effort: Pulmonary effort is normal.      Breath sounds: Normal breath sounds.   Abdominal:      General: Bowel sounds are normal.      Palpations: Abdomen is soft.   Skin:     General: Skin is warm and dry.   Neurological:      General: No focal deficit present.      Mental Status: She is alert and oriented to person, place, and time.   Psychiatric:         Mood and Affect: Mood normal.         Behavior: Behavior normal.                Significant Labs: All pertinent labs within the past 24 hours have been reviewed.    Significant Imaging: I have reviewed all pertinent imaging results/findings within the past 24 hours.

## 2024-11-07 NOTE — TELEPHONE ENCOUNTER
Pt reports BP 71/45. States that she took Carvedilol 12.5, Hydralazine 100 mg, Hydrochlorothiazide 25 mg, Nifedipine 80 mg and Valsartan 160 mg as ordered at 7:15 am. Pt also c/o feeling lightheaded and dizziness. Advised to call 911 per protocol. VU. Pt states that brother with bring her to ED. Reiterated 911 dispo. VU. Encounter routed to provider.      Reason for Disposition   Systolic BP < 90 and feeling weak or lightheaded (e.g., woozy, feeling like they might faint)    Protocols used: Blood Pressure - Low-A-OH

## 2024-11-07 NOTE — H&P
"  [facility]  [facility]  History & Physical    Patient Name: Calos Rios  MRN: [id number]  Patient Class: OP- Observation  Admission Date: 11/7/2024  Attending Physician: Ramo Pretty MD   Primary Care Provider: Adria Doll Jr., MD         Patient information was obtained from patient and ER records.     Subjective:     Principal Problem:<principal problem not specified>    Chief Complaint:   Chief Complaint   Patient presents with    Hypotension     Pt BIB EMS, c/o generalized weakness. Pt states she missed her daily meds on yesterday so she "doubled up" this morning. Pt denies CP, SOB, abd pain or n/v/d    Fatigue        HPI: 53 y.o. F with hypertension, migraines, and anxiety disorder presented to the ED after an episode of acute chest pain PTA. Patient states she missed her hypertension medications yesterday and resumed them today. Did not take more than what she was prescribed. She began to feel lightheaded and dizzy and had to go lie down. She then began to experience what she describes as substernal chest pressure which is when she called EMS. States the episode lasted 30 mins and ended spontaneously. Patient denies previous episodes of chest pressure or pain. Reports she was recently admitted for hypertensive emergency. She denies lower extremity edema, orthopnea, or SAHU.       In ED: Initially hypotensive. Vitals otherwise wnl. CBC unremarkable. CMP with hypokalemia (3.1), elevated Cr (2.0), and mild hypoalbuminemia. BNP mildly elevated at (124) and troponin of (0.119) CXR showed enlarged cardiac silhouette. EKG NSR with LVH and prolonged QT (525). Potassium replaced and administered 1L NS bolus. Patient admitted to hospital medicine for acute chest pain work-up.     Past Medical History:   Diagnosis Date    Anxiety disorder, unspecified     Hypertension     Migraine headache     Obesity        Past Surgical History:   Procedure Laterality Date    CYSTOURETEROSCOPY,WITH " HOLMIUM LASER LITHOTRIPSY OF URETERAL CALCULUS - Bilateral Bilateral 11/09/2022    ESOPHAGOGASTRODUODENOSCOPY N/A 12/23/2022    Procedure: EGD (ESOPHAGOGASTRODUODENOSCOPY);  Surgeon: Anaid Cohen MD;  Location: [facility];  Service: Endoscopy;  Laterality: N/A;    HYSTERECTOMY, TOTAL, LAPAROSCOPIC, WITH SALPINGO-OOPHORECTOMY Bilateral 03/17/2021    LAPAROSCOPIC TOTAL HYSTERECTOMY Bilateral 03/17/2021    PYELOSCOPY Bilateral 10/25/2022    Procedure: PYELOSCOPY;  Surgeon: Raz Cantrell MD;  Location: [facility];  Service: Urology;  Laterality: Bilateral;    TUBAL LIGATION      URETEROSCOPY Bilateral 10/25/2022    Procedure: URETEROSCOPY;  Surgeon: Raz Cantrell MD;  Location: [facility];  Service: Urology;  Laterality: Bilateral;       Review of patient's allergies indicates:  No Known Allergies    No current facility-administered medications on file prior to encounter.     Current Outpatient Medications on File Prior to Encounter   Medication Sig    carvediloL (COREG) 12.5 MG tablet Take 1 tablet (12.5 mg total) by mouth 2 (two) times daily with meals.    albuterol (PROVENTIL/VENTOLIN HFA) 90 mcg/actuation inhaler Inhale 2 puffs into the lungs every 6 (six) hours as needed for Wheezing.    allopurinoL (ZYLOPRIM) 100 MG tablet Take 1 tablet (100 mg total) by mouth once daily.    ALPRAZolam (XANAX) 1 MG tablet Take 0.5 mg by mouth 4 (four) times daily as needed.    blood pressure monitor ([facility] EASE) LG arm size (OP) by Other route as needed for High Blood Pressure.    dulaglutide (TRULICITY) 1.5 mg/0.5 mL pen injector Inject 1.5 mg into the skin every 7 days. (Patient not taking: Reported on 10/28/2024)    DULoxetine (CYMBALTA) 60 MG capsule Take 2 capsules (120 mg total) by mouth once daily.    hydrALAZINE (APRESOLINE) 100 MG tablet Take 1 tablet (100 mg total) by mouth every 8 (eight) hours as needed (SBP >180).    hydroCHLOROthiazide (HYDRODIURIL) 25 MG tablet Take 1 tablet (25 mg total) by mouth once  daily.    NIFEdipine (PROCARDIA-XL) 90 MG (OSM) 24 hr tablet Take 1 tablet (90 mg total) by mouth once daily.    omeprazole (PRILOSEC) 40 MG capsule TAKE 1 CAPSULE BY MOUTH EVERY DAY    ondansetron (ZOFRAN-ODT) 4 MG TbDL Take 1 tablet (4 mg total) by mouth 2 (two) times daily.    valsartan (DIOVAN) 160 MG tablet Take 1 tablet (160 mg total) by mouth once daily.    [DISCONTINUED] dicyclomine (BENTYL) 20 mg tablet Take 1 tablet (20 mg total) by mouth 3 (three) times daily as needed (abdominal pain).     Family History       Problem Relation (Age of Onset)    Diabetes Maternal Uncle    Drug abuse Father    Heart disease Mother, Father, Maternal Grandmother    Kidney disease Father    Ovarian cancer Maternal Aunt          Tobacco Use    Smoking status: Former     Current packs/day: 0.25     Average packs/day: 0.3 packs/day for 15.0 years (3.8 ttl pk-yrs)     Types: Cigarettes    Smokeless tobacco: Never    Tobacco comments:     Quite 10/2024.     Substance and Sexual Activity    Alcohol use: Yes     Alcohol/week: 3.0 standard drinks of alcohol     Types: 3 Shots of liquor per week     Comment: scocially on weekends    Drug use: Yes     Types: Marijuana    Sexual activity: Yes     Partners: Male     Birth control/protection: None     Review of Systems   Constitutional:  Negative for activity change, chills, fatigue and fever.   Respiratory:  Negative for cough, shortness of breath and wheezing.    Cardiovascular:  Positive for chest pain. Negative for palpitations and leg swelling.   Gastrointestinal:  Negative for abdominal pain, constipation, nausea and vomiting.   Genitourinary:  Negative for dysuria, frequency and urgency.   Neurological:  Positive for dizziness, weakness and light-headedness. Negative for syncope.     Objective:     Vital Signs (Most Recent):  Temp: 98 °F (36.7 °C) (11/07/24 1432)  Pulse: 76 (11/07/24 1440)  Resp: 17 (11/07/24 1440)  BP: 135/78 (11/07/24 1432)  SpO2: 99 % (11/07/24 1440) Vital Signs  (24h Range):  Temp:  [97.5 °F (36.4 °C)-98.1 °F (36.7 °C)] 98 °F (36.7 °C)  Pulse:  [66-76] 76  Resp:  [14-19] 17  SpO2:  [96 %-100 %] 99 %  BP: ()/(48-78) 135/78     Weight: 84.4 kg (186 lb)  Body mass index is 31.93 kg/m².     Physical Exam  Vitals and nursing note reviewed.   Constitutional:       Appearance: She is obese.   HENT:      Head: Normocephalic.      Mouth/Throat:      Mouth: Mucous membranes are moist.      Pharynx: Oropharynx is clear.   Eyes:      Conjunctiva/sclera: Conjunctivae normal.   Cardiovascular:      Rate and Rhythm: Normal rate and regular rhythm.      Pulses: Normal pulses.      Heart sounds: Normal heart sounds.   Pulmonary:      Effort: Pulmonary effort is normal.      Breath sounds: Normal breath sounds.   Abdominal:      General: Bowel sounds are normal.      Palpations: Abdomen is soft.   Skin:     General: Skin is warm and dry.   Neurological:      General: No focal deficit present.      Mental Status: She is alert and oriented to person, place, and time.   Psychiatric:         Mood and Affect: Mood normal.         Behavior: Behavior normal.                Significant Labs: All pertinent labs within the past 24 hours have been reviewed.    Significant Imaging: I have reviewed all pertinent imaging results/findings within the past 24 hours.  Assessment/Plan:     Acute kidney injury superimposed on chronic kidney disease  LATOYA is likely due to pre-renal azotemia due to intravascular volume depletion. Baseline creatinine is  1.2-1.4 . Most recent creatinine and eGFR are listed below.  Recent Labs     11/07/24  1015   CREATININE 2.0*   EGFRNORACEVR 30*      Plan  - LATOYA is stable  - Avoid nephrotoxins and renally dose meds for GFR listed above  - Monitor urine output, serial BMP, and adjust therapy as needed  - Likely 2/2 episode of hypotension prior to arrival. Received 1L NS bolus in ED. Will encourage oral hydration and repeat BMP in AM.     Prolonged Q-T interval on  ECG  Prolonged QT noted on EKG 11/7.       Elevated troponin level not due myocardial infarction  Troponin (0.119) on admission. Possibly d/t demand ischemia from previous episode of hypotension. Cardiology has been consulted.       Chest pain  53 y.o. F presented to ED complaining of an episode of chest pain PTA. Also had episode of hypotension after taking home antihypertensive medications. EKG NSR with LVH and prolonged QT. BNP mild elevated (124). Troponin (0.119)    - Lipid panel and TSH pending.   - Echocardiogram ordered  - Cardiology consulted. Stress test tomorrow.   - NPO at midnight.       Hypertension  Patients blood pressure range in the last 24 hours was: BP  Min: 86/48  Max: 135/78.The patient's inpatient anti-hypertensive regimen is listed below:  Current Antihypertensives  carvediloL tablet 12.5 mg, 2 times daily with meals, Oral  hydrALAZINE tablet 100 mg, Every 8 hours PRN, Oral  hydroCHLOROthiazide tablet 25 mg, Daily, Oral  NIFEdipine 24 hr tablet 90 mg, Daily, Oral    Plan  - BP is controlled, no changes needed to their regimen      VTE Risk Mitigation (From admission, onward)           Ordered     enoxaparin injection 40 mg  Daily         11/07/24 1422     IP VTE HIGH RISK PATIENT  Once         11/07/24 1422     Place sequential compression device  Until discontinued         11/07/24 1422                         On 11/07/2024, patient should be placed in hospital observation services under my care in collaboration with Dr. Ramo Pretty.           Heather Cali PA-C  Department of Hospital Medicine  [facility] [place] [facility] Dept

## 2024-11-07 NOTE — PLAN OF CARE
[place] - Emergency Dept  Discharge Assessment    Primary Care Provider: Adria Doll Jr., MD   Case Management Assessment     PCP: See above  [facility] [address]    Patient Arrived From: home with brother ( Adria Mayberry  Existing Help at Home: brother    Barriers to Discharge: none    Discharge Plan:    A. home with family   B. home        Discharge planning assessment completed with patient's assistance.  Patient is from home  and independent.  Patient has  no DME  but is requesting a nebulizer.     Beth medically stable, patient will discharge to  home . Patient's brothr will transport home.  Patient will need followup appointments  with PCP and additional appts  as ordered by the discharging provider.  Case Management will continue to follow and assist with discharge planning.        Discharge Assessment (most recent)       BRIEF DISCHARGE ASSESSMENT - 11/07/24 7201          Discharge Planning    Assessment Type Discharge Planning Brief Assessment     Resource/Environmental Concerns none     Support Systems Family members     Assistance Needed None     Equipment Currently Used at Home none     Current Living Arrangements home     Care Facility Name n/a     Patient/Family Anticipates Transition to home     Patient/Family Anticipated Services at Transition outpatient care     DME Needed Upon Discharge  nebulizer   Patient requesting a nebulizer    Discharge Plan A Home with family     Discharge Plan B Home

## 2024-11-07 NOTE — ASSESSMENT & PLAN NOTE
LATOYA is likely due to pre-renal azotemia due to intravascular volume depletion. Baseline creatinine is  1.2-1.4 . Most recent creatinine and eGFR are listed below.  Recent Labs     11/07/24  1015   CREATININE 2.0*   EGFRNORACEVR 30*      Plan  - LATOYA is stable  - Avoid nephrotoxins and renally dose meds for GFR listed above  - Monitor urine output, serial BMP, and adjust therapy as needed  - Likely 2/2 episode of hypotension prior to arrival. Received 1L NS bolus in ED. Will encourage oral hydration and repeat BMP in AM.

## 2024-11-07 NOTE — ED NOTES
"Calos Rios, a 53 y.o. female presents to the ED w/ complaint of generalized weakness and near syncopal episode while sitting at work. Patient reports she recently had her BP meds regimen changed x1 month ago and has been feeling dizzy recently. Patient denies LOC or headache. Reports chest pressure and tightness. Denies SOB or NVD.     Triage note:  Chief Complaint   Patient presents with    Hypotension     Pt BIB EMS, c/o generalized weakness. Pt states she missed her daily meds on yesterday so she "doubled up" this morning. Pt denies CP, SOB, abd pain or n/v/d    Fatigue     Review of patient's allergies indicates:  No Known Allergies  Past Medical History:   Diagnosis Date    Anxiety disorder, unspecified     Hypertension     Migraine headache     Obesity        "

## 2024-11-07 NOTE — ASSESSMENT & PLAN NOTE
Chest pain with possible ischemic EKG changes in the setting of hypotension.    Symptoms have abated.    Troponin 0.1, consistent with prior measurements in the setting of CKD.    Check echocardiogram.    Assuming no significant rise in troponin, plan nuclear stress test in a.m. for ischemic assessment.

## 2024-11-07 NOTE — ASSESSMENT & PLAN NOTE
Chest discomfort in the setting of hypotension, abated with IV fluids.    Ischemic EKG changes noted with minimal troponin elevation consistent with prior measurements.  This is all in the setting of CKD 3B.  Echocardiogram has been ordered.    Repeat troponin pending.  Assuming no significant rise in troponin, plan nuclear stress test in the morning for ischemic assessment.

## 2024-11-08 VITALS
TEMPERATURE: 99 F | HEART RATE: 75 BPM | BODY MASS INDEX: 31.02 KG/M2 | OXYGEN SATURATION: 97 % | RESPIRATION RATE: 18 BRPM | DIASTOLIC BLOOD PRESSURE: 99 MMHG | HEIGHT: 64 IN | SYSTOLIC BLOOD PRESSURE: 156 MMHG | WEIGHT: 181.69 LBS

## 2024-11-08 PROBLEM — N17.9 ACUTE KIDNEY INJURY SUPERIMPOSED ON CHRONIC KIDNEY DISEASE: Status: RESOLVED | Noted: 2024-05-25 | Resolved: 2024-11-08

## 2024-11-08 PROBLEM — I42.1 HOCM (HYPERTROPHIC OBSTRUCTIVE CARDIOMYOPATHY): Status: ACTIVE | Noted: 2024-11-08

## 2024-11-08 PROBLEM — R79.89 ELEVATED TROPONIN LEVEL NOT DUE MYOCARDIAL INFARCTION: Status: RESOLVED | Noted: 2024-05-25 | Resolved: 2024-11-08

## 2024-11-08 PROBLEM — N18.9 ACUTE KIDNEY INJURY SUPERIMPOSED ON CHRONIC KIDNEY DISEASE: Status: RESOLVED | Noted: 2024-05-25 | Resolved: 2024-11-08

## 2024-11-08 PROBLEM — R07.9 CHEST PAIN: Status: ACTIVE | Noted: 2024-11-08

## 2024-11-08 LAB
ALBUMIN SERPL BCP-MCNC: 3.5 G/DL (ref 3.5–5.2)
ALP SERPL-CCNC: 75 U/L (ref 40–150)
ALT SERPL W/O P-5'-P-CCNC: 14 U/L (ref 10–44)
ANION GAP SERPL CALC-SCNC: 11 MMOL/L (ref 8–16)
ANION GAP SERPL CALC-SCNC: 11 MMOL/L (ref 8–16)
AST SERPL-CCNC: 19 U/L (ref 10–40)
BASOPHILS # BLD AUTO: 0.02 K/UL (ref 0–0.2)
BASOPHILS NFR BLD: 0.3 % (ref 0–1.9)
BILIRUB SERPL-MCNC: 0.6 MG/DL (ref 0.1–1)
BUN SERPL-MCNC: 15 MG/DL (ref 6–20)
BUN SERPL-MCNC: 18 MG/DL (ref 6–20)
CALCIUM SERPL-MCNC: 9.6 MG/DL (ref 8.7–10.5)
CALCIUM SERPL-MCNC: 9.7 MG/DL (ref 8.7–10.5)
CHLORIDE SERPL-SCNC: 102 MMOL/L (ref 95–110)
CHLORIDE SERPL-SCNC: 102 MMOL/L (ref 95–110)
CO2 SERPL-SCNC: 28 MMOL/L (ref 23–29)
CO2 SERPL-SCNC: 28 MMOL/L (ref 23–29)
CREAT SERPL-MCNC: 1.4 MG/DL (ref 0.5–1.4)
CREAT SERPL-MCNC: 1.5 MG/DL (ref 0.5–1.4)
CV STRESS BASE HR: 80 BPM
DIASTOLIC BLOOD PRESSURE: 95 MMHG
DIFFERENTIAL METHOD BLD: ABNORMAL
EOSINOPHIL # BLD AUTO: 0.4 K/UL (ref 0–0.5)
EOSINOPHIL NFR BLD: 5.2 % (ref 0–8)
ERYTHROCYTE [DISTWIDTH] IN BLOOD BY AUTOMATED COUNT: 14.8 % (ref 11.5–14.5)
EST. GFR  (NO RACE VARIABLE): 42 ML/MIN/1.73 M^2
EST. GFR  (NO RACE VARIABLE): 45 ML/MIN/1.73 M^2
GLUCOSE SERPL-MCNC: 109 MG/DL (ref 70–110)
GLUCOSE SERPL-MCNC: 88 MG/DL (ref 70–110)
HCT VFR BLD AUTO: 45.6 % (ref 37–48.5)
HGB BLD-MCNC: 15.4 G/DL (ref 12–16)
IMM GRANULOCYTES # BLD AUTO: 0.01 K/UL (ref 0–0.04)
IMM GRANULOCYTES NFR BLD AUTO: 0.1 % (ref 0–0.5)
LYMPHOCYTES # BLD AUTO: 2.2 K/UL (ref 1–4.8)
LYMPHOCYTES NFR BLD: 32.2 % (ref 18–48)
MAGNESIUM SERPL-MCNC: 2 MG/DL (ref 1.6–2.6)
MCH RBC QN AUTO: 32.5 PG (ref 27–31)
MCHC RBC AUTO-ENTMCNC: 33.8 G/DL (ref 32–36)
MCV RBC AUTO: 96 FL (ref 82–98)
MONOCYTES # BLD AUTO: 0.6 K/UL (ref 0.3–1)
MONOCYTES NFR BLD: 8.3 % (ref 4–15)
NEUTROPHILS # BLD AUTO: 3.6 K/UL (ref 1.8–7.7)
NEUTROPHILS NFR BLD: 53.9 % (ref 38–73)
NRBC BLD-RTO: 0 /100 WBC
NUC REST EJECTION FRACTION: 78
OHS CV CPX 85 PERCENT MAX PREDICTED HEART RATE MALE: 143
OHS CV CPX MAX PREDICTED HEART RATE: 168
OHS CV CPX PATIENT IS FEMALE: 1
OHS CV CPX PATIENT IS MALE: 0
OHS CV CPX PEAK DIASTOLIC BLOOD PRESSURE: 9525 MMHG
OHS CV CPX PEAK HEAR RATE: 103 BPM
OHS CV CPX PEAK RATE PRESSURE PRODUCT: NORMAL
OHS CV CPX PEAK SYSTOLIC BLOOD PRESSURE: 140 MMHG
OHS CV CPX PERCENT MAX PREDICTED HEART RATE ACHIEVED: 64
OHS CV CPX RATE PRESSURE PRODUCT PRESENTING: NORMAL
OHS CV INITIAL DOSE: 8.1 MCG/KG/MIN
OHS CV PEAK DOSE: 24.7 MCG/KG/MIN
PHOSPHATE SERPL-MCNC: 3.1 MG/DL (ref 2.7–4.5)
PLATELET # BLD AUTO: 220 K/UL (ref 150–450)
PMV BLD AUTO: 10.6 FL (ref 9.2–12.9)
POTASSIUM SERPL-SCNC: 2.9 MMOL/L (ref 3.5–5.1)
POTASSIUM SERPL-SCNC: 3.6 MMOL/L (ref 3.5–5.1)
PROT SERPL-MCNC: 7.4 G/DL (ref 6–8.4)
RBC # BLD AUTO: 4.74 M/UL (ref 4–5.4)
SODIUM SERPL-SCNC: 141 MMOL/L (ref 136–145)
SODIUM SERPL-SCNC: 141 MMOL/L (ref 136–145)
SYSTOLIC BLOOD PRESSURE: 147 MMHG
WBC # BLD AUTO: 6.71 K/UL (ref 3.9–12.7)

## 2024-11-08 PROCEDURE — 85025 COMPLETE CBC W/AUTO DIFF WBC: CPT

## 2024-11-08 PROCEDURE — 83735 ASSAY OF MAGNESIUM: CPT

## 2024-11-08 PROCEDURE — 80053 COMPREHEN METABOLIC PANEL: CPT

## 2024-11-08 PROCEDURE — G0378 HOSPITAL OBSERVATION PER HR: HCPCS

## 2024-11-08 PROCEDURE — 96372 THER/PROPH/DIAG INJ SC/IM: CPT

## 2024-11-08 PROCEDURE — A9502 TC99M TETROFOSMIN: HCPCS | Performed by: STUDENT IN AN ORGANIZED HEALTH CARE EDUCATION/TRAINING PROGRAM

## 2024-11-08 PROCEDURE — 99214 OFFICE O/P EST MOD 30 MIN: CPT | Mod: 25,,, | Performed by: INTERNAL MEDICINE

## 2024-11-08 PROCEDURE — 84100 ASSAY OF PHOSPHORUS: CPT

## 2024-11-08 PROCEDURE — 63600175 PHARM REV CODE 636 W HCPCS

## 2024-11-08 PROCEDURE — 63600175 PHARM REV CODE 636 W HCPCS: Performed by: INTERNAL MEDICINE

## 2024-11-08 PROCEDURE — 25000003 PHARM REV CODE 250

## 2024-11-08 PROCEDURE — 80048 BASIC METABOLIC PNL TOTAL CA: CPT | Mod: XB

## 2024-11-08 PROCEDURE — 36415 COLL VENOUS BLD VENIPUNCTURE: CPT

## 2024-11-08 RX ORDER — CARVEDILOL 3.12 MG/1
3.12 TABLET ORAL 2 TIMES DAILY
Status: DISCONTINUED | OUTPATIENT
Start: 2024-11-08 | End: 2024-11-08 | Stop reason: HOSPADM

## 2024-11-08 RX ORDER — REGADENOSON 0.08 MG/ML
0.4 INJECTION, SOLUTION INTRAVENOUS ONCE
Status: DISCONTINUED | OUTPATIENT
Start: 2024-11-08 | End: 2024-11-08 | Stop reason: HOSPADM

## 2024-11-08 RX ORDER — POTASSIUM CHLORIDE 20 MEQ/1
40 TABLET, EXTENDED RELEASE ORAL ONCE
Status: DISCONTINUED | OUTPATIENT
Start: 2024-11-08 | End: 2024-11-08

## 2024-11-08 RX ORDER — REGADENOSON 0.08 MG/ML
0.4 INJECTION, SOLUTION INTRAVENOUS ONCE
Status: COMPLETED | OUTPATIENT
Start: 2024-11-08 | End: 2024-11-08

## 2024-11-08 RX ADMIN — REGADENOSON 0.4 MG: 0.08 INJECTION, SOLUTION INTRAVENOUS at 08:11

## 2024-11-08 RX ADMIN — NIFEDIPINE 90 MG: 30 TABLET, FILM COATED, EXTENDED RELEASE ORAL at 09:11

## 2024-11-08 RX ADMIN — TETROFOSMIN 8.1 MILLICURIE: 1.38 INJECTION, POWDER, LYOPHILIZED, FOR SOLUTION INTRAVENOUS at 07:11

## 2024-11-08 RX ADMIN — Medication 400 ML: at 12:11

## 2024-11-08 RX ADMIN — Medication 400 ML: at 04:11

## 2024-11-08 RX ADMIN — TETROFOSMIN 24.7 MILLICURIE: 1.38 INJECTION, POWDER, LYOPHILIZED, FOR SOLUTION INTRAVENOUS at 08:11

## 2024-11-08 RX ADMIN — POTASSIUM BICARBONATE 50 MEQ: 977.5 TABLET, EFFERVESCENT ORAL at 09:11

## 2024-11-08 RX ADMIN — ENOXAPARIN SODIUM 40 MG: 40 INJECTION SUBCUTANEOUS at 04:11

## 2024-11-08 RX ADMIN — PANTOPRAZOLE SODIUM 40 MG: 40 TABLET, DELAYED RELEASE ORAL at 09:11

## 2024-11-08 RX ADMIN — ALLOPURINOL 100 MG: 100 TABLET ORAL at 09:11

## 2024-11-08 RX ADMIN — HYDROCHLOROTHIAZIDE 25 MG: 25 TABLET ORAL at 09:11

## 2024-11-08 NOTE — NURSING
PER handoff received from CRISTY Nava     Pt resting in bed quietly. NAD noted. No c/o pain.     Fall and safety precautions maintained. Bed alarm activated and audible.. Bed locked in lowest position, with side rails up x2. Call bell and personal items within reach

## 2024-11-08 NOTE — ED NOTES
Assumed pt care. Pt presents to ED with complaint of chest pressure, and severe hypertension. Pt reports being complaint with all blood pressure meds. Pt reports to recently being put on a new BP meds, and reports BP still high. Pt alert and oriented x4. Skin warm and intact. Pt denies any needs at the moment

## 2024-11-08 NOTE — PROGRESS NOTES
[facility] Medicine  Progress Note    Patient Name: Calos Rios  MRN: [id number]  Patient Class: OP- Observation   Admission Date: 11/7/2024  Length of Stay: 0 days  Attending Physician: Ramo Pretty MD  Primary Care Provider: Adria Doll Jr., MD        Subjective:     Principal Problem:Chest pain        HPI:  53 y.o. F with hypertension, migraines, and anxiety disorder presented to the ED after an episode of acute chest pain PTA. Patient states she missed her hypertension medications yesterday and resumed them today. Did not take more than what she was prescribed. She began to feel lightheaded and dizzy and had to go lie down. She then began to experience what she describes as substernal chest pressure which is when she called EMS. States the episode lasted 30 mins and ended spontaneously. Patient denies previous episodes of chest pressure or pain. Reports she was recently admitted for hypertensive emergency. She denies lower extremity edema, orthopnea, or SAHU.       In ED: Initially hypotensive. Vitals otherwise wnl. CBC unremarkable. CMP with hypokalemia (3.1), elevated Cr (2.0), and mild hypoalbuminemia. BNP mildly elevated at (124) and troponin of (0.119) CXR showed enlarged cardiac silhouette. EKG NSR with LVH and prolonged QT (525). Potassium replaced and administered 1L NS bolus. Patient admitted to hospital medicine for acute chest pain work-up.     Overview/Hospital Course:  Patient admitted for further work-up of hypotension and acute chest pain. Cardiology consulted on admission. Recommended stress test to rule-out concern for underlying ischemia given elevated troponin. Echo showed hyperdynamic systolic function with an EF of 75-80% and mild LVOT obstruction. Stress test without evidence of ischemia. Patient denied further episodes of chest pain, lightheadedness or dizziness during admission.     Interval History: Patient seen and examined. Stress test today. Potassium  replaced.     Review of Systems   Constitutional:  Negative for activity change, chills, fatigue and fever.   Eyes:  Negative for visual disturbance.   Respiratory:  Negative for cough and shortness of breath.    Cardiovascular:  Negative for chest pain, palpitations and leg swelling.   Gastrointestinal:  Negative for abdominal pain, nausea and vomiting.   Genitourinary:  Negative for dysuria, frequency and urgency.   Neurological:  Negative for dizziness, syncope and light-headedness.     Objective:     Vital Signs (Most Recent):  Temp: 98.5 °F (36.9 °C) (11/08/24 0710)  Pulse: 78 (11/08/24 0304)  Resp: 18 (11/08/24 0245)  BP: (!) 145/87 (11/08/24 0245)  SpO2: 95 % (11/08/24 0245) Vital Signs (24h Range):  Temp:  [97.5 °F (36.4 °C)-98.5 °F (36.9 °C)] 98.5 °F (36.9 °C)  Pulse:  [63-85] 78  Resp:  [13-19] 18  SpO2:  [95 %-100 %] 95 %  BP: ()/(48-95) 145/87     Weight: 82.4 kg (181 lb 10.5 oz)  Body mass index is 31.18 kg/m².    Intake/Output Summary (Last 24 hours) at 11/8/2024 0811  Last data filed at 11/7/2024 1209  Gross per 24 hour   Intake 1000 ml   Output --   Net 1000 ml         Physical Exam  Vitals and nursing note reviewed.   Constitutional:       Appearance: She is obese.   HENT:      Head: Normocephalic.      Mouth/Throat:      Mouth: Mucous membranes are moist.      Pharynx: Oropharynx is clear.   Eyes:      Conjunctiva/sclera: Conjunctivae normal.   Cardiovascular:      Rate and Rhythm: Normal rate and regular rhythm.      Pulses: Normal pulses.      Heart sounds: Normal heart sounds.   Pulmonary:      Effort: Pulmonary effort is normal.      Breath sounds: Normal breath sounds.   Abdominal:      General: Bowel sounds are normal. There is no distension.      Palpations: Abdomen is soft.      Tenderness: There is no abdominal tenderness. There is no guarding.   Skin:     General: Skin is warm and dry.   Neurological:      General: No focal deficit present.      Mental Status: She is alert and  oriented to person, place, and time.   Psychiatric:         Mood and Affect: Mood normal.         Behavior: Behavior normal.             Significant Labs: All pertinent labs within the past 24 hours have been reviewed.    Significant Imaging: I have reviewed all pertinent imaging results/findings within the past 24 hours.    Assessment/Plan:      * Chest pain  53 y.o. F presented to ED complaining of an episode of chest pain PTA. Also had episode of hypotension after taking home antihypertensive medications. EKG NSR with LVH and prolonged QT. BNP mild elevated (124). Troponin (0.119)    - Lipid panel and TSH pending.   - Echocardiogram ordered  - Cardiology consulted. Stress test 11/7.   - NPO at midnight.       Acute kidney injury superimposed on chronic kidney disease  LATOYA is likely due to pre-renal azotemia due to intravascular volume depletion. Baseline creatinine is  1.2-1.4 . Most recent creatinine and eGFR are listed below.  Recent Labs     11/07/24  1015 11/08/24  0519   CREATININE 2.0* 1.4   EGFRNORACEVR 30* 45*        Plan  - LATOYA is resolved.  - Avoid nephrotoxins and renally dose meds for GFR listed above  - Monitor urine output, serial BMP, and adjust therapy as needed  - Likely 2/2 episode of hypotension prior to arrival. Received 1L NS bolus in ED. Encourage oral hydration.    Prolonged Q-T interval on ECG  Prolonged QT noted on EKG 11/7.       Elevated troponin level not due myocardial infarction  Troponin (0.119) on admission. Possibly d/t demand ischemia from previous episode of hypotension. Cardiology has been consulted.       Hypertension  Patients blood pressure range in the last 24 hours was: BP  Min: 86/48  Max: 160/95.The patient's inpatient anti-hypertensive regimen is listed below:  Current Antihypertensives  hydrALAZINE tablet 100 mg, Every 8 hours PRN, Oral  hydroCHLOROthiazide tablet 25 mg, Daily, Oral  NIFEdipine 24 hr tablet 90 mg, Daily, Oral    Plan  - BP is controlled, no changes  needed to their regimen      VTE Risk Mitigation (From admission, onward)           Ordered     enoxaparin injection 40 mg  Daily         11/07/24 1422     IP VTE HIGH RISK PATIENT  Once         11/07/24 1422     Place sequential compression device  Until discontinued         11/07/24 1422                    Discharge Planning   JEFF:      Code Status: Full Code   Is the patient medically ready for discharge?:     Reason for patient still in hospital (select all that apply): Treatment, Consult recommendations  Discharge Plan A: Home with family                  Heather Cali PA-C  Department of [facility] Medicine   [facility]

## 2024-11-08 NOTE — ASSESSMENT & PLAN NOTE
53 y.o. F presented to ED complaining of an episode of chest pain PTA. Also had episode of hypotension after taking home antihypertensive medications. EKG NSR with LVH and prolonged QT. BNP mild elevated (124). Troponin (0.119)    - Lipid panel and TSH pending.   - Echocardiogram ordered  - Cardiology consulted. Stress test 11/7.   - NPO at midnight.

## 2024-11-08 NOTE — ASSESSMENT & PLAN NOTE
LATOYA is likely due to pre-renal azotemia due to intravascular volume depletion. Baseline creatinine is  1.2-1.4 . Most recent creatinine and eGFR are listed below.  Recent Labs     11/07/24  1015 11/08/24  0519   CREATININE 2.0* 1.4   EGFRNORACEVR 30* 45*        Plan  - LATOYA is resolved.  - Avoid nephrotoxins and renally dose meds for GFR listed above  - Monitor urine output, serial BMP, and adjust therapy as needed  - Likely 2/2 episode of hypotension prior to arrival. Received 1L NS bolus in ED. Encourage oral hydration.   (E4) spontaneous

## 2024-11-08 NOTE — DISCHARGE SUMMARY
[facility] Medicine  Discharge Summary      Patient Name: Calos Rios  MRN: [id number]  ASTRID: [id number]  Patient Class: OP- Observation  Admission Date: 11/7/2024  Hospital Length of Stay: 0 days  Discharge Date and Time:  11/08/2024 4:56 PM  Attending Physician: Ramo Pretty MD   Discharging Provider: Heather Cali PA-C  Primary Care Provider: Adria Doll Jr., MD    Primary Care Team:  [facility]    HPI:   53 y.o. F with hypertension, migraines, and anxiety disorder presented to the ED after an episode of acute chest pain PTA. Patient states she missed her hypertension medications yesterday and resumed them today. Did not take more than what she was prescribed. She began to feel lightheaded and dizzy and had to go lie down. She then began to experience what she describes as substernal chest pressure which is when she called EMS. States the episode lasted 30 mins and ended spontaneously. Patient denies previous episodes of chest pressure or pain. Reports she was recently admitted for hypertensive emergency. She denies lower extremity edema, orthopnea, or SAHU.       In ED: Initially hypotensive. Vitals otherwise wnl. CBC unremarkable. CMP with hypokalemia (3.1), elevated Cr (2.0), and mild hypoalbuminemia. BNP mildly elevated at (124) and troponin of (0.119) CXR showed enlarged cardiac silhouette. EKG NSR with LVH and prolonged QT (525). Potassium replaced and administered 1L NS bolus. Patient admitted to hospital medicine for acute chest pain work-up.     * No surgery found *      Hospital Course:   Patient admitted for further work-up of hypotension and acute chest pain. Cardiology consulted on admission. Recommended stress test to rule-out concern for underlying ischemia given elevated troponin. Echo showed hyperdynamic systolic function with an EF of 75-80% and mild LVOT obstruction. Stress test without evidence of ischemia. Patient denied further episodes of chest pain,  lightheadedness or dizziness during admission. Will instruct patient to hold Carvedilol on discharge as previous pre-syncopal episode likely due to hypotension 2/2 antihypertensives. Cardiology to adjust as needed at follow-up appointment. Mildly elevated Cr noted on labs prior to discharge. Patient instructed to rehydrate with oral rehydration given IVF shortage. Repeat BMP ordered to be collected at PCP follow-up appointment. She understands and agrees to plan. Patient is hemodynamically stable for discharge per Cardiology recommendations with Primary care and Cardiology follow-up appointment. Patient instructed to return to ED in case of shortness of breath, lightheadedness, dizziness, or chest pain.      Goals of Care Treatment Preferences:  Code Status: Full Code      SDOH Screening:  The patient was screened for utility difficulties, food insecurity, transport difficulties, housing insecurity, and interpersonal safety and there were no concerns identified this admission.     Consults:   Consults (From admission, onward)          Status Ordering Provider     Inpatient consult to Cardiology  Once        Provider:  Doug Valle MD    Completed CAYLA JEREZ            No new Assessment & Plan notes have been filed under this hospital service since the last note was generated.  Service: Hospital Medicine    Final Active Diagnoses:    Diagnosis Date Noted POA    Prolonged Q-T interval on ECG [R94.31] 05/25/2024 Yes    Anxiety [F41.9] 11/27/2017 Yes    Hypertension [I10]  Yes      Problems Resolved During this Admission:    Diagnosis Date Noted Date Resolved POA    PRINCIPAL PROBLEM:  Chest pain [R07.9] 09/26/2014 11/08/2024 Yes    Acute kidney injury superimposed on chronic kidney disease [N17.9, N18.9] 05/25/2024 11/08/2024 Yes    Elevated troponin level not due myocardial infarction [R79.89] 05/25/2024 11/08/2024 Yes       Discharged Condition: good    Disposition: Home or Self Care    Follow Up:    Follow-up Information       Doug Valle MD. Schedule an appointment as soon as possible for a visit.    Specialties: Cardiology, Interventional Cardiology  Contact information:  [address]  [address]  [place] [address]  [phone]               Adria Doll Jr., MD Follow up.    Specialty: Internal Medicine  Why: Follow-up within 2 weeks of hospital discharge  Contact information:  [address]  [facility] [address]  [phone]                           Patient Instructions:      BASIC METABOLIC PANEL   Standing Status: Future Standing Exp. Date: 02/06/26     Order Specific Question Answer Comments   Send normal result to authorizing provider's In Basket if patient is active on MyChart: Yes      Notify your health care provider if you experience any of the following:  persistent nausea and vomiting or diarrhea     Notify your health care provider if you experience any of the following:  severe uncontrolled pain     Notify your health care provider if you experience any of the following:  difficulty breathing or increased cough     Activity as tolerated       Significant Diagnostic Studies: N/A    Medications:  Reconciled Home Medications:      Medication List        CONTINUE taking these medications      albuterol 90 mcg/actuation inhaler  Commonly known as: PROVENTIL/VENTOLIN HFA  Inhale 2 puffs into the lungs every 6 (six) hours as needed for Wheezing.     allopurinoL 100 MG tablet  Commonly known as: ZYLOPRIM  Take 1 tablet (100 mg total) by mouth once daily.     ALPRAZolam 1 MG tablet  Commonly known as: XANAX  Take 0.5 mg by mouth 4 (four) times daily as needed.     DULoxetine 60 MG capsule  Commonly known as: CYMBALTA  Take 2 capsules (120 mg total) by mouth once daily.     hydrALAZINE 100 MG tablet  Commonly known as: APRESOLINE  Take 1 tablet (100 mg total) by mouth every 8 (eight) hours as needed (SBP >180).     hydroCHLOROthiazide 25 MG tablet  Commonly known as: HYDRODIURIL  Take  1 tablet (25 mg total) by mouth once daily.     NIFEdipine 90 MG (OSM) 24 hr tablet  Commonly known as: PROCARDIA-XL  Take 1 tablet (90 mg total) by mouth once daily.     ondansetron 4 MG Tbdl  Commonly known as: ZOFRAN-ODT  Take 1 tablet (4 mg total) by mouth 2 (two) times daily.     valsartan 160 MG tablet  Commonly known as: DIOVAN  Take 1 tablet (160 mg total) by mouth once daily.            STOP taking these medications      carvediloL 12.5 MG tablet  Commonly known as: COREG            ASK your doctor about these medications      TRULICITY 1.5 mg/0.5 mL pen injector  Generic drug: dulaglutide  Inject 1.5 mg into the skin every 7 days.              Indwelling Lines/Drains at time of discharge:   Lines/Drains/Airways       None                   Time spent on the discharge of patient: 15 minutes         Heather Cali PA-C  Department of Hospital Medicine  [facility] - Telemetry

## 2024-11-08 NOTE — ED NOTES
Pt wheeled out of ED via W/C to Room #331 accompanied by Transporter; NADN; VSS; AAOx4; belongings present at time of transfer; denies any needs; portable Tele Box in place at time of transfer

## 2024-11-08 NOTE — ASSESSMENT & PLAN NOTE
Patients blood pressure range in the last 24 hours was: BP  Min: 86/48  Max: 160/95.The patient's inpatient anti-hypertensive regimen is listed below:  Current Antihypertensives  hydrALAZINE tablet 100 mg, Every 8 hours PRN, Oral  hydroCHLOROthiazide tablet 25 mg, Daily, Oral  NIFEdipine 24 hr tablet 90 mg, Daily, Oral    Plan  - BP is controlled, no changes needed to their regimen

## 2024-11-08 NOTE — NURSING
[facility] [place]  Nurses Note -- 4 Eyes      11/8/2024       Skin assessed on: Q Shift      [x] No Pressure Injuries Present    [x]Prevention Measures Documented    [] Yes LDA  for Pressure Injury Previously documented     [] Yes New Pressure Injury Discovered   [] LDA for New Pressure Injury Added      Attending RN:  Ashley Galeana RN     Second RN:  CRISTY Nava

## 2024-11-08 NOTE — SUBJECTIVE & OBJECTIVE
Interval History: Patient seen and examined. Stress test today. Potassium replaced.     Review of Systems   Constitutional:  Negative for activity change, chills, fatigue and fever.   Eyes:  Negative for visual disturbance.   Respiratory:  Negative for cough and shortness of breath.    Cardiovascular:  Negative for chest pain, palpitations and leg swelling.   Gastrointestinal:  Negative for abdominal pain, nausea and vomiting.   Genitourinary:  Negative for dysuria, frequency and urgency.   Neurological:  Negative for dizziness, syncope and light-headedness.     Objective:     Vital Signs (Most Recent):  Temp: 98.5 °F (36.9 °C) (11/08/24 0710)  Pulse: 78 (11/08/24 0304)  Resp: 18 (11/08/24 0245)  BP: (!) 145/87 (11/08/24 0245)  SpO2: 95 % (11/08/24 0245) Vital Signs (24h Range):  Temp:  [97.5 °F (36.4 °C)-98.5 °F (36.9 °C)] 98.5 °F (36.9 °C)  Pulse:  [63-85] 78  Resp:  [13-19] 18  SpO2:  [95 %-100 %] 95 %  BP: ()/(48-95) 145/87     Weight: 82.4 kg (181 lb 10.5 oz)  Body mass index is 31.18 kg/m².    Intake/Output Summary (Last 24 hours) at 11/8/2024 0811  Last data filed at 11/7/2024 1209  Gross per 24 hour   Intake 1000 ml   Output --   Net 1000 ml         Physical Exam  Vitals and nursing note reviewed.   Constitutional:       Appearance: She is obese.   HENT:      Head: Normocephalic.      Mouth/Throat:      Mouth: Mucous membranes are moist.      Pharynx: Oropharynx is clear.   Eyes:      Conjunctiva/sclera: Conjunctivae normal.   Cardiovascular:      Rate and Rhythm: Normal rate and regular rhythm.      Pulses: Normal pulses.      Heart sounds: Normal heart sounds.   Pulmonary:      Effort: Pulmonary effort is normal.      Breath sounds: Normal breath sounds.   Abdominal:      General: Bowel sounds are normal. There is no distension.      Palpations: Abdomen is soft.      Tenderness: There is no abdominal tenderness. There is no guarding.   Skin:     General: Skin is warm and dry.   Neurological:       General: No focal deficit present.      Mental Status: She is alert and oriented to person, place, and time.   Psychiatric:         Mood and Affect: Mood normal.         Behavior: Behavior normal.             Significant Labs: All pertinent labs within the past 24 hours have been reviewed.    Significant Imaging: I have reviewed all pertinent imaging results/findings within the past 24 hours.

## 2024-11-09 NOTE — PROGRESS NOTES
[facility] - Telemetry  Cardiology  Progress Note    Patient Name: Calos Rios  MRN: [id number]  Admission Date: 11/7/2024  Hospital Length of Stay: 0 days  Code Status: Full Code   Attending Physician: Ramo Pretty MD   Primary Care Physician: Adria Doll Jr., MD  Expected Discharge Date: 11/8/2024  Principal Problem:Chest pain    Subjective:       Interval History:  stress test did not show any significant ischemia.  Patient continued to be chest pain-free overnight.  No new cardiac complaints    Review of Systems   Constitutional: Negative for chills, diaphoresis, fever and malaise/fatigue.   HENT:  Negative for nosebleeds.    Eyes:  Negative for blurred vision and double vision.   Cardiovascular:  Negative for claudication, cyanosis, dyspnea on exertion, leg swelling, orthopnea, palpitations, paroxysmal nocturnal dyspnea and syncope.   Respiratory:  Negative for cough, shortness of breath and wheezing.    Skin:  Negative for dry skin and poor wound healing.   Musculoskeletal:  Negative for back pain, joint swelling and myalgias.   Gastrointestinal:  Negative for abdominal pain, nausea and vomiting.   Genitourinary:  Negative for hematuria.   Neurological:  Negative for dizziness, headaches, numbness, seizures and weakness.   Psychiatric/Behavioral:  Negative for altered mental status and depression.      Objective:     Vital Signs (Most Recent):  Temp: 98.6 °F (37 °C) (11/08/24 1620)  Pulse: 75 (11/08/24 1620)  Resp: 18 (11/08/24 1620)  BP: (!) 156/99 (11/08/24 1620)  SpO2: 97 % (11/08/24 1620) Vital Signs (24h Range):  Temp:  [97.8 °F (36.6 °C)-98.6 °F (37 °C)] 98.6 °F (37 °C)  Pulse:  [63-85] 75  Resp:  [13-18] 18  SpO2:  [95 %-99 %] 97 %  BP: (133-160)/(74-99) 156/99     Weight: 82.4 kg (181 lb 10.5 oz)  Body mass index is 31.18 kg/m².     SpO2: 97 %         Intake/Output Summary (Last 24 hours) at 11/8/2024 1815  Last data filed at 11/8/2024 1638  Gross per 24 hour   Intake 920 ml   Output --   Net  920 ml       Lines/Drains/Airways       None                      Physical Exam  Constitutional:       General: She is not in acute distress.     Appearance: She is well-developed. She is obese. She is not ill-appearing, toxic-appearing or diaphoretic.   HENT:      Head: Normocephalic and atraumatic.   Eyes:      General: No scleral icterus.     Extraocular Movements: Extraocular movements intact.      Conjunctiva/sclera: Conjunctivae normal.      Pupils: Pupils are equal, round, and reactive to light.   Neck:      Vascular: No JVD.      Trachea: No tracheal deviation.   Cardiovascular:      Rate and Rhythm: Normal rate and regular rhythm.      Heart sounds: S1 normal and S2 normal. Murmur heard.      Systolic murmur is present with a grade of 2/6.      No friction rub. No gallop.   Pulmonary:      Effort: Pulmonary effort is normal. No respiratory distress.      Breath sounds: Normal breath sounds. No stridor. No wheezing, rhonchi or rales.   Chest:      Chest wall: No tenderness.   Abdominal:      General: There is no distension.      Palpations: Abdomen is soft.   Musculoskeletal:         General: No swelling or tenderness. Normal range of motion.      Cervical back: Normal range of motion and neck supple. No rigidity.      Right lower leg: No edema.      Left lower leg: No edema.   Skin:     General: Skin is warm and dry.      Coloration: Skin is not jaundiced.   Neurological:      General: No focal deficit present.      Mental Status: She is alert and oriented to person, place, and time.      Cranial Nerves: No cranial nerve deficit.   Psychiatric:         Mood and Affect: Mood normal.         Behavior: Behavior normal.            Significant Labs:     DATA:     Laboratory:  CBC:  Recent Labs   Lab 10/23/24  1215 11/07/24  1015 11/08/24  0519   WBC 9.38 8.03 6.71   Hemoglobin 15.2 14.9 15.4   Hematocrit 44.6 44.1 45.6   Platelets 233 201 220       CHEMISTRIES:  Recent Labs   Lab 10/14/24  0418 10/15/24  0228  10/15/24  1325 11/07/24  1015 11/08/24  0519 11/08/24  1348   Glucose 93 102   < > 105 88 109   Sodium 137 136   < > 142 141 141   Potassium 3.3 L 3.6   < > 3.1 L 2.9 L 3.6   BUN 24 H 31 H   < > 23 H 15 18   Creatinine 1.5 H 1.8 H   < > 2.0 H 1.4 1.5 H   Calcium 10.1 10.0   < > 9.2 9.7 9.6   Magnesium 2.0 2.3  --   --  2.0  --     < > = values in this interval not displayed.       CARDIAC BIOMARKERS:  Recent Labs   Lab 11/07/24  1015 11/07/24  1422 11/07/24 2009   Troponin I 0.119 H 0.122 H 0.170 H       COAGS:        LIPIDS/LFTS:  Recent Labs   Lab 03/18/24  1533 05/25/24  2118 10/15/24  0223 11/07/24  1015 11/07/24  1422 11/08/24  0519   Cholesterol 180  --   --   --  186  --    Triglycerides 157 H  --   --   --  72  --    HDL 43  --   --   --  64  --    LDL Cholesterol 105.6  --   --   --  107.6  --    Non-HDL Cholesterol 137  --   --   --  122  --    AST 21   < > 16 19  --  19   ALT 18   < > 15 14  --  14    < > = values in this interval not displayed.       Hemoglobin A1C   Date Value Ref Range Status   10/15/2024 5.2 4.0 - 5.6 % Final     Comment:     ADA Screening Guidelines:  5.7-6.4%  Consistent with prediabetes  >or=6.5%  Consistent with diabetes    High levels of fetal hemoglobin interfere with the HbA1C  assay. Heterozygous hemoglobin variants (HbS, HgC, etc)do  not significantly interfere with this assay.   However, presence of multiple variants may affect accuracy.     03/18/2024 5.2 4.0 - 5.6 % Final     Comment:     ADA Screening Guidelines:  5.7-6.4%  Consistent with prediabetes  >or=6.5%  Consistent with diabetes    High levels of fetal hemoglobin interfere with the HbA1C  assay. Heterozygous hemoglobin variants (HbS, HgC, etc)do  not significantly interfere with this assay.   However, presence of multiple variants may affect accuracy.     09/09/2022 5.1 4.0 - 5.6 % Final     Comment:     ADA Screening Guidelines:  5.7-6.4%  Consistent with prediabetes  >or=6.5%  Consistent with diabetes    High  levels of fetal hemoglobin interfere with the HbA1C  assay. Heterozygous hemoglobin variants (HbS, HgC, etc)do  not significantly interfere with this assay.   However, presence of multiple variants may affect accuracy.         TSH  Recent Labs   Lab 11/07/24  1422   TSH 0.773       The 10-year ASCVD risk score (Jesi ANN, et al., 2019) is: 6.6%    Values used to calculate the score:      Age: 53 years      Sex: Female      Is Non- : Yes      Diabetic: No      Tobacco smoker: No      Systolic Blood Pressure: 156 mmHg      Is BP treated: Yes      HDL Cholesterol: 64 mg/dL      Total Cholesterol: 186 mg/dL       BNP    Lab Results   Component Value Date/Time     (H) 11/07/2024 10:15 AM    BNP 51 10/13/2024 06:49 AM    BNP 63 05/25/2024 07:34 PM    BNP <10 02/16/2020 06:55 AM    BNP 13 05/12/2015 05:53 PM            ECHO    Results for orders placed during the hospital encounter of 11/07/24    Echo    Interpretation Summary    Left Ventricle: The left ventricle is normal in size. Moderately increased wall thickness. There is moderate concentric hypertrophy. There is hyperdynamic systolic function with a visually estimated ejection fraction of 75 - 80%. Grade I diastolic dysfunction. Mild LVOT obstruction at rest. LVOT pressure gradient increases with Valsalva.  Peak jolene 4.75 m/sec. 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    Right Ventricle: Normal right ventricular cavity size. Systolic function is normal.    Pulmonary Artery: The estimated pulmonary artery systolic pressure is 38 mmHg.      STRESS TEST    Results for orders placed during the hospital encounter of 11/07/24    Nuclear Stress - Cardiology Interpreted    Interpretation Summary    Normal myocardial perfusion scan. There is no evidence of myocardial ischemia or infarction.    There is a trivial to mild intensity fixed perfusion abnormality in the  wall of the left ventricle secondary to  diaphragm attenuation.    The gated perfusion images showed an ejection fraction of 78% at rest.    There is normal wall motion at rest and post-stress.    The ECG portion of the study is negative for ischemia.    The patient reported no chest pain during the stress test.    There were no arrhythmias during stress.        CATH    No results found for this or any previous visit.      Assessment and Plan:     Brief HPI:     Chest pain   Now chest pain-free.  Stress test did not show any significant ischemia.    HOCM (hypertrophic obstructive cardiomyopathy)   Cardiac MRI as an outpatient.   Initiate beta blockers.  Titrate up to maximally tolerated dose  Follow-up in Cardiology Clinic with Dr. Valle    Hypertension  Continue medical therapy.        VTE Risk Mitigation (From admission, onward)           Ordered     enoxaparin injection 40 mg  Daily         11/07/24 1422     IP VTE HIGH RISK PATIENT  Once         11/07/24 1422     Place sequential compression device  Until discontinued         11/07/24 1422                    Fina Balderas MD  Cardiology  [facility] - [place] - Telemetry

## 2024-11-09 NOTE — ASSESSMENT & PLAN NOTE
Cardiac MRI as an outpatient.   Initiate beta blockers.  Titrate up to maximally tolerated dose  Follow-up in Cardiology Clinic with Dr. Valle

## 2024-11-09 NOTE — SUBJECTIVE & OBJECTIVE
Interval History:  stress test did not show any significant ischemia.  Patient continued to be chest pain-free overnight.  No new cardiac complaints    Review of Systems   Constitutional: Negative for chills, diaphoresis, fever and malaise/fatigue.   HENT:  Negative for nosebleeds.    Eyes:  Negative for blurred vision and double vision.   Cardiovascular:  Negative for claudication, cyanosis, dyspnea on exertion, leg swelling, orthopnea, palpitations, paroxysmal nocturnal dyspnea and syncope.   Respiratory:  Negative for cough, shortness of breath and wheezing.    Skin:  Negative for dry skin and poor wound healing.   Musculoskeletal:  Negative for back pain, joint swelling and myalgias.   Gastrointestinal:  Negative for abdominal pain, nausea and vomiting.   Genitourinary:  Negative for hematuria.   Neurological:  Negative for dizziness, headaches, numbness, seizures and weakness.   Psychiatric/Behavioral:  Negative for altered mental status and depression.      Objective:     Vital Signs (Most Recent):  Temp: 98.6 °F (37 °C) (11/08/24 1620)  Pulse: 75 (11/08/24 1620)  Resp: 18 (11/08/24 1620)  BP: (!) 156/99 (11/08/24 1620)  SpO2: 97 % (11/08/24 1620) Vital Signs (24h Range):  Temp:  [97.8 °F (36.6 °C)-98.6 °F (37 °C)] 98.6 °F (37 °C)  Pulse:  [63-85] 75  Resp:  [13-18] 18  SpO2:  [95 %-99 %] 97 %  BP: (133-160)/(74-99) 156/99     Weight: 82.4 kg (181 lb 10.5 oz)  Body mass index is 31.18 kg/m².     SpO2: 97 %         Intake/Output Summary (Last 24 hours) at 11/8/2024 1815  Last data filed at 11/8/2024 1638  Gross per 24 hour   Intake 920 ml   Output --   Net 920 ml       Lines/Drains/Airways       None                      Physical Exam  Constitutional:       General: She is not in acute distress.     Appearance: She is well-developed. She is obese. She is not ill-appearing, toxic-appearing or diaphoretic.   HENT:      Head: Normocephalic and atraumatic.   Eyes:      General: No scleral icterus.     Extraocular  Movements: Extraocular movements intact.      Conjunctiva/sclera: Conjunctivae normal.      Pupils: Pupils are equal, round, and reactive to light.   Neck:      Vascular: No JVD.      Trachea: No tracheal deviation.   Cardiovascular:      Rate and Rhythm: Normal rate and regular rhythm.      Heart sounds: S1 normal and S2 normal. Murmur heard.      Systolic murmur is present with a grade of 2/6.      No friction rub. No gallop.   Pulmonary:      Effort: Pulmonary effort is normal. No respiratory distress.      Breath sounds: Normal breath sounds. No stridor. No wheezing, rhonchi or rales.   Chest:      Chest wall: No tenderness.   Abdominal:      General: There is no distension.      Palpations: Abdomen is soft.   Musculoskeletal:         General: No swelling or tenderness. Normal range of motion.      Cervical back: Normal range of motion and neck supple. No rigidity.      Right lower leg: No edema.      Left lower leg: No edema.   Skin:     General: Skin is warm and dry.      Coloration: Skin is not jaundiced.   Neurological:      General: No focal deficit present.      Mental Status: She is alert and oriented to person, place, and time.      Cranial Nerves: No cranial nerve deficit.   Psychiatric:         Mood and Affect: Mood normal.         Behavior: Behavior normal.            Significant Labs:     DATA:     Laboratory:  CBC:  Recent Labs   Lab 10/23/24  1215 11/07/24  1015 11/08/24  0519   WBC 9.38 8.03 6.71   Hemoglobin 15.2 14.9 15.4   Hematocrit 44.6 44.1 45.6   Platelets 233 201 220       CHEMISTRIES:  Recent Labs   Lab 10/14/24  0418 10/15/24  0223 10/15/24  1325 11/07/24  1015 11/08/24  0519 11/08/24  1348   Glucose 93 102   < > 105 88 109   Sodium 137 136   < > 142 141 141   Potassium 3.3 L 3.6   < > 3.1 L 2.9 L 3.6   BUN 24 H 31 H   < > 23 H 15 18   Creatinine 1.5 H 1.8 H   < > 2.0 H 1.4 1.5 H   Calcium 10.1 10.0   < > 9.2 9.7 9.6   Magnesium 2.0 2.3  --   --  2.0  --     < > = values in this interval  not displayed.       CARDIAC BIOMARKERS:  Recent Labs   Lab 11/07/24  1015 11/07/24  1422 11/07/24 2009   Troponin I 0.119 H 0.122 H 0.170 H       COAGS:        LIPIDS/LFTS:  Recent Labs   Lab 03/18/24  1533 05/25/24  2118 10/15/24  0223 11/07/24  1015 11/07/24  1422 11/08/24  0519   Cholesterol 180  --   --   --  186  --    Triglycerides 157 H  --   --   --  72  --    HDL 43  --   --   --  64  --    LDL Cholesterol 105.6  --   --   --  107.6  --    Non-HDL Cholesterol 137  --   --   --  122  --    AST 21   < > 16 19  --  19   ALT 18   < > 15 14  --  14    < > = values in this interval not displayed.       Hemoglobin A1C   Date Value Ref Range Status   10/15/2024 5.2 4.0 - 5.6 % Final     Comment:     ADA Screening Guidelines:  5.7-6.4%  Consistent with prediabetes  >or=6.5%  Consistent with diabetes    High levels of fetal hemoglobin interfere with the HbA1C  assay. Heterozygous hemoglobin variants (HbS, HgC, etc)do  not significantly interfere with this assay.   However, presence of multiple variants may affect accuracy.     03/18/2024 5.2 4.0 - 5.6 % Final     Comment:     ADA Screening Guidelines:  5.7-6.4%  Consistent with prediabetes  >or=6.5%  Consistent with diabetes    High levels of fetal hemoglobin interfere with the HbA1C  assay. Heterozygous hemoglobin variants (HbS, HgC, etc)do  not significantly interfere with this assay.   However, presence of multiple variants may affect accuracy.     09/09/2022 5.1 4.0 - 5.6 % Final     Comment:     ADA Screening Guidelines:  5.7-6.4%  Consistent with prediabetes  >or=6.5%  Consistent with diabetes    High levels of fetal hemoglobin interfere with the HbA1C  assay. Heterozygous hemoglobin variants (HbS, HgC, etc)do  not significantly interfere with this assay.   However, presence of multiple variants may affect accuracy.         TSH  Recent Labs   Lab 11/07/24  1422   TSH 0.773       The 10-year ASCVD risk score (Jesi DK, et al., 2019) is: 6.6%    Values used to  calculate the score:      Age: 53 years      Sex: Female      Is Non- : Yes      Diabetic: No      Tobacco smoker: No      Systolic Blood Pressure: 156 mmHg      Is BP treated: Yes      HDL Cholesterol: 64 mg/dL      Total Cholesterol: 186 mg/dL       BNP    Lab Results   Component Value Date/Time     (H) 11/07/2024 10:15 AM    BNP 51 10/13/2024 06:49 AM    BNP 63 05/25/2024 07:34 PM    BNP <10 02/16/2020 06:55 AM    BNP 13 05/12/2015 05:53 PM            ECHO    Results for orders placed during the hospital encounter of 11/07/24    Echo    Interpretation Summary    Left Ventricle: The left ventricle is normal in size. Moderately increased wall thickness. There is moderate concentric hypertrophy. There is hyperdynamic systolic function with a visually estimated ejection fraction of 75 - 80%. Grade I diastolic dysfunction. Mild LVOT obstruction at rest. LVOT pressure gradient increases with Valsalva.  Peak jolene 4.75 m/sec. 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    Right Ventricle: Normal right ventricular cavity size. Systolic function is normal.    Pulmonary Artery: The estimated pulmonary artery systolic pressure is 38 mmHg.      STRESS TEST    Results for orders placed during the hospital encounter of 11/07/24    Nuclear Stress - Cardiology Interpreted    Interpretation Summary    Normal myocardial perfusion scan. There is no evidence of myocardial ischemia or infarction.    There is a trivial to mild intensity fixed perfusion abnormality in the  wall of the left ventricle secondary to diaphragm attenuation.    The gated perfusion images showed an ejection fraction of 78% at rest.    There is normal wall motion at rest and post-stress.    The ECG portion of the study is negative for ischemia.    The patient reported no chest pain during the stress test.    There were no arrhythmias during stress.        CATH    No results found for this or any  previous visit.

## 2024-11-14 ENCOUNTER — TELEPHONE (OUTPATIENT)
Dept: SMOKING CESSATION | Facility: CLINIC | Age: 54
End: 2024-11-14
Payer: MEDICAID

## 2024-11-14 NOTE — TELEPHONE ENCOUNTER
Smoking Cessation clinic - Called pt after 5 mins no show to in-clinic intake appointment. Pt states she forgot about appt and asks to be rescheduled for a Saturday appt. Explained to pt that I do not have Saturday appts, but she is welcomed to call our schedulers anytime and ask for a different appt time.  -Harley Packer, DASHN, LDN, CTTS, [facility]  Smoking Cessation : [phone]

## 2024-11-21 ENCOUNTER — LAB VISIT (OUTPATIENT)
Dept: LAB | Facility: HOSPITAL | Age: 54
End: 2024-11-21
Payer: MEDICAID

## 2024-11-21 DIAGNOSIS — N17.9 ACUTE KIDNEY INJURY SUPERIMPOSED ON CHRONIC KIDNEY DISEASE: ICD-10-CM

## 2024-11-21 DIAGNOSIS — N18.9 ACUTE KIDNEY INJURY SUPERIMPOSED ON CHRONIC KIDNEY DISEASE: ICD-10-CM

## 2024-11-21 LAB
ALBUMIN SERPL BCP-MCNC: 3.5 G/DL (ref 3.5–5.2)
ALP SERPL-CCNC: 72 U/L (ref 40–150)
ALT SERPL W/O P-5'-P-CCNC: 17 U/L (ref 10–44)
ANION GAP SERPL CALC-SCNC: 12 MMOL/L (ref 8–16)
AST SERPL-CCNC: 24 U/L (ref 10–40)
BILIRUB SERPL-MCNC: 0.6 MG/DL (ref 0.1–1)
BUN SERPL-MCNC: 29 MG/DL (ref 6–20)
CALCIUM SERPL-MCNC: 9.8 MG/DL (ref 8.7–10.5)
CHLORIDE SERPL-SCNC: 101 MMOL/L (ref 95–110)
CO2 SERPL-SCNC: 29 MMOL/L (ref 23–29)
CREAT SERPL-MCNC: 1.8 MG/DL (ref 0.5–1.4)
EST. GFR  (NO RACE VARIABLE): 33.5 ML/MIN/1.73 M^2
GLUCOSE SERPL-MCNC: 81 MG/DL (ref 70–110)
POTASSIUM SERPL-SCNC: 3 MMOL/L (ref 3.5–5.1)
PROT SERPL-MCNC: 7.2 G/DL (ref 6–8.4)
SODIUM SERPL-SCNC: 142 MMOL/L (ref 136–145)

## 2024-11-21 PROCEDURE — 36415 COLL VENOUS BLD VENIPUNCTURE: CPT | Performed by: INTERNAL MEDICINE

## 2024-11-21 PROCEDURE — 80053 COMPREHEN METABOLIC PANEL: CPT | Performed by: INTERNAL MEDICINE

## 2024-11-25 ENCOUNTER — LAB VISIT (OUTPATIENT)
Dept: LAB | Facility: HOSPITAL | Age: 54
End: 2024-11-25
Payer: MEDICAID

## 2024-11-25 DIAGNOSIS — N18.32 STAGE 3B CHRONIC KIDNEY DISEASE: ICD-10-CM

## 2024-11-25 DIAGNOSIS — I16.1 HYPERTENSIVE EMERGENCY: ICD-10-CM

## 2024-11-25 LAB
ALBUMIN SERPL BCP-MCNC: 3.1 G/DL (ref 3.5–5.2)
ANION GAP SERPL CALC-SCNC: 9 MMOL/L (ref 8–16)
BUN SERPL-MCNC: 28 MG/DL (ref 6–20)
CALCIUM SERPL-MCNC: 9.3 MG/DL (ref 8.7–10.5)
CHLORIDE SERPL-SCNC: 105 MMOL/L (ref 95–110)
CO2 SERPL-SCNC: 28 MMOL/L (ref 23–29)
CREAT SERPL-MCNC: 1.4 MG/DL (ref 0.5–1.4)
EST. GFR  (NO RACE VARIABLE): 45.3 ML/MIN/1.73 M^2
GLUCOSE SERPL-MCNC: 85 MG/DL (ref 70–110)
PHOSPHATE SERPL-MCNC: 4.4 MG/DL (ref 2.7–4.5)
POTASSIUM SERPL-SCNC: 3.2 MMOL/L (ref 3.5–5.1)
SODIUM SERPL-SCNC: 142 MMOL/L (ref 136–145)

## 2024-11-25 PROCEDURE — 84244 ASSAY OF RENIN: CPT | Performed by: STUDENT IN AN ORGANIZED HEALTH CARE EDUCATION/TRAINING PROGRAM

## 2024-11-25 PROCEDURE — 36415 COLL VENOUS BLD VENIPUNCTURE: CPT | Performed by: STUDENT IN AN ORGANIZED HEALTH CARE EDUCATION/TRAINING PROGRAM

## 2024-11-25 PROCEDURE — 80069 RENAL FUNCTION PANEL: CPT | Performed by: STUDENT IN AN ORGANIZED HEALTH CARE EDUCATION/TRAINING PROGRAM

## 2024-11-26 ENCOUNTER — PATIENT MESSAGE (OUTPATIENT)
Dept: NEPHROLOGY | Facility: CLINIC | Age: 54
End: 2024-11-26
Payer: MEDICAID

## 2024-11-29 LAB
ALDOST SERPL-MCNC: 24.2 NG/DL
ALDOST/RENIN PLAS-RTO: 3 RATIO
RENIN PLAS-CCNC: 8.1 NG/ML/HR

## 2024-12-18 ENCOUNTER — OFFICE VISIT (OUTPATIENT)
Dept: CARDIOLOGY | Facility: CLINIC | Age: 54
End: 2024-12-18
Payer: MEDICAID

## 2024-12-18 ENCOUNTER — TELEPHONE (OUTPATIENT)
Dept: INTERNAL MEDICINE | Facility: CLINIC | Age: 54
End: 2024-12-18
Payer: MEDICAID

## 2024-12-18 VITALS
SYSTOLIC BLOOD PRESSURE: 162 MMHG | DIASTOLIC BLOOD PRESSURE: 98 MMHG | HEIGHT: 64 IN | WEIGHT: 187.06 LBS | BODY MASS INDEX: 31.94 KG/M2

## 2024-12-18 DIAGNOSIS — E66.811 CLASS 1 OBESITY DUE TO EXCESS CALORIES WITH BODY MASS INDEX (BMI) OF 31.0 TO 31.9 IN ADULT, UNSPECIFIED WHETHER SERIOUS COMORBIDITY PRESENT: ICD-10-CM

## 2024-12-18 DIAGNOSIS — I10 PRIMARY HYPERTENSION: ICD-10-CM

## 2024-12-18 DIAGNOSIS — F17.210 CIGARETTE NICOTINE DEPENDENCE WITHOUT COMPLICATION: ICD-10-CM

## 2024-12-18 DIAGNOSIS — I42.1 HOCM (HYPERTROPHIC OBSTRUCTIVE CARDIOMYOPATHY): Primary | ICD-10-CM

## 2024-12-18 DIAGNOSIS — I16.1 HYPERTENSIVE EMERGENCY: ICD-10-CM

## 2024-12-18 DIAGNOSIS — I10 ESSENTIAL HYPERTENSION: ICD-10-CM

## 2024-12-18 DIAGNOSIS — E66.09 CLASS 1 OBESITY DUE TO EXCESS CALORIES WITH BODY MASS INDEX (BMI) OF 31.0 TO 31.9 IN ADULT, UNSPECIFIED WHETHER SERIOUS COMORBIDITY PRESENT: ICD-10-CM

## 2024-12-18 PROCEDURE — 99213 OFFICE O/P EST LOW 20 MIN: CPT | Mod: PBBFAC,PN

## 2024-12-18 PROCEDURE — 3080F DIAST BP >= 90 MM HG: CPT | Mod: CPTII,,,

## 2024-12-18 PROCEDURE — 3077F SYST BP >= 140 MM HG: CPT | Mod: CPTII,,,

## 2024-12-18 PROCEDURE — G2211 COMPLEX E/M VISIT ADD ON: HCPCS | Mod: S$PBB,,,

## 2024-12-18 PROCEDURE — 3008F BODY MASS INDEX DOCD: CPT | Mod: CPTII,,,

## 2024-12-18 PROCEDURE — 99215 OFFICE O/P EST HI 40 MIN: CPT | Mod: S$PBB,,,

## 2024-12-18 PROCEDURE — 99999 PR PBB SHADOW E&M-EST. PATIENT-LVL III: CPT | Mod: PBBFAC,,,

## 2024-12-18 RX ORDER — CARVEDILOL 3.12 MG/1
3.12 TABLET ORAL 2 TIMES DAILY WITH MEALS
Qty: 180 TABLET | Refills: 3 | Status: SHIPPED | OUTPATIENT
Start: 2024-12-18 | End: 2025-12-18

## 2024-12-18 RX ORDER — VALSARTAN 160 MG/1
160 TABLET ORAL DAILY
Qty: 90 TABLET | Refills: 3 | Status: SHIPPED | OUTPATIENT
Start: 2024-12-18

## 2024-12-18 RX ORDER — NIFEDIPINE 90 MG/1
90 TABLET, EXTENDED RELEASE ORAL DAILY
Qty: 90 TABLET | Refills: 3 | Status: SHIPPED | OUTPATIENT
Start: 2024-12-18

## 2024-12-18 NOTE — PROGRESS NOTES
"[facility] - Cardiology [facility]  Cardiology Clinic Note      Chief Complaint  Chief Complaint   Patient presents with    Chest Pain     "Burning sensation", intermittent       HPI:  Ms. Rios is a 53-year-old female with a past medical history of anxiety, depression, migraine headaches, obesity, GERD, tobacco dependency, hypertension    Patient is new to me and here to establish care  She was previously seen in at [facility] for chest tightness, during this visit ischemic workup was negative  Echo completed during hospital admission findings consistent with HOCM  She continues to have symptoms of midsternal chest tightness that she describes to be a bubbling, burning sensation"  Reports that she notices symptoms after eating and lying down shortly after  She does have a history of GERD but does not take any medication currently  Endorses tobacco use    EKG normal sinus rhythm heart rate 70s possible left atrial enlargement LVH prolonged QT interval    Medications  Current Outpatient Medications   Medication Sig Dispense Refill    allopurinoL (ZYLOPRIM) 100 MG tablet Take 1 tablet (100 mg total) by mouth once daily. 30 tablet 3    albuterol (PROVENTIL/VENTOLIN HFA) 90 mcg/actuation inhaler Inhale 2 puffs into the lungs every 6 (six) hours as needed for Wheezing. 18 g 12    ALPRAZolam (XANAX) 0.5 MG tablet Take 1 tablet (0.5 mg total) by mouth nightly as needed for Anxiety. 30 tablet 0    buPROPion (WELLBUTRIN XL) 150 MG TB24 tablet Take 1 tablet (150 mg total) by mouth once daily. 90 tablet 3    carvediloL (COREG) 3.125 MG tablet Take 1 tablet (3.125 mg total) by mouth 2 (two) times daily with meals. 180 tablet 3    NIFEdipine (PROCARDIA-XL) 90 MG (OSM) 24 hr tablet Take 1 tablet (90 mg total) by mouth once daily. 90 tablet 3    ondansetron (ZOFRAN) 4 MG tablet Take 1 tablet (4 mg total) by mouth every 12 (twelve) hours as needed for Nausea. 25 tablet 2    valsartan (DIOVAN) 160 MG tablet Take 1 tablet (160 " mg total) by mouth once daily. 90 tablet 3     No current facility-administered medications for this visit.        History  Past Medical History:   Diagnosis Date    Anxiety disorder, unspecified     Hypertension     Migraine headache     Obesity      Past Surgical History:   Procedure Laterality Date    CYSTOURETEROSCOPY,WITH HOLMIUM LASER LITHOTRIPSY OF URETERAL CALCULUS - Bilateral Bilateral 11/09/2022    ESOPHAGOGASTRODUODENOSCOPY N/A 12/23/2022    Procedure: EGD (ESOPHAGOGASTRODUODENOSCOPY);  Surgeon: Anaid Cohen MD;  Location: [facility];  Service: Endoscopy;  Laterality: N/A;    HYSTERECTOMY, TOTAL, LAPAROSCOPIC, WITH SALPINGO-OOPHORECTOMY Bilateral 03/17/2021    LAPAROSCOPIC TOTAL HYSTERECTOMY Bilateral 03/17/2021    PYELOSCOPY Bilateral 10/25/2022    Procedure: PYELOSCOPY;  Surgeon: Raz Cantrell MD;  Location: [facility];  Service: Urology;  Laterality: Bilateral;    TUBAL LIGATION      URETEROSCOPY Bilateral 10/25/2022    Procedure: URETEROSCOPY;  Surgeon: Raz Cantrell MD;  Location: [facility];  Service: Urology;  Laterality: Bilateral;     Social History     Socioeconomic History    Marital status: Single   Occupational History     Employer: [employer]   Tobacco Use    Smoking status: Former     Current packs/day: 0.25     Average packs/day: 0.3 packs/day for 15.0 years (3.8 ttl pk-yrs)     Types: Cigarettes    Smokeless tobacco: Never    Tobacco comments:     Quite 10/2024.     Substance and Sexual Activity    Alcohol use: Yes     Alcohol/week: 3.0 standard drinks of alcohol     Types: 3 Shots of liquor per week     Comment: scocially on weekends    Drug use: Yes     Types: Marijuana    Sexual activity: Yes     Partners: Male     Birth control/protection: None     Social Drivers of Health     Financial Resource Strain: Medium Risk (11/8/2024)    Overall Financial Resource Strain (CARDIA)     Difficulty of Paying Living Expenses: Somewhat hard   Food Insecurity: No Food Insecurity  (11/8/2024)    Hunger Vital Sign     Worried About Running Out of Food in the Last Year: Never true     Ran Out of Food in the Last Year: Never true   Transportation Needs: No Transportation Needs (11/8/2024)    TRANSPORTATION NEEDS     Transportation : No   Physical Activity: Insufficiently Active (10/11/2024)    Exercise Vital Sign     Days of Exercise per Week: 2 days     Minutes of Exercise per Session: 30 min   Stress: Stress Concern Present (11/8/2024)    [employer] [place] [employer] - Occupational Stress Questionnaire     Feeling of Stress : Very much   Housing Stability: Low Risk  (11/8/2024)    Housing Stability Vital Sign     Unable to Pay for Housing in the Last Year: No     Homeless in the Last Year: No     Family History   Problem Relation Name Age of Onset    Heart disease Mother      Drug abuse Father      Heart disease Father      Kidney disease Father      Diabetes Maternal Uncle      Heart disease Maternal Grandmother      Ovarian cancer Maternal Aunt      Cancer Neg Hx          Allergies  Review of patient's allergies indicates:  No Known Allergies    Review of Systems   Review of Systems   Constitutional: Negative for chills, decreased appetite, diaphoresis, fever, malaise/fatigue, weight gain and weight loss.   Eyes:  Negative for blurred vision.   Cardiovascular:  Negative for chest pain, claudication, dyspnea on exertion, irregular heartbeat, leg swelling, near-syncope, orthopnea, palpitations, paroxysmal nocturnal dyspnea and syncope.   Respiratory:  Negative for cough, shortness of breath, snoring, sputum production and wheezing.    Endocrine: Negative for cold intolerance, heat intolerance, polydipsia, polyphagia and polyuria.   Skin:  Negative for color change, dry skin, itching, nail changes and poor wound healing.   Musculoskeletal:  Negative for back pain, gout, joint pain and joint swelling.   Gastrointestinal:  Negative for bloating, abdominal pain, constipation, diarrhea,  hematemesis, hematochezia, melena, nausea and vomiting.   Genitourinary:  Negative for dysuria and hematuria.   Neurological:  Negative for dizziness, headaches, light-headedness, numbness, paresthesias and weakness.   Psychiatric/Behavioral:  Negative for altered mental status, depression and memory loss.        Physical Exam  Vitals:    12/18/24 1313   BP: (!) 162/98     Wt Readings from Last 1 Encounters:   12/24/24 84.3 kg (185 lb 13.6 oz)     Physical Exam  Constitutional:       General: She is not in acute distress.  HENT:      Head: Normocephalic and atraumatic.      Mouth/Throat:      Mouth: Mucous membranes are moist.   Eyes:      Extraocular Movements: Extraocular movements intact.      Pupils: Pupils are equal, round, and reactive to light.   Neck:      Vascular: No carotid bruit or JVD.   Cardiovascular:      Rate and Rhythm: Normal rate and regular rhythm.      Heart sounds: No murmur heard.     No friction rub. No gallop.   Pulmonary:      Effort: Pulmonary effort is normal.      Breath sounds: Normal breath sounds.   Abdominal:      General: Abdomen is flat.      Palpations: Abdomen is soft.   Musculoskeletal:      Right lower leg: No edema.      Left lower leg: No edema.   Skin:     General: Skin is warm.      Capillary Refill: Capillary refill takes less than 2 seconds.   Neurological:      General: No focal deficit present.   Psychiatric:         Mood and Affect: Mood normal.         Labs  Lab Visit on 11/25/2024   Component Date Value Ref Range Status    Aldosterone 11/25/2024 24.2  ng/dL Final    Comment: INTERPRETIVE INFORMATION: Aldosterone, Serum  Reference intervals for age 15 and older:  Upright .........  4.0 - 31.0 ng/dL  Supine ..........  Less than or equal to 16.0 ng/dL  Unspecified .....  Less than or equal to 31.0 ng/dL  Normal serum levels of aldosterone are dependent on the   sodium intake and whether the patient is upright or supine.   High sodium intake will tend to suppress serum  aldosterone,   whereas low sodium intake will elevate serum aldosterone.   The reference intervals for serum aldosterone are based on   normal sodium intake.     Access complete set of age- and/or gender-specific   reference intervals for this test in the [facility]   Test Directory ([email or web address]).      Renin 2024 8.1  ng/mL/hr Final    Comment: INTERPRETIVE INFORMATION: Renin Activity  Adult, Normal sodium diet:  Supine ................. 0.2-1.6 ng/mL/hr  Upright ................ 0.5-4.0 ng/mL/hr  Children, Normal sodium diet, Supine:   (1-7 days) ..... 2.0-35.0 ng/mL/hr  Cord blood ............. 4.0-32.0 ng/mL/hr  1-12 mos ............... 2.4-37.0 ng/mL/hr  13 mos-3 yrs ........... 1.7-11.2 ng/mL/hr  4-5 yrs ................ 1.0- 6.5 ng/mL/hr  6-10 yrs ............... 0.5- 5.9 ng/mL/hr  11-15 yrs .............. 0.5- 3.3 ng/mL/hr  Children, normal sodium diet, Upright:  0-3 yrs ................ Not Available  4-5 yrs ................ Less than or equal to 15 ng/mL/hr  6-10 yrs ............... Less than or equal to 17 ng/mL/hr  11-15 yrs .............. Less than or equal to 16 ng/mL/hr  Plasma renin activity measures enzyme ability to convert   angiotensinogen to angiotensin I and is limited by the   availability of angiotensinogen. Plasma renin activity is   not an accurate indicator of enzyme activity when   angiotens                           inogen is decreased.  This test was developed and its performance characteristics   determined by [facility]. It has not been cleared or   approved by the US Food and Drug Administration. This test   was performed in a CLIA certified laboratory and is   intended for clinical purposes.      Aldosterone/Renin Activity Calcula* 2024 3.0  <=25.0 ratio Final    Comment: INTERPRETIVE INFORMATION: A/RA Ratio Calculation  Aldosterone/Renin Activity Ratio: Less than or equal to 25  An Aldosterone/Renin Activity Ratio of greater than 25 is   suggestive of  hyperaldosteronism if the aldosterone   concentration is greater than 15 ng/dL.  Performed By: [facility]  [address] [place] [id number]  : Kev Cruz MD, PhD  CLIA Number: [id number]      Glucose 11/25/2024 85  70 - 110 mg/dL Final    Sodium 11/25/2024 142  136 - 145 mmol/L Final    Potassium 11/25/2024 3.2 (L)  3.5 - 5.1 mmol/L Final    Chloride 11/25/2024 105  95 - 110 mmol/L Final    CO2 11/25/2024 28  23 - 29 mmol/L Final    BUN 11/25/2024 28 (H)  6 - 20 mg/dL Final    Calcium 11/25/2024 9.3  8.7 - 10.5 mg/dL Final    Creatinine 11/25/2024 1.4  0.5 - 1.4 mg/dL Final    Albumin 11/25/2024 3.1 (L)  3.5 - 5.2 g/dL Final    Phosphorus 11/25/2024 4.4  2.7 - 4.5 mg/dL Final    eGFR 11/25/2024 45.3 (A)  >60 mL/min/1.73 m^2 Final    Anion Gap 11/25/2024 9  8 - 16 mmol/L Final   Lab Visit on 11/21/2024   Component Date Value Ref Range Status    Sodium 11/21/2024 142  136 - 145 mmol/L Final    Potassium 11/21/2024 3.0 (L)  3.5 - 5.1 mmol/L Final    Chloride 11/21/2024 101  95 - 110 mmol/L Final    CO2 11/21/2024 29  23 - 29 mmol/L Final    Glucose 11/21/2024 81  70 - 110 mg/dL Final    BUN 11/21/2024 29 (H)  6 - 20 mg/dL Final    Creatinine 11/21/2024 1.8 (H)  0.5 - 1.4 mg/dL Final    Calcium 11/21/2024 9.8  8.7 - 10.5 mg/dL Final    Total Protein 11/21/2024 7.2  6.0 - 8.4 g/dL Final    Albumin 11/21/2024 3.5  3.5 - 5.2 g/dL Final    Total Bilirubin 11/21/2024 0.6  0.1 - 1.0 mg/dL Final    Comment: For infants and newborns, interpretation of results should be based  on gestational age, weight and in agreement with clinical  observations.    Premature Infant recommended reference ranges:  Up to 24 hours.............<8.0 mg/dL  Up to 48 hours............<12.0 mg/dL  3-5 days..................<15.0 mg/dL  6-29 days.................<15.0 mg/dL      Alkaline Phosphatase 11/21/2024 72  40 - 150 U/L Final    AST 11/21/2024 24  10 - 40 U/L Final    ALT 11/21/2024 17  10 - 44 U/L  Final    eGFR 11/21/2024 33.5 (A)  >60 mL/min/1.73 m^2 Final    Anion Gap 11/21/2024 12  8 - 16 mmol/L Final   Admission on 11/07/2024, Discharged on 11/08/2024   Component Date Value Ref Range Status    Sodium 11/07/2024 142  136 - 145 mmol/L Final    Potassium 11/07/2024 3.1 (L)  3.5 - 5.1 mmol/L Final    Chloride 11/07/2024 107  95 - 110 mmol/L Final    CO2 11/07/2024 25  23 - 29 mmol/L Final    Glucose 11/07/2024 105  70 - 110 mg/dL Final    BUN 11/07/2024 23 (H)  6 - 20 mg/dL Final    Creatinine 11/07/2024 2.0 (H)  0.5 - 1.4 mg/dL Final    Calcium 11/07/2024 9.2  8.7 - 10.5 mg/dL Final    Total Protein 11/07/2024 7.1  6.0 - 8.4 g/dL Final    Albumin 11/07/2024 3.3 (L)  3.5 - 5.2 g/dL Final    Total Bilirubin 11/07/2024 0.5  0.1 - 1.0 mg/dL Final    Comment: For infants and newborns, interpretation of results should be based  on gestational age, weight and in agreement with clinical  observations.    Premature Infant recommended reference ranges:  Up to 24 hours.............<8.0 mg/dL  Up to 48 hours............<12.0 mg/dL  3-5 days..................<15.0 mg/dL  6-29 days.................<15.0 mg/dL      Alkaline Phosphatase 11/07/2024 71  40 - 150 U/L Final    AST 11/07/2024 19  10 - 40 U/L Final    ALT 11/07/2024 14  10 - 44 U/L Final    eGFR 11/07/2024 30 (A)  >60 mL/min/1.73 m^2 Final    Anion Gap 11/07/2024 10  8 - 16 mmol/L Final    WBC 11/07/2024 8.03  3.90 - 12.70 K/uL Final    RBC 11/07/2024 4.59  4.00 - 5.40 M/uL Final    Hemoglobin 11/07/2024 14.9  12.0 - 16.0 g/dL Final    Hematocrit 11/07/2024 44.1  37.0 - 48.5 % Final    MCV 11/07/2024 96  82 - 98 fL Final    MCH 11/07/2024 32.5 (H)  27.0 - 31.0 pg Final    MCHC 11/07/2024 33.8  32.0 - 36.0 g/dL Final    RDW 11/07/2024 14.6 (H)  11.5 - 14.5 % Final    Platelets 11/07/2024 201  150 - 450 K/uL Final    MPV 11/07/2024 10.2  9.2 - 12.9 fL Final    Immature Granulocytes 11/07/2024 0.2  0.0 - 0.5 % Final    Gran # (ANC) 11/07/2024 5.6  1.8 - 7.7 K/uL  Final    Immature Grans (Abs) 11/07/2024 0.02  0.00 - 0.04 K/uL Final    Comment: Mild elevation in immature granulocytes is non specific and   can be seen in a variety of conditions including stress response,   acute inflammation, trauma and pregnancy. Correlation with other   laboratory and clinical findings is essential.      Lymph # 11/07/2024 1.5  1.0 - 4.8 K/uL Final    Mono # 11/07/2024 0.6  0.3 - 1.0 K/uL Final    Eos # 11/07/2024 0.2  0.0 - 0.5 K/uL Final    Baso # 11/07/2024 0.03  0.00 - 0.20 K/uL Final    nRBC 11/07/2024 0  0 /100 WBC Final    Gran % 11/07/2024 70.2  38.0 - 73.0 % Final    Lymph % 11/07/2024 18.6  18.0 - 48.0 % Final    Mono % 11/07/2024 7.7  4.0 - 15.0 % Final    Eosinophil % 11/07/2024 2.9  0.0 - 8.0 % Final    Basophil % 11/07/2024 0.4  0.0 - 1.9 % Final    Differential Method 11/07/2024 Automated   Final    QRS Duration 11/07/2024 100  ms Final    OHS QTC Calculation 11/07/2024 525  ms Final    BNP 11/07/2024 124 (H)  0 - 99 pg/mL Final    Values of less than 100 pg/ml are consistent with non-CHF populations.    Troponin I 11/07/2024 0.119 (H)  0.000 - 0.026 ng/mL Final    Comment: The reference interval for Troponin I represents the 99th percentile   cutoff   for our facility and is consistent with 3rd generation assay   performance.      Troponin I 11/07/2024 0.122 (H)  0.000 - 0.026 ng/mL Final    Comment: The reference interval for Troponin I represents the 99th percentile   cutoff   for our facility and is consistent with 3rd generation assay   performance.      BSA 11/07/2024 1.95  m2 Final    LVOT stroke volume 11/07/2024 159.3  cm3 Final    LVIDd 11/07/2024 3.3 (A)  3.5 - 6.0 cm Final    LV Systolic Volume 11/07/2024 13.35  mL Final    LV Systolic Volume Index 11/07/2024 7.0  mL/m2 Final    LVIDs 11/07/2024 2.0 (A)  2.1 - 4.0 cm Final    LV Diastolic Volume 11/07/2024 43.87  mL Final    LV Diastolic Volume Index 11/07/2024 23.09  mL/m2 Final    Left Ventricular End Systolic  Volu* 11/07/2024 13.35  mL Final    Left Ventricular End Diastolic Vol* 11/07/2024 43.87  mL Final    IVS 11/07/2024 1.5 (A)  0.6 - 1.1 cm Final    LVOT diameter 11/07/2024 1.9  cm Final    LVOT area 11/07/2024 2.8  cm2 Final    FS 11/07/2024 39.4  28 - 44 % Final    Left Ventricle Relative Wall Thick* 11/07/2024 0.97  cm Final    PW 11/07/2024 1.6 (A)  0.6 - 1.1 cm Final    LV mass 11/07/2024 188.8  g Final    LV Mass Index 11/07/2024 99.4  g/m2 Final    MV Peak E Allen 11/07/2024 0.89  m/s Final    TDI LATERAL 11/07/2024 0.05  m/s Final    TDI SEPTAL 11/07/2024 0.03  m/s Final    E/E' ratio 11/07/2024 22.25  m/s Final    MV Peak A Allen 11/07/2024 1.26  m/s Final    TR Max Allen 11/07/2024 2.96  m/s Final    E/A ratio 11/07/2024 0.71   Final    IVRT 11/07/2024 123.69  msec Final    E wave deceleration time 11/07/2024 357.52  msec Final    LV SEPTAL E/E' RATIO 11/07/2024 29.67  m/s Final    LV LATERAL E/E' RATIO 11/07/2024 17.80  m/s Final    LVOT peak allen 11/07/2024 2.4  m/s Final    Left Ventricular Outflow Tract Addie* 11/07/2024 1.82  cm/s Final    Left Ventricular Outflow Tract Addie* 11/07/2024 19.0  mmHg Final    RV S' 11/07/2024 18.00  cm/s Final    TAPSE 11/07/2024 2.85  cm Final    LA size 11/07/2024 3.86  cm Final    Left Atrium Minor Axis 11/07/2024 5.22  cm Final    Left Atrium Major Axis 11/07/2024 4.65  cm Final    RA Major Craig 11/07/2024 4.01  cm Final    AV mean gradient 11/07/2024 19.3  mmHg Final    AV peak gradient 11/07/2024 33.6  mmHg Final    Ao peak allen 11/07/2024 2.9  m/s Final    Ao VTI 11/07/2024 46.9  cm Final    LVOT peak VTI 11/07/2024 56.2  cm Final    AV valve area 11/07/2024 3.4  cm² Final    AV Velocity Ratio 11/07/2024 0.83   Final    AV index (prosthetic) 11/07/2024 1.20   Final    ALEXEI by Velocity Ratio 11/07/2024 2.3  cm² Final    MV mean gradient 11/07/2024 2  mmHg Final    MV peak gradient 11/07/2024 6  mmHg Final    MV stenosis pressure 1/2 time 11/07/2024 103.68  ms Final    MV valve  area p 1/2 method 11/07/2024 2.12  cm2 Final    MV valve area by continuity eq 11/07/2024 4.50  cm2 Final    MV VTI 11/07/2024 35.4  cm Final    TV peak gradient 11/07/2024 1  mmHg Final    Triscuspid Valve Regurgitation Pea* 11/07/2024 35  mmHg Final    PV PEAK VELOCITY 11/07/2024 0.96  m/s Final    PV peak gradient 11/07/2024 4  mmHg Final    Sinus 11/07/2024 3.14  cm Final    STJ 11/07/2024 3.02  cm Final    Ascending aorta 11/07/2024 2.76  cm Final    IVC diameter 11/07/2024 1.37  cm Final    Mean e' 11/07/2024 0.04  m/s Final    ZLVIDS 11/07/2024 -3.92   Final    ZLVIDD 11/07/2024 -4.80   Final    RVDD 11/07/2024 3.52  cm Final    ANGELES 11/07/2024 30.6  mL/m2 Final    LA Vol 11/07/2024 58.10  cm3 Final    RV- roy basal diam 11/07/2024 3.5  cm Final    RV/LV Ratio 11/07/2024 1.06  cm Final    LA WIDTH 11/07/2024 3.6  cm Final    RA Width 11/07/2024 2.5  cm Final    TV resting pulmonary artery pressu* 11/07/2024 38  mmHg Final    RV TB RVSP 11/07/2024 6  mmHg Final    Est. RA pres 11/07/2024 3  mmHg Final    85% Max Predicted HR 11/08/2024 143   Final    Max Predicted HR 11/08/2024 168   Final    OHS CV CPX PATIENT IS MALE 11/08/2024 0.0   Final    OHS CV CPX PATIENT IS FEMALE 11/08/2024 1.0   Final    dose 11/08/2024 8.1  mcg/kg/min Final    dose 11/08/2024 24.7  mcg/kg/min Final    HR at rest 11/08/2024 80  bpm Final    Systolic blood pressure 11/08/2024 147  mmHg Final    Diastolic blood pressure 11/08/2024 95  mmHg Final    RPP 11/08/2024 11,760   Final    Peak HR 11/08/2024 103  bpm Final    Peak Systolic BP 11/08/2024 140  mmHg Final    Peak Diatolic BP 11/08/2024 9,525  mmHg Final    Peak RPP 11/08/2024 14,420   Final    % Max HR Achieved 11/08/2024 64   Final    Nuc Rest EF 11/08/2024 78   Final    Cholesterol 11/07/2024 186  120 - 199 mg/dL Final    Comment: The National Cholesterol Education Program (NCEP) has set the  following guidelines (reference ranges) for  Cholesterol:  Optimal.....................<200 mg/dL  Borderline High.............200-239 mg/dL  High........................> or = 240 mg/dL      Triglycerides 11/07/2024 72  30 - 150 mg/dL Final    Comment: The National Cholesterol Education Program (NCEP) has set the  following guidelines (reference values) for triglycerides:  Normal......................<150 mg/dL  Borderline High.............150-199 mg/dL  High........................200-499 mg/dL      HDL 11/07/2024 64  40 - 75 mg/dL Final    Comment: The National Cholesterol Education Program (NCEP) has set the  following guidelines (reference values) for HDL Cholesterol:  Low...............<40 mg/dL  Optimal...........>60 mg/dL      LDL Cholesterol 11/07/2024 107.6  63.0 - 159.0 mg/dL Final    Comment: The National Cholesterol Education Program (NCEP) has set the  following guidelines (reference values) for LDL Cholesterol:  Optimal.......................<130 mg/dL  Borderline High...............130-159 mg/dL  High..........................160-189 mg/dL  Very High.....................>190 mg/dL      HDL/Cholesterol Ratio 11/07/2024 34.4  20.0 - 50.0 % Final    Total Cholesterol/HDL Ratio 11/07/2024 2.9  2.0 - 5.0 Final    Non-HDL Cholesterol 11/07/2024 122  mg/dL Final    Comment: Risk category and Non-HDL cholesterol goals:  Coronary heart disease (CHD)or equivalent (10-year risk of CHD >20%):  Non-HDL cholesterol goal     <130 mg/dL  Two or more CHD risk factors and 10-year risk of CHD <= 20%:  Non-HDL cholesterol goal     <160 mg/dL  0 to 1 CHD risk factor:  Non-HDL cholesterol goal     <190 mg/dL      TSH 11/07/2024 0.773  0.400 - 4.000 uIU/mL Final    Troponin I 11/07/2024 0.170 (H)  0.000 - 0.026 ng/mL Final    Comment: The reference interval for Troponin I represents the 99th percentile   cutoff   for our facility and is consistent with 3rd generation assay   performance.      Sodium 11/08/2024 141  136 - 145 mmol/L Final    Potassium 11/08/2024 2.9  (L)  3.5 - 5.1 mmol/L Final    Chloride 11/08/2024 102  95 - 110 mmol/L Final    CO2 11/08/2024 28  23 - 29 mmol/L Final    Glucose 11/08/2024 88  70 - 110 mg/dL Final    BUN 11/08/2024 15  6 - 20 mg/dL Final    Creatinine 11/08/2024 1.4  0.5 - 1.4 mg/dL Final    Calcium 11/08/2024 9.7  8.7 - 10.5 mg/dL Final    Total Protein 11/08/2024 7.4  6.0 - 8.4 g/dL Final    Albumin 11/08/2024 3.5  3.5 - 5.2 g/dL Final    Total Bilirubin 11/08/2024 0.6  0.1 - 1.0 mg/dL Final    Comment: For infants and newborns, interpretation of results should be based  on gestational age, weight and in agreement with clinical  observations.    Premature Infant recommended reference ranges:  Up to 24 hours.............<8.0 mg/dL  Up to 48 hours............<12.0 mg/dL  3-5 days..................<15.0 mg/dL  6-29 days.................<15.0 mg/dL      Alkaline Phosphatase 11/08/2024 75  40 - 150 U/L Final    AST 11/08/2024 19  10 - 40 U/L Final    ALT 11/08/2024 14  10 - 44 U/L Final    eGFR 11/08/2024 45 (A)  >60 mL/min/1.73 m^2 Final    Anion Gap 11/08/2024 11  8 - 16 mmol/L Final    Magnesium 11/08/2024 2.0  1.6 - 2.6 mg/dL Final    Phosphorus 11/08/2024 3.1  2.7 - 4.5 mg/dL Final    WBC 11/08/2024 6.71  3.90 - 12.70 K/uL Final    RBC 11/08/2024 4.74  4.00 - 5.40 M/uL Final    Hemoglobin 11/08/2024 15.4  12.0 - 16.0 g/dL Final    Hematocrit 11/08/2024 45.6  37.0 - 48.5 % Final    MCV 11/08/2024 96  82 - 98 fL Final    MCH 11/08/2024 32.5 (H)  27.0 - 31.0 pg Final    MCHC 11/08/2024 33.8  32.0 - 36.0 g/dL Final    RDW 11/08/2024 14.8 (H)  11.5 - 14.5 % Final    Platelets 11/08/2024 220  150 - 450 K/uL Final    MPV 11/08/2024 10.6  9.2 - 12.9 fL Final    Immature Granulocytes 11/08/2024 0.1  0.0 - 0.5 % Final    Gran # (ANC) 11/08/2024 3.6  1.8 - 7.7 K/uL Final    Immature Grans (Abs) 11/08/2024 0.01  0.00 - 0.04 K/uL Final    Comment: Mild elevation in immature granulocytes is non specific and   can be seen in a variety of conditions  including stress response,   acute inflammation, trauma and pregnancy. Correlation with other   laboratory and clinical findings is essential.      Lymph # 11/08/2024 2.2  1.0 - 4.8 K/uL Final    Mono # 11/08/2024 0.6  0.3 - 1.0 K/uL Final    Eos # 11/08/2024 0.4  0.0 - 0.5 K/uL Final    Baso # 11/08/2024 0.02  0.00 - 0.20 K/uL Final    nRBC 11/08/2024 0  0 /100 WBC Final    Gran % 11/08/2024 53.9  38.0 - 73.0 % Final    Lymph % 11/08/2024 32.2  18.0 - 48.0 % Final    Mono % 11/08/2024 8.3  4.0 - 15.0 % Final    Eosinophil % 11/08/2024 5.2  0.0 - 8.0 % Final    Basophil % 11/08/2024 0.3  0.0 - 1.9 % Final    Differential Method 11/08/2024 Automated   Final    Sodium 11/08/2024 141  136 - 145 mmol/L Final    Potassium 11/08/2024 3.6  3.5 - 5.1 mmol/L Final    Chloride 11/08/2024 102  95 - 110 mmol/L Final    CO2 11/08/2024 28  23 - 29 mmol/L Final    Glucose 11/08/2024 109  70 - 110 mg/dL Final    BUN 11/08/2024 18  6 - 20 mg/dL Final    Creatinine 11/08/2024 1.5 (H)  0.5 - 1.4 mg/dL Final    Calcium 11/08/2024 9.6  8.7 - 10.5 mg/dL Final    Anion Gap 11/08/2024 11  8 - 16 mmol/L Final    eGFR 11/08/2024 42 (A)  >60 mL/min/1.73 m^2 Final   Lab Visit on 10/23/2024   Component Date Value Ref Range Status    Specimen UA 10/23/2024 Urine, Clean Catch   Final    Color, UA 10/23/2024 Yellow  Yellow, Straw, Ariella Final    Appearance, UA 10/23/2024 Clear  Clear Final    pH, UA 10/23/2024 6.0  5.0 - 8.0 Final    Specific Gravity, UA 10/23/2024 1.020  1.005 - 1.030 Final    Protein, UA 10/23/2024 Trace (A)  Negative Final    Comment: Recommend a 24 hour urine protein or a urine   protein/creatinine ratio if globulin induced proteinuria is  clinically suspected.      Glucose, UA 10/23/2024 Negative  Negative Final    Ketones, UA 10/23/2024 Negative  Negative Final    Bilirubin (UA) 10/23/2024 Negative  Negative Final    Occult Blood UA 10/23/2024 Negative  Negative Final    Nitrite, UA 10/23/2024 Negative  Negative Final     Urobilinogen, UA 10/23/2024 Negative  <2.0 EU/dL Final    Leukocytes, UA 10/23/2024 Negative  Negative Final    Protein, Urine Random 10/23/2024 20  mg/dL Final    Creatinine, Urine 10/23/2024 209.9  15.0 - 325.0 mg/dL Final    Prot/Creat Ratio, Urine 10/23/2024 0.10  0.00 - 0.20 Final   Lab Visit on 10/23/2024   Component Date Value Ref Range Status    Glucose 10/23/2024 110  70 - 110 mg/dL Final    Sodium 10/23/2024 139  136 - 145 mmol/L Final    Potassium 10/23/2024 3.1 (L)  3.5 - 5.1 mmol/L Final    Chloride 10/23/2024 103  95 - 110 mmol/L Final    CO2 10/23/2024 28  23 - 29 mmol/L Final    BUN 10/23/2024 25 (H)  6 - 20 mg/dL Final    Calcium 10/23/2024 9.2  8.7 - 10.5 mg/dL Final    Creatinine 10/23/2024 1.7 (H)  0.5 - 1.4 mg/dL Final    Albumin 10/23/2024 3.2 (L)  3.5 - 5.2 g/dL Final    Phosphorus 10/23/2024 3.1  2.7 - 4.5 mg/dL Final    eGFR 10/23/2024 36 (A)  >60 mL/min/1.73 m^2 Final    Anion Gap 10/23/2024 8  8 - 16 mmol/L Final    PTH, Intact 10/23/2024 102.7 (H)  9.0 - 77.0 pg/mL Final    Uric Acid 10/23/2024 10.3 (H)  2.4 - 5.7 mg/dL Final    WBC 10/23/2024 9.38  3.90 - 12.70 K/uL Final    RBC 10/23/2024 4.64  4.00 - 5.40 M/uL Final    Hemoglobin 10/23/2024 15.2  12.0 - 16.0 g/dL Final    Hematocrit 10/23/2024 44.6  37.0 - 48.5 % Final    MCV 10/23/2024 96  82 - 98 fL Final    MCH 10/23/2024 32.8 (H)  27.0 - 31.0 pg Final    MCHC 10/23/2024 34.1  32.0 - 36.0 g/dL Final    RDW 10/23/2024 14.3  11.5 - 14.5 % Final    Platelets 10/23/2024 233  150 - 450 K/uL Final    MPV 10/23/2024 10.5  9.2 - 12.9 fL Final    Immature Granulocytes 10/23/2024 0.2  0.0 - 0.5 % Final    Gran # (ANC) 10/23/2024 5.7  1.8 - 7.7 K/uL Final    Immature Grans (Abs) 10/23/2024 0.02  0.00 - 0.04 K/uL Final    Comment: Mild elevation in immature granulocytes is non specific and   can be seen in a variety of conditions including stress response,   acute inflammation, trauma and pregnancy. Correlation with other   laboratory and  clinical findings is essential.      Lymph # 10/23/2024 2.7  1.0 - 4.8 K/uL Final    Mono # 10/23/2024 0.8  0.3 - 1.0 K/uL Final    Eos # 10/23/2024 0.2  0.0 - 0.5 K/uL Final    Baso # 10/23/2024 0.03  0.00 - 0.20 K/uL Final    nRBC 10/23/2024 0  0 /100 WBC Final    Gran % 10/23/2024 60.8  38.0 - 73.0 % Final    Lymph % 10/23/2024 28.4  18.0 - 48.0 % Final    Mono % 10/23/2024 8.3  4.0 - 15.0 % Final    Eosinophil % 10/23/2024 2.0  0.0 - 8.0 % Final    Basophil % 10/23/2024 0.3  0.0 - 1.9 % Final    Differential Method 10/23/2024 Automated   Final    Iron 10/23/2024 88  30 - 160 ug/dL Final    Transferrin 10/23/2024 175 (L)  200 - 375 mg/dL Final    TIBC 10/23/2024 259  250 - 450 ug/dL Final    Saturated Iron 10/23/2024 34  20 - 50 % Final    Ferritin 10/23/2024 172  20.0 - 300.0 ng/mL Final    Vit D, 25-Hydroxy 10/23/2024 18 (L)  30 - 96 ng/mL Final    Comment: Vitamin D deficiency.........<10 ng/mL                              Vitamin D insufficiency......10-29 ng/mL       Vitamin D sufficiency........> or equal to 30 ng/mL  Vitamin D toxicity............>100 ng/mL     Admission on 10/13/2024, Discharged on 10/15/2024   Component Date Value Ref Range Status    QRS Duration 10/13/2024 78  ms Final    [facility] QTC Calculation 10/13/2024 477  ms Final    WBC 10/13/2024 8.99  3.90 - 12.70 K/uL Final    RBC 10/13/2024 5.42 (H)  4.00 - 5.40 M/uL Final    Hemoglobin 10/13/2024 17.4 (H)  12.0 - 16.0 g/dL Final    Hematocrit 10/13/2024 50.5 (H)  37.0 - 48.5 % Final    MCV 10/13/2024 93  82 - 98 fL Final    MCH 10/13/2024 32.1 (H)  27.0 - 31.0 pg Final    MCHC 10/13/2024 34.5  32.0 - 36.0 g/dL Final    RDW 10/13/2024 15.9 (H)  11.5 - 14.5 % Final    Platelets 10/13/2024 207  150 - 450 K/uL Final    MPV 10/13/2024 12.2  9.2 - 12.9 fL Final    Immature Granulocytes 10/13/2024 0.2  0.0 - 0.5 % Final    Gran # (ANC) 10/13/2024 6.2  1.8 - 7.7 K/uL Final    Immature Grans (Abs) 10/13/2024 0.02  0.00 - 0.04 K/uL Final    Comment: Mild  elevation in immature granulocytes is non specific and   can be seen in a variety of conditions including stress response,   acute inflammation, trauma and pregnancy. Correlation with other   laboratory and clinical findings is essential.      Lymph # 10/13/2024 2.0  1.0 - 4.8 K/uL Final    Mono # 10/13/2024 0.4  0.3 - 1.0 K/uL Final    Eos # 10/13/2024 0.4  0.0 - 0.5 K/uL Final    Baso # 10/13/2024 0.06  0.00 - 0.20 K/uL Final    nRBC 10/13/2024 0  0 /100 WBC Final    Gran % 10/13/2024 68.5  38.0 - 73.0 % Final    Lymph % 10/13/2024 21.7  18.0 - 48.0 % Final    Mono % 10/13/2024 4.7  4.0 - 15.0 % Final    Eosinophil % 10/13/2024 4.2  0.0 - 8.0 % Final    Basophil % 10/13/2024 0.7  0.0 - 1.9 % Final    Differential Method 10/13/2024 Automated   Final    QRS Duration 10/13/2024 82  ms Final    [facility] QTC Calculation 10/13/2024 479  ms Final    SARS-CoV-2 RNA, Amplification, Qual 10/13/2024 Negative  Negative Final    Comment: This test utilizes isothermal nucleic acid amplification technology   to   detect the SARS-CoV-2 RdRp nucleic acid segment. The analytical   sensitivity   (limit of detection) is 500 copies/swab.    A POSITIVE result is indicative of the presence of SARS-CoV-2 RNA;   clinical   correlation with patient history and other diagnostic information is   necessary to determine patient infection status.    A NEGATIVE result means that SARS-CoV-2 nucleic acids are not present   above   the limit of detection. A NEGATIVE result should be treated as   presumptive.   It does not rule out the possibility of COVID-19 and should not be   the sole   basis for treatment decisions.    If COVID-19 is strongly suspected based on clinical and exposure   history,   re-testing using an alternate molecular assay should be considered.    This test is Food and Drug Administration (FDA) approved. Performance   characteristics of this has been independently verified by [facility] Department of Pat                            [facility].      Troponin I 10/13/2024 0.068 (H)  0.000 - 0.026 ng/mL Final    Comment: The reference interval for Troponin I represents the 99th percentile   cutoff   for our facility and is consistent with 3rd generation assay   performance.      BNP 10/13/2024 51  0 - 99 pg/mL Final    Values of less than 100 pg/ml are consistent with non-CHF populations.    Troponin I 10/13/2024 0.068 (H)  0.000 - 0.026 ng/mL Final    Comment: The reference interval for Troponin I represents the 99th percentile   cutoff   for our facility and is consistent with 3rd generation assay   performance.      Sodium 10/13/2024 140  136 - 145 mmol/L Final    Potassium 10/13/2024 3.2 (L)  3.5 - 5.1 mmol/L Final    Chloride 10/13/2024 104  95 - 110 mmol/L Final    CO2 10/13/2024 25  23 - 29 mmol/L Final    Glucose 10/13/2024 108  70 - 110 mg/dL Final    BUN 10/13/2024 15  6 - 20 mg/dL Final    Creatinine 10/13/2024 1.2  0.5 - 1.4 mg/dL Final    Calcium 10/13/2024 9.2  8.7 - 10.5 mg/dL Final    Total Protein 10/13/2024 7.5  6.0 - 8.4 g/dL Final    Albumin 10/13/2024 3.4 (L)  3.5 - 5.2 g/dL Final    Total Bilirubin 10/13/2024 0.5  0.1 - 1.0 mg/dL Final    Comment: For infants and newborns, interpretation of results should be based  on gestational age, weight and in agreement with clinical  observations.    Premature Infant recommended reference ranges:  Up to 24 hours.............<8.0 mg/dL  Up to 48 hours............<12.0 mg/dL  3-5 days..................<15.0 mg/dL  6-29 days.................<15.0 mg/dL      Alkaline Phosphatase 10/13/2024 80  55 - 135 U/L Final    AST 10/13/2024 23  10 - 40 U/L Final    *Result may be interfered by visible hemolysis    ALT 10/13/2024 14  10 - 44 U/L Final    eGFR 10/13/2024 54.5 (A)  >60 mL/min/1.73 m^2 Final    Anion Gap 10/13/2024 11  8 - 16 mmol/L Final    Magnesium 10/13/2024 1.9  1.6 - 2.6 mg/dL Final    Specimen UA 10/13/2024 Urine, Clean Catch   Final    Color, UA  10/13/2024 Yellow  Yellow, Straw, Ariella Final    Appearance, UA 10/13/2024 Clear  Clear Final    pH, UA 10/13/2024 7.0  5.0 - 8.0 Final    Specific Gravity, UA 10/13/2024 1.010  1.005 - 1.030 Final    Protein, UA 10/13/2024 2+ (A)  Negative Final    Comment: Recommend a 24 hour urine protein or a urine   protein/creatinine ratio if globulin induced proteinuria is  clinically suspected.      Glucose, UA 10/13/2024 Trace (A)  Negative Final    Ketones, UA 10/13/2024 Negative  Negative Final    Bilirubin (UA) 10/13/2024 Negative  Negative Final    Occult Blood UA 10/13/2024 1+ (A)  Negative Final    Nitrite, UA 10/13/2024 Negative  Negative Final    Leukocytes, UA 10/13/2024 Negative  Negative Final    RBC, UA 10/13/2024 16 (H)  0 - 4 /hpf Final    WBC, UA 10/13/2024 1  0 - 5 /hpf Final    Bacteria 10/13/2024 Rare  None-Occ /hpf Final    Squam Epithel, UA 10/13/2024 2  /hpf Final    Hyaline Casts, UA 10/13/2024 0  0-1/lpf /lpf Final    Microscopic Comment 10/13/2024 SEE COMMENT   Final    Comment: Other formed elements not mentioned in the report are not   present in the microscopic examination.       POCT Glucose 10/13/2024 139 (H)  70 - 110 mg/dL Final    Sodium 10/14/2024 137  136 - 145 mmol/L Final    Potassium 10/14/2024 3.3 (L)  3.5 - 5.1 mmol/L Final    Chloride 10/14/2024 102  95 - 110 mmol/L Final    CO2 10/14/2024 20 (L)  23 - 29 mmol/L Final    Glucose 10/14/2024 93  70 - 110 mg/dL Final    BUN 10/14/2024 24 (H)  6 - 20 mg/dL Final    Creatinine 10/14/2024 1.5 (H)  0.5 - 1.4 mg/dL Final    Calcium 10/14/2024 10.1  8.7 - 10.5 mg/dL Final    Total Protein 10/14/2024 7.8  6.0 - 8.4 g/dL Final    Albumin 10/14/2024 3.5  3.5 - 5.2 g/dL Final    Total Bilirubin 10/14/2024 0.4  0.1 - 1.0 mg/dL Final    Comment: For infants and newborns, interpretation of results should be based  on gestational age, weight and in agreement with clinical  observations.    Premature Infant recommended reference ranges:  Up to 24  hours.............<8.0 mg/dL  Up to 48 hours............<12.0 mg/dL  3-5 days..................<15.0 mg/dL  6-29 days.................<15.0 mg/dL      Alkaline Phosphatase 10/14/2024 88  55 - 135 U/L Final    AST 10/14/2024 20  10 - 40 U/L Final    *Result may be interfered by visible hemolysis    ALT 10/14/2024 17  10 - 44 U/L Final    eGFR 10/14/2024 41.7 (A)  >60 mL/min/1.73 m^2 Final    Anion Gap 10/14/2024 15  8 - 16 mmol/L Final    Magnesium 10/14/2024 2.0  1.6 - 2.6 mg/dL Final    Phosphorus 10/14/2024 3.2  2.7 - 4.5 mg/dL Final    WBC 10/14/2024 10.42  3.90 - 12.70 K/uL Final    RBC 10/14/2024 5.28  4.00 - 5.40 M/uL Final    Hemoglobin 10/14/2024 17.0 (H)  12.0 - 16.0 g/dL Final    Hematocrit 10/14/2024 49.3 (H)  37.0 - 48.5 % Final    MCV 10/14/2024 93  82 - 98 fL Final    MCH 10/14/2024 32.2 (H)  27.0 - 31.0 pg Final    MCHC 10/14/2024 34.5  32.0 - 36.0 g/dL Final    RDW 10/14/2024 14.4  11.5 - 14.5 % Final    Platelets 10/14/2024 224  150 - 450 K/uL Final    MPV 10/14/2024 11.6  9.2 - 12.9 fL Final    Immature Granulocytes 10/14/2024 0.3  0.0 - 0.5 % Final    Gran # (ANC) 10/14/2024 7.5  1.8 - 7.7 K/uL Final    Immature Grans (Abs) 10/14/2024 0.03  0.00 - 0.04 K/uL Final    Comment: Mild elevation in immature granulocytes is non specific and   can be seen in a variety of conditions including stress response,   acute inflammation, trauma and pregnancy. Correlation with other   laboratory and clinical findings is essential.      Lymph # 10/14/2024 2.4  1.0 - 4.8 K/uL Final    Mono # 10/14/2024 0.5  0.3 - 1.0 K/uL Final    Eos # 10/14/2024 0.0  0.0 - 0.5 K/uL Final    Baso # 10/14/2024 0.02  0.00 - 0.20 K/uL Final    nRBC 10/14/2024 0  0 /100 WBC Final    Gran % 10/14/2024 71.7  38.0 - 73.0 % Final    Lymph % 10/14/2024 23.0  18.0 - 48.0 % Final    Mono % 10/14/2024 4.5  4.0 - 15.0 % Final    Eosinophil % 10/14/2024 0.3  0.0 - 8.0 % Final    Basophil % 10/14/2024 0.2  0.0 - 1.9 % Final    Differential Method  10/14/2024 Automated   Final    POCT Glucose 10/14/2024 106  70 - 110 mg/dL Final    Creatinine, Urine 10/14/2024 183.0  15.0 - 325.0 mg/dL Final    Urine Urea Nitrogen 10/14/2024 958  140 - 1050 mg/dL Final    Comment: The random urine reference ranges provided were established   for 24 hour urine collections.  No reference ranges exist for  random urine specimens.  Correlate clinically.      Creatinine, Urine 10/14/2024 183.0  15.0 - 325.0 mg/dL Final    POCT Glucose 10/14/2024 100  70 - 110 mg/dL Final    POCT Glucose 10/14/2024 132 (H)  70 - 110 mg/dL Final    POCT Glucose 10/14/2024 131 (H)  70 - 110 mg/dL Final    Sodium 10/15/2024 136  136 - 145 mmol/L Final    Potassium 10/15/2024 3.6  3.5 - 5.1 mmol/L Final    Chloride 10/15/2024 104  95 - 110 mmol/L Final    CO2 10/15/2024 21 (L)  23 - 29 mmol/L Final    Glucose 10/15/2024 102  70 - 110 mg/dL Final    BUN 10/15/2024 31 (H)  6 - 20 mg/dL Final    Creatinine 10/15/2024 1.8 (H)  0.5 - 1.4 mg/dL Final    Calcium 10/15/2024 10.0  8.7 - 10.5 mg/dL Final    Total Protein 10/15/2024 7.3  6.0 - 8.4 g/dL Final    Albumin 10/15/2024 3.4 (L)  3.5 - 5.2 g/dL Final    Total Bilirubin 10/15/2024 0.3  0.1 - 1.0 mg/dL Final    Comment: For infants and newborns, interpretation of results should be based  on gestational age, weight and in agreement with clinical  observations.    Premature Infant recommended reference ranges:  Up to 24 hours.............<8.0 mg/dL  Up to 48 hours............<12.0 mg/dL  3-5 days..................<15.0 mg/dL  6-29 days.................<15.0 mg/dL      Alkaline Phosphatase 10/15/2024 77  55 - 135 U/L Final    AST 10/15/2024 16  10 - 40 U/L Final    ALT 10/15/2024 15  10 - 44 U/L Final    eGFR 10/15/2024 33.5 (A)  >60 mL/min/1.73 m^2 Final    Anion Gap 10/15/2024 11  8 - 16 mmol/L Final    Magnesium 10/15/2024 2.3  1.6 - 2.6 mg/dL Final    Phosphorus 10/15/2024 3.6  2.7 - 4.5 mg/dL Final    WBC 10/15/2024 10.86  3.90 - 12.70 K/uL Final    RBC  10/15/2024 5.09  4.00 - 5.40 M/uL Final    Hemoglobin 10/15/2024 16.3 (H)  12.0 - 16.0 g/dL Final    Hematocrit 10/15/2024 47.8  37.0 - 48.5 % Final    MCV 10/15/2024 94  82 - 98 fL Final    MCH 10/15/2024 32.0 (H)  27.0 - 31.0 pg Final    MCHC 10/15/2024 34.1  32.0 - 36.0 g/dL Final    RDW 10/15/2024 14.3  11.5 - 14.5 % Final    Platelets 10/15/2024 240  150 - 450 K/uL Final    MPV 10/15/2024 11.2  9.2 - 12.9 fL Final    Immature Granulocytes 10/15/2024 0.3  0.0 - 0.5 % Final    Gran # (ANC) 10/15/2024 8.6 (H)  1.8 - 7.7 K/uL Final    Immature Grans (Abs) 10/15/2024 0.03  0.00 - 0.04 K/uL Final    Comment: Mild elevation in immature granulocytes is non specific and   can be seen in a variety of conditions including stress response,   acute inflammation, trauma and pregnancy. Correlation with other   laboratory and clinical findings is essential.      Lymph # 10/15/2024 1.8  1.0 - 4.8 K/uL Final    Mono # 10/15/2024 0.4  0.3 - 1.0 K/uL Final    Eos # 10/15/2024 0.0  0.0 - 0.5 K/uL Final    Baso # 10/15/2024 0.01  0.00 - 0.20 K/uL Final    nRBC 10/15/2024 0  0 /100 WBC Final    Gran % 10/15/2024 79.3 (H)  38.0 - 73.0 % Final    Lymph % 10/15/2024 16.7 (L)  18.0 - 48.0 % Final    Mono % 10/15/2024 3.6 (L)  4.0 - 15.0 % Final    Eosinophil % 10/15/2024 0.0  0.0 - 8.0 % Final    Basophil % 10/15/2024 0.1  0.0 - 1.9 % Final    Differential Method 10/15/2024 Automated   Final    Hemoglobin A1C 10/15/2024 5.2  4.0 - 5.6 % Final    Comment: ADA Screening Guidelines:  5.7-6.4%  Consistent with prediabetes  >or=6.5%  Consistent with diabetes    High levels of fetal hemoglobin interfere with the HbA1C  assay. Heterozygous hemoglobin variants (HbS, HgC, etc)do  not significantly interfere with this assay.   However, presence of multiple variants may affect accuracy.      Estimated Avg Glucose 10/15/2024 103  68 - 131 mg/dL Final    Sodium, Urine 10/15/2024 89  20 - 250 mmol/L Final    Comment: The random urine reference ranges  provided were established   for 24 hour urine collections.  No reference ranges exist for  random urine specimens.  Correlate clinically.      Creatinine, Urine 10/15/2024 140.0  15.0 - 325.0 mg/dL Final    Sodium 10/15/2024 137  136 - 145 mmol/L Final    Potassium 10/15/2024 4.2  3.5 - 5.1 mmol/L Final    Chloride 10/15/2024 102  95 - 110 mmol/L Final    CO2 10/15/2024 25  23 - 29 mmol/L Final    Glucose 10/15/2024 108  70 - 110 mg/dL Final    BUN 10/15/2024 33 (H)  6 - 20 mg/dL Final    Creatinine 10/15/2024 1.6 (H)  0.5 - 1.4 mg/dL Final    Calcium 10/15/2024 10.0  8.7 - 10.5 mg/dL Final    Anion Gap 10/15/2024 10  8 - 16 mmol/L Final    eGFR 10/15/2024 38.6 (A)  >60 mL/min/1.73 m^2 Final    POCT Glucose 10/15/2024 147 (H)  70 - 110 mg/dL Final    POCT Glucose 10/15/2024 130 (H)  70 - 110 mg/dL Final       EKG  Reviewed    Echo   Results for orders placed or performed during the hospital encounter of 11/07/24   Echo   Result Value Ref Range    BSA 1.95 m2    LVOT stroke volume 159.3 cm3    LVIDd 3.3 (A) 3.5 - 6.0 cm    LV Systolic Volume 13.35 mL    LV Systolic Volume Index 7.0 mL/m2    LVIDs 2.0 (A) 2.1 - 4.0 cm    LV Diastolic Volume 43.87 mL    LV Diastolic Volume Index 23.09 mL/m2    Left Ventricular End Systolic Volume by Teichholz Method 13.35 mL    Left Ventricular End Diastolic Volume by Teichholz Method 43.87 mL    IVS 1.5 (A) 0.6 - 1.1 cm    LVOT diameter 1.9 cm    LVOT area 2.8 cm2    FS 39.4 28 - 44 %    Left Ventricle Relative Wall Thickness 0.97 cm    PW 1.6 (A) 0.6 - 1.1 cm    LV mass 188.8 g    LV Mass Index 99.4 g/m2    MV Peak E Allen 0.89 m/s    TDI LATERAL 0.05 m/s    TDI SEPTAL 0.03 m/s    E/E' ratio 22.25 m/s    MV Peak A Allen 1.26 m/s    TR Max Allen 2.96 m/s    E/A ratio 0.71     IVRT 123.69 msec    E wave deceleration time 357.52 msec    LV SEPTAL E/E' RATIO 29.67 m/s    LV LATERAL E/E' RATIO 17.80 m/s    LVOT peak allen 2.4 m/s    Left Ventricular Outflow Tract Mean Velocity 1.82 cm/s    Left  Ventricular Outflow Tract Mean Gradient 19.0 mmHg    RV S' 18.00 cm/s    TAPSE 2.85 cm    LA size 3.86 cm    Left Atrium Minor Axis 5.22 cm    Left Atrium Major Axis 4.65 cm    RA Major Axis 4.01 cm    AV mean gradient 19.3 mmHg    AV peak gradient 33.6 mmHg    Ao peak jolene 2.9 m/s    Ao VTI 46.9 cm    LVOT peak VTI 56.2 cm    AV valve area 3.4 cm²    AV Velocity Ratio 0.83     AV index (prosthetic) 1.20     ALEXEI by Velocity Ratio 2.3 cm²    MV mean gradient 2 mmHg    MV peak gradient 6 mmHg    MV stenosis pressure 1/2 time 103.68 ms    MV valve area p 1/2 method 2.12 cm2    MV valve area by continuity eq 4.50 cm2    MV VTI 35.4 cm    TV peak gradient 1 mmHg    Triscuspid Valve Regurgitation Peak Gradient 35 mmHg    PV PEAK VELOCITY 0.96 m/s    PV peak gradient 4 mmHg    Sinus 3.14 cm    STJ 3.02 cm    Ascending aorta 2.76 cm    IVC diameter 1.37 cm    Mean e' 0.04 m/s    ZLVIDS -3.92     ZLVIDD -4.80     RVDD 3.52 cm    ANGELES 30.6 mL/m2    LA Vol 58.10 cm3    RV- roy basal diam 3.5 cm    RV/LV Ratio 1.06 cm    LA WIDTH 3.6 cm    RA Width 2.5 cm    TV resting pulmonary artery pressure 38 mmHg    RV TB RVSP 6 mmHg    Est. RA pres 3 mmHg    Narrative      Left Ventricle: The left ventricle is normal in size. Moderately   increased wall thickness. There is moderate concentric hypertrophy. There   is hyperdynamic systolic function with a visually estimated ejection   fraction of 75 - 80%. Grade I diastolic dysfunction. Mild LVOT obstruction   at rest. LVOT pressure gradient increases with Valsalva.  Peak jolene 4.75   m/sec. The findings are consistent with hypertrophic cardiomyopathy with   obstruction.    Right Ventricle: Normal right ventricular cavity size. Systolic   function is normal.    Pulmonary Artery: The estimated pulmonary artery systolic pressure is   38 mmHg.         Imaging  No results found.    Prior coronary angiogram / intervention:  No priors    Assessment and Plan  Ms. Rios is a 53-year-old female with  a past medical history of anxiety, depression, migraine headaches, obesity, GERD, tobacco dependency, hypertension      Essential hypertension  Hypertensive emergency  Primary hypertension  Elevated today  Currently taking valsartan 160 nifedipine 90 daily  Added carvedilol 3.125 b.i.d.  Return in 2 weeks for a nurse visit for blood pressure and heart rate check  - NIFEdipine (PROCARDIA-XL) 90 MG (OSM) 24 hr tablet; Take 1 tablet (90 mg total) by mouth once daily.  Dispense: 90 tablet; Refill: 3  - valsartan (DIOVAN) 160 MG tablet; Take 1 tablet (160 mg total) by mouth once daily.  Dispense: 90 tablet; Refill: 3  - carvediloL (COREG) 3.125 MG tablet; Take 1 tablet (3.125 mg total) by mouth 2 (two) times daily with meals.  Dispense: 180 tablet; Refill: 3    HOCM (hypertrophic obstructive cardiomyopathy) (Primary)  Findings on most recent echo; symptomatic with ischemic workup being negative  Added beta-blocker  Holter monitor ordered to rule out NSVT  Ordered cardiac MRI  Referral to advanced heart failure placed  - carvediloL (COREG) 3.125 MG tablet; Take 1 tablet (3.125 mg total) by mouth 2 (two) times daily with meals.  Dispense: 180 tablet; Refill: 3  - Ambulatory referral/consult to Adult Advanced Heart Failure; Future  - Holter monitor - 48 hour; Future  - Cardiac MRI Morphology; Future  - MRI Cardiac Morphology Function W WO Contrast; Future    Cigarette nicotine dependence without complication  Patient current daily smoker  Counseled on smoking cessation fro greater than 10min   Decline referral to smoking cessation at this time    Class 1 obesity due to excess calories with body mass index (BMI) of 31.0 to 31.9 in adult, unspecified whether serious comorbidity present  Encouraged a heart healthy diet that is low in saturated fats and sodium   Also encouraged an increase in activities as tolerated for healthy weight management   Discussed Mediterranean Diet recommendations (Adopted from Tyree et al, NEJM,  2018.)  - Eat primarily plant-based foods, such as fruits/vegetables, whole grains, legumes & nuts  - Limit refined carbohydrates (white pasta, bread, rice).  - Replace butter with healthy fats such as olive oil.  - Use herbs and spices instead of salt to flavor foods.  - Limit red meat and processed meats to no more than a few times a month.  - Avoid sugary sodas, bakery goods, and sweets.  - Eat fish and poultry at least twice a week.              - Get plenty of exercise (150 minutes per week).     Follow Up  Follow up in about 3 months (around 3/18/2025), or if symptoms worsen or fail to improve.       Gretchne Murphy, CHERI  [facility] - Cardiology    Total professional time spent for the encounter:  40 minutes  Time was spent preparing to see the patient, reviewing results of prior testing, obtaining and/or reviewing separately obtained history, performing a medically appropriate examination and interview, counseling and educating the patient/family, ordering medications/tests/procedures, referring and communicating with other health care professionals, documenting clinical information in the electronic health record, and independently interpreting results.

## 2024-12-18 NOTE — TELEPHONE ENCOUNTER
----- Message from Kaleigh sent at 12/18/2024 10:44 AM CST -----  Contact: Self/ [phone]  No blue slot available to schedule an appointment for the patient.  Patient is established with which PCP: Dr Doll   Reason for the visit: follow up  Would the patient like a call back, or a response through their [facility] portal?:  call Back

## 2024-12-20 ENCOUNTER — TELEPHONE (OUTPATIENT)
Dept: TRANSPLANT | Facility: CLINIC | Age: 54
End: 2024-12-20
Payer: MEDICAID

## 2024-12-20 DIAGNOSIS — I50.9 CONGESTIVE HEART FAILURE, UNSPECIFIED HF CHRONICITY, UNSPECIFIED HEART FAILURE TYPE: Primary | ICD-10-CM

## 2024-12-24 ENCOUNTER — OFFICE VISIT (OUTPATIENT)
Dept: INTERNAL MEDICINE | Facility: CLINIC | Age: 54
End: 2024-12-24
Payer: MEDICAID

## 2024-12-24 VITALS
OXYGEN SATURATION: 99 % | HEIGHT: 64 IN | BODY MASS INDEX: 31.73 KG/M2 | WEIGHT: 185.88 LBS | HEART RATE: 86 BPM | DIASTOLIC BLOOD PRESSURE: 88 MMHG | SYSTOLIC BLOOD PRESSURE: 136 MMHG

## 2024-12-24 DIAGNOSIS — I42.1 HOCM (HYPERTROPHIC OBSTRUCTIVE CARDIOMYOPATHY): ICD-10-CM

## 2024-12-24 DIAGNOSIS — I10 PRIMARY HYPERTENSION: ICD-10-CM

## 2024-12-24 DIAGNOSIS — F41.9 ANXIETY: Primary | ICD-10-CM

## 2024-12-24 PROCEDURE — 1159F MED LIST DOCD IN RCRD: CPT | Mod: CPTII,,, | Performed by: INTERNAL MEDICINE

## 2024-12-24 PROCEDURE — 3075F SYST BP GE 130 - 139MM HG: CPT | Mod: CPTII,,, | Performed by: INTERNAL MEDICINE

## 2024-12-24 PROCEDURE — 4010F ACE/ARB THERAPY RXD/TAKEN: CPT | Mod: CPTII,,, | Performed by: INTERNAL MEDICINE

## 2024-12-24 PROCEDURE — 3008F BODY MASS INDEX DOCD: CPT | Mod: CPTII,,, | Performed by: INTERNAL MEDICINE

## 2024-12-24 PROCEDURE — 99999 PR PBB SHADOW E&M-EST. PATIENT-LVL III: CPT | Mod: PBBFAC,,, | Performed by: INTERNAL MEDICINE

## 2024-12-24 PROCEDURE — 99213 OFFICE O/P EST LOW 20 MIN: CPT | Mod: PBBFAC | Performed by: INTERNAL MEDICINE

## 2024-12-24 PROCEDURE — 99214 OFFICE O/P EST MOD 30 MIN: CPT | Mod: S$PBB,,, | Performed by: INTERNAL MEDICINE

## 2024-12-24 PROCEDURE — 3066F NEPHROPATHY DOC TX: CPT | Mod: CPTII,,, | Performed by: INTERNAL MEDICINE

## 2024-12-24 PROCEDURE — 3044F HG A1C LEVEL LT 7.0%: CPT | Mod: CPTII,,, | Performed by: INTERNAL MEDICINE

## 2024-12-24 PROCEDURE — 3079F DIAST BP 80-89 MM HG: CPT | Mod: CPTII,,, | Performed by: INTERNAL MEDICINE

## 2024-12-24 RX ORDER — BUPROPION HYDROCHLORIDE 150 MG/1
150 TABLET ORAL DAILY
Qty: 90 TABLET | Refills: 3 | Status: SHIPPED | OUTPATIENT
Start: 2024-12-24

## 2024-12-24 RX ORDER — ALPRAZOLAM 0.5 MG/1
0.5 TABLET ORAL NIGHTLY PRN
Qty: 30 TABLET | Refills: 0 | Status: SHIPPED | OUTPATIENT
Start: 2024-12-24

## 2024-12-24 RX ORDER — ALBUTEROL SULFATE 90 UG/1
2 INHALANT RESPIRATORY (INHALATION) EVERY 6 HOURS PRN
Qty: 18 G | Refills: 12 | Status: SHIPPED | OUTPATIENT
Start: 2024-12-24

## 2024-12-24 RX ORDER — ONDANSETRON 4 MG/1
4 TABLET, FILM COATED ORAL EVERY 12 HOURS PRN
Qty: 25 TABLET | Refills: 2 | Status: SHIPPED | OUTPATIENT
Start: 2024-12-24

## 2024-12-24 NOTE — PROGRESS NOTES
This note was generated with Videolic[employer] voice recognition software. I apologize for any possible typographical errors.  MMkerwin seems to have trouble with pronouns so I apologize if I Mis pronoun anyone      She is a 53 -year-old lady coming in today to follow-up her obesity and  her hypertension.   I  last saw her 10/28/2024 .     BMI today is l  31.90 .  Was on Trulicity but her pharmacist is saying medicaid is not covering any more-- Last dose was in May or June.            SHe was in the ED 10/13/2024- due to URI and Hypertensive emergency.  She had not been taking home BP meds x 1 month . 292/159.  Received cardene gtt, duonebs, amlodipine, coreg, aldactone, potassium bicarb, and prednisone. Admitted to . Started on supplemental O2. +Smoker, cough with mucus and rhonchi on exam- started on duonebs, steroids for asthma versus likely COPD exacerbation. Does have history of asthma but given smoking history likely undiagnosed COPD component as well, refer to pulm outpt for PFT -- SHe has stopped smoking since she was admitted. .  She saw Cards 12/18/2024.   Had echo 11/7/2024 showed : moderate concentric hypertrophy. There is hyperdynamic systolic function with a visually estimated ejection fraction of 75 - 80%. Grade I diastolic dysfunction. Mild LVOT obstruction at rest. LVOT pressure gradient increases with Valsalva. Peak jolene 4.75 m/sec. 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  Cards started her on carvedilol and have her set up for of the heart for HOCM work up.            She is currently taking valsartan hct., NIfedipine  90 mg,  , carvedilol  3.125 mg , HCTZ 25 mg a day, Valsartan 160 mg  a day.       Blood pressure this visit was  136/88   Blood pressure is been pretty well controlled when she is taking all her medications I reviewed this with her at her last visit.          She also had a LATOYA on CKD-- -  Discharged with sCr of 1.6 - hypertensive emergency. sCr  "increased from 1.5 on admission to 1.8 and decreased to 1.6 at discharge in October-- has dropped down to 1.4-   nephrology follow up  1/28/2024.            SHe has been diagnosed with renal stones.   s/p bilateral bilateral laser lithotripsy for large renal stones.  In November 2022.  She drinking plenty of water and not having any urinary  problem.       Fibroids.  She is seeing Dr eileen Nair for  this .  She is not have any urinary complaints this time.         ROS : Gen - no fatigue  Eyes - no eye pain or visual changes Wt is stable   ENT - no hoarseness or sore throat  CV - No chest pain or SOB.  NO palpitations.  Pulm - no cough or wheezing  GI - no N/V/D.  She is having lower abdominal pains but said that is her fibroids.     no dysuria or incontinence  MS - no joint pain or muscle pain  Skin - no rash, or c/o of skin lesions  Neuro - no HA, dizziness--- memory is doing well.   Heme - no abnormal bleeding or bruising  Endo - no polydipsia, or temperature changes  Psych - no anxiety or depression        PHYSICAL EXAMINATION:  GENERAL:  She is an obese  53 year-old, in no acute distress.            /88 (BP Location: Left arm, Patient Position: Sitting)   Pulse 86   Ht 5' 4" (1.626 m)   Wt 84.3 kg (185 lb 13.6 oz)   LMP 04/15/2019 (Approximate)   SpO2 99%   BMI 31.90 kg/m²        HEENT:  Pupils are equal, round, reactive to light and accommodation.  CHEST:  Clear without wheeze.  CARDIOVASCULAR:  S1 and S2, regular rate and rhythm without gallop or   Rub.  NIKOLAY murmur - best heard and 2nd intercostal space  L>R-  ( has MIld aortic valve Sclerosis on echo 5/20204)   Lungs: CTA bilaterally    ABDOMEN:  Soft, obese, nontender, no hepatosplenomegaly, no guarding or rebound   tenderness.  LOWER EXTREMITIES:  No edema.     ASSESSMENT:  Hypertension- severe- possible HOCM- seeing cards-- off hydralazine and doing well  LATOYA/CKD-- need bp control and has nephrology follow up.    Obesity-- discussed compound " GLP-1-- but need to stay away form any meds that would elevated bp or heart rate like phenteramine,    H/o Tobacco abuse-- stopped smoking  cigarettes   Anxiety- could not tolerate cymbalta.  Will give some xanax for prn and try some Wellbutrin.  Mom passed in August          follow up in 6 months with labs

## 2025-01-03 ENCOUNTER — CLINICAL SUPPORT (OUTPATIENT)
Dept: CARDIOLOGY | Facility: CLINIC | Age: 55
End: 2025-01-03
Payer: MEDICAID

## 2025-01-03 VITALS — OXYGEN SATURATION: 99 % | DIASTOLIC BLOOD PRESSURE: 94 MMHG | SYSTOLIC BLOOD PRESSURE: 142 MMHG | HEART RATE: 90 BPM

## 2025-01-03 DIAGNOSIS — Z01.30 BLOOD PRESSURE CHECK: Primary | ICD-10-CM

## 2025-01-03 PROCEDURE — 99999 PR PBB SHADOW E&M-EST. PATIENT-LVL I: CPT | Mod: PBBFAC,,,

## 2025-01-03 PROCEDURE — 99211 OFF/OP EST MAY X REQ PHY/QHP: CPT | Mod: PBBFAC,PN

## 2025-01-03 NOTE — PROGRESS NOTES
2 week follow up BP check for MICHELLE Murphy.  Patient is currently taking carvedilol 3.125 mg twice a day, nifedipine 90 mg daily, and valsartan 160 mg daily. Patient denies N/V, headaches, dizziness, blurred vision.      Patient reports feeling well.  She went to PCP earlier this week with blood pressure within normal limits.  She reports monitoring her blood pressure both at home and at work daily with average readings 130's/80's.    CHERI Murphy advised of visit via chart.  Advised patient, office will contact her with provider recommendations.  Patient verbalized understanding.

## 2025-01-06 ENCOUNTER — TELEPHONE (OUTPATIENT)
Dept: CARDIOLOGY | Facility: CLINIC | Age: 55
End: 2025-01-06
Payer: MEDICAID

## 2025-01-06 ENCOUNTER — PATIENT OUTREACH (OUTPATIENT)
Dept: INTERNAL MEDICINE | Facility: CLINIC | Age: 55
End: 2025-01-06
Payer: MEDICAID

## 2025-01-06 VITALS — DIASTOLIC BLOOD PRESSURE: 88 MMHG | SYSTOLIC BLOOD PRESSURE: 136 MMHG

## 2025-01-06 NOTE — PROGRESS NOTES
BP still elevated at nurse visit; we will increase carvedilol to 6.25 b.i.d.  Patient will pressure log at home; encouraged patient to notify office if blood pressure remains greater than sign 140/90s

## 2025-01-06 NOTE — PROGRESS NOTES
Chart reviewed and updated. Reconciled immunizations.  Pt had recent BP in office  /88 BP Location: Left arm 12.24.2024.      Donna Orozco [facility]  Panel Care Coordinator  [facility]  [email or web address]

## 2025-01-06 NOTE — TELEPHONE ENCOUNTER
----- Message from CHERI Jordan sent at 1/5/2025 11:45 PM CST -----  Hey. Can you please let patient know that I have increased her coreg to 6.25 BID and to stop the previous dose of coreg (3.125). Goal BP should be less than 130s/90s. I sent a new prescription to the pharmacy she has on file. Thanks.  ----- Message -----  From: Cornelio Jorge LPN  Sent: 1/3/2025   8:35 AM CST  To: CHERI Jordan

## 2025-02-04 ENCOUNTER — PATIENT MESSAGE (OUTPATIENT)
Dept: TRANSPLANT | Facility: CLINIC | Age: 55
End: 2025-02-04

## 2025-02-04 ENCOUNTER — LAB VISIT (OUTPATIENT)
Dept: LAB | Facility: HOSPITAL | Age: 55
End: 2025-02-04
Attending: INTERNAL MEDICINE
Payer: MEDICAID

## 2025-02-04 ENCOUNTER — OFFICE VISIT (OUTPATIENT)
Dept: TRANSPLANT | Facility: CLINIC | Age: 55
End: 2025-02-04
Attending: INTERNAL MEDICINE
Payer: MEDICAID

## 2025-02-04 VITALS
SYSTOLIC BLOOD PRESSURE: 168 MMHG | DIASTOLIC BLOOD PRESSURE: 110 MMHG | HEIGHT: 64 IN | HEART RATE: 89 BPM | BODY MASS INDEX: 32.7 KG/M2 | WEIGHT: 191.56 LBS

## 2025-02-04 DIAGNOSIS — I10 ESSENTIAL HYPERTENSION: ICD-10-CM

## 2025-02-04 DIAGNOSIS — E87.6 HYPOKALEMIA: Primary | ICD-10-CM

## 2025-02-04 DIAGNOSIS — T50.2X5A DIURETIC-INDUCED HYPOKALEMIA: ICD-10-CM

## 2025-02-04 DIAGNOSIS — I50.9 CONGESTIVE HEART FAILURE, UNSPECIFIED HF CHRONICITY, UNSPECIFIED HEART FAILURE TYPE: ICD-10-CM

## 2025-02-04 DIAGNOSIS — I42.1 HOCM (HYPERTROPHIC OBSTRUCTIVE CARDIOMYOPATHY): Primary | ICD-10-CM

## 2025-02-04 DIAGNOSIS — E87.6 DIURETIC-INDUCED HYPOKALEMIA: ICD-10-CM

## 2025-02-04 PROBLEM — R07.9 CHEST PAIN: Status: RESOLVED | Noted: 2024-11-08 | Resolved: 2025-02-04

## 2025-02-04 LAB
ALBUMIN SERPL BCP-MCNC: 3.9 G/DL (ref 3.5–5.2)
ALP SERPL-CCNC: 71 U/L (ref 40–150)
ALT SERPL W/O P-5'-P-CCNC: 26 U/L (ref 10–44)
ANION GAP SERPL CALC-SCNC: 13 MMOL/L (ref 8–16)
AST SERPL-CCNC: 22 U/L (ref 10–40)
BILIRUB SERPL-MCNC: 0.4 MG/DL (ref 0.1–1)
BNP SERPL-MCNC: 99 PG/ML (ref 0–99)
BUN SERPL-MCNC: 26 MG/DL (ref 6–20)
CALCIUM SERPL-MCNC: 9.9 MG/DL (ref 8.7–10.5)
CHLORIDE SERPL-SCNC: 103 MMOL/L (ref 95–110)
CO2 SERPL-SCNC: 24 MMOL/L (ref 23–29)
CREAT SERPL-MCNC: 1.6 MG/DL (ref 0.5–1.4)
EST. GFR  (NO RACE VARIABLE): 38.3 ML/MIN/1.73 M^2
GLUCOSE SERPL-MCNC: 89 MG/DL (ref 70–110)
MAGNESIUM SERPL-MCNC: 2 MG/DL (ref 1.6–2.6)
POTASSIUM SERPL-SCNC: 3 MMOL/L (ref 3.5–5.1)
PROT SERPL-MCNC: 8.2 G/DL (ref 6–8.4)
SODIUM SERPL-SCNC: 140 MMOL/L (ref 136–145)
TSH SERPL DL<=0.005 MIU/L-ACNC: 1.26 UIU/ML (ref 0.4–4)

## 2025-02-04 PROCEDURE — 3008F BODY MASS INDEX DOCD: CPT | Mod: CPTII,,, | Performed by: INTERNAL MEDICINE

## 2025-02-04 PROCEDURE — 84443 ASSAY THYROID STIM HORMONE: CPT | Performed by: INTERNAL MEDICINE

## 2025-02-04 PROCEDURE — 99215 OFFICE O/P EST HI 40 MIN: CPT | Mod: PBBFAC | Performed by: INTERNAL MEDICINE

## 2025-02-04 PROCEDURE — 36415 COLL VENOUS BLD VENIPUNCTURE: CPT | Performed by: INTERNAL MEDICINE

## 2025-02-04 PROCEDURE — 99204 OFFICE O/P NEW MOD 45 MIN: CPT | Mod: S$PBB,,, | Performed by: INTERNAL MEDICINE

## 2025-02-04 PROCEDURE — 83880 ASSAY OF NATRIURETIC PEPTIDE: CPT | Performed by: INTERNAL MEDICINE

## 2025-02-04 PROCEDURE — 83735 ASSAY OF MAGNESIUM: CPT | Performed by: INTERNAL MEDICINE

## 2025-02-04 PROCEDURE — 1159F MED LIST DOCD IN RCRD: CPT | Mod: CPTII,,, | Performed by: INTERNAL MEDICINE

## 2025-02-04 PROCEDURE — 80053 COMPREHEN METABOLIC PANEL: CPT | Performed by: INTERNAL MEDICINE

## 2025-02-04 PROCEDURE — 3077F SYST BP >= 140 MM HG: CPT | Mod: CPTII,,, | Performed by: INTERNAL MEDICINE

## 2025-02-04 PROCEDURE — 99999 PR PBB SHADOW E&M-EST. PATIENT-LVL V: CPT | Mod: PBBFAC,,, | Performed by: INTERNAL MEDICINE

## 2025-02-04 PROCEDURE — 83880 ASSAY OF NATRIURETIC PEPTIDE: CPT | Mod: 91 | Performed by: INTERNAL MEDICINE

## 2025-02-04 PROCEDURE — 3080F DIAST BP >= 90 MM HG: CPT | Mod: CPTII,,, | Performed by: INTERNAL MEDICINE

## 2025-02-04 PROCEDURE — 1160F RVW MEDS BY RX/DR IN RCRD: CPT | Mod: CPTII,,, | Performed by: INTERNAL MEDICINE

## 2025-02-04 RX ORDER — HYDROCHLOROTHIAZIDE 25 MG/1
25 TABLET ORAL DAILY
COMMUNITY
Start: 2025-02-01

## 2025-02-04 RX ORDER — DULOXETIN HYDROCHLORIDE 60 MG/1
120 CAPSULE, DELAYED RELEASE ORAL DAILY
COMMUNITY
Start: 2025-01-07

## 2025-02-04 NOTE — Clinical Note
See Dr. Aldo Lim same day as echo here (or few days later) and at least 1 week after cardiac MRI (order already in system from Dec 2024)  Genetics consult

## 2025-02-04 NOTE — PROGRESS NOTES
Subjective:     Patient ID:  Calos Rios is a 54 y.o. female who presents for  assessment for hypertrophic cardiomyopathy      HPI:  55 yo BF referred by Dr. Balderas for consideration of HOCM.  He ordered a cardiac MRI but this has not yet been performed.  She has history of severe hypertension, she has no family hx of SCD or hypertrophic cardiomyopathy.  She has strong family history of hypertension.    She works full-time.  She was seen in the emergency room January 12, 2025 with finger injury blood pressure at that time 137/96. She reports home blood pressures generally run in the upper 130s/90s.       She has experienced chest pain in the past but relates this to blood pressure issues and admissions for hypertensive emergency.  She is not have any chest discomfort this time.  Denies dyspnea on exertion, orthopnea or PND.  No symptomatic arrhythmia.      PAST MEDICAL HISTORY:    Hypertension   Working diagnosis of hypertrophic cardiomyopathy sent here for further assessment   Asthma   KIDNEY STONES     OPERATIONS:    HYSTERECTOMY WITH BSO   CYSTOSCOPY WITH LITHOTRIPSY      FAMILY HISTORY:    Hypertension, coronary artery disease, myocardial infarction,   Negative for sudden cardiac death or hypertrophic cardiomyopathy     SOCIAL HISTORY:    Former smoker, occasional alcohol,  negative for drug use    Current Outpatient Medications   Medication Sig Dispense Refill    albuterol (PROVENTIL/VENTOLIN HFA) 90 mcg/actuation inhaler Inhale 2 puffs into the lungs every 6 (six) hours as needed for Wheezing. 18 g 12    allopurinoL (ZYLOPRIM) 100 MG tablet Take 1 tablet (100 mg total) by mouth once daily. 30 tablet 3    ALPRAZolam (XANAX) 0.5 MG tablet Take 1 tablet (0.5 mg total) by mouth nightly as needed for Anxiety. 30 tablet 0    buPROPion (WELLBUTRIN XL) 150 MG TB24 tablet Take 1 tablet (150 mg total) by mouth once daily. 90 tablet 3    carvediloL (COREG) 6.25 MG tablet Take 1 tablet (6.25 mg total) by mouth 2  "(two) times daily with meals. 180 tablet 3    DULoxetine (CYMBALTA) 60 MG capsule Take 120 mg by mouth once daily.      hydroCHLOROthiazide (HYDRODIURIL) 25 MG tablet Take 25 mg by mouth once daily.      NIFEdipine (PROCARDIA-XL) 90 MG (OSM) 24 hr tablet Take 1 tablet (90 mg total) by mouth once daily. 90 tablet 3    ondansetron (ZOFRAN) 4 MG tablet Take 1 tablet (4 mg total) by mouth every 12 (twelve) hours as needed for Nausea. 25 tablet 2    valsartan (DIOVAN) 160 MG tablet Take 1 tablet (160 mg total) by mouth once daily. 90 tablet 3     No current facility-administered medications for this visit.       Review of patient's allergies indicates:  No Known Allergies    Review of Systems   Constitutional: Negative for night sweats, weight gain and weight loss.   Cardiovascular:  Negative for chest pain, dyspnea on exertion, irregular heartbeat, leg swelling, near-syncope, orthopnea, palpitations, paroxysmal nocturnal dyspnea and syncope.   Neurological:  Negative for brief paralysis, dizziness, focal weakness, headaches, light-headedness and weakness.     Objective:   Physical Exam  Constitutional:       General: She is not in acute distress.     Appearance: She is well-developed. She is not ill-appearing, toxic-appearing or diaphoretic.      Comments: BP (!) 168/110 (BP Location: Left arm, Patient Position: Sitting)   Pulse 89   Ht 5' 4" (1.626 m)   Wt 86.9 kg (191 lb 9.3 oz)   LMP 04/15/2019 (Approximate)   BMI 32.88 kg/m²    Manual cuff right arm 174/114 and left arm 168/110   HENT:      Head: Normocephalic and atraumatic.   Eyes:      General: No scleral icterus.        Right eye: No discharge.         Left eye: No discharge.      Conjunctiva/sclera: Conjunctivae normal.      Pupils: Pupils are equal, round, and reactive to light.      Comments:  Fundi with arterial narrowing but no hemorrhages no papilledema   Neck:      Thyroid: No thyromegaly.      Vascular: No JVD.      Trachea: No tracheal deviation. " "  Cardiovascular:      Rate and Rhythm: Normal rate and regular rhythm.      Heart sounds: Normal heart sounds. No murmur (I do not note a murmur with today's blood pressure as reported) heard.     No gallop.   Pulmonary:      Effort: Pulmonary effort is normal.      Breath sounds: Normal breath sounds.   Abdominal:      General: Bowel sounds are normal. There is no distension.      Palpations: Abdomen is soft. There is no mass.      Tenderness: There is no abdominal tenderness. There is no guarding or rebound.      Comments:  No abdominal bruits   Musculoskeletal:         General: No tenderness.      Right lower leg: No edema.      Left lower leg: No edema.   Skin:     General: Skin is warm and dry.   Neurological:      General: No focal deficit present.      Mental Status: She is alert and oriented to person, place, and time. Mental status is at baseline.   Psychiatric:         Mood and Affect: Mood normal.         Behavior: Behavior normal.         Thought Content: Thought content normal.         Judgment: Judgment normal.         CARDIAC TESTING REVIEWED TODAY   October 13, 2024 EKG normal sinus rhythm, normal EKG     November 7, 2024 ECHO report    Left Ventricle: The left ventricle is normal in size. Moderately increased wall thickness. There is moderate concentric hypertrophy. There is hyperdynamic systolic function with a visually estimated ejection fraction of 75 - 80%. Grade I diastolic dysfunction. Mild LVOT obstruction at rest. LVOT pressure gradient increases with Valsalva.  Peak jolene 4.75 m/sec. 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    Right Ventricle: Normal right ventricular cavity size. Systolic function is normal.    Pulmonary Artery: The estimated pulmonary artery systolic pressure is 38 mmHg.     Vitals    Height Weight BMI (Calculated) BSA (Calculated - sq m) BP Pulse   5' 4" (1.626 m) 84.4 kg (186 lb) 31.9 1.95 sq meters 129/76 72     Study Details A " complete echo was performed using complete 2D, color flow Doppler and spectral Doppler. During the study, the apical, parasternal and subcostal views were captured.  Overall the study quality was adequate. This was a portable study performed at the patient's bedside.     Echocardiography Findings    Left Ventricle The left ventricle is normal in size. Moderately increased wall thickness. There is moderate concentric hypertrophy. Normal wall motion. There is hyperdynamic systolic function with a visually estimated ejection fraction of 75 - 80%. Grade I diastolic dysfunction. Mild LVOT obstruction at rest. LVOT pressure gradient increases with Valsalva.  Peak jolene 4.75 m/sec. 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   Right Ventricle Normal right ventricular cavity size. Systolic function is normal.   Left Atrium Left atrium is at the upper limits of normal in size.   Right Atrium Normal right atrial size.   Aortic Valve The aortic valve is structurally normal. There is normal leaflet mobility. There is no stenosis. Aortic valve peak velocity is 2.9 m/s. Mean gradient is 19.3 mmHg. There is no significant regurgitation.   Mitral Valve The mitral valve is structurally normal. There is normal leaflet mobility. There is no stenosis. The mean pressure gradient across the mitral valve is 2 mmHg at a heart rate of  bpm. There is no significant regurgitation.   Tricuspid Valve The tricuspid valve is structurally normal. There is normal leaflet mobility. There is no stenosis. There is trace regurgitation.   Pulmonic Valve Not well visualized due to poor acoustic window. There is trace regurgitation.   IVC/SVC Normal venous pressure at 3 mmHg.   Ascending Aorta Aortic root is normal in size measuring 3.14 cm. Ascending aorta is normal measuring 2.76 cm.   Pericardium and Other Findings There is no pericardial effusion.   Pulmonary Artery The estimated pulmonary artery systolic pressure is  38 mmHg.      November 8, 2024 regadenoson nuclear stress study negative for ischemia    Labs reviewed as follows:  Lab Results   Component Value Date    BNP 99 02/04/2025     02/04/2025    K 3.0 (L) 02/04/2025    MG 2.0 02/04/2025     02/04/2025    CO2 24 02/04/2025    BUN 26 (H) 02/04/2025    CREATININE 1.6 (H) 02/04/2025    GLU 89 02/04/2025    AST 22 02/04/2025    ALT 26 02/04/2025    ALBUMIN 3.9 02/04/2025    PROT 8.2 02/04/2025    BILITOT 0.4 02/04/2025    WBC 6.71 11/08/2024    HGB 15.4 11/08/2024    HCT 45.6 11/08/2024    HCT 43 05/25/2024     11/08/2024    TSH 1.260 02/04/2025    CHOL 186 11/07/2024    HDL 64 11/07/2024    LDLCALC 107.6 11/07/2024    TRIG 72 11/07/2024     Assessment:     1. HOCM (hypertrophic obstructive cardiomyopathy) working diagnosis but echo does not describe JANICE    2. Essential hypertension    3. Diuretic-induced hypokalemia      Plan:   In order to confirm that she has a hypertrophic cardiomyopathy I would like for her to undergo genetic testing for an associated abnormal genome, control her blood pressure, proceed with a cardiac MRI, repeat echo Doppler [facility] at [facility] and return to see Dr. Dawkins the same day.  JANICE is not described on outside ECHO, I do not note murmur today but BP significantly elevated today.    Quite variable blood pressure readings on chart and she reports that she has to separate her carvedilol from her other medications so did not take today.  She attributes some today's reading to not taking her carvedilol yet today and of course her initial visit here could be playing a role as well.  However she does have funduscopic changes consistent with uncontrolled hypertension. I recommend Magi Balderas and Lavon evaluate her for secondary causes of hypertension.      The spironolactone is not on med list but she has at home thought she was told to stop.      See patient instruction sheet    ADDENDUM:  Her labs returned after visit so I am  messaging her over the importance of resuming aldactone and following up with Dr. Doll with BMP and BP response    See message to patient tonight

## 2025-02-04 NOTE — PATIENT INSTRUCTIONS
Take your BP twice a day and send readings to Dr. Doll to adjust BP medications.  Also, send that office a message regarding whether you are supposed to be taking the spironolactone.    Addendum:  After labs returned I recommend aldactone taken at dose of 25 mg daily with BMP in 2 weeks

## 2025-02-05 ENCOUNTER — TELEPHONE (OUTPATIENT)
Dept: GENETICS | Facility: CLINIC | Age: 55
End: 2025-02-05
Payer: MEDICAID

## 2025-02-05 ENCOUNTER — TELEPHONE (OUTPATIENT)
Dept: TRANSPLANT | Facility: CLINIC | Age: 55
End: 2025-02-05
Payer: MEDICAID

## 2025-02-05 DIAGNOSIS — I42.1 HOCM (HYPERTROPHIC OBSTRUCTIVE CARDIOMYOPATHY): Primary | ICD-10-CM

## 2025-02-05 LAB — NT-PROBNP SERPL IA-MCNC: 847 PG/ML

## 2025-02-05 NOTE — TELEPHONE ENCOUNTER
----- Message from Med Assistant Halie sent at 2/5/2025  2:07 PM CST -----  Regarding: Referral  Hello,    This patient needs a consult scheduled with Genetics for    Reason: Adult and Pediatric Non-Cancer Genetics    Can you please assist with scheduling this appointment and reaching out to the patient? The referral has been put in by Dr. Darwin Cannon.    Thank you,  Halie

## 2025-02-05 NOTE — TELEPHONE ENCOUNTER
I spoke with Mrs. Rios this morning to let her know of her [facility] appointment on 02/19/25.  Patient verbalized understanding.

## 2025-02-19 ENCOUNTER — HOSPITAL ENCOUNTER (OUTPATIENT)
Dept: CARDIOLOGY | Facility: HOSPITAL | Age: 55
Discharge: HOME | End: 2025-02-19
Attending: INTERNAL MEDICINE
Payer: MEDICAID

## 2025-02-19 VITALS
SYSTOLIC BLOOD PRESSURE: 142 MMHG | BODY MASS INDEX: 32.61 KG/M2 | HEART RATE: 85 BPM | WEIGHT: 191 LBS | HEIGHT: 64 IN | DIASTOLIC BLOOD PRESSURE: 100 MMHG

## 2025-02-19 DIAGNOSIS — I42.1 HOCM (HYPERTROPHIC OBSTRUCTIVE CARDIOMYOPATHY): ICD-10-CM

## 2025-02-19 LAB
ASCENDING AORTA: 2.65 CM
AV AREA BY CONTINUOUS VTI: 2.9 CM2
AV INDEX (PROSTH): 0.89
AV LVOT MEAN GRADIENT: 3 MMHG
AV LVOT PEAK GRADIENT: 4 MMHG
AV MEAN GRADIENT: 3 MMHG
AV PEAK GRADIENT: 7 MMHG
AV VALVE AREA BY VELOCITY RATIO: 2.4 CM²
AV VALVE AREA: 2.8 CM2
AV VELOCITY RATIO: 0.77
BSA FOR ECHO PROCEDURE: 1.98 M2
CV ECHO LV RWT: 0.67 CM
CV STRESS BASE HR: 85 BPM
DIASTOLIC BLOOD PRESSURE: 100 MMHG
DOP CALC AO PEAK VEL: 1.3 M/S
DOP CALC AO VTI: 21.7 CM
DOP CALC LVOT AREA: 3.1 CM2
DOP CALC LVOT DIAMETER: 2 CM
DOP CALC LVOT PEAK VEL: 1 M/S
DOP CALC LVOT STROKE VOLUME: 60.6 CM3
DOP CALC RVOT AREA: 3.76 CM2
DOP CALC RVOT DIAMETER: 2.19 CM
DOP CALCLVOT PEAK VEL VTI: 19.3 CM
E WAVE DECELERATION TIME: 246 MS
E/A RATIO: 0.62
E/E' RATIO: 16 M/S
ECHO EF ESTIMATED: 66 %
ECHO LV POSTERIOR WALL: 1.3 CM (ref 0.6–1.1)
EJECTION FRACTION: 60 %
FRACTIONAL SHORTENING: 35.9 % (ref 28–44)
INTERVENTRICULAR SEPTUM: 1.6 CM (ref 0.6–1.1)
IVC DIAMETER: 0.91 CM
LA MAJOR: 4.8 CM
LA MINOR: 4 CM
LA WIDTH: 3.5 CM
LEFT ATRIUM SIZE: 4 CM
LEFT ATRIUM VOLUME INDEX MOD: 20 ML/M2
LEFT ATRIUM VOLUME INDEX: 27 ML/M2
LEFT ATRIUM VOLUME MOD: 38 ML
LEFT ATRIUM VOLUME: 52 CM3
LEFT INTERNAL DIMENSION IN SYSTOLE: 2.5 CM (ref 2.1–4)
LEFT VENTRICLE DIASTOLIC VOLUME INDEX: 35.27 ML/M2
LEFT VENTRICLE DIASTOLIC VOLUME: 67.72 ML
LEFT VENTRICLE MASS INDEX: 110.9 G/M2
LEFT VENTRICLE SYSTOLIC VOLUME INDEX: 11.8 ML/M2
LEFT VENTRICLE SYSTOLIC VOLUME: 22.69 ML
LEFT VENTRICULAR INTERNAL DIMENSION IN DIASTOLE: 3.9 CM (ref 3.5–6)
LEFT VENTRICULAR MASS: 212.9 G
LV LATERAL E/E' RATIO: 18
LV SEPTAL E/E' RATIO: 14.4
MV PEAK A VEL: 1.16 M/S
MV PEAK E VEL: 0.72 M/S
OHS CV CPX 1 MINUTE RECOVERY HEART RATE: 109 BPM
OHS CV CPX 85 PERCENT MAX PREDICTED HEART RATE MALE: 142
OHS CV CPX ESTIMATED METS: 9
OHS CV CPX MAX PREDICTED HEART RATE: 167
OHS CV CPX PATIENT IS FEMALE: 1
OHS CV CPX PATIENT IS MALE: 0
OHS CV CPX PEAK DIASTOLIC BLOOD PRESSURE: 96 MMHG
OHS CV CPX PEAK HEAR RATE: 137 BPM
OHS CV CPX PEAK RATE PRESSURE PRODUCT: ABNORMAL
OHS CV CPX PEAK SYSTOLIC BLOOD PRESSURE: 190 MMHG
OHS CV CPX PERCENT MAX PREDICTED HEART RATE ACHIEVED: 86
OHS CV CPX RATE PRESSURE PRODUCT PRESENTING: ABNORMAL
OHS CV RV/LV RATIO: 0.87 CM
RA MAJOR: 4.41 CM
RA PRESSURE ESTIMATED: 3 MMHG
RA WIDTH: 3.38 CM
RIGHT ATRIAL AREA: 13.6 CM2
RIGHT VENTRICLE DIASTOLIC BASEL DIMENSION: 3.4 CM
RV TISSUE DOPPLER FREE WALL SYSTOLIC VELOCITY 1 (APICAL 4 CHAMBER VIEW): 11.07 CM/S
SINUS: 3.14 CM
STJ: 2.56 CM
STRESS ECHO POST EXERCISE DUR MIN: 5 MINUTES
STRESS ECHO POST EXERCISE DUR SEC: 49 SECONDS
SYSTOLIC BLOOD PRESSURE: 142 MMHG
TDI LATERAL: 0.04 M/S
TDI SEPTAL: 0.05 M/S
TDI: 0.05 M/S
TRICUSPID ANNULAR PLANE SYSTOLIC EXCURSION: 1.95 CM
Z-SCORE OF LEFT VENTRICULAR DIMENSION IN END DIASTOLE: -3.29
Z-SCORE OF LEFT VENTRICULAR DIMENSION IN END SYSTOLE: -2.28

## 2025-02-19 PROCEDURE — 93325 DOPPLER ECHO COLOR FLOW MAPG: CPT

## 2025-02-21 ENCOUNTER — OFFICE VISIT (OUTPATIENT)
Dept: GENETICS | Facility: CLINIC | Age: 55
End: 2025-02-21
Payer: MEDICAID

## 2025-02-21 DIAGNOSIS — I42.1 HOCM (HYPERTROPHIC OBSTRUCTIVE CARDIOMYOPATHY): Primary | ICD-10-CM

## 2025-02-21 NOTE — PROGRESS NOTES
TELEMEDICINE VIDEO VISIT     The patient location is: [facility]  The chief complaint leading to consultation is: LVH, evaluate for HOCM  Total time spent with patient: 66 minutes  Visit type: Virtual visit with synchronous audio and video      Each patient to whom he or she provides medical services by telemedicine is: (1) informed of the relationship between the physician and patient and the respective role of any other health care provider with respect to management of the patient; and (2) notified that he or she may decline to receive medical services by telemedicine and may withdraw from such care at any time.    NEW PATIENT GENETIC COUNSELING CONSULTATION     [facility]  [address] [place] [address]    DATE OF CONSULTATION: 02/21/2025    REFERRING PHYSICIAN: Darwin Cannon Jr*    REASON FOR CONSULTATION: We are requested by Dr. Darwin Cannon* to consult on Calos Rios regarding hypertrophic cardiomyopathy (HCM)/hypertrophic obstructive cardiomyopathy (HOCM). Calos is not accompanied for today's appointment.     HISTORY OF PRESENT ILLNESS:  Calos Rios is a 54 y.o. female with left ventricular hypertrophy raising concern for hypertrophic cardiomyopathy.     Calos reports experiencing cheat pain/tightness which she initially thought was due to anxiety beginning in October 2024. She came to medical attention at [facility] in November 2024, had ischemic workup which was negative however echocardiogram revealed left ventricular hypertrophy raising concern for possible hypertrophic cardiomyopathy. Cardiac MRI ordered as part of workup is currently pending. Calos reports she had not been seen by cardiology prior to November. She denies any history of syncope or near syncope. She reports a history of severe hypertension. Calos denies any family history of hypertrophic cardiomyopathy, however multiple paternal relatives  have unspecified heart issues. She was seen for evaluation by [facility]Trans[facility] (Dr. Darwin Cannon) who recommended genetic evaluation to aid in confirming a diagnosis of hypertrophic cardiomyopathy, which would inform medical management as well as potential risk for other family members.    Calos's medical history is also significant for nephrolithiasis and chronic kidney disease stage 3b, continues to be followed by nephrology (Dr. Parrish). Additional medical concerns reported during today's visit include easy bruising and myopia.    REVIEW OF SYSTEMS: A complete review of systems was negative other than as stated above.    MEDICAL HISTORY:    Past Medical History:  Patient Active Problem List    Diagnosis Date Noted    HOCM (hypertrophic obstructive cardiomyopathy) 11/08/2024    Hypokalemia 05/25/2024    Prolonged Q-T interval on ECG 05/25/2024    Cigarette nicotine dependence without complication 05/25/2024    Pain of upper abdomen 12/15/2022    Nephrolithiasis 11/09/2022    Metabolic syndrome 01/27/2022    Snoring 08/21/2018    Insomnia due to mental condition 04/19/2018    Reactive depression 04/19/2018    Anxiety 11/27/2017    Gastroesophageal reflux disease without esophagitis 11/27/2017    Essential hypertension     Migraine headache     Obesity        Past Surgical History:  Past Surgical History:   Procedure Laterality Date    CYSTOURETEROSCOPY,WITH HOLMIUM LASER LITHOTRIPSY OF URETERAL CALCULUS - Bilateral Bilateral 11/09/2022    ESOPHAGOGASTRODUODENOSCOPY N/A 12/23/2022    Procedure: EGD (ESOPHAGOGASTRODUODENOSCOPY);  Surgeon: Anaid Cohen MD;  Location: [facility];  Service: Endoscopy;  Laterality: N/A;    HYSTERECTOMY, TOTAL, LAPAROSCOPIC, WITH SALPINGO-OOPHORECTOMY Bilateral 03/17/2021    LAPAROSCOPIC TOTAL HYSTERECTOMY Bilateral 03/17/2021    PYELOSCOPY Bilateral 10/25/2022    Procedure: PYELOSCOPY;  Surgeon: Raz Cantrell MD;  Location: [facility] OR;  Service: Urology;   Laterality: Bilateral;    TUBAL LIGATION      URETEROSCOPY Bilateral 10/25/2022    Procedure: URETEROSCOPY;  Surgeon: Raz Cantrell MD;  [facility];  Service: Urology;  Laterality: Bilateral;       Developmental History:  No developmental concerns reported  Highest level of education achieved: Some college    Family History:    Calos's oldest daughter (34yo) is reported to have congenital deafness. Her youngest daughter (21yo) has no reported health issues; per Calos she had an identical twin sister who  at 3 weeks old. Calos's brother (51yo) is reported to have hypertension and unspecified kidney issues. Her mother  at 75yo, was reported to have a heart murmur. Maternal uncle  at 64yo from complications due to diabetes. Maternal grandmother  in her early 60s, was reported to have a heart attack which was confirmed at a hospital. Maternal grandfather  in his 30s from suicide. Calos's father  at 56yo, was reported to have kidney issues requiring transplant. One paternal aunt  in her 40s from ovarian cancer diagnosed in her 40s. Two paternal aunts and one paternal uncle who all  in their 60s were reported to have unspecified heart issues. Paternal grandfather  suddenly at home, age of death unknown. Paternal grandmother  in her 70s, was reported to have Alzheimer's diagnosed in her 70s.    3-generation family history obtained and otherwise negative for history of intellectual disability/developmental delay, birth defects, or 3 or more miscarriages unless otherwise noted above. Consanguinity denied.    Social History:  Lives with her brother. Calos resides in [place], [place].    DIAGNOSTIC STUDIES REVIEWED:     PERTINENT LABORATORY STUDIES: Not applicable    PRIOR GENETIC TESTING RESULTS: None    PRIOR RADIOLOGY, IMAGING, AND OTHER STUDIES:      Exercise stress echo w/ color flow doppler (2025):    Stress Protocol: The patient exercised for 5 minutes  49 seconds on a high ramp protocol, achieving a peak heart rate of 137 bpm, which is 86% of the age predicted maximum heart rate. Their exercise capacity was normal. The patient reported no symptoms during the stress test. The test was stopped because the patient experienced fatigue.    Stress ECG: There is no ST segment deviation identified during the protocol. There are no arrhythmias during stress. There is normal blood pressure response with stress.    ECG Conclusion: The ECG portion of the study is negative for ischemia.    Left Ventricle: The left ventricle is normal in size. Moderate septal thickening. IVSd is 1.6 cm. There is normal systolic function. Ejection fraction is approximately 60%. There is normal diastolic function. No LVOT obstruction at rest. Peak gradient after exercise is 16 mm Hg.    Right Ventricle: Normal right ventricular cavity size. Wall thickness is normal. Systolic function is normal.    Mitral Valve: There is normal leaflet mobility. There is no systolic anterior motion of the mitral valve. There is mild regurgitation.    Pulmonary Artery: Pulmonary artery pressure could not be accurately determined.    Post-stress Echo: The left ventricle systolic function is hyperdynamic. Right ventricle systolic function is normal.    Post-stress Conclusion: The study is negative with no echocardiographic evidence of stress induced ischemia.    Regadenoson stress test with myocardial perfusion SPECT (multi study) (11/08/24):    Normal myocardial perfusion scan. There is no evidence of myocardial ischemia or infarction.    There is a trivial to mild intensity fixed perfusion abnormality in the  wall of the left ventricle secondary to diaphragm attenuation.    The gated perfusion images showed an ejection fraction of 78% at rest.    There is normal wall motion at rest and post-stress.    The ECG portion of the study is negative for ischemia.    The patient reported no chest pain during the stress  test.    There were no arrhythmias during stress.    Transthoracic echo (TTE) complete (11/07/24):    Left Ventricle: The left ventricle is normal in size. Moderately increased wall thickness. There is moderate concentric hypertrophy. There is hyperdynamic systolic function with a visually estimated ejection fraction of 75 - 80%. Grade I diastolic dysfunction. Mild LVOT obstruction at rest. LVOT pressure gradient increases with Valsalva.  Peak jolene 4.75 m/sec. 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    Right Ventricle: Normal right ventricular cavity size. Systolic function is normal.    Pulmonary Artery: The estimated pulmonary artery systolic pressure is 38 mmHg.     EKG 12-lead (11/07/24):  Vent. Rate : 072 BPM     Atrial Rate : 072 BPM      P-R Int : 140 ms          QRS Dur : 100 ms       QT Int : 480 ms       P-R-T Axes : 023 048 003 degrees      QTc Int : 525 ms     Normal sinus rhythm   Possible Left atrial enlargement   LVH   Prolonged QT   Abnormal ECG     ASSESSMENT/DISCUSSION:  Calos is a 54 y.o. female with left ventricular hypertrophy (LVH) on recent echocardiogram raising concern for hypertrophic cardiomyopathy.      Heart disease can be caused by both genetic and environmental factors. Left ventricular hypertrophy (thickened heart muscle) can be caused by acquired factors such as uncontrolled hypertension, obesity, or athletic training. However, in some individuals it may be due to an underlying genetic condition called hypertrophic cardiomyopathy (HCM). Depending on where the thickening is, this condition can affect how blood flows out of the heart (referred to as 'HCM with obstruction' or 'HOCM')     The genetics of hypertrophic cardiomyopathy (HCM) was reviewed with the patient. Genes are sections of DNA with a specific genetic code that determine physical traits, growth, development, and susceptibility to disease, among other things. Humans have two copies  of most genes, with one copy being inherited from either parent. HCM follows an autosomal dominant pattern of inheritance. This implies that males and females are equally affected and that one non-working copy of the gene (pathogenic variant) is sufficient to have the condition. When a person has a diagnosis of HCM, then first-degree relatives (parents, siblings, and children) each have a 50% chance of having inherited the same non-working gene. However, not all individuals in a family with the same gene change will develop HCM (non-penetrance); those who do develop HCM may present differently ranging from no symptoms to severe cardiac compromise and sudden death (variable expressivity). Periodic clinical screening with a cardiologist, including echocardiogram and EKG, is recommended for first-degree family members of a person with HCM.      Hypertrophic cardiomyopathy can be caused by genetic changes (pathogenic variants) in specific genes coding for proteins forming the basic contractile unit of the heart, called the sarcomere. We discussed the option of testing for 100 genes most often implicated in hereditary cardiomyopathy and arrhythmia conditions. The sensitivity of this testing is approximately 40% to 60% depending on clinical factors and family history. This means that in some individuals with HCM, a genetic change may not be found in the genes tested. This would not rule out a possible diagnosis of HCM. It may be that the genetic cause is not identifiable with the current technology.      We reviewed the possible outcomes of genetic testing:    -A positive result (pathogenic variant is found) would confirm an underlying genetic basis for the patients symptoms and additional recommendations would be guided by the specific gene involved. This would provide a highly accurate test for other family members to help clarify their risks.   -A negative result (no variant found) would reduce the likelihood that the  specific genes tested are involved, but it would not rule out the possibility that there may be a genetic cause for which testing is not available.   -A variant of unknown significance is an inconclusive result that does not confirm or rule out a diagnosis and is not useful for genetic testing in family members. Additional information may be available over time to clarify an unknown variant.      If a pathogenic variant were identified in the patient, this would suggest that other family members may also have a chance of having the same genetic predisposition. However, there is variability in clinical presentation within families; some family members may have more clinical complications than others. In the absence of a genetic test that would rule out a familial risk, clinical screening may be suggested for first-degree relatives, based on their family history.      We also reviewed the Genetic Information Nondiscrimination Act (JASEN) protects individuals from discrimination on the basis of genetic information from medical insurance providers and employers. However, the law does not cover life insurance, disability, or long-term disability insurance. This law also does not apply to certain employers, including members of the [place] , Federal Government employees, and employers with less than 15 employees. Additional information about JASEN can be found at https://ginahelp.org/.    RECOMMENDATIONS/PLAN:  [facility] Arrhythmia and Cardiomyopathy Comprehensive Panel; buccal kit mailed to patient  Follow-up pending results     REFERENCES:  OSVALDO Thompson., OSVALDO Edward, ESTEVAN Lane, Bettie MJosselin ZENDEJAS., SAI Boston., ESTEVAN Hill, DOROTEO Oreilly, & EIRC Vasques. (2024). Genetic testing and counseling for hypertrophic cardiomyopathy: An evidence-based practice resource of the National Society of Genetic Counselors. Journal of genetic counseling, 10.1002/jgc4.1993. Advance online publication. https://doi.org/10.1002/jgc4.1993   Isabelle  ROMY., LISA Melendez, OSVALDO Ni, LISA San, LISA Mlaik, CLARA Jackson, LISA Ceballos, DICK Carter, ESTEVAN Salas, LISSET Mcmillan, LISSET Elmore, OSVALDO Chase, LISSET Vergara, SAI Schneider, DOMENICO Scherer, Fabien SJosselin, & SHADI Brito (2023). Genetic Testing and Counselling in Hypertrophic Cardiomyopathy: Frequently Asked Questions. Journal of clinical medicine, 12(7), 4682. https://doi.org/10.3390/mza37912064  LISA Cervantes, & DOROTEO Dugan (2008). Hypertrophic Cardiomyopathy Overview. In DICK Parnell (Eds.) et. al., GeneRevieVentario®. [facility], [place]    TIME SPENT:   Face to Face time with patient: 24 minutes with over 50% spent counseling  66 minutes of total time spent today on the encounter, which includes face to face time and non-face to face time preparing to see the patient (eg, review of tests), Obtaining and/or reviewing separately obtained history, Documenting clinical information in the electronic or other health record, Independently interpreting results (not separately reported) and communicating results to the patient/family/caregiver, or Care coordination (not separately reported).     Carina Jang MS, [facility]  Certified Genetic Counselor   [facility]    EXTERNAL CC:    Adria Doll Jr., Darwin Patton Jr*

## 2025-02-24 ENCOUNTER — TELEPHONE (OUTPATIENT)
Dept: CARDIOLOGY | Facility: HOSPITAL | Age: 55
End: 2025-02-24
Payer: MEDICAID

## 2025-02-24 ENCOUNTER — RESULTS FOLLOW-UP (OUTPATIENT)
Dept: TRANSPLANT | Facility: CLINIC | Age: 55
End: 2025-02-24
Payer: MEDICAID

## 2025-02-24 ENCOUNTER — PATIENT MESSAGE (OUTPATIENT)
Dept: CARDIOLOGY | Facility: HOSPITAL | Age: 55
End: 2025-02-24
Payer: MEDICAID

## 2025-02-24 NOTE — TELEPHONE ENCOUNTER
I had the pleasure of discussing your upcoming Cardiac MRI scheduled for 05/15/2025 @ 9:00 at [facility] at [address] [address] [address] from the [facility]. Please aim to arrive at least 20-30 minutes before your scheduled time to allow staff time to check you in for the test as well as do their prep for the MRI to ensure this is done safely.    We discussed and ensured that you will arrive for the scheduled appointment and confirmed the absence of a pacemaker/defibrillator, the absence of a cerebral aneurysm or surgical clip, pump, nerve or brain stimulator, middle or inner ear prosthesis or other metal implant or being injured by a metal object (i.e. bullet, bb, shrapnel).    What is it? Imaging to look at your heart and its surrounding structures with and/or without the administration of Gadolinium contrast. Patients with pacemakers previously could not be scanned. However, new pacemakers have been designed to withstand MRI scans safely. Also, it has been determined that many of the older pacemakers can be scanned safely under the proper conditions.     Why are you having this test? An MRI is used to visualize various structures and tissue types within the body. Because it provides a high level of detail, an MRI is preferred for a wide variety of purposes. Your doctor has decided that an MRI is necessary to visualize the pathology present.    NO special preparation is required for this particular MRI. You can eat and take medications as you normally do.    The test should take about 40-50 minutes to complete. It is important to hold still for the exam or the pictures will be unreadable. If you are claustrophobic or have pain issues that may interfere with your ability to stay still, please discuss this with the ordering provider. You may or may not receive IV Gadolinium during the exam if this is indicated, so an IV will be placed. THIS IS NOT A DYE AND DOES NOT CONTAIN  IODINE. Your heart rate, blood pressure, and oxygen saturation will be continuously monitored throughout the exam. Rescue medications will be on hand in the event of any issues.      Thank you again for your time today. I can be reached at [phone], M-F from 7:30-4.

## 2025-02-25 ENCOUNTER — HOSPITAL ENCOUNTER (INPATIENT)
Facility: HOSPITAL | Age: 55
LOS: 1 days | Discharge: HOME | DRG: 322 | End: 2025-02-26
Attending: EMERGENCY MEDICINE
Payer: MEDICAID

## 2025-02-25 ENCOUNTER — PATIENT MESSAGE (OUTPATIENT)
Dept: CARDIOLOGY | Facility: CLINIC | Age: 55
End: 2025-02-25
Payer: MEDICAID

## 2025-02-25 DIAGNOSIS — I21.4 NSTEMI (NON-ST ELEVATED MYOCARDIAL INFARCTION): Primary | ICD-10-CM

## 2025-02-25 DIAGNOSIS — I21.4 NON-ST ELEVATION MYOCARDIAL INFARCTION (NSTEMI): ICD-10-CM

## 2025-02-25 DIAGNOSIS — R79.89 ELEVATED TROPONIN: ICD-10-CM

## 2025-02-25 DIAGNOSIS — I21.4 NSTEMI (NON-ST ELEVATION MYOCARDIAL INFARCTION): ICD-10-CM

## 2025-02-25 DIAGNOSIS — R07.9 CHEST PAIN: ICD-10-CM

## 2025-02-25 LAB
ABO + RH BLD: NORMAL
ALBUMIN SERPL BCP-MCNC: 3.4 G/DL (ref 3.5–5.2)
ALP SERPL-CCNC: 61 U/L (ref 40–150)
ALT SERPL W/O P-5'-P-CCNC: 15 U/L (ref 10–44)
ANION GAP SERPL CALC-SCNC: 7 MMOL/L (ref 8–16)
APTT PPP: 27.1 SEC (ref 21–32)
APTT PPP: 27.1 SEC (ref 21–32)
APTT PPP: 57.8 SEC (ref 21–32)
AST SERPL-CCNC: 24 U/L (ref 10–40)
BASOPHILS # BLD AUTO: 0.04 K/UL (ref 0–0.2)
BASOPHILS NFR BLD: 0.5 % (ref 0–1.9)
BILIRUB SERPL-MCNC: 0.2 MG/DL (ref 0.1–1)
BLD GP AB SCN CELLS X3 SERPL QL: NORMAL
BNP SERPL-MCNC: 64 PG/ML (ref 0–99)
BSA FOR ECHO PROCEDURE: 1.98 M2
BUN SERPL-MCNC: 20 MG/DL (ref 6–20)
CALCIUM SERPL-MCNC: 8.9 MG/DL (ref 8.7–10.5)
CHLORIDE SERPL-SCNC: 110 MMOL/L (ref 95–110)
CHOLEST SERPL-MCNC: 157 MG/DL (ref 120–199)
CHOLEST/HDLC SERPL: 2.9 {RATIO} (ref 2–5)
CO2 SERPL-SCNC: 23 MMOL/L (ref 23–29)
CREAT SERPL-MCNC: 1.1 MG/DL (ref 0.5–1.4)
DIFFERENTIAL METHOD BLD: ABNORMAL
EOSINOPHIL # BLD AUTO: 0.4 K/UL (ref 0–0.5)
EOSINOPHIL NFR BLD: 5.1 % (ref 0–8)
ERYTHROCYTE [DISTWIDTH] IN BLOOD BY AUTOMATED COUNT: 13.8 % (ref 11.5–14.5)
EST. GFR  (NO RACE VARIABLE): >60 ML/MIN/1.73 M^2
ESTIMATED AVG GLUCOSE: 103 MG/DL (ref 68–131)
GLUCOSE SERPL-MCNC: 112 MG/DL (ref 70–110)
HBA1C MFR BLD: 5.2 % (ref 4–5.6)
HCT VFR BLD AUTO: 41.4 % (ref 37–48.5)
HCV AB SERPL QL IA: NORMAL
HDLC SERPL-MCNC: 54 MG/DL (ref 40–75)
HDLC SERPL: 34.4 % (ref 20–50)
HGB BLD-MCNC: 13.7 G/DL (ref 12–16)
HIV 1+2 AB+HIV1 P24 AG SERPL QL IA: NORMAL
IMM GRANULOCYTES # BLD AUTO: 0.02 K/UL (ref 0–0.04)
IMM GRANULOCYTES NFR BLD AUTO: 0.2 % (ref 0–0.5)
INR PPP: 1 (ref 0.8–1.2)
INR PPP: 1 (ref 0.8–1.2)
LDLC SERPL CALC-MCNC: 96.6 MG/DL (ref 63–159)
LIPASE SERPL-CCNC: 29 U/L (ref 4–60)
LYMPHOCYTES # BLD AUTO: 3.1 K/UL (ref 1–4.8)
LYMPHOCYTES NFR BLD: 36.9 % (ref 18–48)
MAGNESIUM SERPL-MCNC: 1.9 MG/DL (ref 1.6–2.6)
MCH RBC QN AUTO: 31.9 PG (ref 27–31)
MCHC RBC AUTO-ENTMCNC: 33.1 G/DL (ref 32–36)
MCV RBC AUTO: 96 FL (ref 82–98)
MONOCYTES # BLD AUTO: 0.4 K/UL (ref 0.3–1)
MONOCYTES NFR BLD: 5.2 % (ref 4–15)
NEUTROPHILS # BLD AUTO: 4.3 K/UL (ref 1.8–7.7)
NEUTROPHILS NFR BLD: 52.1 % (ref 38–73)
NONHDLC SERPL-MCNC: 103 MG/DL
NRBC BLD-RTO: 0 /100 WBC
OHS QRS DURATION: 102 MS
OHS QRS DURATION: 96 MS
OHS QTC CALCULATION: 480 MS
OHS QTC CALCULATION: 487 MS
PLATELET # BLD AUTO: 190 K/UL (ref 150–450)
PMV BLD AUTO: 10.3 FL (ref 9.2–12.9)
POTASSIUM SERPL-SCNC: 3.1 MMOL/L (ref 3.5–5.1)
PROT SERPL-MCNC: 6.9 G/DL (ref 6–8.4)
PROTHROMBIN TIME: 10.5 SEC (ref 9–12.5)
PROTHROMBIN TIME: 10.9 SEC (ref 9–12.5)
RA PRESSURE ESTIMATED: 3 MMHG
RBC # BLD AUTO: 4.3 M/UL (ref 4–5.4)
SODIUM SERPL-SCNC: 140 MMOL/L (ref 136–145)
SPECIMEN OUTDATE: NORMAL
TRIGL SERPL-MCNC: 32 MG/DL (ref 30–150)
TROPONIN I SERPL DL<=0.01 NG/ML-MCNC: 1335 NG/L (ref 0–14)
TROPONIN I SERPL DL<=0.01 NG/ML-MCNC: 1660 NG/L (ref 0–14)
WBC # BLD AUTO: 8.27 K/UL (ref 3.9–12.7)

## 2025-02-25 PROCEDURE — 93799 UNLISTED CV SVC/PROCEDURE: CPT | Mod: LD | Performed by: INTERNAL MEDICINE

## 2025-02-25 PROCEDURE — 85025 COMPLETE CBC W/AUTO DIFF WBC: CPT

## 2025-02-25 PROCEDURE — 84484 ASSAY OF TROPONIN QUANT: CPT | Performed by: EMERGENCY MEDICINE

## 2025-02-25 PROCEDURE — 25000003 PHARM REV CODE 250: Performed by: STUDENT IN AN ORGANIZED HEALTH CARE EDUCATION/TRAINING PROGRAM

## 2025-02-25 PROCEDURE — 85347 COAGULATION TIME ACTIVATED: CPT | Performed by: INTERNAL MEDICINE

## 2025-02-25 PROCEDURE — 027135Z DILATION OF CORONARY ARTERY, TWO ARTERIES WITH TWO DRUG-ELUTING INTRALUMINAL DEVICES, PERCUTANEOUS APPROACH: ICD-10-PCS | Performed by: INTERNAL MEDICINE

## 2025-02-25 PROCEDURE — 25500020 PHARM REV CODE 255: Performed by: INTERNAL MEDICINE

## 2025-02-25 PROCEDURE — 99291 CRITICAL CARE FIRST HOUR: CPT

## 2025-02-25 PROCEDURE — 86901 BLOOD TYPING SEROLOGIC RH(D): CPT

## 2025-02-25 PROCEDURE — 85730 THROMBOPLASTIN TIME PARTIAL: CPT | Mod: 91

## 2025-02-25 PROCEDURE — 93010 ELECTROCARDIOGRAM REPORT: CPT | Mod: ,,, | Performed by: INTERNAL MEDICINE

## 2025-02-25 PROCEDURE — 85610 PROTHROMBIN TIME: CPT

## 2025-02-25 PROCEDURE — C1887 CATHETER, GUIDING: HCPCS | Performed by: INTERNAL MEDICINE

## 2025-02-25 PROCEDURE — 63600175 PHARM REV CODE 636 W HCPCS: Performed by: INTERNAL MEDICINE

## 2025-02-25 PROCEDURE — 99223 1ST HOSP IP/OBS HIGH 75: CPT | Mod: 25,,, | Performed by: INTERNAL MEDICINE

## 2025-02-25 PROCEDURE — 83690 ASSAY OF LIPASE: CPT

## 2025-02-25 PROCEDURE — 93458 L HRT ARTERY/VENTRICLE ANGIO: CPT | Mod: 26,XU,, | Performed by: INTERNAL MEDICINE

## 2025-02-25 PROCEDURE — 4A023N7 MEASUREMENT OF CARDIAC SAMPLING AND PRESSURE, LEFT HEART, PERCUTANEOUS APPROACH: ICD-10-PCS | Performed by: INTERNAL MEDICINE

## 2025-02-25 PROCEDURE — 25000003 PHARM REV CODE 250

## 2025-02-25 PROCEDURE — 99153 MOD SED SAME PHYS/QHP EA: CPT | Performed by: INTERNAL MEDICINE

## 2025-02-25 PROCEDURE — 80061 LIPID PANEL: CPT

## 2025-02-25 PROCEDURE — 86803 HEPATITIS C AB TEST: CPT | Performed by: PHYSICIAN ASSISTANT

## 2025-02-25 PROCEDURE — B2111ZZ FLUOROSCOPY OF MULTIPLE CORONARY ARTERIES USING LOW OSMOLAR CONTRAST: ICD-10-PCS | Performed by: INTERNAL MEDICINE

## 2025-02-25 PROCEDURE — 85610 PROTHROMBIN TIME: CPT | Mod: 91

## 2025-02-25 PROCEDURE — 20600001 HC STEP DOWN PRIVATE ROOM

## 2025-02-25 PROCEDURE — 92928 PRQ TCAT PLMT NTRAC ST 1 LES: CPT | Mod: LC,RC,, | Performed by: INTERNAL MEDICINE

## 2025-02-25 PROCEDURE — 99152 MOD SED SAME PHYS/QHP 5/>YRS: CPT | Performed by: INTERNAL MEDICINE

## 2025-02-25 PROCEDURE — 99222 1ST HOSP IP/OBS MODERATE 55: CPT | Mod: ,,, | Performed by: INTERNAL MEDICINE

## 2025-02-25 PROCEDURE — 83880 ASSAY OF NATRIURETIC PEPTIDE: CPT

## 2025-02-25 PROCEDURE — 94761 N-INVAS EAR/PLS OXIMETRY MLT: CPT

## 2025-02-25 PROCEDURE — 85730 THROMBOPLASTIN TIME PARTIAL: CPT

## 2025-02-25 PROCEDURE — 96374 THER/PROPH/DIAG INJ IV PUSH: CPT

## 2025-02-25 PROCEDURE — 02703ZZ DILATION OF CORONARY ARTERY, ONE ARTERY, PERCUTANEOUS APPROACH: ICD-10-PCS | Performed by: INTERNAL MEDICINE

## 2025-02-25 PROCEDURE — 25000242 PHARM REV CODE 250 ALT 637 W/ HCPCS

## 2025-02-25 PROCEDURE — C9600 PERC DRUG-EL COR STENT SING: HCPCS | Mod: LC,RC | Performed by: INTERNAL MEDICINE

## 2025-02-25 PROCEDURE — 83735 ASSAY OF MAGNESIUM: CPT

## 2025-02-25 PROCEDURE — C1769 GUIDE WIRE: HCPCS | Performed by: INTERNAL MEDICINE

## 2025-02-25 PROCEDURE — 25000242 PHARM REV CODE 250 ALT 637 W/ HCPCS: Performed by: EMERGENCY MEDICINE

## 2025-02-25 PROCEDURE — 27201423 OPTIME MED/SURG SUP & DEVICES STERILE SUPPLY: Performed by: INTERNAL MEDICINE

## 2025-02-25 PROCEDURE — 25000003 PHARM REV CODE 250: Performed by: INTERNAL MEDICINE

## 2025-02-25 PROCEDURE — 93458 L HRT ARTERY/VENTRICLE ANGIO: CPT | Mod: XU | Performed by: INTERNAL MEDICINE

## 2025-02-25 PROCEDURE — 93005 ELECTROCARDIOGRAM TRACING: CPT

## 2025-02-25 PROCEDURE — 87389 HIV-1 AG W/HIV-1&-2 AB AG IA: CPT | Performed by: PHYSICIAN ASSISTANT

## 2025-02-25 PROCEDURE — 63600175 PHARM REV CODE 636 W HCPCS

## 2025-02-25 PROCEDURE — C1725 CATH, TRANSLUMIN NON-LASER: HCPCS | Performed by: INTERNAL MEDICINE

## 2025-02-25 PROCEDURE — 93571 IV DOP VEL&/PRESS C FLO 1ST: CPT | Mod: 26,52,LD, | Performed by: INTERNAL MEDICINE

## 2025-02-25 PROCEDURE — C1894 INTRO/SHEATH, NON-LASER: HCPCS | Performed by: INTERNAL MEDICINE

## 2025-02-25 PROCEDURE — 99152 MOD SED SAME PHYS/QHP 5/>YRS: CPT | Mod: ,,, | Performed by: INTERNAL MEDICINE

## 2025-02-25 PROCEDURE — C1874 STENT, COATED/COV W/DEL SYS: HCPCS | Performed by: INTERNAL MEDICINE

## 2025-02-25 PROCEDURE — 80053 COMPREHEN METABOLIC PANEL: CPT

## 2025-02-25 PROCEDURE — 84484 ASSAY OF TROPONIN QUANT: CPT | Mod: 91

## 2025-02-25 PROCEDURE — 83036 HEMOGLOBIN GLYCOSYLATED A1C: CPT

## 2025-02-25 DEVICE — EVEROLIMUS-ELUTING PLATINUM CHROMIUM CORONARY STENT SYSTEM
Type: IMPLANTABLE DEVICE | Site: HEART | Status: FUNCTIONAL
Brand: SYNERGY™ XD

## 2025-02-25 RX ORDER — LIDOCAINE HYDROCHLORIDE 20 MG/ML
15 SOLUTION OROPHARYNGEAL ONCE
Status: COMPLETED | OUTPATIENT
Start: 2025-02-25 | End: 2025-02-25

## 2025-02-25 RX ORDER — CARVEDILOL 25 MG/1
25 TABLET ORAL 2 TIMES DAILY WITH MEALS
Status: DISCONTINUED | OUTPATIENT
Start: 2025-02-25 | End: 2025-02-26 | Stop reason: HOSPADM

## 2025-02-25 RX ORDER — NAPROXEN SODIUM 220 MG/1
81 TABLET, FILM COATED ORAL DAILY
Status: DISCONTINUED | OUTPATIENT
Start: 2025-02-26 | End: 2025-02-26 | Stop reason: HOSPADM

## 2025-02-25 RX ORDER — POTASSIUM CHLORIDE 20 MEQ/1
40 TABLET, EXTENDED RELEASE ORAL
Status: DISCONTINUED | OUTPATIENT
Start: 2025-02-25 | End: 2025-02-25

## 2025-02-25 RX ORDER — ALUMINUM HYDROXIDE, MAGNESIUM HYDROXIDE, AND SIMETHICONE 1200; 120; 1200 MG/30ML; MG/30ML; MG/30ML
30 SUSPENSION ORAL ONCE
Status: COMPLETED | OUTPATIENT
Start: 2025-02-25 | End: 2025-02-25

## 2025-02-25 RX ORDER — ATORVASTATIN CALCIUM 40 MG/1
80 TABLET, FILM COATED ORAL ONCE
Status: COMPLETED | OUTPATIENT
Start: 2025-02-25 | End: 2025-02-25

## 2025-02-25 RX ORDER — KETOROLAC TROMETHAMINE 30 MG/ML
10 INJECTION, SOLUTION INTRAMUSCULAR; INTRAVENOUS
Status: DISCONTINUED | OUTPATIENT
Start: 2025-02-25 | End: 2025-02-25

## 2025-02-25 RX ORDER — VALSARTAN 80 MG/1
80 TABLET ORAL DAILY
Status: DISCONTINUED | OUTPATIENT
Start: 2025-02-25 | End: 2025-02-25

## 2025-02-25 RX ORDER — HEPARIN SODIUM,PORCINE/D5W 25000/250
0-40 INTRAVENOUS SOLUTION INTRAVENOUS CONTINUOUS
Status: DISCONTINUED | OUTPATIENT
Start: 2025-02-25 | End: 2025-02-25

## 2025-02-25 RX ORDER — CARVEDILOL 3.12 MG/1
12.5 TABLET ORAL 2 TIMES DAILY WITH MEALS
Status: DISCONTINUED | OUTPATIENT
Start: 2025-02-25 | End: 2025-02-25

## 2025-02-25 RX ORDER — ATORVASTATIN CALCIUM 40 MG/1
80 TABLET, FILM COATED ORAL DAILY
Status: DISCONTINUED | OUTPATIENT
Start: 2025-02-25 | End: 2025-02-25

## 2025-02-25 RX ORDER — HEPARIN SODIUM 1000 [USP'U]/ML
INJECTION, SOLUTION INTRAVENOUS; SUBCUTANEOUS
Status: DISCONTINUED | OUTPATIENT
Start: 2025-02-25 | End: 2025-02-26 | Stop reason: HOSPADM

## 2025-02-25 RX ORDER — SODIUM CHLORIDE 9 MG/ML
INJECTION, SOLUTION INTRAVENOUS ONCE
OUTPATIENT
Start: 2025-02-25

## 2025-02-25 RX ORDER — DIPHENHYDRAMINE HCL 50 MG
50 CAPSULE ORAL ONCE
Status: COMPLETED | OUTPATIENT
Start: 2025-02-25 | End: 2025-02-25

## 2025-02-25 RX ORDER — ASPIRIN 325 MG
325 TABLET ORAL ONCE
Status: DISCONTINUED | OUTPATIENT
Start: 2025-02-25 | End: 2025-02-25

## 2025-02-25 RX ORDER — NITROGLYCERIN 0.4 MG/1
0.4 TABLET SUBLINGUAL
Status: COMPLETED | OUTPATIENT
Start: 2025-02-25 | End: 2025-02-25

## 2025-02-25 RX ORDER — SODIUM CHLORIDE 9 MG/ML
INJECTION, SOLUTION INTRAVENOUS CONTINUOUS
Status: DISCONTINUED | OUTPATIENT
Start: 2025-02-25 | End: 2025-02-25

## 2025-02-25 RX ORDER — CARVEDILOL 3.12 MG/1
6.25 TABLET ORAL 2 TIMES DAILY WITH MEALS
Status: DISCONTINUED | OUTPATIENT
Start: 2025-02-25 | End: 2025-02-25

## 2025-02-25 RX ORDER — FENTANYL CITRATE 50 UG/ML
INJECTION, SOLUTION INTRAMUSCULAR; INTRAVENOUS
Status: DISCONTINUED | OUTPATIENT
Start: 2025-02-25 | End: 2025-02-26 | Stop reason: HOSPADM

## 2025-02-25 RX ORDER — VALSARTAN 40 MG/1
80 TABLET ORAL ONCE
Status: COMPLETED | OUTPATIENT
Start: 2025-02-25 | End: 2025-02-25

## 2025-02-25 RX ORDER — NITROGLYCERIN 0.4 MG/1
0.4 TABLET SUBLINGUAL EVERY 5 MIN PRN
Status: DISCONTINUED | OUTPATIENT
Start: 2025-02-25 | End: 2025-02-26 | Stop reason: HOSPADM

## 2025-02-25 RX ORDER — POTASSIUM CHLORIDE 20 MEQ/1
40 TABLET, EXTENDED RELEASE ORAL
Status: COMPLETED | OUTPATIENT
Start: 2025-02-25 | End: 2025-02-25

## 2025-02-25 RX ORDER — CLOPIDOGREL BISULFATE 75 MG/1
75 TABLET ORAL DAILY
Status: DISCONTINUED | OUTPATIENT
Start: 2025-02-26 | End: 2025-02-26 | Stop reason: HOSPADM

## 2025-02-25 RX ORDER — ACETAMINOPHEN 500 MG
1000 TABLET ORAL
Status: COMPLETED | OUTPATIENT
Start: 2025-02-25 | End: 2025-02-25

## 2025-02-25 RX ORDER — ONDANSETRON 8 MG/1
8 TABLET, ORALLY DISINTEGRATING ORAL EVERY 8 HOURS PRN
Status: DISCONTINUED | OUTPATIENT
Start: 2025-02-25 | End: 2025-02-26 | Stop reason: HOSPADM

## 2025-02-25 RX ORDER — ATORVASTATIN CALCIUM 40 MG/1
80 TABLET, FILM COATED ORAL NIGHTLY
Status: DISCONTINUED | OUTPATIENT
Start: 2025-02-26 | End: 2025-02-26 | Stop reason: HOSPADM

## 2025-02-25 RX ORDER — LIDOCAINE 50 MG/G
1 PATCH TOPICAL
Status: COMPLETED | OUTPATIENT
Start: 2025-02-25 | End: 2025-02-25

## 2025-02-25 RX ORDER — CLOPIDOGREL BISULFATE 300 MG/1
600 TABLET, FILM COATED ORAL ONCE
Status: COMPLETED | OUTPATIENT
Start: 2025-02-25 | End: 2025-02-25

## 2025-02-25 RX ORDER — METOPROLOL TARTRATE 25 MG/1
25 TABLET, FILM COATED ORAL 2 TIMES DAILY
Status: DISCONTINUED | OUTPATIENT
Start: 2025-02-25 | End: 2025-02-25

## 2025-02-25 RX ORDER — NAPROXEN SODIUM 220 MG/1
324 TABLET, FILM COATED ORAL ONCE
Status: DISCONTINUED | OUTPATIENT
Start: 2025-02-25 | End: 2025-02-25

## 2025-02-25 RX ORDER — MORPHINE SULFATE 4 MG/ML
4 INJECTION, SOLUTION INTRAMUSCULAR; INTRAVENOUS
Refills: 0 | Status: DISCONTINUED | OUTPATIENT
Start: 2025-02-25 | End: 2025-02-25

## 2025-02-25 RX ORDER — SODIUM CHLORIDE 0.9 % (FLUSH) 0.9 %
10 SYRINGE (ML) INJECTION
Status: DISCONTINUED | OUTPATIENT
Start: 2025-02-25 | End: 2025-02-26 | Stop reason: HOSPADM

## 2025-02-25 RX ORDER — ACETAMINOPHEN 325 MG/1
650 TABLET ORAL EVERY 4 HOURS PRN
Status: DISCONTINUED | OUTPATIENT
Start: 2025-02-25 | End: 2025-02-26 | Stop reason: HOSPADM

## 2025-02-25 RX ORDER — VALSARTAN 160 MG/1
160 TABLET ORAL DAILY
Status: DISCONTINUED | OUTPATIENT
Start: 2025-02-26 | End: 2025-02-26 | Stop reason: HOSPADM

## 2025-02-25 RX ORDER — LIDOCAINE HYDROCHLORIDE 20 MG/ML
INJECTION, SOLUTION EPIDURAL; INFILTRATION; INTRACAUDAL; PERINEURAL
Status: DISCONTINUED | OUTPATIENT
Start: 2025-02-25 | End: 2025-02-26 | Stop reason: HOSPADM

## 2025-02-25 RX ORDER — MIDAZOLAM HYDROCHLORIDE 1 MG/ML
INJECTION INTRAMUSCULAR; INTRAVENOUS
Status: DISCONTINUED | OUTPATIENT
Start: 2025-02-25 | End: 2025-02-26 | Stop reason: HOSPADM

## 2025-02-25 RX ORDER — HEPARIN SOD,PORCINE/0.9 % NACL 1000/500ML
INTRAVENOUS SOLUTION INTRAVENOUS
Status: DISCONTINUED | OUTPATIENT
Start: 2025-02-25 | End: 2025-02-25

## 2025-02-25 RX ADMIN — VALSARTAN 80 MG: 40 TABLET, FILM COATED ORAL at 05:02

## 2025-02-25 RX ADMIN — POTASSIUM CHLORIDE 40 MEQ: 1500 TABLET, EXTENDED RELEASE ORAL at 03:02

## 2025-02-25 RX ADMIN — CLOPIDOGREL BISULFATE 600 MG: 300 TABLET, FILM COATED ORAL at 06:02

## 2025-02-25 RX ADMIN — NITROGLYCERIN 0.4 MG: 0.4 TABLET, ORALLY DISINTEGRATING SUBLINGUAL at 03:02

## 2025-02-25 RX ADMIN — VALSARTAN 80 MG: 80 TABLET, FILM COATED ORAL at 10:02

## 2025-02-25 RX ADMIN — LIDOCAINE 1 PATCH: 50 PATCH CUTANEOUS at 02:02

## 2025-02-25 RX ADMIN — ALUMINUM HYDROXIDE, MAGNESIUM HYDROXIDE, AND SIMETHICONE 30 ML: 200; 200; 20 SUSPENSION ORAL at 02:02

## 2025-02-25 RX ADMIN — POTASSIUM CHLORIDE 40 MEQ: 1500 TABLET, EXTENDED RELEASE ORAL at 10:02

## 2025-02-25 RX ADMIN — POTASSIUM CHLORIDE 40 MEQ: 1500 TABLET, EXTENDED RELEASE ORAL at 07:02

## 2025-02-25 RX ADMIN — NITROGLYCERIN 0.4 MG: 0.4 TABLET, ORALLY DISINTEGRATING SUBLINGUAL at 01:02

## 2025-02-25 RX ADMIN — SODIUM CHLORIDE: 9 INJECTION, SOLUTION INTRAVENOUS at 03:02

## 2025-02-25 RX ADMIN — HEPARIN SODIUM AND DEXTROSE 12 UNITS/KG/HR: 10000; 5 INJECTION INTRAVENOUS at 04:02

## 2025-02-25 RX ADMIN — ATORVASTATIN CALCIUM 80 MG: 40 TABLET, FILM COATED ORAL at 06:02

## 2025-02-25 RX ADMIN — CARVEDILOL 25 MG: 25 TABLET, FILM COATED ORAL at 05:02

## 2025-02-25 RX ADMIN — LIDOCAINE HYDROCHLORIDE 15 ML: 20 SOLUTION ORAL at 02:02

## 2025-02-25 RX ADMIN — CARVEDILOL 12.5 MG: 12.5 TABLET, FILM COATED ORAL at 06:02

## 2025-02-25 RX ADMIN — POTASSIUM CHLORIDE 40 MEQ: 1500 TABLET, EXTENDED RELEASE ORAL at 12:02

## 2025-02-25 RX ADMIN — ACETAMINOPHEN 1000 MG: 500 TABLET ORAL at 02:02

## 2025-02-25 RX ADMIN — DIPHENHYDRAMINE HYDROCHLORIDE 50 MG: 50 CAPSULE ORAL at 10:02

## 2025-02-25 NOTE — ED TRIAGE NOTES
Calos Aga Rios, a 54 y.o. female presents to the ED w/ complaint of midsternal CP since 0000 which woke her up from sleep. Endorses pain and tingling radiating down L arm. Denies cardiac hx other than HTN. Denies SOB, abdominal pain, NVD, fever/chills.    Triage note:  Chief Complaint   Patient presents with    Chest Pain     Burning CP since 0000. Pt reports tingling to left arm. 324mg aspirin given en route.      Review of patient's allergies indicates:  No Known Allergies  Past Medical History:   Diagnosis Date    Anxiety disorder, unspecified     Hypertension     Migraine headache     Obesity

## 2025-02-25 NOTE — ASSESSMENT & PLAN NOTE
Patient presented with symptoms of chest pain with concerns for ACS. EKG on admission showed NSR. Labs on admission significant for troponin 1660. ASYA 2. Home meds include valsartan, nifedipine, coreg, HCTZ, and allopurinol. Previous TTE from 11/7/24 showed EF 75-80%, mild LVOT obstruction at rest. Previous Stress Test from 2/19/25 negative for ischemia.    Plan:  - Consulted cardiology, appreciate reccs  - DAPT load, ( given in the ambulance), followed by maintenance dosing   - Full AC w/ heparin  - F/u TTE  - F/u repeat troponin levels  - F/u lipid panel  - Continue statin  - Continue home coreg  - NTG PRN for chest pain  - EKG STAT for new chest pain or hemodynamic instability

## 2025-02-25 NOTE — SUBJECTIVE & OBJECTIVE
Past Medical History:   Diagnosis Date    Anxiety disorder, unspecified     Hypertension     Migraine headache     Obesity        Past Surgical History:   Procedure Laterality Date    CYSTOURETEROSCOPY,WITH HOLMIUM LASER LITHOTRIPSY OF URETERAL CALCULUS - Bilateral Bilateral 11/09/2022    ESOPHAGOGASTRODUODENOSCOPY N/A 12/23/2022    Procedure: EGD (ESOPHAGOGASTRODUODENOSCOPY);  Surgeon: Anaid Cohen MD;  Location: [facility] ENDO;  Service: Endoscopy;  Laterality: N/A;    HYSTERECTOMY, TOTAL, LAPAROSCOPIC, WITH SALPINGO-OOPHORECTOMY Bilateral 03/17/2021    LAPAROSCOPIC TOTAL HYSTERECTOMY Bilateral 03/17/2021    PYELOSCOPY Bilateral 10/25/2022    Procedure: PYELOSCOPY;  Surgeon: Raz Cantrell MD;  Location: [facility] OR;  Service: Urology;  Laterality: Bilateral;    TUBAL LIGATION      URETEROSCOPY Bilateral 10/25/2022    Procedure: URETEROSCOPY;  Surgeon: Raz Cantrell MD;  Location: [facility] OR;  Service: Urology;  Laterality: Bilateral;       Review of patient's allergies indicates:  No Known Allergies    No current facility-administered medications on file prior to encounter.     Current Outpatient Medications on File Prior to Encounter   Medication Sig    albuterol (PROVENTIL/VENTOLIN HFA) 90 mcg/actuation inhaler Inhale 2 puffs into the lungs every 6 (six) hours as needed for Wheezing.    allopurinoL (ZYLOPRIM) 100 MG tablet Take 1 tablet (100 mg total) by mouth once daily.    ALPRAZolam (XANAX) 0.5 MG tablet Take 1 tablet (0.5 mg total) by mouth nightly as needed for Anxiety.    buPROPion (WELLBUTRIN XL) 150 MG TB24 tablet Take 1 tablet (150 mg total) by mouth once daily.    carvediloL (COREG) 6.25 MG tablet Take 1 tablet (6.25 mg total) by mouth 2 (two) times daily with meals.    DULoxetine (CYMBALTA) 60 MG capsule Take 120 mg by mouth once daily.    hydroCHLOROthiazide (HYDRODIURIL) 25 MG tablet Take 25 mg by mouth once daily.    NIFEdipine (PROCARDIA-XL) 90 MG (OSM) 24 hr tablet Take 1 tablet (90 mg  total) by mouth once daily.    ondansetron (ZOFRAN) 4 MG tablet Take 1 tablet (4 mg total) by mouth every 12 (twelve) hours as needed for Nausea.    valsartan (DIOVAN) 160 MG tablet Take 1 tablet (160 mg total) by mouth once daily.     Family History       Problem Relation (Age of Onset)    Diabetes Maternal Uncle    Drug abuse Father    Heart disease Mother, Father, Maternal Grandmother    Kidney disease Father    Ovarian cancer Maternal Aunt          Tobacco Use    Smoking status: Former     Current packs/day: 0.25     Average packs/day: 0.3 packs/day for 15.0 years (3.8 ttl pk-yrs)     Types: Cigarettes    Smokeless tobacco: Never    Tobacco comments:     Quite 10/2024.     Substance and Sexual Activity    Alcohol use: Yes     Alcohol/week: 3.0 standard drinks of alcohol     Types: 3 Shots of liquor per week     Comment: scocially on weekends    Drug use: Yes     Types: Marijuana    Sexual activity: Yes     Partners: Male     Birth control/protection: None     Review of Systems   Constitutional: Negative for chills and fever.   HENT:  Negative for congestion.    Eyes:  Negative for blurred vision and visual disturbance.   Cardiovascular:  Positive for chest pain. Negative for dyspnea on exertion, irregular heartbeat, leg swelling, near-syncope, orthopnea, palpitations, paroxysmal nocturnal dyspnea and syncope.   Respiratory:  Negative for cough, shortness of breath, sputum production and wheezing.    Skin:  Negative for color change.   Musculoskeletal:  Negative for arthritis and muscle weakness.   Gastrointestinal:  Negative for bloating, abdominal pain, change in bowel habit, nausea and vomiting.   Genitourinary:  Negative for dysuria.   Neurological:  Negative for dizziness and light-headedness.   Psychiatric/Behavioral:  The patient is not nervous/anxious.      Objective:     Vital Signs (Most Recent):  Temp: 98 °F (36.7 °C) (02/25/25 0535)  Pulse: 65 (02/25/25 0535)  Resp: 12 (02/25/25 0535)  BP: (!) 150/96  (02/25/25 0535)  SpO2: 100 % (02/25/25 0535) Vital Signs (24h Range):  Temp:  [97.6 °F (36.4 °C)-98 °F (36.7 °C)] 98 °F (36.7 °C)  Pulse:  [60-82] 65  Resp:  [11-17] 12  SpO2:  [96 %-100 %] 100 %  BP: (132-166)/() 150/96     Weight: 86.6 kg (191 lb)  Body mass index is 32.79 kg/m².    SpO2: 100 %       No intake or output data in the 24 hours ending 02/25/25 0642    Lines/Drains/Airways       Peripheral Intravenous Line  Duration                  Peripheral IV - Single Lumen 02/25/25 0151 18 G Left;Posterior Hand <1 day         Peripheral IV - Single Lumen 02/25/25 22 G Posterior;Right Hand <1 day                     Physical Exam  Constitutional:       General: She is not in acute distress.     Appearance: Normal appearance. She is not ill-appearing.   HENT:      Head: Normocephalic and atraumatic.      Mouth/Throat:      Mouth: Mucous membranes are moist.   Eyes:      Extraocular Movements: Extraocular movements intact.      Pupils: Pupils are equal, round, and reactive to light.   Cardiovascular:      Rate and Rhythm: Normal rate and regular rhythm.      Heart sounds: No murmur heard.     No friction rub. No gallop.   Pulmonary:      Effort: No respiratory distress.      Breath sounds: No rales.   Abdominal:      General: Bowel sounds are normal. There is no distension.      Tenderness: There is no abdominal tenderness.   Musculoskeletal:      Cervical back: No rigidity.      Right lower leg: No edema.      Left lower leg: No edema.   Skin:     General: Skin is warm.      Capillary Refill: Capillary refill takes less than 2 seconds.   Neurological:      General: No focal deficit present.      Mental Status: She is alert and oriented to person, place, and time.   Psychiatric:         Mood and Affect: Mood normal.         Behavior: Behavior normal.          Significant Labs: BMP:   Recent Labs   Lab 02/25/25  0217   *      K 3.1*      CO2 23   BUN 20   CREATININE 1.1   CALCIUM 8.9   MG 1.9    , CMP   Recent Labs   Lab 02/25/25 0217      K 3.1*      CO2 23   *   BUN 20   CREATININE 1.1   CALCIUM 8.9   PROT 6.9   ALBUMIN 3.4*   BILITOT 0.2   ALKPHOS 61   AST 24   ALT 15   ANIONGAP 7*   , CBC   Recent Labs   Lab 02/25/25 0217   WBC 8.27   HGB 13.7   HCT 41.4      , INR   Recent Labs   Lab 02/25/25  0338   INR 1.0   , Lipid Panel   Recent Labs   Lab 02/25/25  0507   CHOL 157   HDL 54   LDLCALC 96.6   TRIG 32   CHOLHDL 34.4   , and Troponin   Recent Labs   Lab 02/25/25  0217 02/25/25  0427   TROPONINIHS 1660* 1335*       Significant Imaging: Echocardiogram: 2D echo with color flow doppler: No results found for this or any previous visit. and Transthoracic echo (TTE) complete (Cupid Only):   Results for orders placed or performed during the hospital encounter of 11/07/24   Echo   Result Value Ref Range    BSA 1.95 m2    LVOT stroke volume 159.3 cm3    LVIDd 3.3 (A) 3.5 - 6.0 cm    LV Systolic Volume 13.35 mL    LV Systolic Volume Index 7.0 mL/m2    LVIDs 2.0 (A) 2.1 - 4.0 cm    LV Diastolic Volume 43.87 mL    LV Diastolic Volume Index 23.09 mL/m2    Left Ventricular End Systolic Volume by Teichholz Method 13.35 mL    Left Ventricular End Diastolic Volume by Teichholz Method 43.87 mL    IVS 1.5 (A) 0.6 - 1.1 cm    LVOT diameter 1.9 cm    LVOT area 2.8 cm2    FS 39.4 28 - 44 %    Left Ventricle Relative Wall Thickness 0.97 cm    PW 1.6 (A) 0.6 - 1.1 cm    LV mass 188.8 g    LV Mass Index 99.4 g/m2    MV Peak E Allen 0.89 m/s    TDI LATERAL 0.05 m/s    TDI SEPTAL 0.03 m/s    E/E' ratio 22.25 m/s    MV Peak A Allen 1.26 m/s    TR Max Allen 2.96 m/s    E/A ratio 0.71     IVRT 123.69 msec    E wave deceleration time 357.52 msec    LV SEPTAL E/E' RATIO 29.67 m/s    LV LATERAL E/E' RATIO 17.80 m/s    LVOT peak allen 2.4 m/s    Left Ventricular Outflow Tract Mean Velocity 1.82 cm/s    Left Ventricular Outflow Tract Mean Gradient 19.0 mmHg    RV S' 18.00 cm/s    TAPSE 2.85 cm    LA size 3.86 cm     Left Atrium Minor Axis 5.22 cm    Left Atrium Major Axis 4.65 cm    RA Major Axis 4.01 cm    AV mean gradient 19.3 mmHg    AV peak gradient 33.6 mmHg    Ao peak jolene 2.9 m/s    Ao VTI 46.9 cm    LVOT peak VTI 56.2 cm    AV valve area 3.4 cm²    AV Velocity Ratio 0.83     AV index (prosthetic) 1.20     ALEXEI by Velocity Ratio 2.3 cm²    MV mean gradient 2 mmHg    MV peak gradient 6 mmHg    MV stenosis pressure 1/2 time 103.68 ms    MV valve area p 1/2 method 2.12 cm2    MV valve area by continuity eq 4.50 cm2    MV VTI 35.4 cm    TV peak gradient 1 mmHg    Triscuspid Valve Regurgitation Peak Gradient 35 mmHg    PV PEAK VELOCITY 0.96 m/s    PV peak gradient 4 mmHg    Sinus 3.14 cm    STJ 3.02 cm    Ascending aorta 2.76 cm    IVC diameter 1.37 cm    Mean e' 0.04 m/s    ZLVIDS -3.92     ZLVIDD -4.80     RVDD 3.52 cm    ANGELES 30.6 mL/m2    LA Vol 58.10 cm3    RV- roy basal diam 3.5 cm    RV/LV Ratio 1.06 cm    LA WIDTH 3.6 cm    RA Width 2.5 cm    TV resting pulmonary artery pressure 38 mmHg    RV TB RVSP 6 mmHg    Est. RA pres 3 mmHg    Narrative      Left Ventricle: The left ventricle is normal in size. Moderately   increased wall thickness. There is moderate concentric hypertrophy. There   is hyperdynamic systolic function with a visually estimated ejection   fraction of 75 - 80%. Grade I diastolic dysfunction. Mild LVOT obstruction   at rest. LVOT pressure gradient increases with Valsalva.  Peak jolene 4.75   m/sec. The findings are consistent with hypertrophic cardiomyopathy with   obstruction.    Right Ventricle: Normal right ventricular cavity size. Systolic   function is normal.    Pulmonary Artery: The estimated pulmonary artery systolic pressure is   38 mmHg.         Exercise stress echo 2/19/25    Stress Protocol: The patient exercised for 5 minutes 49 seconds on a high ramp protocol, achieving a peak heart rate of 137 bpm, which is 86% of the age predicted maximum heart rate. Their exercise capacity was normal. The  patient reported no symptoms during the stress test. The test was stopped because the patient experienced fatigue.    Stress ECG: There is no ST segment deviation identified during the protocol. There are no arrhythmias during stress. There is normal blood pressure response with stress.    ECG Conclusion: The ECG portion of the study is negative for ischemia.    Left Ventricle: The left ventricle is normal in size. Moderate septal thickening. IVSd is 1.6 cm. There is normal systolic function. Ejection fraction is approximately 60%. There is normal diastolic function. No LVOT obstruction at rest. Peak gradient after exercise is 16 mm Hg.    Right Ventricle: Normal right ventricular cavity size. Wall thickness is normal. Systolic function is normal.    Mitral Valve: There is normal leaflet mobility. There is no systolic anterior motion of the mitral valve. There is mild regurgitation.    Pulmonary Artery: Pulmonary artery pressure could not be accurately determined.    Post-stress Echo: The left ventricle systolic function is hyperdynamic. Right ventricle systolic function is normal.    Post-stress Conclusion: The study is negative with no echocardiographic evidence of stress induced ischemia.    Nuclear Stress Test 11/8/24    Normal myocardial perfusion scan. There is no evidence of myocardial ischemia or infarction.    There is a trivial to mild intensity fixed perfusion abnormality in the  wall of the left ventricle secondary to diaphragm attenuation.    The gated perfusion images showed an ejection fraction of 78% at rest.    There is normal wall motion at rest and post-stress.    The ECG portion of the study is negative for ischemia.    The patient reported no chest pain during the stress test.    There were no arrhythmias during stress.

## 2025-02-25 NOTE — ED PROVIDER NOTES
Encounter Date: 2/25/2025       History     Chief Complaint   Patient presents with    Chest Pain     Burning CP since 0000. Pt reports tingling to left arm. 324mg aspirin given en route.      HPI    Patient is a 54 y.o. female w/ HTN, anxiety, depression, GERD, CKD3b presented to the ED w/ c/o central, burning chest pain 10/10 intensity for the past 2-3 hours. Pt reports that she woke up to use the restroom and noticed chest pain. She drank water but the chest pain was not relieved. She reports that this happened a few weeks ago but that the episode went away after about 30 mins. She reports L arm numbness and tingling that has improved since onset. Pt denies nausea, vomiting, GERD, and jaw pain.     Of note, pt is being worked up by cardiology for potential HOCM- echo shows LVH but no JANICE- pending genetic workup. She stopped taking her Cymbalta due to side effects.     Review of patient's allergies indicates:  No Known Allergies  Past Medical History:   Diagnosis Date    Anxiety disorder, unspecified     Hypertension     Migraine headache     Obesity      Past Surgical History:   Procedure Laterality Date    CYSTOURETEROSCOPY,WITH HOLMIUM LASER LITHOTRIPSY OF URETERAL CALCULUS - Bilateral Bilateral 11/09/2022    ESOPHAGOGASTRODUODENOSCOPY N/A 12/23/2022    Procedure: EGD (ESOPHAGOGASTRODUODENOSCOPY);  Surgeon: Anaid Cohen MD;  Location: [facility];  Service: Endoscopy;  Laterality: N/A;    HYSTERECTOMY, TOTAL, LAPAROSCOPIC, WITH SALPINGO-OOPHORECTOMY Bilateral 03/17/2021    LAPAROSCOPIC TOTAL HYSTERECTOMY Bilateral 03/17/2021    PYELOSCOPY Bilateral 10/25/2022    Procedure: PYELOSCOPY;  Surgeon: Raz Cantrell MD;  Location: [facility] OR;  Service: Urology;  Laterality: Bilateral;    TUBAL LIGATION      URETEROSCOPY Bilateral 10/25/2022    Procedure: URETEROSCOPY;  Surgeon: Raz Cantrell MD;  Location: [facility];  Service: Urology;  Laterality: Bilateral;     Family History   Problem Relation Name Age of  Onset    Heart disease Mother      Drug abuse Father      Heart disease Father      Kidney disease Father      Diabetes Maternal Uncle      Heart disease Maternal Grandmother      Ovarian cancer Maternal Aunt      Cancer Neg Hx       Social History[1]    Physical Exam     Initial Vitals [02/25/25 0148]   BP Pulse Resp Temp SpO2   (!) 147/86 82 17 97.6 °F (36.4 °C) 100 %      MAP       --         Physical Exam    Constitutional: She appears well-developed and well-nourished. She is not diaphoretic. No distress.   HENT:   Head: Normocephalic and atraumatic.   Eyes: EOM are normal.   Cardiovascular:  Normal rate and regular rhythm.           Pulmonary/Chest: Breath sounds normal. No respiratory distress. She has no wheezes. She has no rales. She exhibits no tenderness.   Abdominal: Abdomen is soft. She exhibits no distension. There is no abdominal tenderness.   Musculoskeletal:         General: No edema (legs). Normal range of motion.     Neurological: She is alert. She has normal strength.   Skin: Skin is warm and dry.   Psychiatric: She has a normal mood and affect. Thought content normal.         ED Course   Procedures  Labs Reviewed   COMPREHENSIVE METABOLIC PANEL - Abnormal       Result Value    Sodium 140      Potassium 3.1 (*)     Chloride 110      CO2 23      Glucose 112 (*)     BUN 20      Creatinine 1.1      Calcium 8.9      Total Protein 6.9      Albumin 3.4 (*)     Total Bilirubin 0.2      Alkaline Phosphatase 61      AST 24      ALT 15      eGFR >60.0      Anion Gap 7 (*)     Narrative:     Release to patient->Immediate   CBC W/ AUTO DIFFERENTIAL - Abnormal    WBC 8.27      RBC 4.30      Hemoglobin 13.7      Hematocrit 41.4      MCV 96      MCH 31.9 (*)     MCHC 33.1      RDW 13.8      Platelets 190      MPV 10.3      Immature Granulocytes 0.2      Gran # (ANC) 4.3      Immature Grans (Abs) 0.02      Lymph # 3.1      Mono # 0.4      Eos # 0.4      Baso # 0.04      nRBC 0      Gran % 52.1      Lymph % 36.9       Mono % 5.2      Eosinophil % 5.1      Basophil % 0.5      Differential Method Automated      Narrative:     Release to patient->Immediate   TROPONIN I HIGH SENSITIVITY - Abnormal    Troponin I High Sensitivity 1660 (*)     Narrative:     Release to patient->Immediate   TROPONIN I HIGH SENSITIVITY - Abnormal    Troponin I High Sensitivity 1335 (*)    HEPATITIS C ANTIBODY    Hepatitis C Ab Non-reactive      Narrative:     Release to patient->Immediate   HIV 1 / 2 ANTIBODY    HIV 1/2 Ag/Ab Non-reactive      Narrative:     Release to patient->Immediate   LIPASE    Lipase 29      Narrative:     Release to patient->Immediate   MAGNESIUM    Magnesium 1.9      Narrative:     Release to patient->Immediate   APTT    aPTT 27.1      Narrative:     Draw baseline aPTT prior to starting the heparin bolus or  infusion  PTT daily if no changes in infusion rate  (if patient is on warfarin prior to heparin therapy)   APTT    aPTT 27.1      Narrative:     Draw baseline aPTT prior to starting the heparin bolus or  infusion  PTT daily if no changes in infusion rate  (if patient is on warfarin prior to heparin therapy)   PROTIME-INR    Prothrombin Time 10.5      INR 1.0      Narrative:     Draw baseline aPTT prior to starting the heparin bolus or  infusion  PTT daily if no changes in infusion rate  (if patient is on warfarin prior to heparin therapy)   LIPID PANEL    Cholesterol 157      Triglycerides 32      HDL 54      LDL Cholesterol 96.6      HDL/Cholesterol Ratio 34.4      Total Cholesterol/HDL Ratio 2.9      Non-HDL Cholesterol 103     HEMOGLOBIN A1C    Hemoglobin A1C 5.2      Estimated Avg Glucose 103     B-TYPE NATRIURETIC PEPTIDE    BNP 64     TYPE & SCREEN    Group & Rh O POS      Indirect Edel NEG      Specimen Outdate 02/28/2025 23:59       EKG Readings: (Independently Interpreted)   Initial Reading: No STEMI. Rhythm: Normal Sinus Rhythm. Heart Rate: 68. Ectopy: No Ectopy. Conduction: Normal. T Waves Flipped: III, AVR,  AVF and V1. Axis: Normal. Clinical Impression: Normal Sinus Rhythm     ECG Results              EKG 12-lead (Final result)        Collection Time Result Time QRS Duration [facility] QTC Calculation    02/25/25 03:32:22 02/25/25 08:07:48 96 487                     Final result by Interface, Lab In [facility] (02/25/25 08:07:53)                   Narrative:    Test Reason : R07.9,    Vent. Rate :  68 BPM     Atrial Rate :  68 BPM     P-R Int : 160 ms          QRS Dur :  96 ms      QT Int : 458 ms       P-R-T Axes :   9  -6  76 degrees    QTcB Int : 487 ms    Normal sinus rhythm  LVH with repolarization abnormality  Prolonged QT  Abnormal ECG  When compared with ECG of 25-Feb-2025 02:05,  Nonspecific T wave abnormality has replaced inverted T waves in Inferior  leads  Confirmed by Yair Boyd ([id number]) on 2/25/2025 8:07:47 AM    Referred By: AAAREFERRAL SELF           Confirmed By: Yair Boyd                                     EKG 12-lead (Final result)        Collection Time Result Time QRS Duration [facility] QTC Calculation    02/25/25 02:05:54 02/25/25 08:07:59 102 480                     Final result by Interface, Lab In [facility] (02/25/25 08:08:08)                   Narrative:    Test Reason : R79.89,    Vent. Rate :  68 BPM     Atrial Rate :  68 BPM     P-R Int : 140 ms          QRS Dur : 102 ms      QT Int : 452 ms       P-R-T Axes :  20  33  -5 degrees    QTcB Int : 480 ms    Normal sinus rhythm  Nonspecific ST abnormality  Prolonged QT  Abnormal ECG  When compared with ECG of 19-Feb-2025 09:10,  Inverted T waves have replaced nonspecific T wave abnormality in Inferior  leads  Confirmed by Yair Boyd ([id number]) on 2/25/2025 8:07:58 AM    Referred By: AAAREFERRAL SELF           Confirmed By: Yair Boyd                                  Imaging Results              X-Ray Chest AP Portable (Final result)  Result time 02/25/25 03:54:01      Final result by Anish Burch MD (02/25/25 03:54:01)                   Impression:      No acute  radiographic abnormality.    The cardiopericardial silhouette remains enlarged.      Electronically signed by: Anish Burch  Date:    02/25/2025  Time:    03:54               Narrative:    EXAMINATION:  XR CHEST AP PORTABLE    CLINICAL HISTORY:  chest pain;    TECHNIQUE:  Single frontal view of the chest was performed.    COMPARISON:  Portable chest x-ray 11/07/2024    FINDINGS:  Midline thoracic trachea.    Cardiopericardial silhouette remains enlarged.    Pulmonary vasculature appears less engorged.  No consolidation, pulmonary edema, pneumothorax, or blunting of either lateral costophrenic angle is demonstrated radiographically.    In the imaged portions of the skeleton and upper abdomen, anatomic detail is suboptimally visualized, especially in the thoracic spine.  Mild thoracic dextrocurvature is again suggested.  Scattered degenerative skeletal changes.    External leads project over the torso.                                       Medications   LIDOcaine 5 % patch 1 patch (1 patch Transdermal Patch Applied 2/25/25 0243)   heparin 25,000 units in dextrose 5% 250 mL (100 units/mL) infusion LOW INTENSITY nomogram - [facility] (12 Units/kg/hr × 67.5 kg (Adjusted) Intravenous Handoff 2/25/25 0700)   heparin 25,000 units in dextrose 5% (100 units/ml) IV bolus from bag LOW INTENSITY nomogram - [facility] (has no administration in time range)   heparin 25,000 units in dextrose 5% (100 units/ml) IV bolus from bag LOW INTENSITY nomogram - [facility] (has no administration in time range)   aspirin chewable tablet 81 mg (has no administration in time range)   nitroGLYCERIN SL tablet 0.4 mg (has no administration in time range)   clopidogreL tablet 75 mg (has no administration in time range)   atorvastatin tablet 80 mg (has no administration in time range)   carvediloL tablet 12.5 mg (12.5 mg Oral Given 2/25/25 0653)   potassium chloride SA CR tablet 40 mEq (40 mEq Oral Given 2/25/25 0731)   acetaminophen tablet 1,000 mg (1,000 mg Oral  Given 2/25/25 0237)   aluminum-magnesium hydroxide-simethicone 200-200-20 mg/5 mL suspension 30 mL (30 mLs Oral Given 2/25/25 0237)     And   LIDOcaine viscous HCl 2% oral solution 15 mL (15 mLs Oral Given 2/25/25 0237)   nitroGLYCERIN SL tablet 0.4 mg (0.4 mg Sublingual Given 2/25/25 0324)   potassium chloride SA CR tablet 40 mEq (40 mEq Oral Given 2/25/25 0330)   heparin 25,000 units in dextrose 5% (100 units/ml) IV bolus from bag LOW INTENSITY nomogram - [facility] (4,000 Units Intravenous Bolus from Bag 2/25/25 0428)   nitroGLYCERIN SL tablet 0.4 mg (0.4 mg Sublingual Given 2/25/25 0344)   clopidogreL tablet 600 mg (600 mg Oral Given 2/25/25 0623)   atorvastatin tablet 80 mg (80 mg Oral Given 2/25/25 0653)     Medical Decision Making  Amount and/or Complexity of Data Reviewed  Labs: ordered. Decision-making details documented in ED Course.  Radiology: ordered.    Risk  OTC drugs.  Prescription drug management.  Decision regarding hospitalization.    Patient is a 54 y.o. female w/ HTN, anxiety, depression, GERD, CKD3b presented to the ED w/ c/o central, burning chest pain 10/10 intensity for the past 2-3 hours.     On presentation, patient is well appearing and hemodynamically stable. She is endorsing substernal chest pain.    EKG without significant changes. Given her reports of chest pain and age, basic labs with troponin ordered.  She had a recent negative stress test, making ACS less likely.    Description of symptoms possibly related to GERD/acid reflux.    Tylenol, lidocaine patch, and GI cocktail ordered for symptoms.     Patient's labs as below.    Grossly unremarkable aside from troponin, which resulted significantly elevated (over 1800), consistent with ACS.    Heparin drip was initiated - full dose aspirin given by EMS. She was given a nitroglycerin with significant improvement in her pain.  Repeat EKG was ordered which did not reveal any significant changes from initial.    She was admitted to hospital  medicine for management of NSTEMI.             ED Course as of 02/25/25 0835   Tue Feb 25, 2025   0249 CBC auto differential(!)  No leukocytosis or anemia [DR]   0305 Lipase: 29 [DR]   0305 Magnesium : 1.9 [DR]      ED Course User Index  [DR] Magali Hu DO                           Clinical Impression:  Final diagnoses:  [R07.9] Chest pain  [R79.89] Elevated troponin  [I21.4] NSTEMI (non-ST elevated myocardial infarction) (Primary)          ED Disposition Condition    Admit Stable                    [1]   Social History  Tobacco Use    Smoking status: Former     Current packs/day: 0.25     Average packs/day: 0.3 packs/day for 15.0 years (3.8 ttl pk-yrs)     Types: Cigarettes    Smokeless tobacco: Never    Tobacco comments:     Quite 10/2024.     Substance Use Topics    Alcohol use: Yes     Alcohol/week: 3.0 standard drinks of alcohol     Types: 3 Shots of liquor per week     Comment: scocially on weekends    Drug use: Yes     Types: Marijuana        Magali Hu DO  Resident  02/25/25 0835

## 2025-02-25 NOTE — ASSESSMENT & PLAN NOTE
Internal Medicine Patient currently being worked up for HOCM outpatient due to TTE from November 2024 showing mild LVOT obstruction at rest. See plan under NSTEMI. Cardiology consulted.

## 2025-02-25 NOTE — HPI
Ms. Calos Rios is am 54F with PMH of obesity and HTN who presents from chest pain that woke her up from her sleep at 11:00 PM. She states that she felt a burning sensation in the center of her sternum that was also under her left breast. She mentions that it radiated to her left arm which caused some numbness. She tried to drink some water which did not help. Of note, she was admitted to [facility] on 10/13/2024 for hypertensive emergency and her echo showed moderate concentric hypertrophy with mild LVOT obstruction. She is currently being worked up by cardiology outpatient for HOCM. She recently got a stress echo on 2/19/25 which was negative for ischemia. She mentions that for the past few months, she feels short of breath upon exertion. She denies any chest pain or shortness of breath at rest. She states that this is the first time she has had burning chest pain that did not go away with water. She states that the chest pain only resolved when she came to the hospital and was given nitroglycerin. Patient states that she had a shooter of Patron earlier this evening. She usually drinks about 3 drinks on Saturday and Sundays. She smokes 4 cigarettes per day.    In the ED, she was hemodynamically stable with /86 and HR 82. Labs were remarkable for K 3.1 and troponin of 1660. EKG was NSR. Patient was admitted to hospital medicine for further workup of chest pain.

## 2025-02-25 NOTE — CONSULTS
[facility] - Emergency Dept  Interventional Cardiology  Consult Note    Patient Name: Calos Rios  MRN: [id number]  Admission Date: 2/25/2025  Hospital Length of Stay: 0 days  Code Status: Full Code   Attending Provider: Bren Tobias MD  Consulting Provider: Chaparrita Ruvalcaba MD  Primary Care Physician: Adria Doll Jr., MD  Principal Problem:NSTEMI (non-ST elevated myocardial infarction)    Patient information was obtained from patient, past medical records, and ER records.     Inpatient consult to Interventional Cardiology  Consult performed by: Chaparrita Ruvalcaba MD  Consult ordered by: Jolie Bonilla MD  Reason for consult: NSTEMI      Subjective:     Chief Complaint:  NSTEMI     HPI:  Ms. Calos Rios is a 53 y/o F w/ pertinent PMHx of HTN and tobacco abuse who presented with sternal, non-radiating, reproducible, burning sensation that started around 11pm and improved SL nitro and possibly lidocaine patch. Worsened after swallowing K capsules. Denies any SOB, diaphoresis, nausea, vomiting, syncope, orthopnea, PND.     Of note, patient is being evaluated for HOCM by HTS and is pending a cardiac MRI. Patient underwent recent  stress echo (2/19/25) that was negative for ischemia (ordered as part of HOCM workup). Also showed no LVOT obstruction at rest w/ peak gradient after exercise is 16 mm Hg.     EMS gave her 325 mg ASA. Hospital medicine started on ACS pathway with Heparin gtt. Interventional cardiology has been consulted to evaluate chest pain.     Past Medical History:   Diagnosis Date    Anxiety disorder, unspecified     Hypertension     Migraine headache     Obesity        Past Surgical History:   Procedure Laterality Date    CYSTOURETEROSCOPY,WITH HOLMIUM LASER LITHOTRIPSY OF URETERAL CALCULUS - Bilateral Bilateral 11/09/2022    ESOPHAGOGASTRODUODENOSCOPY N/A 12/23/2022    Procedure: EGD (ESOPHAGOGASTRODUODENOSCOPY);  Surgeon: Anaid Cohen MD;  Location: [facility];   Service: Endoscopy;  Laterality: N/A;    HYSTERECTOMY, TOTAL, LAPAROSCOPIC, WITH SALPINGO-OOPHORECTOMY Bilateral 03/17/2021    LAPAROSCOPIC TOTAL HYSTERECTOMY Bilateral 03/17/2021    PYELOSCOPY Bilateral 10/25/2022    Procedure: PYELOSCOPY;  Surgeon: Raz Cantrell MD;  Location: [facility];  Service: Urology;  Laterality: Bilateral;    TUBAL LIGATION      URETEROSCOPY Bilateral 10/25/2022    Procedure: URETEROSCOPY;  Surgeon: Raz Cantrell MD;  Location: [facility] OR;  Service: Urology;  Laterality: Bilateral;       Review of patient's allergies indicates:  No Known Allergies    (Not in a hospital admission)    Family History       Problem Relation (Age of Onset)    Diabetes Maternal Uncle    Drug abuse Father    Heart disease Mother, Father, Maternal Grandmother    Kidney disease Father    Ovarian cancer Maternal Aunt          Tobacco Use    Smoking status: Former     Current packs/day: 0.25     Average packs/day: 0.3 packs/day for 15.0 years (3.8 ttl pk-yrs)     Types: Cigarettes    Smokeless tobacco: Never    Tobacco comments:     Quite 10/2024.     Substance and Sexual Activity    Alcohol use: Yes     Alcohol/week: 3.0 standard drinks of alcohol     Types: 3 Shots of liquor per week     Comment: scocially on weekends    Drug use: Yes     Types: Marijuana    Sexual activity: Yes     Partners: Male     Birth control/protection: None     ROS12 point ROS negative except for HPI  Objective:     Vital Signs (Most Recent):  Temp: 97.8 °F (36.6 °C) (02/25/25 0727)  Pulse: 68 (02/25/25 0730)  Resp: 13 (02/25/25 0730)  BP: (!) 167/104 (02/25/25 0730)  SpO2: 100 % (02/25/25 0730) Vital Signs (24h Range):  Temp:  [97.6 °F (36.4 °C)-98 °F (36.7 °C)] 97.8 °F (36.6 °C)  Pulse:  [60-82] 68  Resp:  [11-17] 13  SpO2:  [96 %-100 %] 100 %  BP: (132-186)/() 167/104     Weight: 86.6 kg (190 lb 14.7 oz)  Body mass index is 32.77 kg/m².    SpO2: 100 %         Intake/Output Summary (Last 24 hours) at 2/25/2025  0934  Last data filed at 2/25/2025 0724  Gross per 24 hour   Intake --   Output 550 ml   Net -550 ml       Lines/Drains/Airways       Peripheral Intravenous Line  Duration                  Peripheral IV - Single Lumen 02/25/25 0151 18 G Left;Posterior Hand <1 day         Peripheral IV - Single Lumen 02/25/25 22 G Posterior;Right Hand <1 day                     Physical Exam  Constitutional:       General: She is not in acute distress.  Neck:      Vascular: No hepatojugular reflux or JVD.   Cardiovascular:      Rate and Rhythm: Normal rate and regular rhythm.      Comments: Sternal tenderness upon palpation.   Pulmonary:      Effort: Pulmonary effort is normal.      Breath sounds: Normal breath sounds.   Abdominal:      General: Bowel sounds are normal.      Palpations: Abdomen is soft.   Skin:     General: Skin is warm.      Capillary Refill: Capillary refill takes less than 2 seconds.   Neurological:      Mental Status: She is alert and oriented to person, place, and time.            Significant Labs: All pertinent lab results from the last 24 hours have been reviewed.    Significant Imaging: Echocardiogram: Transthoracic echo (TTE) complete (Cupid Only):   Results for orders placed or performed during the hospital encounter of 02/25/25   Echo   Result Value Ref Range    BSA 1.98 m2    Est. RA pres 3 mmHg    Narrative      Left Ventricle: The left ventricle is normal in size. Normal wall   thickness. There is low normal systolic function with a visually estimated   ejection fraction of 50 - 55%.    Right Ventricle: The right ventricle is normal in size. Systolic   function is normal.    IVC/SVC: Normal venous pressure at 3 mmHg.    Limited echo, Spetum measuring 1.6cm. Hypertrophic cardiomyopathy       Assessment and Plan:     Cardiac/Vascular  * NSTEMI (non-ST elevated myocardial infarction)  Possibly 2/2 type 1 vs type 2 in the setting of HTN. Will also evaluate for HOCM w/ LHC.     -LHC +/- PCI, patient is a  WEST candidate  - Anti-platelet Therapy: ASA / Ticagrelor  - Access: Right radial  - Creatinine/CrCl: 1.1  - Allergies: None  - Pre-Hydration: NS  - Pre-Op Med: Bendaryl 50mg pO   - All patient's questions were answered.  -The risks, benefits and alternatives of the procedure were explained to the patient.   -The risks of coronary angiography include but are not limited to: bleeding, infection, heart rhythm abnormalities, allergic reactions, kidney injury and potential need for dialysis, stroke and death.   - Should stenting be indicated, the patient has agreed to dual anti-platelet therapy for 1-consecutive year with a drug-eluting stent and a minimum of 1-month with the use of a bare metal stent  - Additionally, pt is aware that non-compliance is likely to result in stent clotting with heart attack, heart failure, and/or death  -The risks of moderate sedation include hypotension, respiratory depression, arrhythmias, bronchospasm, and death.   - Informed consent was obtained and the  patient is agreeable to proceed with the procedure.          VTE Risk Mitigation (From admission, onward)           Ordered     IP VTE HIGH RISK PATIENT  Once         02/25/25 0435     Place sequential compression device  Until discontinued         02/25/25 0435     heparin 25,000 units in dextrose 5% (100 units/ml) IV bolus from bag LOW INTENSITY nomogram - [facility]  As needed (PRN)        Question:  Heparin Infusion Adjustment (DO NOT MODIFY ANSWER)  Answer:  \\[facility]\epic\Images\Pharmacy\HeparinInfusions\heparin LOW INTENSITY nomogram for [facility] [id number]    02/25/25 0314     heparin 25,000 units in dextrose 5% (100 units/ml) IV bolus from bag LOW INTENSITY nomogram - [facility]  As needed (PRN)        Question:  Heparin Infusion Adjustment (DO NOT MODIFY ANSWER)  Answer:  \\[facility]\epic\Images\Pharmacy\HeparinInfusions\heparin LOW INTENSITY nomogram for [facility] [id number]    02/25/25 0314     heparin 25,000 units in dextrose 5% 250 mL (100  units/mL) infusion LOW INTENSITY nomogram - [facility]  Continuous        Question:  Begin at (units/kg/hr)  Answer:  12    02/25/25 0314                    Thank you for your consult. I will follow-up with patient. Please contact us if you have any additional questions.    Chaparrita Ruvalcaba MD  Interventional Cardiology   [facility] - Emergency Dept

## 2025-02-25 NOTE — ASSESSMENT & PLAN NOTE
54 yoF with h/o HTN, GERD, Obesity, current smoker presents to ED with chest pain. Cardiology has been consulted to evaluate chest pain.        Latest Reference Range & Units 02/25/25 02:17 02/25/25 04:27   Troponin I High Sensitivity 0 - 14 ng/L 1660 (H) 1335 (H)   TTE 11/7/24 LVEF 75 - 80%. Grade I diastolic dysfunction. hypertrophic cardiomyopathy with obstruction.  Exercise stress echo 2/19/25 was negative for stress induced ischemia.  Nuclear Stress Test 11/8/24 revealed no evidence of myocardial ischemia or infarction. Though there was a trivial to mild intensity fixed perfusion abnormality in the  wall of the left ventricle secondary to diaphragm attenuation.    Bedside Echo in ED did not show any wall motion abnormality  EKG: T inversion in III, aVF, LVH  T inversion may be due to LVH. Compared to EKG in November 2024, she does not have any ST depression in inferolateral leads  Recommend continue ACS pathway with Heparin gtt  Received  en route to ED by EMS  Get clopi 600, atorvastatin 80  Get interventional cardiology consult for possible Left Heart Catheterization. keep her NPO.   Increase carvediolol to 12.5 BID for better BP control in addition to antianginal effect  Monitor BP, HR after increasing carvedilol

## 2025-02-25 NOTE — Clinical Note
The wire was inserted into the distal left anterior descending arteryiFR was performed of the LAD. iFR= 0.90.

## 2025-02-25 NOTE — HPI
54 yoF with h/o HTN, GERD, Obesity, current smoker presents to ED with chest pain. Substernal. 10/10, constant for 2-3 hours, burning, radiating to Rt breast, relieved with s.l. NTG in ED. Associated with left arm numbness. She reports no shortness of breath, palpitations, sweating, nausea/vomiting or lightheadedness associated with it. In November 2024 she was diagnosed with HOCM with LVOT obstruction by Dr. Valle & followed with Dr. Cannon recommended to get genetic testing, cMRI, TTE and follow up with Dr. Maryam Lim. She was also recommended to get evaluated for secondary causes of HTN. BP at home in 130s/90s. EMS gave her 325 mg ASA. Hospital medicine started on ACS pathway with Heparin gtt. Cardiology has been consulted to evaluate chest pain.

## 2025-02-25 NOTE — CONSULTS
[facility] - Emergency Dept  Cardiology  Consult Note    Patient Name: Calos Rios  MRN: [id number]  Admission Date: 2/25/2025  Hospital Length of Stay: 0 days  Code Status: Full Code   Attending Provider: Bren Tobias MD   Consulting Provider: Joaquin Lopez MD  Primary Care Physician: Adria Doll Jr., MD  Principal Problem:NSTEMI (non-ST elevated myocardial infarction)    Patient information was obtained from patient, past medical records, ER records, and primary team.     Inpatient consult to Cardiology  Consult performed by: Joaquin Lopez MD  Consult ordered by: Jolie Bonilla MD  Reason for consult: NSTEMI/ Chest pain evaluation        Subjective:     Chief Complaint:  Chest Pain     HPI:   54 yoF with h/o HTN, GERD, Obesity, current smoker presents to ED with chest pain. Substernal. 10/10, constant for 2-3 hours, burning, radiating to Rt breast, relieved with s.l. NTG in ED. Associated with left arm numbness. She reports no shortness of breath, palpitations, sweating, nausea/vomiting or lightheadedness associated with it. In November 2024 she was diagnosed with HOCM with LVOT obstruction by Dr. Valle & followed with Dr. Cannon recommended to get genetic testing, cMRI, TTE and follow up with Dr. Maryam Lim. She was also recommended to get evaluated for secondary causes of HTN. BP at home in 130s/90s. EMS gave her 325 mg ASA. Hospital medicine started on ACS pathway with Heparin gtt. Cardiology has been consulted to evaluate chest pain.     Past Medical History:   Diagnosis Date    Anxiety disorder, unspecified     Hypertension     Migraine headache     Obesity        Past Surgical History:   Procedure Laterality Date    CYSTOURETEROSCOPY,WITH HOLMIUM LASER LITHOTRIPSY OF URETERAL CALCULUS - Bilateral Bilateral 11/09/2022    ESOPHAGOGASTRODUODENOSCOPY N/A 12/23/2022    Procedure: EGD (ESOPHAGOGASTRODUODENOSCOPY);  Surgeon: Anaid Cohen MD;  [facility];  Service: Endoscopy;   Laterality: N/A;    HYSTERECTOMY, TOTAL, LAPAROSCOPIC, WITH SALPINGO-OOPHORECTOMY Bilateral 03/17/2021    LAPAROSCOPIC TOTAL HYSTERECTOMY Bilateral 03/17/2021    PYELOSCOPY Bilateral 10/25/2022    Procedure: PYELOSCOPY;  Surgeon: Raz Cantrell MD;  Location: [facility];  Service: Urology;  Laterality: Bilateral;    TUBAL LIGATION      URETEROSCOPY Bilateral 10/25/2022    Procedure: URETEROSCOPY;  Surgeon: Raz Cantrell MD;  Location: [facility];  Service: Urology;  Laterality: Bilateral;       Review of patient's allergies indicates:  No Known Allergies    No current facility-administered medications on file prior to encounter.     Current Outpatient Medications on File Prior to Encounter   Medication Sig    albuterol (PROVENTIL/VENTOLIN HFA) 90 mcg/actuation inhaler Inhale 2 puffs into the lungs every 6 (six) hours as needed for Wheezing.    allopurinoL (ZYLOPRIM) 100 MG tablet Take 1 tablet (100 mg total) by mouth once daily.    ALPRAZolam (XANAX) 0.5 MG tablet Take 1 tablet (0.5 mg total) by mouth nightly as needed for Anxiety.    buPROPion (WELLBUTRIN XL) 150 MG TB24 tablet Take 1 tablet (150 mg total) by mouth once daily.    carvediloL (COREG) 6.25 MG tablet Take 1 tablet (6.25 mg total) by mouth 2 (two) times daily with meals.    DULoxetine (CYMBALTA) 60 MG capsule Take 120 mg by mouth once daily.    hydroCHLOROthiazide (HYDRODIURIL) 25 MG tablet Take 25 mg by mouth once daily.    NIFEdipine (PROCARDIA-XL) 90 MG (OSM) 24 hr tablet Take 1 tablet (90 mg total) by mouth once daily.    ondansetron (ZOFRAN) 4 MG tablet Take 1 tablet (4 mg total) by mouth every 12 (twelve) hours as needed for Nausea.    valsartan (DIOVAN) 160 MG tablet Take 1 tablet (160 mg total) by mouth once daily.     Family History       Problem Relation (Age of Onset)    Diabetes Maternal Uncle    Drug abuse Father    Heart disease Mother, Father, Maternal Grandmother    Kidney disease Father    Ovarian cancer Maternal Aunt           Tobacco Use    Smoking status: Former     Current packs/day: 0.25     Average packs/day: 0.3 packs/day for 15.0 years (3.8 ttl pk-yrs)     Types: Cigarettes    Smokeless tobacco: Never    Tobacco comments:     Quite 10/2024.     Substance and Sexual Activity    Alcohol use: Yes     Alcohol/week: 3.0 standard drinks of alcohol     Types: 3 Shots of liquor per week     Comment: scocially on weekends    Drug use: Yes     Types: Marijuana    Sexual activity: Yes     Partners: Male     Birth control/protection: None     Review of Systems   Constitutional: Negative for chills and fever.   HENT:  Negative for congestion.    Eyes:  Negative for blurred vision and visual disturbance.   Cardiovascular:  Positive for chest pain. Negative for dyspnea on exertion, irregular heartbeat, leg swelling, near-syncope, orthopnea, palpitations, paroxysmal nocturnal dyspnea and syncope.   Respiratory:  Negative for cough, shortness of breath, sputum production and wheezing.    Skin:  Negative for color change.   Musculoskeletal:  Negative for arthritis and muscle weakness.   Gastrointestinal:  Negative for bloating, abdominal pain, change in bowel habit, nausea and vomiting.   Genitourinary:  Negative for dysuria.   Neurological:  Negative for dizziness and light-headedness.   Psychiatric/Behavioral:  The patient is not nervous/anxious.      Objective:     Vital Signs (Most Recent):  Temp: 98 °F (36.7 °C) (02/25/25 0535)  Pulse: 65 (02/25/25 0535)  Resp: 12 (02/25/25 0535)  BP: (!) 150/96 (02/25/25 0535)  SpO2: 100 % (02/25/25 0535) Vital Signs (24h Range):  Temp:  [97.6 °F (36.4 °C)-98 °F (36.7 °C)] 98 °F (36.7 °C)  Pulse:  [60-82] 65  Resp:  [11-17] 12  SpO2:  [96 %-100 %] 100 %  BP: (132-166)/() 150/96     Weight: 86.6 kg (191 lb)  Body mass index is 32.79 kg/m².    SpO2: 100 %       No intake or output data in the 24 hours ending 02/25/25 0642    Lines/Drains/Airways       Peripheral Intravenous Line  Duration                   Peripheral IV - Single Lumen 02/25/25 0151 18 G Left;Posterior Hand <1 day         Peripheral IV - Single Lumen 02/25/25 22 G Posterior;Right Hand <1 day                     Physical Exam  Constitutional:       General: She is not in acute distress.     Appearance: Normal appearance. She is not ill-appearing.   HENT:      Head: Normocephalic and atraumatic.      Mouth/Throat:      Mouth: Mucous membranes are moist.   Eyes:      Extraocular Movements: Extraocular movements intact.      Pupils: Pupils are equal, round, and reactive to light.   Cardiovascular:      Rate and Rhythm: Normal rate and regular rhythm.      Heart sounds: No murmur heard.     No friction rub. No gallop.   Pulmonary:      Effort: No respiratory distress.      Breath sounds: No rales.   Abdominal:      General: Bowel sounds are normal. There is no distension.      Tenderness: There is no abdominal tenderness.   Musculoskeletal:      Cervical back: No rigidity.      Right lower leg: No edema.      Left lower leg: No edema.   Skin:     General: Skin is warm.      Capillary Refill: Capillary refill takes less than 2 seconds.   Neurological:      General: No focal deficit present.      Mental Status: She is alert and oriented to person, place, and time.   Psychiatric:         Mood and Affect: Mood normal.         Behavior: Behavior normal.          Significant Labs: BMP:   Recent Labs   Lab 02/25/25 0217   *      K 3.1*      CO2 23   BUN 20   CREATININE 1.1   CALCIUM 8.9   MG 1.9   , CMP   Recent Labs   Lab 02/25/25 0217      K 3.1*      CO2 23   *   BUN 20   CREATININE 1.1   CALCIUM 8.9   PROT 6.9   ALBUMIN 3.4*   BILITOT 0.2   ALKPHOS 61   AST 24   ALT 15   ANIONGAP 7*   , CBC   Recent Labs   Lab 02/25/25 0217   WBC 8.27   HGB 13.7   HCT 41.4      , INR   Recent Labs   Lab 02/25/25  0338   INR 1.0   , Lipid Panel   Recent Labs   Lab 02/25/25  0507   CHOL 157   HDL 54   LDLCALC 96.6   TRIG 32    CHOLHDL 34.4   , and Troponin   Recent Labs   Lab 02/25/25  0217 02/25/25  0427   TROPONINIHS 1660* 1335*       Significant Imaging: Echocardiogram: 2D echo with color flow doppler: No results found for this or any previous visit. and Transthoracic echo (TTE) complete (Cupid Only):   Results for orders placed or performed during the hospital encounter of 11/07/24   Echo   Result Value Ref Range    BSA 1.95 m2    LVOT stroke volume 159.3 cm3    LVIDd 3.3 (A) 3.5 - 6.0 cm    LV Systolic Volume 13.35 mL    LV Systolic Volume Index 7.0 mL/m2    LVIDs 2.0 (A) 2.1 - 4.0 cm    LV Diastolic Volume 43.87 mL    LV Diastolic Volume Index 23.09 mL/m2    Left Ventricular End Systolic Volume by Teichholz Method 13.35 mL    Left Ventricular End Diastolic Volume by Teichholz Method 43.87 mL    IVS 1.5 (A) 0.6 - 1.1 cm    LVOT diameter 1.9 cm    LVOT area 2.8 cm2    FS 39.4 28 - 44 %    Left Ventricle Relative Wall Thickness 0.97 cm    PW 1.6 (A) 0.6 - 1.1 cm    LV mass 188.8 g    LV Mass Index 99.4 g/m2    MV Peak E Allen 0.89 m/s    TDI LATERAL 0.05 m/s    TDI SEPTAL 0.03 m/s    E/E' ratio 22.25 m/s    MV Peak A Allen 1.26 m/s    TR Max Allen 2.96 m/s    E/A ratio 0.71     IVRT 123.69 msec    E wave deceleration time 357.52 msec    LV SEPTAL E/E' RATIO 29.67 m/s    LV LATERAL E/E' RATIO 17.80 m/s    LVOT peak allen 2.4 m/s    Left Ventricular Outflow Tract Mean Velocity 1.82 cm/s    Left Ventricular Outflow Tract Mean Gradient 19.0 mmHg    RV S' 18.00 cm/s    TAPSE 2.85 cm    LA size 3.86 cm    Left Atrium Minor Axis 5.22 cm    Left Atrium Major Axis 4.65 cm    RA Major Axis 4.01 cm    AV mean gradient 19.3 mmHg    AV peak gradient 33.6 mmHg    Ao peak allen 2.9 m/s    Ao VTI 46.9 cm    LVOT peak VTI 56.2 cm    AV valve area 3.4 cm²    AV Velocity Ratio 0.83     AV index (prosthetic) 1.20     ALEXEI by Velocity Ratio 2.3 cm²    MV mean gradient 2 mmHg    MV peak gradient 6 mmHg    MV stenosis pressure 1/2 time 103.68 ms    MV valve area p  1/2 method 2.12 cm2    MV valve area by continuity eq 4.50 cm2    MV VTI 35.4 cm    TV peak gradient 1 mmHg    Triscuspid Valve Regurgitation Peak Gradient 35 mmHg    PV PEAK VELOCITY 0.96 m/s    PV peak gradient 4 mmHg    Sinus 3.14 cm    STJ 3.02 cm    Ascending aorta 2.76 cm    IVC diameter 1.37 cm    Mean e' 0.04 m/s    ZLVIDS -3.92     ZLVIDD -4.80     RVDD 3.52 cm    ANGELES 30.6 mL/m2    LA Vol 58.10 cm3    RV- roy basal diam 3.5 cm    RV/LV Ratio 1.06 cm    LA WIDTH 3.6 cm    RA Width 2.5 cm    TV resting pulmonary artery pressure 38 mmHg    RV TB RVSP 6 mmHg    Est. RA pres 3 mmHg    Narrative      Left Ventricle: The left ventricle is normal in size. Moderately   increased wall thickness. There is moderate concentric hypertrophy. There   is hyperdynamic systolic function with a visually estimated ejection   fraction of 75 - 80%. Grade I diastolic dysfunction. Mild LVOT obstruction   at rest. LVOT pressure gradient increases with Valsalva.  Peak jolene 4.75   m/sec. The findings are consistent with hypertrophic cardiomyopathy with   obstruction.    Right Ventricle: Normal right ventricular cavity size. Systolic   function is normal.    Pulmonary Artery: The estimated pulmonary artery systolic pressure is   38 mmHg.         Exercise stress echo 2/19/25    Stress Protocol: The patient exercised for 5 minutes 49 seconds on a high ramp protocol, achieving a peak heart rate of 137 bpm, which is 86% of the age predicted maximum heart rate. Their exercise capacity was normal. The patient reported no symptoms during the stress test. The test was stopped because the patient experienced fatigue.    Stress ECG: There is no ST segment deviation identified during the protocol. There are no arrhythmias during stress. There is normal blood pressure response with stress.    ECG Conclusion: The ECG portion of the study is negative for ischemia.    Left Ventricle: The left ventricle is normal in size. Moderate septal thickening.  IVSd is 1.6 cm. There is normal systolic function. Ejection fraction is approximately 60%. There is normal diastolic function. No LVOT obstruction at rest. Peak gradient after exercise is 16 mm Hg.    Right Ventricle: Normal right ventricular cavity size. Wall thickness is normal. Systolic function is normal.    Mitral Valve: There is normal leaflet mobility. There is no systolic anterior motion of the mitral valve. There is mild regurgitation.    Pulmonary Artery: Pulmonary artery pressure could not be accurately determined.    Post-stress Echo: The left ventricle systolic function is hyperdynamic. Right ventricle systolic function is normal.    Post-stress Conclusion: The study is negative with no echocardiographic evidence of stress induced ischemia.    Nuclear Stress Test 11/8/24    Normal myocardial perfusion scan. There is no evidence of myocardial ischemia or infarction.    There is a trivial to mild intensity fixed perfusion abnormality in the  wall of the left ventricle secondary to diaphragm attenuation.    The gated perfusion images showed an ejection fraction of 78% at rest.    There is normal wall motion at rest and post-stress.    The ECG portion of the study is negative for ischemia.    The patient reported no chest pain during the stress test.    There were no arrhythmias during stress.  Assessment and Plan:     * NSTEMI (non-ST elevated myocardial infarction)  54 yoF with h/o HTN, GERD, Obesity, current smoker presents to ED with chest pain. Cardiology has been consulted to evaluate chest pain.        Latest Reference Range & Units 02/25/25 02:17 02/25/25 04:27   Troponin I High Sensitivity 0 - 14 ng/L 1660 (H) 1335 (H)   TTE 11/7/24 LVEF 75 - 80%. Grade I diastolic dysfunction. hypertrophic cardiomyopathy with obstruction.  Exercise stress echo 2/19/25 was negative for stress induced ischemia.  Nuclear Stress Test 11/8/24 revealed no evidence of myocardial ischemia or infarction. Though there was a  trivial to mild intensity fixed perfusion abnormality in the  wall of the left ventricle secondary to diaphragm attenuation.    Bedside Echo in ED did not show any wall motion abnormality  EKG: T inversion in III, aVF, LVH  T inversion may be due to LVH. Compared to EKG in November 2024, she does not have any ST depression in inferolateral leads  Recommend continue ACS pathway with Heparin gtt  Received  en route to ED by EMS  Get clopi 600, atorvastatin 80  Get interventional cardiology consult for possible Left Heart Catheterization. keep her NPO.   Increase carvediolol to 12.5 BID for better BP control in addition to antianginal effect  Monitor BP, HR after increasing carvedilol              VTE Risk Mitigation (From admission, onward)           Ordered     IP VTE HIGH RISK PATIENT  Once         02/25/25 0435     Place sequential compression device  Until discontinued         02/25/25 0435     heparin 25,000 units in dextrose 5% (100 units/ml) IV bolus from bag LOW INTENSITY nomogram - [facility]  As needed (PRN)        Question:  Heparin Infusion Adjustment (DO NOT MODIFY ANSWER)  Answer:  [email or web address]\epic\Images\Pharmacy\HeparinInfusions\heparin LOW INTENSITY nomogram for [facility] [id number]    02/25/25 0314     heparin 25,000 units in dextrose 5% (100 units/ml) IV bolus from bag LOW INTENSITY nomogram - [facility]  As needed (PRN)        Question:  Heparin Infusion Adjustment (DO NOT MODIFY ANSWER)  Answer:  [email or web address]\epic\Images\Pharmacy\HeparinInfusions\heparin LOW INTENSITY nomogram for [facility] [id number]    02/25/25 0314     heparin 25,000 units in dextrose 5% 250 mL (100 units/mL) infusion LOW INTENSITY nomogram - [facility]  Continuous        Question:  Begin at (units/kg/hr)  Answer:  12    02/25/25 0314                    Thank you for your consult. I will follow-up with patient. Please contact us if you have any additional questions.    Joaquin Lopez MD  Cardiology   [facility] - Emergency Dept

## 2025-02-25 NOTE — CARE UPDATE
CARDIOLOGY CARE UPDATE:    IC completed cardiac cath; s/p 2 stents (1st Mrg lesion & Prox RCA to Mid RCA lesion). Reports feeling better after the cath procedure. Pt would benefit from better BP control.     Recommendations:  - follow IC recs:    - DAPT (plavix for atleast 6 months and ASA 81mg daily indefinitely)   - cardiac rehab referral   - encourage smoking cessation  - continue high intensity statin (optimal LDL <55)  - Increased coreg to 25mg BID   - Increased Valsartan to 160mg qd  - Goal BP <130/80  - Pending formal echo       Padmini Alvarado MD  Internal Medicine PGY-2  [address] [facility] [address]  [phone]

## 2025-02-25 NOTE — HPI
Ms. Calos Rios is a 55 y/o F w/ pertinent PMHx of HTN and tobacco abuse who presented with sternal, non-radiating, reproducible, burning sensation that started around 11pm and improved SL nitro and possibly lidocaine patch. Worsened after swallowing K capsules. Denies any SOB, diaphoresis, nausea, vomiting, syncope, orthopnea, PND.     Of note, patient is being evaluated for HOCM by [facility] and is pending a cardiac MRI. Patient underwent recent  stress echo (2/19/25) that was negative for ischemia (ordered as part of HOCM workup). Also showed no LVOT obstruction at rest w/ peak gradient after exercise is 16 mm Hg.     EMS gave her 325 mg ASA. Hospital medicine started on ACS pathway with Heparin gtt. Interventional cardiology has been consulted to evaluate chest pain.

## 2025-02-25 NOTE — PHARMACY MED REC
"Admission Medication History     The home medication history was taken by Margaret Ibarra.    You may go to "Admission" then "Reconcile Home Medications" tabs to review and/or act upon these items.     The home medication list has been updated by the Pharmacy department.   Please read ALL comments highlighted in yellow.   Please address this information as you see fit.    Feel free to contact us if you have any questions or require assistance.      The medications listed below were removed from the home medication list. Please reorder if appropriate:  Patient reports no longer taking the following medication(s):  ONDANSETRON 4 MG    Medications listed below were obtained from: Patient/family and Analytic software- [employer] Medications   Medication Sig    albuterol (PROVENTIL/VENTOLIN HFA) 90 mcg/actuation inhaler   Inhale 2 puffs into the lungs every 6 (six) hours as needed for Wheezing.    allopurinoL (ZYLOPRIM) 100 MG tablet   Take 1 tablet (100 mg total) by mouth once daily.    ALPRAZolam (XANAX) 0.5 MG tablet Take 0.25 mg by mouth nightly as needed for Anxiety.      brimonidine tartrate (LUMIFY OPHT)   Place 2 drops into both eyes once daily.    buPROPion (WELLBUTRIN XL) 150 MG TB24 tablet   Take 1 tablet (150 mg total) by mouth once daily.    DULoxetine (CYMBALTA) 60 MG capsule   Take 120 mg by mouth once daily.    NIFEdipine (PROCARDIA-XL) 90 MG (OSM) 24 hr tablet   Take 1 tablet (90 mg total) by mouth once daily.    valsartan (DIOVAN) 160 MG tablet   Take 1 tablet (160 mg total) by mouth once daily.    carvediloL (COREG) 6.25 MG tablet   Take 1 tablet (6.25 mg total) by mouth 2 (two) times daily with meals.    hydroCHLOROthiazide (HYDRODIURIL) 25 MG tablet Take 25 mg by mouth once daily.       Potential issues to be addressed PRIOR TO DISCHARGE  Patient requires education regarding drug therapies   Patient requested refills for the following medications: (XANAX, PROCARDIA-XL, DIOVAN)          Margaret" Ibarra  EXT [id number]                  .

## 2025-02-25 NOTE — SUBJECTIVE & OBJECTIVE
Past Medical History:   Diagnosis Date    Anxiety disorder, unspecified     Hypertension     Migraine headache     Obesity        Past Surgical History:   Procedure Laterality Date    CYSTOURETEROSCOPY,WITH HOLMIUM LASER LITHOTRIPSY OF URETERAL CALCULUS - Bilateral Bilateral 11/09/2022    ESOPHAGOGASTRODUODENOSCOPY N/A 12/23/2022    Procedure: EGD (ESOPHAGOGASTRODUODENOSCOPY);  Surgeon: Anaid Cohen MD;  Location: [facility] ENDO;  Service: Endoscopy;  Laterality: N/A;    HYSTERECTOMY, TOTAL, LAPAROSCOPIC, WITH SALPINGO-OOPHORECTOMY Bilateral 03/17/2021    LAPAROSCOPIC TOTAL HYSTERECTOMY Bilateral 03/17/2021    PYELOSCOPY Bilateral 10/25/2022    Procedure: PYELOSCOPY;  Surgeon: Raz Cantrell MD;  Location: [facility] OR;  Service: Urology;  Laterality: Bilateral;    TUBAL LIGATION      URETEROSCOPY Bilateral 10/25/2022    Procedure: URETEROSCOPY;  Surgeon: Raz Cantrell MD;  Location: [facility] OR;  Service: Urology;  Laterality: Bilateral;       Review of patient's allergies indicates:  No Known Allergies    No current facility-administered medications on file prior to encounter.     Current Outpatient Medications on File Prior to Encounter   Medication Sig    albuterol (PROVENTIL/VENTOLIN HFA) 90 mcg/actuation inhaler Inhale 2 puffs into the lungs every 6 (six) hours as needed for Wheezing.    allopurinoL (ZYLOPRIM) 100 MG tablet Take 1 tablet (100 mg total) by mouth once daily.    ALPRAZolam (XANAX) 0.5 MG tablet Take 1 tablet (0.5 mg total) by mouth nightly as needed for Anxiety.    buPROPion (WELLBUTRIN XL) 150 MG TB24 tablet Take 1 tablet (150 mg total) by mouth once daily.    carvediloL (COREG) 6.25 MG tablet Take 1 tablet (6.25 mg total) by mouth 2 (two) times daily with meals.    DULoxetine (CYMBALTA) 60 MG capsule Take 120 mg by mouth once daily.    hydroCHLOROthiazide (HYDRODIURIL) 25 MG tablet Take 25 mg by mouth once daily.    NIFEdipine (PROCARDIA-XL) 90 MG (OSM) 24 hr tablet Take 1 tablet (90 mg  total) by mouth once daily.    ondansetron (ZOFRAN) 4 MG tablet Take 1 tablet (4 mg total) by mouth every 12 (twelve) hours as needed for Nausea.    valsartan (DIOVAN) 160 MG tablet Take 1 tablet (160 mg total) by mouth once daily.     Family History       Problem Relation (Age of Onset)    Diabetes Maternal Uncle    Drug abuse Father    Heart disease Mother, Father, Maternal Grandmother    Kidney disease Father    Ovarian cancer Maternal Aunt          Tobacco Use    Smoking status: Former     Current packs/day: 0.25     Average packs/day: 0.3 packs/day for 15.0 years (3.8 ttl pk-yrs)     Types: Cigarettes    Smokeless tobacco: Never    Tobacco comments:     Quite 10/2024.     Substance and Sexual Activity    Alcohol use: Yes     Alcohol/week: 3.0 standard drinks of alcohol     Types: 3 Shots of liquor per week     Comment: scocially on weekends    Drug use: Yes     Types: Marijuana    Sexual activity: Yes     Partners: Male     Birth control/protection: None     Review of Systems   Constitutional:  Negative for chills and fever.   HENT:  Negative for congestion.    Respiratory:  Negative for shortness of breath and wheezing.    Cardiovascular:  Positive for chest pain. Negative for palpitations and leg swelling.   Gastrointestinal:  Negative for constipation, diarrhea, nausea and vomiting.   Genitourinary:  Negative for difficulty urinating.   Musculoskeletal:  Negative for back pain.   Neurological:  Negative for dizziness.     Objective:     Vital Signs (Most Recent):  Temp: 97.8 °F (36.6 °C) (02/25/25 0352)  Pulse: 67 (02/25/25 0402)  Resp: 11 (02/25/25 0402)  BP: (!) 158/102 (02/25/25 0402)  SpO2: 97 % (02/25/25 0402) Vital Signs (24h Range):  Temp:  [97.6 °F (36.4 °C)-97.8 °F (36.6 °C)] 97.8 °F (36.6 °C)  Pulse:  [60-82] 67  Resp:  [11-17] 11  SpO2:  [96 %-100 %] 97 %  BP: (132-166)/() 158/102     Weight: 86.6 kg (191 lb)  Body mass index is 32.79 kg/m².     Physical Exam  Constitutional:        Appearance: Normal appearance. She is obese.   HENT:      Head: Normocephalic and atraumatic.      Right Ear: External ear normal.      Left Ear: External ear normal.   Eyes:      Conjunctiva/sclera: Conjunctivae normal.   Cardiovascular:      Rate and Rhythm: Normal rate and regular rhythm.      Pulses: Normal pulses.      Heart sounds: Normal heart sounds.   Pulmonary:      Effort: Pulmonary effort is normal.      Breath sounds: Normal breath sounds.   Abdominal:      General: Abdomen is flat. Bowel sounds are normal.      Palpations: Abdomen is soft.   Musculoskeletal:      Right lower leg: No edema.      Left lower leg: No edema.   Skin:     General: Skin is warm.   Neurological:      General: No focal deficit present.      Mental Status: She is alert and oriented to person, place, and time.                Significant Labs: All pertinent labs within the past 24 hours have been reviewed.    Significant Imaging: I have reviewed all pertinent imaging results/findings within the past 24 hours.

## 2025-02-25 NOTE — BRIEF OP NOTE
Brief Operative Note:    : Maycol Pastor MD     Referring Physician: Self,Aaareferral     All Operators: Surgeon(s):  Chris Cramer MD Subramaniam, Venkat, MD Tafur Soto, Jose D., MD     Preoperative Diagnosis: Non-ST elevation myocardial infarction (NSTEMI) [I21.4]     Postop Diagnosis: Non-ST elevation myocardial infarction (NSTEMI) [I21.4]    Treatments/Procedures: Procedure(s) (LRB):  Angiogram, Coronary, with Left Heart Cath (N/A)  Instantaneous Wave-Free Ratio (IFR) (N/A)  Stent, Drug Eluting, Multi Vessel, Coronary    Findings:Severe coronary disease is present. S/p PCI to RCA and OM. See catheterization report for full details.    Estimated Blood loss: 20 cc    Specimens removed: No    Recommendations:   - Routine post-cath care as per orders  - IVF at 150 cc/hr for 4 hrs  - Cardiac rehab referral, Smoking cessation counseling, Continue medical management, Risk factor reduction, Plavix for at least 6 months and ASA 81 mg indefinitely, Follow-up with outpatient cardiologist       Chris Cramer

## 2025-02-25 NOTE — ASSESSMENT & PLAN NOTE
Patient's blood pressure range in the last 24 hours was: BP  Min: 132/85  Max: 166/99.The patient's inpatient anti-hypertensive regimen is listed below:    Plan:  - BP is controlled  - Continue home coreg  - Currently holding home valsartan, nifedipine, HCTZ

## 2025-02-25 NOTE — ASSESSMENT & PLAN NOTE
Possibly 2/2 type 1 vs type 2 in the setting of HTN. Will also evaluate for HOCM w/ LHC.     -LHC +/- PCI, patient is a WEST candidate  - Anti-platelet Therapy: ASA / Ticagrelor  - Access: Right radial  - Creatinine/CrCl: 1.1  - Allergies: None  - Pre-Hydration: NS  - Pre-Op Med: Bendaryl 50mg pO   - All patient's questions were answered.  -The risks, benefits and alternatives of the procedure were explained to the patient.   -The risks of coronary angiography include but are not limited to: bleeding, infection, heart rhythm abnormalities, allergic reactions, kidney injury and potential need for dialysis, stroke and death.   - Should stenting be indicated, the patient has agreed to dual anti-platelet therapy for 1-consecutive year with a drug-eluting stent and a minimum of 1-month with the use of a bare metal stent  - Additionally, pt is aware that non-compliance is likely to result in stent clotting with heart attack, heart failure, and/or death  -The risks of moderate sedation include hypotension, respiratory depression, arrhythmias, bronchospasm, and death.   - Informed consent was obtained and the  patient is agreeable to proceed with the procedure.

## 2025-02-25 NOTE — ED NOTES
Patient reports chest pain at this time. Cath lab and cardiology notified. Okay to given nitro at this time. Ready for cath lab

## 2025-02-25 NOTE — SUBJECTIVE & OBJECTIVE
Past Medical History:   Diagnosis Date    Anxiety disorder, unspecified     Hypertension     Migraine headache     Obesity        Past Surgical History:   Procedure Laterality Date    CYSTOURETEROSCOPY,WITH HOLMIUM LASER LITHOTRIPSY OF URETERAL CALCULUS - Bilateral Bilateral 11/09/2022    ESOPHAGOGASTRODUODENOSCOPY N/A 12/23/2022    Procedure: EGD (ESOPHAGOGASTRODUODENOSCOPY);  Surgeon: Anaid Cohen MD;  Location: [facility];  Service: Endoscopy;  Laterality: N/A;    HYSTERECTOMY, TOTAL, LAPAROSCOPIC, WITH SALPINGO-OOPHORECTOMY Bilateral 03/17/2021    LAPAROSCOPIC TOTAL HYSTERECTOMY Bilateral 03/17/2021    PYELOSCOPY Bilateral 10/25/2022    Procedure: PYELOSCOPY;  Surgeon: Raz Cantrell MD;  Location: [facility] OR;  Service: Urology;  Laterality: Bilateral;    TUBAL LIGATION      URETEROSCOPY Bilateral 10/25/2022    Procedure: URETEROSCOPY;  Surgeon: Raz Cantrell MD;  Location: [facility];  Service: Urology;  Laterality: Bilateral;       Review of patient's allergies indicates:  No Known Allergies    (Not in a hospital admission)    Family History       Problem Relation (Age of Onset)    Diabetes Maternal Uncle    Drug abuse Father    Heart disease Mother, Father, Maternal Grandmother    Kidney disease Father    Ovarian cancer Maternal Aunt          Tobacco Use    Smoking status: Former     Current packs/day: 0.25     Average packs/day: 0.3 packs/day for 15.0 years (3.8 ttl pk-yrs)     Types: Cigarettes    Smokeless tobacco: Never    Tobacco comments:     Quite 10/2024.     Substance and Sexual Activity    Alcohol use: Yes     Alcohol/week: 3.0 standard drinks of alcohol     Types: 3 Shots of liquor per week     Comment: scocially on weekends    Drug use: Yes     Types: Marijuana    Sexual activity: Yes     Partners: Male     Birth control/protection: None     ROS12 point ROS negative except for HPI  Objective:     Vital Signs (Most Recent):  Temp: 97.8 °F (36.6 °C) (02/25/25 0727)  Pulse: 68 (02/25/25  0730)  Resp: 13 (02/25/25 0730)  BP: (!) 167/104 (02/25/25 0730)  SpO2: 100 % (02/25/25 0730) Vital Signs (24h Range):  Temp:  [97.6 °F (36.4 °C)-98 °F (36.7 °C)] 97.8 °F (36.6 °C)  Pulse:  [60-82] 68  Resp:  [11-17] 13  SpO2:  [96 %-100 %] 100 %  BP: (132-186)/() 167/104     Weight: 86.6 kg (190 lb 14.7 oz)  Body mass index is 32.77 kg/m².    SpO2: 100 %         Intake/Output Summary (Last 24 hours) at 2/25/2025 0934  Last data filed at 2/25/2025 0724  Gross per 24 hour   Intake --   Output 550 ml   Net -550 ml       Lines/Drains/Airways       Peripheral Intravenous Line  Duration                  Peripheral IV - Single Lumen 02/25/25 0151 18 G Left;Posterior Hand <1 day         Peripheral IV - Single Lumen 02/25/25 22 G Posterior;Right Hand <1 day                     Physical Exam  Constitutional:       General: She is not in acute distress.  Neck:      Vascular: No hepatojugular reflux or JVD.   Cardiovascular:      Rate and Rhythm: Normal rate and regular rhythm.      Comments: Sternal tenderness upon palpation.   Pulmonary:      Effort: Pulmonary effort is normal.      Breath sounds: Normal breath sounds.   Abdominal:      General: Bowel sounds are normal.      Palpations: Abdomen is soft.   Skin:     General: Skin is warm.      Capillary Refill: Capillary refill takes less than 2 seconds.   Neurological:      Mental Status: She is alert and oriented to person, place, and time.            Significant Labs: All pertinent lab results from the last 24 hours have been reviewed.    Significant Imaging: Echocardiogram: Transthoracic echo (TTE) complete (Cupid Only):   Results for orders placed or performed during the hospital encounter of 02/25/25   Echo   Result Value Ref Range    BSA 1.98 m2    Est. RA pres 3 mmHg    Narrative      Left Ventricle: The left ventricle is normal in size. Normal wall   thickness. There is low normal systolic function with a visually estimated   ejection fraction of 50 - 55%.     Right Ventricle: The right ventricle is normal in size. Systolic   function is normal.    IVC/SVC: Normal venous pressure at 3 mmHg.    Limited echo, Spetum measuring 1.6cm. Hypertrophic cardiomyopathy
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
## 2025-02-26 ENCOUNTER — TELEPHONE (OUTPATIENT)
Dept: CARDIOLOGY | Facility: CLINIC | Age: 55
End: 2025-02-26
Payer: MEDICAID

## 2025-02-26 ENCOUNTER — TELEPHONE (OUTPATIENT)
Dept: CARDIAC REHAB | Facility: CLINIC | Age: 55
End: 2025-02-26
Payer: MEDICAID

## 2025-02-26 VITALS
HEIGHT: 64 IN | HEART RATE: 83 BPM | DIASTOLIC BLOOD PRESSURE: 95 MMHG | SYSTOLIC BLOOD PRESSURE: 135 MMHG | BODY MASS INDEX: 32.37 KG/M2 | OXYGEN SATURATION: 96 % | TEMPERATURE: 98 F | RESPIRATION RATE: 17 BRPM | WEIGHT: 189.63 LBS

## 2025-02-26 LAB
ALBUMIN SERPL BCP-MCNC: 3 G/DL (ref 3.5–5.2)
ALP SERPL-CCNC: 57 U/L (ref 40–150)
ALT SERPL W/O P-5'-P-CCNC: 26 U/L (ref 10–44)
ANION GAP SERPL CALC-SCNC: 7 MMOL/L (ref 8–16)
ASCENDING AORTA: 2.75 CM
AST SERPL-CCNC: 111 U/L (ref 10–40)
AV AREA BY CONTINUOUS VTI: 3 CM2
AV INDEX (PROSTH): 0.99
AV LVOT MEAN GRADIENT: 3 MMHG
AV LVOT PEAK GRADIENT: 6 MMHG
AV MEAN GRADIENT: 5 MMHG
AV PEAK GRADIENT: 8 MMHG
AV VALVE AREA BY VELOCITY RATIO: 2.7 CM²
AV VALVE AREA: 3.1 CM2
AV VELOCITY RATIO: 0.86
BASOPHILS # BLD AUTO: 0.03 K/UL (ref 0–0.2)
BASOPHILS NFR BLD: 0.3 % (ref 0–1.9)
BILIRUB SERPL-MCNC: 0.4 MG/DL (ref 0.1–1)
BSA FOR ECHO PROCEDURE: 1.97 M2
BUN SERPL-MCNC: 16 MG/DL (ref 6–20)
CALCIUM SERPL-MCNC: 9.2 MG/DL (ref 8.7–10.5)
CHLORIDE SERPL-SCNC: 110 MMOL/L (ref 95–110)
CO2 SERPL-SCNC: 21 MMOL/L (ref 23–29)
CREAT SERPL-MCNC: 1.3 MG/DL (ref 0.5–1.4)
CV ECHO LV RWT: 0.63 CM
DIFFERENTIAL METHOD BLD: ABNORMAL
DOP CALC AO PEAK VEL: 1.4 M/S
DOP CALC AO VTI: 25.2 CM
DOP CALC LVOT AREA: 3.1 CM2
DOP CALC LVOT DIAMETER: 2 CM
DOP CALC LVOT PEAK VEL: 1.2 M/S
DOP CALC LVOT STROKE VOLUME: 78.5 CM3
DOP CALCLVOT PEAK VEL VTI: 25 CM
E WAVE DECELERATION TIME: 393 MS
E/A RATIO: 0.58
E/E' RATIO: 13 M/S
ECHO EF ESTIMATED: 75 %
ECHO LV POSTERIOR WALL: 1.2 CM (ref 0.6–1.1)
EOSINOPHIL # BLD AUTO: 0.3 K/UL (ref 0–0.5)
EOSINOPHIL NFR BLD: 3.9 % (ref 0–8)
ERYTHROCYTE [DISTWIDTH] IN BLOOD BY AUTOMATED COUNT: 13.9 % (ref 11.5–14.5)
EST. GFR  (NO RACE VARIABLE): 49.2 ML/MIN/1.73 M^2
FRACTIONAL SHORTENING: 42.1 % (ref 28–44)
GLUCOSE SERPL-MCNC: 84 MG/DL (ref 70–110)
HCT VFR BLD AUTO: 39.8 % (ref 37–48.5)
HGB BLD-MCNC: 13.1 G/DL (ref 12–16)
IMM GRANULOCYTES # BLD AUTO: 0.02 K/UL (ref 0–0.04)
IMM GRANULOCYTES NFR BLD AUTO: 0.2 % (ref 0–0.5)
INTERVENTRICULAR SEPTUM: 1.5 CM (ref 0.6–1.1)
IVC DIAMETER: 0.9 CM
IVRT: 111 MS
LA MAJOR: 5.4 CM
LA MINOR: 5.3 CM
LA WIDTH: 3.7 CM
LEFT ATRIUM SIZE: 3.7 CM
LEFT ATRIUM VOLUME INDEX MOD: 22 ML/M2
LEFT ATRIUM VOLUME INDEX: 33 ML/M2
LEFT ATRIUM VOLUME MOD: 42 ML
LEFT ATRIUM VOLUME: 62 CM3
LEFT INTERNAL DIMENSION IN SYSTOLE: 2.2 CM (ref 2.1–4)
LEFT VENTRICLE DIASTOLIC VOLUME INDEX: 32.46 ML/M2
LEFT VENTRICLE DIASTOLIC VOLUME: 62 ML
LEFT VENTRICLE MASS INDEX: 96 G/M2
LEFT VENTRICLE SYSTOLIC VOLUME INDEX: 8.4 ML/M2
LEFT VENTRICLE SYSTOLIC VOLUME: 16 ML
LEFT VENTRICULAR INTERNAL DIMENSION IN DIASTOLE: 3.8 CM (ref 3.5–6)
LEFT VENTRICULAR MASS: 183.4 G
LV LATERAL E/E' RATIO: 12
LV SEPTAL E/E' RATIO: 15
LYMPHOCYTES # BLD AUTO: 2.6 K/UL (ref 1–4.8)
LYMPHOCYTES NFR BLD: 29.6 % (ref 18–48)
MAGNESIUM SERPL-MCNC: 2 MG/DL (ref 1.6–2.6)
MCH RBC QN AUTO: 32.3 PG (ref 27–31)
MCHC RBC AUTO-ENTMCNC: 32.9 G/DL (ref 32–36)
MCV RBC AUTO: 98 FL (ref 82–98)
MONOCYTES # BLD AUTO: 0.6 K/UL (ref 0.3–1)
MONOCYTES NFR BLD: 7 % (ref 4–15)
MV A" WAVE DURATION": 139.87 MS
MV PEAK A VEL: 1.03 M/S
MV PEAK E VEL: 0.6 M/S
NEUTROPHILS # BLD AUTO: 5.1 K/UL (ref 1.8–7.7)
NEUTROPHILS NFR BLD: 59 % (ref 38–73)
NRBC BLD-RTO: 0 /100 WBC
OHS CV RV/LV RATIO: 0.79 CM
OHS QRS DURATION: 88 MS
OHS QTC CALCULATION: 471 MS
PHOSPHATE SERPL-MCNC: 3 MG/DL (ref 2.7–4.5)
PISA TR MAX VEL: 1.4 M/S
PLATELET # BLD AUTO: 207 K/UL (ref 150–450)
PMV BLD AUTO: 10.1 FL (ref 9.2–12.9)
POTASSIUM SERPL-SCNC: 4.3 MMOL/L (ref 3.5–5.1)
PROT SERPL-MCNC: 6.4 G/DL (ref 6–8.4)
PULM VEIN A" WAVE DURATION": 139.87 MS
PULM VEIN S/D RATIO: 1.61
PULMONIC VEIN PEAK A VELOCITY: 0.2 M/S
PV PEAK D VEL: 0.28 M/S
PV PEAK S VEL: 0.45 M/S
RA MAJOR: 4.22 CM
RA PRESSURE ESTIMATED: 3 MMHG
RA WIDTH: 3 CM
RBC # BLD AUTO: 4.05 M/UL (ref 4–5.4)
RIGHT ATRIAL AREA: 12.5 CM2
RIGHT VENTRICLE DIASTOLIC BASEL DIMENSION: 3 CM
RV TB RVSP: 4 MMHG
RV TISSUE DOPPLER FREE WALL SYSTOLIC VELOCITY 1 (APICAL 4 CHAMBER VIEW): 11.27 CM/S
SINUS: 2.75 CM
SODIUM SERPL-SCNC: 138 MMOL/L (ref 136–145)
STJ: 2.67 CM
TDI LATERAL: 0.05 M/S
TDI SEPTAL: 0.04 M/S
TDI: 0.05 M/S
TRICUSPID ANNULAR PLANE SYSTOLIC EXCURSION: 1.44 CM
TV PEAK GRADIENT: 7 MMHG
TV REST PULMONARY ARTERY PRESSURE: 11 MMHG
WBC # BLD AUTO: 8.72 K/UL (ref 3.9–12.7)
Z-SCORE OF LEFT VENTRICULAR DIMENSION IN END DIASTOLE: -3.47
Z-SCORE OF LEFT VENTRICULAR DIMENSION IN END SYSTOLE: -3.23

## 2025-02-26 PROCEDURE — 93005 ELECTROCARDIOGRAM TRACING: CPT

## 2025-02-26 PROCEDURE — 25000003 PHARM REV CODE 250

## 2025-02-26 PROCEDURE — 36415 COLL VENOUS BLD VENIPUNCTURE: CPT

## 2025-02-26 PROCEDURE — 84100 ASSAY OF PHOSPHORUS: CPT

## 2025-02-26 PROCEDURE — 83735 ASSAY OF MAGNESIUM: CPT

## 2025-02-26 PROCEDURE — 93010 ELECTROCARDIOGRAM REPORT: CPT | Mod: ,,, | Performed by: INTERNAL MEDICINE

## 2025-02-26 PROCEDURE — 85025 COMPLETE CBC W/AUTO DIFF WBC: CPT

## 2025-02-26 PROCEDURE — 80053 COMPREHEN METABOLIC PANEL: CPT

## 2025-02-26 RX ORDER — NIFEDIPINE 60 MG/1
60 TABLET, EXTENDED RELEASE ORAL DAILY
Qty: 30 TABLET | Refills: 11 | Status: SHIPPED | OUTPATIENT
Start: 2025-02-27 | End: 2025-02-26

## 2025-02-26 RX ORDER — CARVEDILOL 25 MG/1
25 TABLET ORAL 2 TIMES DAILY WITH MEALS
Qty: 180 TABLET | Refills: 3 | Status: SHIPPED | OUTPATIENT
Start: 2025-02-26 | End: 2026-02-26

## 2025-02-26 RX ORDER — NIFEDIPINE 90 MG/1
90 TABLET, EXTENDED RELEASE ORAL DAILY
Qty: 30 TABLET | Refills: 11 | Status: SHIPPED | OUTPATIENT
Start: 2025-02-27 | End: 2026-02-27

## 2025-02-26 RX ORDER — NIFEDIPINE 60 MG/1
60 TABLET, EXTENDED RELEASE ORAL DAILY
Status: COMPLETED | OUTPATIENT
Start: 2025-02-26 | End: 2025-02-26

## 2025-02-26 RX ORDER — NAPROXEN SODIUM 220 MG/1
81 TABLET, FILM COATED ORAL DAILY
Qty: 360 TABLET | Refills: 0 | Status: SHIPPED | OUTPATIENT
Start: 2025-02-27 | End: 2026-02-27

## 2025-02-26 RX ORDER — ATORVASTATIN CALCIUM 80 MG/1
80 TABLET, FILM COATED ORAL NIGHTLY
Qty: 90 TABLET | Refills: 3 | Status: SHIPPED | OUTPATIENT
Start: 2025-02-26 | End: 2026-02-26

## 2025-02-26 RX ORDER — NITROGLYCERIN 0.4 MG/1
0.4 TABLET SUBLINGUAL EVERY 5 MIN PRN
Qty: 25 TABLET | Refills: 0 | Status: SHIPPED | OUTPATIENT
Start: 2025-02-26

## 2025-02-26 RX ORDER — CLOPIDOGREL BISULFATE 75 MG/1
75 TABLET ORAL DAILY
Qty: 30 TABLET | Refills: 11 | Status: SHIPPED | OUTPATIENT
Start: 2025-02-27 | End: 2026-02-27

## 2025-02-26 RX ORDER — NIFEDIPINE 60 MG/1
60 TABLET, EXTENDED RELEASE ORAL DAILY
Status: DISCONTINUED | OUTPATIENT
Start: 2025-02-27 | End: 2025-02-26 | Stop reason: HOSPADM

## 2025-02-26 RX ADMIN — CARVEDILOL 25 MG: 25 TABLET, FILM COATED ORAL at 07:02

## 2025-02-26 RX ADMIN — ASPIRIN 81 MG: 81 TABLET, CHEWABLE ORAL at 07:02

## 2025-02-26 RX ADMIN — NIFEDIPINE 60 MG: 60 TABLET, FILM COATED, EXTENDED RELEASE ORAL at 05:02

## 2025-02-26 RX ADMIN — VALSARTAN 160 MG: 160 TABLET, FILM COATED ORAL at 07:02

## 2025-02-26 RX ADMIN — CLOPIDOGREL BISULFATE 75 MG: 75 TABLET ORAL at 07:02

## 2025-02-26 NOTE — PLAN OF CARE
Recommendations     1. Continue low Na diet as tolerated   2. Encourage good intake   3. RD to monitor weight, labs, intake     Goals:   1. % nutritional needs met with diet   2. Maintain weight during admission  Nutrition Goal Status: new  Communication of RD Recs: other (comment) (POC)     Nutrition Discharge Planning      Nutrition Discharge Planning: Therapeutic diet (comments)  Therapeutic diet (comments): cardiac diet

## 2025-02-26 NOTE — PLAN OF CARE
Problem: Acute Coronary Syndrome  Goal: Absence of Cardiac-Related Pain  Outcome: Progressing  Goal: Adequate Tissue Perfusion  Outcome: Progressing     Problem: Cardiac Catheterization (Diagnostic/Interventional)  Goal: Absence of Bleeding  Outcome: Progressing  Goal: Optimal Pain Control and Function  Outcome: Progressing     Problem: Adult Inpatient Plan of Care  Goal: Optimal Comfort and Wellbeing  Outcome: Progressing

## 2025-02-26 NOTE — TELEPHONE ENCOUNTER
----- Message from Darwin Cannon MD sent at 2/25/2025  5:05 PM CST -----  Thank you  ----- Message -----  From: Thelma Alaniz RN  Sent: 2/24/2025   2:47 PM CST  To: Darwin Cannon Jr., MD    We are still waiting for an available slot to open up for cMRI.  They are currently booked into May and waiting on new calendar to be updated.  I will continue to follow up.    Thank you.  ----- Message -----  From: Darwin Cannon Jr., MD  Sent: 2/24/2025  12:45 PM CST  To: Thelma Alaniz RN    I reviewed images and measure septum 1.5-1.6 cm and posterior wall 1.3 cm.  No gradient present and no LUIS M noted.  The reported gradient post exercise is non-obstructive.    She was schedule for genetic lab but does not look like she went.  This test would be helpful in her case and the cardiac MRI is an extremely important test for her.  At this point I would not label   her as hypertrophic cardiomyopathy but this might change depending upon the results of pending tests (genetic and MRI).  ----- Message -----  From: Sana Rollins MD  Sent: 2/19/2025   1:42 PM CST  To: Darwin Cannon Jr., MD

## 2025-02-26 NOTE — TELEPHONE ENCOUNTER
Spoke with patient, she states she did not call the office.  She is currently in the hospital.  She had a heart attack and has stents.  She is awaiting discharge.  She said she will call the office back regarding an appointment.

## 2025-02-26 NOTE — PLAN OF CARE
Cardiology representative Flo stated patient's cardiologist did not have any sooner available appointments. He did send a message to provider requesting if patient can be seen sooner than her appointment in April. Patient has also been added to the early waiting list.     Yuniel Lees

## 2025-02-26 NOTE — TELEPHONE ENCOUNTER
----- Message from Danroberto sent at 2/26/2025  1:52 PM CST -----  Regarding: Sooner appt  Contact: : pt [phone]  Type:  Sooner Apoointment RequestCaller is requesting a sooner appointment.  Caller declined first available appointment listed below.  Caller will accept being placed on the waitlist and is requesting a message be sent to doctor.Name of Caller denys burkett from [facility] When is the first available appointment? 04/11/2025Symptoms: myocardio issueWould the patient rather a call back or a response via [facility]?  Call back Best Call Back Number: pt [phone] Information:

## 2025-02-26 NOTE — NURSING
2005  Assessment completed & WNL (see flowsheets).  Vas Band previously deflated during bedside report-now removed and Tegaderm applied for monitoring.  Pt aware of POC for this shift with no questions or concerns voiced.  Call light within reach, will continue to monitor.     2055  Noted blood sugar checks ordered post cath however pt is not diabetic.  Secure chat sent to on call and per Oscar SON, ok to d/c.     0450  On call MD notified of elevated BP. Pt asymptomatic when woken.    0530  NIFEdipine given per MAR.  Previous assessment remains unchanged.  Pt denies needs at this time.  Will continue to monitor and report to oncoming shift.

## 2025-02-26 NOTE — TELEPHONE ENCOUNTER
Letter regarding Phase II cardiac rehab was sent to patient.  Pt will be referred to [facility] due to insurance issue.   Darío Haley, RN  Cardiac Rehab Nurse

## 2025-02-26 NOTE — NURSING
Patient arrived to the unit. Plan of care reviewed with patient. Patient is AOX4. BP elevated and other VS stable. Patient remained free of falls and trauma, fall precautions are in place. Patient is ambulating independently. Patient has no complaints of pain. Patient has no questions at this time. Wheels are locked and the bed is in lowest position. The call bell is within reach. Telemetry is on.

## 2025-02-26 NOTE — HOSPITAL COURSE
54 yoF with h/o HTN, GERD, Obesity, current smoker admitted to medicine for NSTEMI. Presented to ED with substernal chest pain 10/10. Troponin trend positive without EKG changes. Hospital medicine started on ACS pathway with Heparin gtt. Cards and interventional cards consulted. Pt underwent cath on 2/25. LHC shows moderate LAD stenosis, occluded OM, occluded RCA. S/p PCI to OM and RCA. Pt doing well s/p PCI, CP resolved. Echo showed low normal systolic function with a visually estimated ejection fraction of 50 - 55%. Grade I diastolic dysfunction.    Per cardiology, continue DAPT, high intensity statin, and optimize blood pressure regimen. Also referred to cardiac rehab. She is discharging on Carvedilol 25 mg BID, Nifedipine 90 mg, Valsartan 160 mg, HCTZ 25 mg, Aspirin 81 mg, Clopidogrel 75 mg. Pt follow up with PCP, Cardiology.

## 2025-02-26 NOTE — CONSULTS
[facility] - Cardiology Stepdown  Adult Nutrition  Consult Note    SUMMARY     Recommendations    1. Continue low Na diet as tolerated   2. Encourage good intake   3. RD to monitor weight, labs, intake    Goals:   1. % nutritional needs met with diet   2. Maintain weight during admission  Nutrition Goal Status: new  Communication of RD Recs: other (comment) (POC)    Nutrition Discharge Planning     Nutrition Discharge Planning: Therapeutic diet (comments)  Therapeutic diet (comments): cardiac diet    Assessment and Plan    No nutrition diagnosis at this time.     Malnutrition Assessment    Orbital Region (Subcutaneous Fat Loss): well nourished  Upper Arm Region (Subcutaneous Fat Loss): well nourished  Thoracic and Lumbar Region: well nourished   Bahai Region (Muscle Loss): well nourished  Clavicle Bone Region (Muscle Loss): well nourished  Clavicle and Acromion Bone Region (Muscle Loss): well nourished  Dorsal Hand (Muscle Loss): well nourished  Patellar Region (Muscle Loss): well nourished  Anterior Thigh Region (Muscle Loss): well nourished  Posterior Calf Region (Muscle Loss): well nourished     Reason for Assessment    Reason For Assessment: consult (NSTEMI - cardiac diet education)  Diagnosis: cardiac disease (NSTEMI (non-ST elevated myocardial infarction))  General Information Comments: Pt admitted with NSTEMI (non-ST elevated myocardial infarction). PMHx: HTN. No recent surgical hx. No wounds. Pt reported having a good intake - PO: %. No GI s/s - BM: 2/26. Wt stable. Pt intentionally losing wt.    Food & Nutrition  Education     Diet Education: Cardiac diet   Time Spent: 10 minutes   Learners: Patient     Handouts provided: Heart Healthy Nutrition Therapy     Comments: Discussed importance of eating a balanced diet with whole grains, fruits and vegetables, and lean protein sources. Encouraged heart-healthy unsaturated fats while limiting saturated fats, trans fats, and cholesterol intake.  "Reviewed high sodium foods that should be avoided. Food labels, salt free seasonings, and recommended sodium intake reviewed. All questions/concerns were addressed.      Follow-Up: Yes     Please Re-consult as needed.   Thanks!    Nutrition/Diet History    Nutrition Intake History: 2 meals  Spiritual, Cultural Beliefs, Jewish Practices, Values that Affect Care: no  Food Allergies: NKFA  Factors Affecting Nutritional Intake: None identified at this time    Nutrition Related Social Determinants of Health: SDOH: Adequate food in home environment and None Identified    Food Insecurity: No Food Insecurity (2025)    Hunger Vital Sign     Worried About Running Out of Food in the Last Year: Never true     Ran Out of Food in the Last Year: Never true     Anthropometrics    Height: 5' 3.78" (162 cm)  Height (inches): 63.78 in  Height Method: Stated  Weight: 86 kg (189 lb 9.5 oz)  Weight (lb): 189.6 lb  Weight Method: Stated  Ideal Body Weight (IBW), Female: 118.9 lb  % Ideal Body Weight, Female (lb): 159.46 %  BMI (Calculated): 32.8  BMI Grade: 30 - 34.9- obesity - grade I  Weight Loss: intentional  Usual Body Weight (UBW), k.1 kg  % Usual Body Weight: 102.47    Lab/Procedures/Meds    Pertinent Labs Reviewed: reviewed  Pertinent Labs Comments: GFR 49.2 low, albumin 3.0 low,  high  Pertinent Medications Reviewed: reviewed  Pertinent Medications Comments: aspirin, atorvastatin, carvedilol, nifedipine, valsartan    Estimated/Assessed Needs    Weight Used For Calorie Calculations: 86.6 kg (190 lb 14.7 oz)  Energy Calorie Requirements (kcal): 1893 kcal  Energy Need Method: Bluefield-St Jeor (* 1.3)  Protein Requirements: 69-87 g/pro (0.8-1.0 g/kg)  Weight Used For Protein Calculations: 86.6 kg (190 lb 14.7 oz)        RDA Method (mL): 1893  CHO Requirement: 237 g    Nutrition Prescription Ordered    Current Diet Order: low Na    Evaluation of Received Nutrient/Fluid Intake    Energy Calories Required: meeting " needs  Protein Required: meeting needs  Tolerance: tolerating  % Intake of Estimated Energy Needs: 75 - 100 %  % Meal Intake: 75 - 100 %    Nutrition Risk    Level of Risk/Frequency of Follow-up: low     Monitor and Evaluation    Monitor and Evaluation: Energy intake, Food and beverage intake, Protein intake, Diet order, Food and nutrition knowledge, Weight, Beliefs and attitudes, Electrolyte and renal panel, Gastrointestinal profile, Glucose/endocrine profile, Inflammatory profile, Lipid profile, Nutrition focused physical findings     Nutrition Follow-Up    RD Follow-up?: Yes

## 2025-02-26 NOTE — DISCHARGE SUMMARY
[facility] - Cardiology [facility] Medicine  Discharge Summary      Patient Name: Calos Rios  MRN: [id number]  ASTRID: [id number]  Patient Class: IP- Inpatient  Admission Date: 2/25/2025  Hospital Length of Stay: 1 days  Discharge Date and Time:  02/26/2025 2:21 PM  Attending Physician: Bren Tobias MD   Discharging Provider: Marky Acosta MD  Primary Care Provider: Adria Doll Jr., MD  Hospital Medicine Team: [place] [address] Marky Acosta MD  Primary Care Team: [facility] 5    HPI:   Ms. Calos Rios is am 54F with PMH of obesity and HTN who presents from chest pain that woke her up from her sleep at 11:00 PM. She states that she felt a burning sensation in the center of her sternum that was also under her left breast. She mentions that it radiated to her left arm which caused some numbness. She tried to drink some water which did not help. Of note, she was admitted to [facility] on 10/13/2024 for hypertensive emergency and her echo showed moderate concentric hypertrophy with mild LVOT obstruction. She is currently being worked up by cardiology outpatient for HOCM. She recently got a stress echo on 2/19/25 which was negative for ischemia. She mentions that for the past few months, she feels short of breath upon exertion. She denies any chest pain or shortness of breath at rest. She states that this is the first time she has had burning chest pain that did not go away with water. She states that the chest pain only resolved when she came to the hospital and was given nitroglycerin. 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 She usually drinks about 3 drinks on Saturday and Sundays. She smokes 4 cigarettes per day.    In the ED, she was hemodynamically stable with /86 and HR 82. Labs were remarkable for K 3.1 and troponin of 1660. EKG was NSR. Patient was admitted to hospital medicine for further workup of chest pain.     Procedure(s) (LRB):  Angiogram, Coronary, with  Left Heart Cath (N/A)  Instantaneous Wave-Free Ratio (IFR) (N/A)  Stent, Drug Eluting, Multi Vessel, Coronary      Hospital Course:   54 yoF with h/o HTN, GERD, Obesity, current smoker admitted to medicine for NSTEMI. Presented to ED with substernal chest pain 10/10. Troponin trend positive without EKG changes. Hospital medicine started on ACS pathway with Heparin gtt. Cards and interventional cards consulted. Pt underwent cath on 2/25. LHC shows moderate LAD stenosis, occluded OM, occluded RCA. S/p PCI to OM and RCA. Pt doing well s/p PCI, CP resolved. Echo showed low normal systolic function with a visually estimated ejection fraction of 50 - 55%. Grade I diastolic dysfunction.    Per cardiology, continue DAPT, high intensity statin, and optimize blood pressure regimen. Also referred to cardiac rehab. She is discharging on Carvedilol 25 mg BID, Nifedipine 90 mg, Valsartan 160 mg, HCTZ 25 mg, Aspirin 81 mg, Clopidogrel 75 mg. Pt follow up with PCP, Cardiology.         Goals of Care Treatment Preferences:  Code Status: Full Code      SDOH Screening:  The patient was screened for utility difficulties, food insecurity, transport difficulties, housing insecurity, and interpersonal safety and there were no concerns identified this admission.     Consults:   Consults (From admission, onward)          Status Ordering Provider     Inpatient consult to Interventional Cardiology  Once        Provider:  (Not yet assigned)    Completed CRUZITO CALZADA     Inpatient consult to Registered Dietitian/Nutritionist  Once        Provider:  (Not yet assigned)    Completed CRUZITO CALZADA     Inpatient consult to Cardiology  Once        Provider:  (Not yet assigned)    Completed CRUZITO CALZADA            * NSTEMI (non-ST elevated myocardial infarction)  Patient presented with symptoms of chest pain with concerns for ACS. EKG on admission showed NSR. Labs on admission significant for troponin 1660. ASYA 2. Home  meds include valsartan, nifedipine, coreg, HCTZ, and allopurinol. Previous TTE from 11/7/24 showed EF 75-80%, mild LVOT obstruction at rest. Previous Stress Test from 2/19/25 negative for ischemia.    Plan:  - Consulted cardiology, appreciate reccs  - DAPT load, ( given in the ambulance), followed by maintenance dosing   - Full AC w/ heparin  - F/u TTE  - F/u repeat troponin levels  - F/u lipid panel  - Continue statin  - Continue home coreg  - NTG PRN for chest pain  - EKG STAT for new chest pain or hemodynamic instability        HOCM (hypertrophic obstructive cardiomyopathy)  Patient currently being worked up for HOCM outpatient due to TTE from November 2024 showing mild LVOT obstruction at rest. See plan under NSTEMI. Cardiology consulted.       Essential hypertension  Patient's blood pressure range in the last 24 hours was: BP  Min: 132/85  Max: 166/99.The patient's inpatient anti-hypertensive regimen is listed below:    Plan:  - BP is controlled  - Continue home coreg  - Currently holding home valsartan, nifedipine, HCTZ      Final Active Diagnoses:    Diagnosis Date Noted POA    PRINCIPAL PROBLEM:  NSTEMI (non-ST elevated myocardial infarction) [I21.4] 02/25/2025 Unknown    HOCM (hypertrophic obstructive cardiomyopathy) [I42.1] 11/08/2024 Yes    Essential hypertension [I10]  Yes      Problems Resolved During this Admission:       Discharged Condition: stable    Disposition: Home or Self Care    Follow Up:    Patient Instructions:      Ambulatory referral/consult to Internal Medicine   Standing Status: Future   Referral Priority: Routine Referral Type: Consultation   Referral Reason: Specialty Services Required   Referred to Provider: KELLEE STEIN JR Requested Specialty: Internal Medicine   Number of Visits Requested: 1     Ambulatory referral/consult to Cardiology   Standing Status: Future   Referral Priority: Routine Referral Type: Consultation   Referral Reason: Specialty Services Required    Requested Specialty: Cardiology   Number of Visits Requested: 1     Cardiac rehab phase II   Standing Status: Future Standing Exp. Date: 02/25/26     Order Specific Question Answer Comments   Department [facility] CARDIAC REHAB        Significant Diagnostic Studies: N/A    Pending Diagnostic Studies:       None           Medications:  Reconciled Home Medications:      Medication List        PAUSE taking these medications      DULoxetine 60 MG capsule  Wait to take this until your doctor or other care provider tells you to start again.  Commonly known as: CYMBALTA  Take 120 mg by mouth once daily.            START taking these medications      aspirin 81 MG Chew  Chew and swallow 1 tablet (81 mg total) by mouth once daily.  Start taking on: February 27, 2025     atorvastatin 80 MG tablet  Commonly known as: LIPITOR  Take 1 tablet (80 mg total) by mouth every evening.     clopidogreL 75 mg tablet  Commonly known as: PLAVIX  Take 1 tablet (75 mg total) by mouth once daily.  Start taking on: February 27, 2025     empagliflozin 10 mg tablet  Commonly known as: JARDIANCE  Take 1 tablet (10 mg total) by mouth once daily.     nitroGLYCERIN 0.4 MG SL tablet  Commonly known as: NITROSTAT  Place 1 tablet (0.4 mg total) under the tongue every 5 (five) minutes as needed for Chest pain (angina).            CHANGE how you take these medications      carvediloL 25 MG tablet  Commonly known as: COREG  Take 1 tablet (25 mg total) by mouth 2 (two) times daily with meals.  What changed:   medication strength  how much to take            CONTINUE taking these medications      albuterol 90 mcg/actuation inhaler  Commonly known as: PROVENTIL/VENTOLIN HFA  Inhale 2 puffs into the lungs every 6 (six) hours as needed for Wheezing.     allopurinoL 100 MG tablet  Commonly known as: ZYLOPRIM  Take 1 tablet (100 mg total) by mouth once daily.     ALPRAZolam 0.5 MG tablet  Commonly known as: XANAX  Take 1 tablet (0.5 mg total) by mouth nightly as  needed for Anxiety.     buPROPion 150 MG TB24 tablet  Commonly known as: WELLBUTRIN XL  Take 1 tablet (150 mg total) by mouth once daily.     hydroCHLOROthiazide 25 MG tablet  Commonly known as: HYDRODIURIL  Take 25 mg by mouth once daily.     LUMIFY OPHT  Place 2 drops into both eyes once daily.     NIFEdipine 90 MG (OSM) 24 hr tablet  Commonly known as: PROCARDIA-XL  Take 1 tablet (90 mg total) by mouth once daily.  Start taking on: February 27, 2025     valsartan 160 MG tablet  Commonly known as: DIOVAN  Take 1 tablet (160 mg total) by mouth once daily.              Indwelling Lines/Drains at time of discharge:   Lines/Drains/Airways       None                   Time spent on the discharge of patient: 35 minutes         Marky Acosta MD  Department of Hospital Medicine  [facility] - Cardiology Stepdown

## 2025-02-26 NOTE — LETTER
February 26, 2025    Calos Rios  [address] [facility] [address]             [facility]  [address] [facility]  [place] [address]  Phone: [phone]                                  [facility]   [address] [facility]   [place] [address]  [phone]         [facility]   [address] [place] [address] [phone]         [facility]    [address]  [place], [place] [address] [phone]   Re: Calos Rios  Clinic number: [id number]    Dear Ms. Rios:    You were recently admitted to an [facility] facility for cardiac (heart) problem.  Your physician has referred you to [facility].  Cardiac Rehab Phase 2 is an educational and exercise program, conducted in a outpatient setting, proven to help reduce your risk for recurrent heart events.    Cardiac rehab has two major parts:    1. Exercise training to help you achieve cardiovascular fitness while learning how to exercise safely and improve muscle strength and endurance.  Your exercise prescription will be based on the results of the cardiopulmonary stress test (CPX) which will be done before entering the program and at completion.  2. Education, counseling and training to help you understand your heart condition and find ways to reduce your risk of future heart problems.  The cardiac rehab team will help you learn how to cope with the stress of adjusting to a new lifestyle and to deal with your fears about the future.    Phase 2 is a 36-session program, meeting 3 times a week for 12 weeks.  Each session consists of an hour of exercise and half-hour dedicated to the educational topic of the day.  Class days vary per location.  Please contact your nearest facility for details.    Through cardiac rehab you will learn:  About your heart condition, medical therapies, and medication  Risk factors in y our lifestyle  contributing to heart disease  New strategies to modify your risk factors  About a healthy diet that can lower your blood cholesterol, control weight, help prevent or control high blood pressure, and diabetes  How to stop smoking  How to manage stress    If you are interested in getting started, call the [facility] of your choosing.     Sincerely,     [facility]

## 2025-02-26 NOTE — PLAN OF CARE
Problem: Acute Coronary Syndrome  Goal: Absence of Cardiac-Related Pain  Outcome: Progressing  Intervention: Manage Cardiac Pain Symptoms  Flowsheets (Taken 2/25/2025 2005)  Chest Pain Intervention: cardiac monitoring continued     Problem: Cardiac Catheterization (Diagnostic/Interventional)  Goal: Absence of Vascular Access Complication  Outcome: Progressing-R radial site monitored t/o shift     Problem: Adult Inpatient Plan of Care  Goal: Plan of Care Review  Outcome: Progressing  Flowsheets (Taken 2/25/2025 2005)  Plan of Care Reviewed With: patient

## 2025-02-26 NOTE — PLAN OF CARE
[facility] - Cardiology Stepdown  Discharge Final Note    Primary Care Provider: Adria Doll Jr., MD    Expected Discharge Date: 2/26/2025    Patient discharged home with family. Pt went to dc suite, dc suite provided transportation. CHW notified to assist with a follow up pcp and general cardiology appointment.     Patient's bedside nurse and pt notified of the above.    Discharge Plan A and Plan B have been determined by review of patient's clinical status, future medical and therapeutic needs, and coverage/benefits for post-acute care in coordination with multidisciplinary team members.    Final Discharge Note (most recent)       Final Note - 02/26/25 1336          Final Note    Assessment Type Final Discharge Note (P)      Anticipated Discharge Disposition Home or Self Care (P)      Hospital Resources/Appts/Education Provided Provided patient/caregiver with written discharge plan information (P)         Post-Acute Status    Coverage MEDICAID [place] (P)      Discharge Delays None known at this time (P)                      Important Message from Medicare                 Future Appointments   Date Time Provider Department Center   2/28/2025  7:00 AM LAB, [facility]   3/7/2025  9:00 AM Guero Moser NP [facility]   4/11/2025  9:30 AM Gretchen Murphy FNP-C [facility] CARDIO [facility]   5/15/2025  9:00 AM [facility]   6/24/2025  8:40 AM Adria Doll Jr., MD [facility] W     Tyson Simpson, MSN  RN Case Management  [phone]

## 2025-02-26 NOTE — PLAN OF CARE
[facility] - Cardiology Stepdown  Initial Discharge Assessment       Primary Care Provider: Adria Doll Jr., MD    Admission Diagnosis: Non-ST elevation myocardial infarction (NSTEMI) [I21.4]  NSTEMI (non-ST elevation myocardial infarction) [I21.4]  Elevated troponin [R79.89]  NSTEMI (non-ST elevated myocardial infarction) [I21.4]  Chest pain [R07.9]    Admission Date: 2/25/2025  Expected Discharge Date: 2/26/2025    Transition of Care Barriers: (P) None    Payor: MEDICAID / Plan: HEALTHY BLUE ([place] / Product Type: Managed Medicaid /     Extended Emergency Contact Information  Primary Emergency Contact: Adria Rios  Mobile Phone: [phone]  Relation: Brother  Preferred language: English   needed? No  Secondary Emergency Contact: GabrielLucero  Home Phone: [phone]  Mobile Phone: [phone]  Relation: Daughter    Discharge Plan A: (P) Home with family  Discharge Plan B: (P) Home      [id number] - [place], [place] - [address]  [address]  [place] [address]  Phone: [phone] Fax: [phone]    CM met with the pt at bedside. Pt answered all dc questions and given dc pamphlet. Family will provide transportation home when discharged. DC plan A is home with family. DC plan B is home.      Discharge Plan A and Plan B have been determined by review of patient's clinical status, future medical and therapeutic needs, and coverage/benefits for post-acute care in coordination with multidisciplinary team members.     Initial Assessment (most recent)       Adult Discharge Assessment - 02/26/25 1105          Discharge Assessment    Assessment Type Discharge Planning Assessment (P)      Confirmed/corrected address, phone number and insurance Yes (P)      Confirmed Demographics Correct on Facesheet (P)      Source of Information patient (P)      When was your last doctors appointment? -- (P)    unsure    Does patient/caregiver understand observation status Yes  (P)      Communicated JEFF with patient/caregiver Yes (P)      Reason For Admission NSTEMI (P)      People in Home sibling(s) (P)      Facility Arrived From: home (P)      Do you expect to return to your current living situation? Yes (P)      Do you have help at home or someone to help you manage your care at home? Yes (P)      Who are your caregiver(s) and their phone number(s)? Adria Rios (Brother)  [phone] (P)      Prior to hospitilization cognitive status: Alert/Oriented (P)      Current cognitive status: Alert/Oriented (P)      Walking or Climbing Stairs Difficulty no (P)      Dressing/Bathing Difficulty no (P)      Equipment Currently Used at Home none (P)      Readmission within 30 days? No (P)      Patient currently being followed by outpatient case management? No (P)      Do you currently have service(s) that help you manage your care at home? No (P)      Do you take prescription medications? Yes (P)      Do you have prescription coverage? Yes (P)      Coverage MEDICAID - [employer] ([place] [place] (P)      Do you have any problems affording any of your prescribed medications? No (P)      Is the patient taking medications as prescribed? yes (P)      Who is going to help you get home at discharge? Adria Rios (Brother)  [phone] (P)      How do you get to doctors appointments? car, drives self (P)      Are you on dialysis? No (P)      Do you take coumadin? No (P)      Discharge Plan A Home with family (P)      Discharge Plan B Home (P)      DME Needed Upon Discharge  none (P)      Discharge Plan discussed with: Patient (P)      Transition of Care Barriers None (P)         Physical Activity    On average, how many days per week do you engage in moderate to strenuous exercise (like a brisk walk)? 0 days (P)      On average, how many minutes do you engage in exercise at this level? 0 min (P)         Financial Resource Strain    How hard is it for you to pay for the very basics like food, housing,  medical care, and heating? Not very hard (P)         Housing Stability    In the last 12 months, was there a time when you were not able to pay the mortgage or rent on time? No (P)      At any time in the past 12 months, were you homeless or living in a shelter (including now)? No (P)         Transportation Needs    Has the lack of transportation kept you from medical appointments, meetings, work or from getting things needed for daily living? No (P)         Food Insecurity    Within the past 12 months, you worried that your food would run out before you got the money to buy more. Never true (P)      Within the past 12 months, the food you bought just didn't last and you didn't have money to get more. Never true (P)         Stress    Do you feel stress - tense, restless, nervous, or anxious, or unable to sleep at night because your mind is troubled all the time - these days? To some extent (P)         Social Isolation    How often do you feel lonely or isolated from those around you?  Never (P)         Alcohol Use    Q1: How often do you have a drink containing alcohol? 2-4 times a month (P)      Q2: How many drinks containing alcohol do you have on a typical day when you are drinking? 1 or 2 (P)      Q3: How often do you have six or more drinks on one occasion? Never (P)         [employer]    In the past 12 months has the electric, gas, oil, or water company threatened to shut off services in your home? No (P)         Health Literacy    How often do you need to have someone help you when you read instructions, pamphlets, or other written material from your doctor or pharmacy? Never (P)         OTHER    Name(s) of People in Home Adria Rios (Brother)  [phone] (P)                       Tyson Simpson, MSN  RN Case Management  [phone]

## 2025-02-28 ENCOUNTER — LAB VISIT (OUTPATIENT)
Dept: LAB | Facility: HOSPITAL | Age: 55
End: 2025-02-28
Attending: INTERNAL MEDICINE
Payer: MEDICAID

## 2025-02-28 DIAGNOSIS — E87.6 HYPOKALEMIA: ICD-10-CM

## 2025-02-28 LAB
ANION GAP SERPL CALC-SCNC: 13 MMOL/L (ref 8–16)
BUN SERPL-MCNC: 20 MG/DL (ref 6–20)
CALCIUM SERPL-MCNC: 10.2 MG/DL (ref 8.7–10.5)
CHLORIDE SERPL-SCNC: 109 MMOL/L (ref 95–110)
CO2 SERPL-SCNC: 17 MMOL/L (ref 23–29)
CREAT SERPL-MCNC: 1.3 MG/DL (ref 0.5–1.4)
EST. GFR  (NO RACE VARIABLE): 49.2 ML/MIN/1.73 M^2
GLUCOSE SERPL-MCNC: 85 MG/DL (ref 70–110)
POCT GLUCOSE: 104 MG/DL (ref 70–110)
POTASSIUM SERPL-SCNC: 4.4 MMOL/L (ref 3.5–5.1)
SODIUM SERPL-SCNC: 139 MMOL/L (ref 136–145)

## 2025-02-28 PROCEDURE — 36415 COLL VENOUS BLD VENIPUNCTURE: CPT | Performed by: INTERNAL MEDICINE

## 2025-02-28 PROCEDURE — 80048 BASIC METABOLIC PNL TOTAL CA: CPT | Performed by: INTERNAL MEDICINE

## 2025-03-03 ENCOUNTER — RESULTS FOLLOW-UP (OUTPATIENT)
Dept: INTERNAL MEDICINE | Facility: CLINIC | Age: 55
End: 2025-03-03

## 2025-03-07 ENCOUNTER — LAB VISIT (OUTPATIENT)
Dept: LAB | Facility: HOSPITAL | Age: 55
End: 2025-03-07
Payer: MEDICAID

## 2025-03-07 ENCOUNTER — OFFICE VISIT (OUTPATIENT)
Dept: INTERNAL MEDICINE | Facility: CLINIC | Age: 55
End: 2025-03-07
Payer: MEDICAID

## 2025-03-07 VITALS
OXYGEN SATURATION: 95 % | HEART RATE: 65 BPM | HEIGHT: 64 IN | SYSTOLIC BLOOD PRESSURE: 120 MMHG | DIASTOLIC BLOOD PRESSURE: 86 MMHG | WEIGHT: 188.25 LBS | BODY MASS INDEX: 32.14 KG/M2

## 2025-03-07 DIAGNOSIS — I21.4 NSTEMI (NON-ST ELEVATED MYOCARDIAL INFARCTION): ICD-10-CM

## 2025-03-07 DIAGNOSIS — N18.32 STAGE 3B CHRONIC KIDNEY DISEASE: ICD-10-CM

## 2025-03-07 DIAGNOSIS — I42.1 HOCM (HYPERTROPHIC OBSTRUCTIVE CARDIOMYOPATHY): Primary | ICD-10-CM

## 2025-03-07 DIAGNOSIS — I42.1 HOCM (HYPERTROPHIC OBSTRUCTIVE CARDIOMYOPATHY): ICD-10-CM

## 2025-03-07 LAB
ALBUMIN SERPL BCP-MCNC: 3.4 G/DL (ref 3.5–5.2)
ALP SERPL-CCNC: 66 U/L (ref 40–150)
ALT SERPL W/O P-5'-P-CCNC: 17 U/L (ref 10–44)
ANION GAP SERPL CALC-SCNC: 7 MMOL/L (ref 8–16)
AST SERPL-CCNC: 24 U/L (ref 10–40)
BACTERIA #/AREA URNS AUTO: ABNORMAL /HPF
BILIRUB SERPL-MCNC: 0.4 MG/DL (ref 0.1–1)
BILIRUB UR QL STRIP: NEGATIVE
BUN SERPL-MCNC: 23 MG/DL (ref 6–20)
CALCIUM SERPL-MCNC: 9.5 MG/DL (ref 8.7–10.5)
CHLORIDE SERPL-SCNC: 108 MMOL/L (ref 95–110)
CLARITY UR REFRACT.AUTO: CLEAR
CO2 SERPL-SCNC: 26 MMOL/L (ref 23–29)
COLOR UR AUTO: YELLOW
CREAT SERPL-MCNC: 1.4 MG/DL (ref 0.5–1.4)
EST. GFR  (NO RACE VARIABLE): 45 ML/MIN/1.73 M^2
GLUCOSE SERPL-MCNC: 73 MG/DL (ref 70–110)
GLUCOSE UR QL STRIP: ABNORMAL
HGB UR QL STRIP: ABNORMAL
HYALINE CASTS UR QL AUTO: 6 /LPF
KETONES UR QL STRIP: NEGATIVE
LEUKOCYTE ESTERASE UR QL STRIP: NEGATIVE
MICROSCOPIC COMMENT: ABNORMAL
NITRITE UR QL STRIP: NEGATIVE
PH UR STRIP: 6 [PH] (ref 5–8)
POTASSIUM SERPL-SCNC: 4 MMOL/L (ref 3.5–5.1)
PROT SERPL-MCNC: 7.1 G/DL (ref 6–8.4)
PROT UR QL STRIP: ABNORMAL
RBC #/AREA URNS AUTO: 1 /HPF (ref 0–4)
SODIUM SERPL-SCNC: 141 MMOL/L (ref 136–145)
SP GR UR STRIP: 1.02 (ref 1–1.03)
SQUAMOUS #/AREA URNS AUTO: 5 /HPF
URN SPEC COLLECT METH UR: ABNORMAL
WBC #/AREA URNS AUTO: 3 /HPF (ref 0–5)
YEAST UR QL AUTO: ABNORMAL

## 2025-03-07 PROCEDURE — 36415 COLL VENOUS BLD VENIPUNCTURE: CPT | Performed by: NURSE PRACTITIONER

## 2025-03-07 PROCEDURE — 99214 OFFICE O/P EST MOD 30 MIN: CPT | Mod: PBBFAC | Performed by: NURSE PRACTITIONER

## 2025-03-07 PROCEDURE — 81001 URINALYSIS AUTO W/SCOPE: CPT | Performed by: NURSE PRACTITIONER

## 2025-03-07 PROCEDURE — 99999 PR PBB SHADOW E&M-EST. PATIENT-LVL IV: CPT | Mod: PBBFAC,,, | Performed by: NURSE PRACTITIONER

## 2025-03-07 PROCEDURE — 80053 COMPREHEN METABOLIC PANEL: CPT | Performed by: NURSE PRACTITIONER

## 2025-03-07 NOTE — PROGRESS NOTES
"Transitional Care Note  Subjective:       Patient ID: Calos Rios is a 54 y.o. female.  Chief Complaint: Follow-up    Family and/or Caretaker present at visit?  No.  Diagnostic tests reviewed/disposition: No diagnosic tests pending after this hospitalization.  Disease/illness education: HOCM, NSTEMI, CKD3b   Home health/community services discussion/referrals: Patient does not have home health established from hospital visit.  They do not need home health.  If needed, we will set up home health for the patient.   Establishment or re-establishment of referral orders for community resources: No other necessary community resources.   Discussion with other health care providers: No discussion with other health care providers necessary.   Presenting for hospital follow up. S/P NSTEMI and PCI. Currently being treated for HOCM and about to undergo genetic testing. Feels well today and denies CP, SOB, leg swelling, fatigue. Stopped smoking. Has follow up scheduled with cardiology and will be starting cardiac rehab.         Hospital discharge summary from 2/26/25:       "............................[facility] - Cardiology [facility]  Discharge Summary        Patient Name: Calos Rios  MRN: [id number]  ASTRID: [id number]  Patient Class: IP- Inpatient  Admission Date: 2/25/2025  Hospital Length of Stay: 1 days  Discharge Date and Time:  02/26/2025 2:21 PM  Attending Physician: Bren Tobias MD   Discharging Provider: Marky Acosta MD  Primary Care Provider: Adria Doll Jr., MD  [facility] Medicine Team: [facility]OSP MED 5 Marky Acosta MD  Primary Care Team: [facility] MED 5     HPI:   Ms. Calos Rios is am 54F with PMH of obesity and HTN who presents from chest pain that woke her up from her sleep at 11:00 PM. She states that she felt a burning sensation in the center of her sternum that was also under her left breast. She mentions that it radiated to her left arm which caused some numbness. She " tried to drink some water which did not help. Of note, she was admitted to [facility] on 10/13/2024 for hypertensive emergency and her echo showed moderate concentric hypertrophy with mild LVOT obstruction. She is currently being worked up by cardiology outpatient for HOCM. She recently got a stress echo on 2/19/25 which was negative for ischemia. She mentions that for the past few months, she feels short of breath upon exertion. She denies any chest pain or shortness of breath at rest. She states that this is the first time she has had burning chest pain that did not go away with water. She states that the chest pain only resolved when she came to the hospital and was given nitroglycerin. Patient states that she had a shooter of [employer] earlier this evening. She usually drinks about 3 drinks on Saturday and Sundays. She smokes 4 cigarettes per day.     In the ED, she was hemodynamically stable with /86 and HR 82. Labs were remarkable for K 3.1 and troponin of 1660. EKG was NSR. Patient was admitted to hospital medicine for further workup of chest pain.      Procedure(s) (LRB):  Angiogram, Coronary, with Left Heart Cath (N/A)  Instantaneous Wave-Free Ratio (IFR) (N/A)  Stent, Drug Eluting, Multi Vessel, Coronary       Hospital Course:   54 yoF with h/o HTN, GERD, Obesity, current smoker admitted to medicine for NSTEMI. Presented to ED with substernal chest pain 10/10. Troponin trend positive without EKG changes. Hospital medicine started on ACS pathway with Heparin gtt. Cards and interventional cards consulted. Pt underwent cath on 2/25. LHC shows moderate LAD stenosis, occluded OM, occluded RCA. S/p PCI to OM and RCA. Pt doing well s/p PCI, CP resolved. Echo showed low normal systolic function with a visually estimated ejection fraction of 50 - 55%. Grade I diastolic dysfunction.     Per cardiology, continue DAPT, high intensity statin, and optimize blood pressure regimen. Also referred to cardiac rehab. She is  discharging on Carvedilol 25 mg BID, Nifedipine 90 mg, Valsartan 160 mg, HCTZ 25 mg, Aspirin 81 mg, Clopidogrel 75 mg. Pt follow up with PCP, Cardiology.           Goals of Care Treatment Preferences:  Code Status: Full Code        SDOH Screening:  The patient was screened for utility difficulties, food insecurity, transport difficulties, housing insecurity, and interpersonal safety and there were no concerns identified this admission.     Consults:     Consults (From admission, onward)             Status Ordering Provider        Inpatient consult to Interventional Cardiology  Once       Provider:  (Not yet assigned)   Completed CRUZITO CALZADA        Inpatient consult to Registered Dietitian/Nutritionist  Once       Provider:  (Not yet assigned)   Completed CRUZITO CALZADA        Inpatient consult to Cardiology  Once       Provider:  (Not yet assigned)   Completed CRUZITO CALZADA                * NSTEMI (non-ST elevated myocardial infarction)  Patient presented with symptoms of chest pain with concerns for ACS. EKG on admission showed NSR. Labs on admission significant for troponin 1660. ASYA 2. Home meds include valsartan, nifedipine, coreg, HCTZ, and allopurinol. Previous TTE from 11/7/24 showed EF 75-80%, mild LVOT obstruction at rest. Previous Stress Test from 2/19/25 negative for ischemia.     Plan:  - Consulted cardiology, appreciate reccs  - DAPT load, ( given in the ambulance), followed by maintenance dosing   - Full AC w/ heparin  - F/u TTE  - F/u repeat troponin levels  - F/u lipid panel  - Continue statin  - Continue home coreg  - NTG PRN for chest pain  - EKG STAT for new chest pain or hemodynamic instability           HOCM (hypertrophic obstructive cardiomyopathy)  Patient currently being worked up for HOCM outpatient due to TTE from November 2024 showing mild LVOT obstruction at rest. See plan under NSTEMI. Cardiology consulted.         Essential hypertension  Patient's  blood pressure range in the last 24 hours was: BP  Min: 132/85  Max: 166/99.The patient's inpatient anti-hypertensive regimen is listed below:     Plan:  - BP is controlled  - Continue home coreg  - Currently holding home valsartan, nifedipine, HCTZ          Final Active Diagnoses:    Diagnosis Date Noted POA  · PRINCIPAL PROBLEM:  NSTEMI (non-ST elevated myocardial infarction) [I21.4] 02/25/2025 Unknown  · HOCM (hypertrophic obstructive cardiomyopathy) [I42.1] 11/08/2024 Yes  · Essential hypertension [I10]   Yes     Problems Resolved During this Admission:        Discharged Condition: stable     Disposition: Home or Self Care     Follow Up:     Patient Instructions:         Ambulatory referral/consult to Internal Medicine   Standing Status: Future  Referral Priority: Routine Referral Type: Consultation   Referral Reason: Specialty Services Required   Referred to Provider: KELLEE STEIN JR Requested Specialty: Internal Medicine   Number of Visits Requested: 1        Ambulatory referral/consult to Cardiology   Standing Status: Future  Referral Priority: Routine Referral Type: Consultation   Referral Reason: Specialty Services Required   Requested Specialty: Cardiology   Number of Visits Requested: 1        Cardiac rehab phase II  Standing Status: Future Standing Exp. Date: 02/25/26       Order Specific Question Answer Comments  Department [facility] CARDIAC REHAB          Significant Diagnostic Studies: N/A       Pending Diagnostic Studies:          None             Medications:  Reconciled Home Medications:        Medication List          PAUSE taking these medications        DULoxetine 60 MG capsule  Wait to take this until your doctor or other care provider tells you to start again.  Commonly known as: CYMBALTA  Take 120 mg by mouth once daily.                  START taking these medications        aspirin 81 MG Chew  Chew and swallow 1 tablet (81 mg total) by mouth once daily.  Start taking on: February 27, 2025      atorvastatin 80 MG tablet  Commonly known as: LIPITOR  Take 1 tablet (80 mg total) by mouth every evening.     clopidogreL 75 mg tablet  Commonly known as: PLAVIX  Take 1 tablet (75 mg total) by mouth once daily.  Start taking on: February 27, 2025     empagliflozin 10 mg tablet  Commonly known as: JARDIANCE  Take 1 tablet (10 mg total) by mouth once daily.     nitroGLYCERIN 0.4 MG SL tablet  Commonly known as: NITROSTAT  Place 1 tablet (0.4 mg total) under the tongue every 5 (five) minutes as needed for Chest pain (angina).                  CHANGE how you take these medications        carvediloL 25 MG tablet  Commonly known as: COREG  Take 1 tablet (25 mg total) by mouth 2 (two) times daily with meals.  What changed:   · medication strength  · how much to take                  CONTINUE taking these medications        albuterol 90 mcg/actuation inhaler  Commonly known as: PROVENTIL/VENTOLIN HFA  Inhale 2 puffs into the lungs every 6 (six) hours as needed for Wheezing.     allopurinoL 100 MG tablet  Commonly known as: ZYLOPRIM  Take 1 tablet (100 mg total) by mouth once daily.     ALPRAZolam 0.5 MG tablet  Commonly known as: XANAX  Take 1 tablet (0.5 mg total) by mouth nightly as needed for Anxiety.     buPROPion 150 MG TB24 tablet  Commonly known as: WELLBUTRIN XL  Take 1 tablet (150 mg total) by mouth once daily.     hydroCHLOROthiazide 25 MG tablet  Commonly known as: HYDRODIURIL  Take 25 mg by mouth once daily.     LUMIFY OPHT  Place 2 drops into both eyes once daily.     NIFEdipine 90 MG (OSM) 24 hr tablet  Commonly known as: PROCARDIA-XL  Take 1 tablet (90 mg total) by mouth once daily.  Start taking on: February 27, 2025     valsartan 160 MG tablet  Commonly known as: DIOVAN  Take 1 tablet (160 mg total) by mouth once daily.                   Indwelling Lines/Drains at time of discharge:     Lines/Drains/Airways          None                          Time spent on the discharge of patient: 35 minutes          "  Marky Acosta MD  [facility]  [facility] - Cardiology Stepdown    Cosigned by: Bren Tobias MD at 3/5/2025  1:19 PM................................"      Review of Systems   Constitutional:  Negative for fatigue.   Respiratory: Negative.     Cardiovascular: Negative.    All other systems reviewed and are negative.    Objective:      Physical Exam  Constitutional:       General: She is not in acute distress.     Appearance: Normal appearance. She is not ill-appearing, toxic-appearing or diaphoretic.   HENT:      Head: Normocephalic and atraumatic.   Eyes:      Pupils: Pupils are equal, round, and reactive to light.   Cardiovascular:      Rate and Rhythm: Normal rate and regular rhythm.      Comments: Distant heart sounds   Pulmonary:      Effort: Pulmonary effort is normal.      Breath sounds: Normal breath sounds.   Skin:     General: Skin is warm and dry.   Neurological:      Mental Status: She is alert and oriented to person, place, and time.   Psychiatric:         Mood and Affect: Mood normal.         Behavior: Behavior normal.       Assessment:       No diagnosis found.    Plan:       Calos was seen today for follow-up.    Diagnoses and all orders for this visit:    HOCM (hypertrophic obstructive cardiomyopathy); stabilized after PCI and currently undergoing genetic testing. Followed by cardiology and about to start cardiac rehab. Continue current management. Will obtain follow up labs to monitor for acid/base and electrolytes disturbances, and to monitor renal function closely.   -     Comprehensive Metabolic Panel; Future  -     Urinalysis    NSTEMI (non-ST elevated myocardial infarction); stabilized after PCI and currently undergoing genetic testing. Followed by cardiology and about to start cardiac rehab. Continue current management. Will obtain follow up labs to monitor for acid/base and electrolytes disturbances, and to monitor renal function closely.   -     Comprehensive " Metabolic Panel; Future  -     Urinalysis    Stage 3b chronic kidney disease; stabilized after PCI and currently undergoing genetic testing. Followed by cardiology and about to start cardiac rehab. Continue current management. Will obtain follow up labs to monitor for acid/base and electrolytes disturbances, and to monitor renal function closely.   -     Comprehensive Metabolic Panel; Future  -     Urinalysis    RTC Prn and if symptoms worsen or do not fail to improve

## 2025-03-13 ENCOUNTER — PATIENT MESSAGE (OUTPATIENT)
Dept: INTERNAL MEDICINE | Facility: CLINIC | Age: 55
End: 2025-03-13
Payer: MEDICAID

## 2025-03-13 DIAGNOSIS — I42.1 HOCM (HYPERTROPHIC OBSTRUCTIVE CARDIOMYOPATHY): ICD-10-CM

## 2025-03-13 DIAGNOSIS — N18.32 STAGE 3B CHRONIC KIDNEY DISEASE: Primary | ICD-10-CM

## 2025-03-21 ENCOUNTER — HOSPITAL ENCOUNTER (EMERGENCY)
Facility: HOSPITAL | Age: 55
Discharge: HOME | End: 2025-03-21
Attending: STUDENT IN AN ORGANIZED HEALTH CARE EDUCATION/TRAINING PROGRAM
Payer: MEDICAID

## 2025-03-21 VITALS
SYSTOLIC BLOOD PRESSURE: 123 MMHG | RESPIRATION RATE: 14 BRPM | BODY MASS INDEX: 32.44 KG/M2 | HEIGHT: 64 IN | DIASTOLIC BLOOD PRESSURE: 73 MMHG | WEIGHT: 190 LBS | OXYGEN SATURATION: 96 % | HEART RATE: 74 BPM | TEMPERATURE: 98 F

## 2025-03-21 DIAGNOSIS — R06.02 SHORTNESS OF BREATH: ICD-10-CM

## 2025-03-21 DIAGNOSIS — R06.00 DYSPNEA: ICD-10-CM

## 2025-03-21 DIAGNOSIS — J45.901 MODERATE ASTHMA WITH EXACERBATION, UNSPECIFIED WHETHER PERSISTENT: Primary | ICD-10-CM

## 2025-03-21 LAB
ALBUMIN SERPL BCP-MCNC: 3.6 G/DL (ref 3.5–5.2)
ALP SERPL-CCNC: 64 U/L (ref 40–150)
ALT SERPL W/O P-5'-P-CCNC: 18 U/L (ref 10–44)
ANION GAP SERPL CALC-SCNC: 13 MMOL/L (ref 8–16)
AST SERPL-CCNC: 22 U/L (ref 10–40)
BASOPHILS # BLD AUTO: 0.03 K/UL (ref 0–0.2)
BASOPHILS # BLD AUTO: 0.07 K/UL (ref 0–0.2)
BASOPHILS NFR BLD: 0.4 % (ref 0–1.9)
BASOPHILS NFR BLD: 0.9 % (ref 0–1.9)
BILIRUB SERPL-MCNC: 0.3 MG/DL (ref 0.1–1)
BNP SERPL-MCNC: 40 PG/ML (ref 0–99)
BUN SERPL-MCNC: 46 MG/DL (ref 6–20)
CALCIUM SERPL-MCNC: 9.3 MG/DL (ref 8.7–10.5)
CHLORIDE SERPL-SCNC: 105 MMOL/L (ref 95–110)
CO2 SERPL-SCNC: 21 MMOL/L (ref 23–29)
CREAT SERPL-MCNC: 1.5 MG/DL (ref 0.5–1.4)
DIFFERENTIAL METHOD BLD: ABNORMAL
DIFFERENTIAL METHOD BLD: ABNORMAL
EOSINOPHIL # BLD AUTO: 0.5 K/UL (ref 0–0.5)
EOSINOPHIL # BLD AUTO: 0.6 K/UL (ref 0–0.5)
EOSINOPHIL NFR BLD: 6.3 % (ref 0–8)
EOSINOPHIL NFR BLD: 7.3 % (ref 0–8)
ERYTHROCYTE [DISTWIDTH] IN BLOOD BY AUTOMATED COUNT: 13.5 % (ref 11.5–14.5)
ERYTHROCYTE [DISTWIDTH] IN BLOOD BY AUTOMATED COUNT: 13.5 % (ref 11.5–14.5)
EST. GFR  (NO RACE VARIABLE): 41.4 ML/MIN/1.73 M^2
GLUCOSE SERPL-MCNC: 94 MG/DL (ref 70–110)
HCT VFR BLD AUTO: 40.2 % (ref 37–48.5)
HCT VFR BLD AUTO: 42.4 % (ref 37–48.5)
HGB BLD-MCNC: 13.4 G/DL (ref 12–16)
HGB BLD-MCNC: 13.7 G/DL (ref 12–16)
IMM GRANULOCYTES # BLD AUTO: 0.02 K/UL (ref 0–0.04)
IMM GRANULOCYTES # BLD AUTO: 0.02 K/UL (ref 0–0.04)
IMM GRANULOCYTES NFR BLD AUTO: 0.3 % (ref 0–0.5)
IMM GRANULOCYTES NFR BLD AUTO: 0.3 % (ref 0–0.5)
INFLUENZA A, MOLECULAR: NEGATIVE
INFLUENZA B, MOLECULAR: NEGATIVE
LIPASE SERPL-CCNC: 21 U/L (ref 4–60)
LYMPHOCYTES # BLD AUTO: 1.1 K/UL (ref 1–4.8)
LYMPHOCYTES # BLD AUTO: 1.5 K/UL (ref 1–4.8)
LYMPHOCYTES NFR BLD: 15.2 % (ref 18–48)
LYMPHOCYTES NFR BLD: 18.8 % (ref 18–48)
MAGNESIUM SERPL-MCNC: 2.3 MG/DL (ref 1.6–2.6)
MCH RBC QN AUTO: 31.1 PG (ref 27–31)
MCH RBC QN AUTO: 31.7 PG (ref 27–31)
MCHC RBC AUTO-ENTMCNC: 32.3 G/DL (ref 32–36)
MCHC RBC AUTO-ENTMCNC: 33.3 G/DL (ref 32–36)
MCV RBC AUTO: 95 FL (ref 82–98)
MCV RBC AUTO: 96 FL (ref 82–98)
MONOCYTES # BLD AUTO: 0.3 K/UL (ref 0.3–1)
MONOCYTES # BLD AUTO: 0.6 K/UL (ref 0.3–1)
MONOCYTES NFR BLD: 4.6 % (ref 4–15)
MONOCYTES NFR BLD: 7.4 % (ref 4–15)
NEUTROPHILS # BLD AUTO: 5.2 K/UL (ref 1.8–7.7)
NEUTROPHILS # BLD AUTO: 5.4 K/UL (ref 1.8–7.7)
NEUTROPHILS NFR BLD: 65.3 % (ref 38–73)
NEUTROPHILS NFR BLD: 73.2 % (ref 38–73)
NRBC BLD-RTO: 0 /100 WBC
NRBC BLD-RTO: 0 /100 WBC
OHS QRS DURATION: 90 MS
OHS QRS DURATION: 92 MS
OHS QRS DURATION: 94 MS
OHS QTC CALCULATION: 462 MS
OHS QTC CALCULATION: 465 MS
OHS QTC CALCULATION: 469 MS
PLATELET # BLD AUTO: 161 K/UL (ref 150–450)
PLATELET # BLD AUTO: ABNORMAL K/UL (ref 150–450)
PLATELET BLD QL SMEAR: ABNORMAL
PMV BLD AUTO: 10.4 FL (ref 9.2–12.9)
PMV BLD AUTO: ABNORMAL FL (ref 9.2–12.9)
POTASSIUM SERPL-SCNC: 3.2 MMOL/L (ref 3.5–5.1)
PROT SERPL-MCNC: 7.2 G/DL (ref 6–8.4)
RBC # BLD AUTO: 4.23 M/UL (ref 4–5.4)
RBC # BLD AUTO: 4.41 M/UL (ref 4–5.4)
SARS-COV-2 RDRP RESP QL NAA+PROBE: NEGATIVE
SODIUM SERPL-SCNC: 139 MMOL/L (ref 136–145)
SPECIMEN SOURCE: NORMAL
TROPONIN I SERPL DL<=0.01 NG/ML-MCNC: 111 NG/L (ref 0–14)
TROPONIN I SERPL DL<=0.01 NG/ML-MCNC: 136 NG/L (ref 0–14)
TROPONIN I SERPL DL<=0.01 NG/ML-MCNC: 141 NG/L (ref 0–14)
WBC # BLD AUTO: 7.43 K/UL (ref 3.9–12.7)
WBC # BLD AUTO: 7.97 K/UL (ref 3.9–12.7)

## 2025-03-21 PROCEDURE — 96374 THER/PROPH/DIAG INJ IV PUSH: CPT

## 2025-03-21 PROCEDURE — 99285 EMERGENCY DEPT VISIT HI MDM: CPT | Mod: 25

## 2025-03-21 PROCEDURE — 94761 N-INVAS EAR/PLS OXIMETRY MLT: CPT

## 2025-03-21 PROCEDURE — 87635 SARS-COV-2 COVID-19 AMP PRB: CPT | Performed by: STUDENT IN AN ORGANIZED HEALTH CARE EDUCATION/TRAINING PROGRAM

## 2025-03-21 PROCEDURE — 84484 ASSAY OF TROPONIN QUANT: CPT | Mod: 91 | Performed by: EMERGENCY MEDICINE

## 2025-03-21 PROCEDURE — 25000242 PHARM REV CODE 250 ALT 637 W/ HCPCS: Performed by: STUDENT IN AN ORGANIZED HEALTH CARE EDUCATION/TRAINING PROGRAM

## 2025-03-21 PROCEDURE — 85025 COMPLETE CBC W/AUTO DIFF WBC: CPT | Performed by: STUDENT IN AN ORGANIZED HEALTH CARE EDUCATION/TRAINING PROGRAM

## 2025-03-21 PROCEDURE — 93005 ELECTROCARDIOGRAM TRACING: CPT

## 2025-03-21 PROCEDURE — 83880 ASSAY OF NATRIURETIC PEPTIDE: CPT | Performed by: STUDENT IN AN ORGANIZED HEALTH CARE EDUCATION/TRAINING PROGRAM

## 2025-03-21 PROCEDURE — 93010 ELECTROCARDIOGRAM REPORT: CPT | Mod: ,,, | Performed by: INTERNAL MEDICINE

## 2025-03-21 PROCEDURE — 84484 ASSAY OF TROPONIN QUANT: CPT | Performed by: STUDENT IN AN ORGANIZED HEALTH CARE EDUCATION/TRAINING PROGRAM

## 2025-03-21 PROCEDURE — 83735 ASSAY OF MAGNESIUM: CPT | Performed by: STUDENT IN AN ORGANIZED HEALTH CARE EDUCATION/TRAINING PROGRAM

## 2025-03-21 PROCEDURE — 63600175 PHARM REV CODE 636 W HCPCS: Mod: JZ,TB | Performed by: STUDENT IN AN ORGANIZED HEALTH CARE EDUCATION/TRAINING PROGRAM

## 2025-03-21 PROCEDURE — 80053 COMPREHEN METABOLIC PANEL: CPT | Performed by: STUDENT IN AN ORGANIZED HEALTH CARE EDUCATION/TRAINING PROGRAM

## 2025-03-21 PROCEDURE — 83690 ASSAY OF LIPASE: CPT | Performed by: STUDENT IN AN ORGANIZED HEALTH CARE EDUCATION/TRAINING PROGRAM

## 2025-03-21 PROCEDURE — 87502 INFLUENZA DNA AMP PROBE: CPT | Performed by: STUDENT IN AN ORGANIZED HEALTH CARE EDUCATION/TRAINING PROGRAM

## 2025-03-21 PROCEDURE — 94640 AIRWAY INHALATION TREATMENT: CPT

## 2025-03-21 RX ORDER — IPRATROPIUM BROMIDE AND ALBUTEROL SULFATE 2.5; .5 MG/3ML; MG/3ML
3 SOLUTION RESPIRATORY (INHALATION)
Status: COMPLETED | OUTPATIENT
Start: 2025-03-21 | End: 2025-03-21

## 2025-03-21 RX ORDER — METHYLPREDNISOLONE SOD SUCC 125 MG
125 VIAL (EA) INJECTION
Status: COMPLETED | OUTPATIENT
Start: 2025-03-21 | End: 2025-03-21

## 2025-03-21 RX ORDER — FLUTICASONE PROPIONATE 50 MCG
1 SPRAY, SUSPENSION (ML) NASAL 2 TIMES DAILY PRN
Qty: 15 G | Refills: 0 | Status: SHIPPED | OUTPATIENT
Start: 2025-03-21

## 2025-03-21 RX ORDER — ALBUTEROL SULFATE 90 UG/1
2 INHALANT RESPIRATORY (INHALATION) EVERY 6 HOURS PRN
Qty: 18 G | Refills: 12 | Status: SHIPPED | OUTPATIENT
Start: 2025-03-21

## 2025-03-21 RX ORDER — PREDNISONE 50 MG/1
50 TABLET ORAL DAILY
Qty: 5 TABLET | Refills: 0 | Status: SHIPPED | OUTPATIENT
Start: 2025-03-21

## 2025-03-21 RX ADMIN — METHYLPREDNISOLONE SODIUM SUCCINATE 125 MG: 125 INJECTION, POWDER, FOR SOLUTION INTRAMUSCULAR; INTRAVENOUS at 05:03

## 2025-03-21 RX ADMIN — IPRATROPIUM BROMIDE AND ALBUTEROL SULFATE 3 ML: 2.5; .5 SOLUTION RESPIRATORY (INHALATION) at 04:03

## 2025-03-21 NOTE — PROVIDER PROGRESS NOTES - EMERGENCY DEPT.
Encounter Date: 3/21/2025    ED Physician Progress Notes        Physician Note:   Received patient at sign-out  Repeat troponin down trending to 111.  Suspect that patient's elevated troponin is secondary to recent catheterization but as troponins are down trending, she has no chest pain, do not suspect acute thrombotic ACS.  Laboratory evaluation was also remarkable for hypokalemia of 3.2 but I suspect this is due to albuterol use.  Elevated BUN of 46 and creatinine 1.5.  Patient was seen and examined by myself.  She notes her dyspnea has resolved.  On exam she still has some trace end expiratory wheezing in the left anterior lung zone but is speaking complete sentences on room air in feels comfortable with discharge.  Will proceed with discharge for acute asthma exacerbation as planned.  Prescriptions for inhalers and steroids provided by previous MD.  Extensive return precautions.

## 2025-03-21 NOTE — ED PROVIDER NOTES
Encounter Date: 3/21/2025       History     Chief Complaint   Patient presents with    Shortness of Breath     Pt states feeling short of breath with a cough since yesterday. Pt had angiogram with 2 stents placed last month for an STEMI     HPI    53 yo F w/HCM, HFpEF, CAD s/p PCI (2/25/25), asthma, presenting w/dyspnea, cough, wheezing x 1 day.    Denies orthopnea, denies leg swelling, denies chest pain or exertional chest pain, or pleuritic chest pain.  Reports she ran out of her albuterol inhaler yesterday, and has been spending time outside a noticing a lot of pollen on her clothing.  Denies fever, productive cough, reports significant worsening in wheezing, dyspnea, and coughing after running out of albuterol inhaler.    No syncope or near-syncope, reports history of smoking, but no longer.  Reports compliance with Plavix and aspirin.      Review of patient's allergies indicates:  No Known Allergies  Past Medical History:   Diagnosis Date    Anxiety disorder, unspecified     Hypertension     Migraine headache     Obesity      Past Surgical History:   Procedure Laterality Date    ANGIOGRAM, CORONARY, WITH LEFT HEART CATHETERIZATION N/A 2/25/2025    Procedure: Angiogram, Coronary, with Left Heart Cath;  Surgeon: Maycol Pastor MD;  Location: [facility] CATH LAB;  Service: Cardiology;  Laterality: N/A;    CYSTOURETEROSCOPY,WITH HOLMIUM LASER LITHOTRIPSY OF URETERAL CALCULUS - Bilateral Bilateral 11/09/2022    ESOPHAGOGASTRODUODENOSCOPY N/A 12/23/2022    Procedure: EGD (ESOPHAGOGASTRODUODENOSCOPY);  Surgeon: Anaid Cohen MD;  [facility]: [facility] ENDO;  Service: Endoscopy;  Laterality: N/A;    HYSTERECTOMY, TOTAL, LAPAROSCOPIC, WITH SALPINGO-OOPHORECTOMY Bilateral 03/17/2021    INSTANTANEOUS WAVE-FREE RATIO (IFR) N/A 2/25/2025    Procedure: Instantaneous Wave-Free Ratio (IFR);  Surgeon: Maycol Pastor MD;  Location: [facility] CATH LAB;  Service: Cardiology;  Laterality: N/A;    LAPAROSCOPIC TOTAL HYSTERECTOMY  Bilateral 03/17/2021    PYELOSCOPY Bilateral 10/25/2022    Procedure: PYELOSCOPY;  Surgeon: Raz Cantrell MD;  Location: [facility] OR;  Service: Urology;  Laterality: Bilateral;    STENT, DRUG ELUTING, MULTI VESSEL, CORONARY  2/25/2025    Procedure: Stent, Drug Eluting, Multi Vessel, Coronary;  Surgeon: Maycol Pastor MD;  Location: [facility] CATH LAB;  Service: Cardiology;;    TUBAL LIGATION      URETEROSCOPY Bilateral 10/25/2022    Procedure: URETEROSCOPY;  Surgeon: Raz Cantrell MD;  Location: [facility] OR;  Service: Urology;  Laterality: Bilateral;     Family History   Problem Relation Name Age of Onset    Heart disease Mother      Drug abuse Father      Heart disease Father      Kidney disease Father      Diabetes Maternal Uncle      Heart disease Maternal Grandmother      Ovarian cancer Maternal Aunt      Cancer Neg Hx       Social History[1]  Review of Systems    Physical Exam     Initial Vitals [03/21/25 0309]   BP Pulse Resp Temp SpO2   (!) 143/79 96 18 98 °F (36.7 °C) (!) 94 %      MAP       --         Physical Exam    Constitutional: She appears well-developed.   HENT:   Head: Atraumatic.   Eyes: EOM are normal.   Neck: Neck supple.   Cardiovascular:  Normal heart sounds and intact distal pulses.           Pulmonary/Chest: She has wheezes.   Abdominal: Abdomen is soft. She exhibits no distension.   Musculoskeletal:         General: No edema. Normal range of motion.      Cervical back: Neck supple.     Neurological: She is alert and oriented to person, place, and time.         ED Course   Procedures  Labs Reviewed   CBC W/ AUTO DIFFERENTIAL - Abnormal       Result Value    WBC 7.97      RBC 4.41      Hemoglobin 13.7      Hematocrit 42.4      MCV 96      MCH 31.1 (*)     MCHC 32.3      RDW 13.5      Platelets SEE COMMENT      MPV SEE COMMENT      Immature Granulocytes 0.3      Gran # (ANC) 5.2      Immature Grans (Abs) 0.02      Lymph # 1.5      Mono # 0.6      Eos # 0.6 (*)     Baso # 0.07      nRBC 0       Gran % 65.3      Lymph % 18.8      Mono % 7.4      Eosinophil % 7.3      Basophil % 0.9      Platelet Estimate Clumped (*)     Differential Method Automated     TROPONIN I HIGH SENSITIVITY - Abnormal    Troponin I High Sensitivity 141 (*)    COMPREHENSIVE METABOLIC PANEL - Abnormal    Sodium 139      Potassium 3.2 (*)     Chloride 105      CO2 21 (*)     Glucose 94      BUN 46 (*)     Creatinine 1.5 (*)     Calcium 9.3      Total Protein 7.2      Albumin 3.6      Total Bilirubin 0.3      Alkaline Phosphatase 64      AST 22      ALT 18      eGFR 41.4 (*)     Anion Gap 13      Narrative:     add on HS Troponin-[id number] per Sofia Paredes MD    03/21/2025  06:14 Arsen   CBC W/ AUTO DIFFERENTIAL - Abnormal    WBC 7.43      RBC 4.23      Hemoglobin 13.4      Hematocrit 40.2      MCV 95      MCH 31.7 (*)     MCHC 33.3      RDW 13.5     TROPONIN I HIGH SENSITIVITY - Abnormal    Troponin I High Sensitivity 136 (*)     Narrative:     add on HS Troponin-[id number] per Sofia Paredes MD    03/21/2025  06:14 Arsen   INFLUENZA A & B BY MOLECULAR    Influenza A, Molecular Negative      Influenza B, Molecular Negative      Flu A & B Source NP     B-TYPE NATRIURETIC PEPTIDE    BNP 40     SARS-COV-2 RNA AMPLIFICATION, QUAL    SARS-CoV-2 RNA, Amplification, Qual Negative     MAGNESIUM    Magnesium 2.3      Narrative:     add on HS Troponin-[id number] per Sofia Paredes MD    03/21/2025  06:14 Arsen   LIPASE    Lipase 21      Narrative:     add on HS Troponin-[id number] per Sofia Paredes MD    03/21/2025  06:14 Arsen   TROPONIN I HIGH SENSITIVITY          Imaging Results              X-Ray Chest AP Portable (In process)  Result time 03/21/25 05:03:27                     Medications   albuterol-ipratropium 2.5 mg-0.5 mg/3 mL nebulizer solution 3 mL (3 mLs Nebulization Given 3/21/25 4471)   methylPREDNISolone sodium succinate injection 125 mg (125 mg Intravenous Given 3/21/25 2923)     Medical Decision Making  55 yo F  w/HCM, HFpEF, CAD s/p PCI (2/25/25), asthma, presenting w/dyspnea, cough, wheezing x 1 day.        Differential diagnosis includes but is not limited to: asthma exacerbation, viral syndrome, cardiac wheeze/fluid overload     Presented with diffuse expiratory wheezing, satting 94% on RA, given 3x duonebs, solumedrol with significant improvement in wheezing, tachypnea. History and exam consistent with asthma exacerbation, with albuterol pump improving symptoms at home until patient ran out of albuterol pump. May need inhaled steroids as she is only on albuterol pump for treatment of asthma.     HS trop obtained as part of dyspnea work up given risk factors for patient with recent cath, PCI placement, with HS trop of 141, downtrending from 1335 a few weeks prior during admission, HS trop of 1335 prior to LHC. Given no chest pain symptoms, no exertional chest pain, no pleuritic chest pain, with history/exam consistent with asthma exacerbation, less concern for ACS, PE, will trend troponin, if downtrending, anticipate discharge with albuterol pump refill, pulm follow up.    Signed out to incoming ED provider pending repeat trop, CXR, remainder of lab work, reassessment        Amount and/or Complexity of Data Reviewed  Labs: ordered.  Radiology: ordered.    Risk  Prescription drug management.                        Ekg independently interpreted by me with NSR rate of 81, poor R wave progression seen in prior EKG, no STEMI,               Clinical Impression:  Final diagnoses:  [R06.02] Shortness of breath  [R06.00] Dyspnea  [J45.901] Moderate asthma with exacerbation, unspecified whether persistent (Primary)                   Sofia Paredes MD  03/21/25 0725         [1]   Social History  Tobacco Use    Smoking status: Former     Current packs/day: 0.25     Average packs/day: 0.3 packs/day for 15.0 years (3.8 ttl pk-yrs)     Types: Cigarettes    Smokeless tobacco: Never    Tobacco comments:     Quite 10/2024.      Substance Use Topics    Alcohol use: Yes     Alcohol/week: 3.0 standard drinks of alcohol     Types: 3 Shots of liquor per week     Comment: scocially on weekends    Drug use: Yes     Types: Marijuana        Sofia Paredes MD  03/21/25 0728

## 2025-03-21 NOTE — DISCHARGE INSTRUCTIONS
You were seen in the ER today due to wheezing and difficulty breathing with cough, which were due to an asthma exacerbation. Your albuterol pump was refilled, please follow up with pulmonology and your primary care doctor regarding your asthma plan. You can use flonase spray for itchy/congested nose, which may relieve your allergy symptoms from seasonal allergies. IF you develop chest pain, difficulty breathing, symptoms of passing out, please return immediately to the ER.

## 2025-03-24 NOTE — TELEPHONE ENCOUNTER
Sent to Dr. Parrish  LOV-10/24/2024  RTC-3 months(overdue)(rescheduled)  Last Prescribed-10/24/2024

## 2025-03-25 RX ORDER — ALLOPURINOL 100 MG/1
100 TABLET ORAL DAILY
Qty: 30 TABLET | Refills: 3 | Status: SHIPPED | OUTPATIENT
Start: 2025-03-25

## 2025-04-01 ENCOUNTER — TELEPHONE (OUTPATIENT)
Dept: GENETICS | Facility: CLINIC | Age: 55
End: 2025-04-01
Payer: MEDICAID

## 2025-04-01 NOTE — TELEPHONE ENCOUNTER
Spoke with Pt to schedule virtual genetics appt. Pt understood and confirmed appt.       ----- Message from Carina Jang sent at 4/1/2025  9:25 AM CDT -----  Hello,Can this patient be scheduled with me for results? I can see her for a virtual visit on Friday at 2:30 or next Monday at 2:30.Thank you!Best,Carina

## 2025-04-01 NOTE — PROGRESS NOTES
TELEMEDICINE VIDEO VISIT     The patient location is: [facility]  The chief complaint leading to consultation is: Results disclosure, MYH7 VUS  Total time spent with patient: 8 minutes  Visit type: Virtual visit with synchronous audio and video      Each patient to whom he or she provides medical services by telemedicine is: (1) informed of the relationship between the physician and patient and the respective role of any other health care provider with respect to management of the patient; and (2) notified that he or she may decline to receive medical services by telemedicine and may withdraw from such care at any time.    ESTABLISHED PATIENT GENETIC COUNSELING CONSULTATION     [facility]  [address] [place] [address]    DATE OF CONSULTATION: 04/01/2025    REFERRING PHYSICIAN: Darwin Cannon Jr*     REASON FOR CONSULTATION: Results disclosure. Calos Rios is unaccompanied for today's visit.      HISTORY OF PRESENT ILLNESS:  Calos Rios is a 54 y.o. 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 Calos was initially seen by our genetic counseling service (myself) on 02/21/25. HPI from this visit is as follows:    Calos Rios is a 54 y.o. female with left ventricular hypertrophy raising concern for hypertrophic cardiomyopathy.     Calos reports experiencing cheat pain/tightness which she initially thought was due to anxiety beginning in October 2024. She came to medical attention at [facility] in November 2024, had ischemic workup which was negative however echocardiogram revealed left ventricular hypertrophy raising concern for possible hypertrophic cardiomyopathy. Cardiac MRI ordered as part of workup is currently pending. Calos reports she  had not been seen by cardiology prior to November. She denies any history of syncope or near syncope. She reports a history of severe hypertension. Calos denies any family history of hypertrophic cardiomyopathy, however multiple paternal relatives have unspecified heart issues. She was seen for evaluation by Advanced Heart Failure/Transplant (Dr. Darwin Cannon) who recommended genetic evaluation to aid in confirming a diagnosis of hypertrophic cardiomyopathy, which would inform medical management as well as potential risk for other family members.    Calos's medical history is also significant for nephrolithiasis and chronic kidney disease stage 3b, continues to be followed by nephrology (Dr. Parrish). Additional medical concerns reported during today's visit include easy bruising and myopia.    REVIEW OF SYSTEMS: A complete review of systems was negative other than as stated above.    MEDICAL HISTORY:    Past Medical History:  Patient Active Problem List    Diagnosis Date Noted    Stage 3b chronic kidney disease 03/07/2025    NSTEMI (non-ST elevated myocardial infarction) 02/25/2025    HOCM (hypertrophic obstructive cardiomyopathy) 11/08/2024    Hypokalemia 05/25/2024    Prolonged Q-T interval on ECG 05/25/2024    Cigarette nicotine dependence without complication 05/25/2024    Pain of upper abdomen 12/15/2022    Nephrolithiasis 11/09/2022    Metabolic syndrome 01/27/2022    Snoring 08/21/2018    Insomnia due to mental condition 04/19/2018    Reactive depression 04/19/2018    Anxiety 11/27/2017    Gastroesophageal reflux disease without esophagitis 11/27/2017    Essential hypertension     Migraine headache     Obesity        Past Surgical History:  Past Surgical History:   Procedure Laterality Date    ANGIOGRAM, CORONARY, WITH LEFT HEART CATHETERIZATION N/A 2/25/2025    Procedure: Angiogram, Coronary, with Left Heart Cath;  Surgeon: Maycol Pastor MD;  Location: [facility] CATH LAB;  Service: Cardiology;   Laterality: N/A;    CYSTOURETEROSCOPY,WITH HOLMIUM LASER LITHOTRIPSY OF URETERAL CALCULUS - Bilateral Bilateral 2022    ESOPHAGOGASTRODUODENOSCOPY N/A 2022    Procedure: EGD (ESOPHAGOGASTRODUODENOSCOPY);  Surgeon: Anaid Cohen MD;  Location: [facility];  Service: Endoscopy;  Laterality: N/A;    HYSTERECTOMY, TOTAL, LAPAROSCOPIC, WITH SALPINGO-OOPHORECTOMY Bilateral 2021    INSTANTANEOUS WAVE-FREE RATIO (IFR) N/A 2025    Procedure: Instantaneous Wave-Free Ratio (IFR);  Surgeon: Maycol Pastor MD;  Location: [facility] CATH LAB;  Service: Cardiology;  Laterality: N/A;    LAPAROSCOPIC TOTAL HYSTERECTOMY Bilateral 2021    PYELOSCOPY Bilateral 10/25/2022    Procedure: PYELOSCOPY;  Surgeon: Raz Cantrell MD;  Location: [facility] OR;  Service: Urology;  Laterality: Bilateral;    STENT, DRUG ELUTING, MULTI VESSEL, CORONARY  2025    Procedure: Stent, Drug Eluting, Multi Vessel, Coronary;  Surgeon: Maycol Pastor MD;  Location: [facility] CATH LAB;  Service: Cardiology;;    TUBAL LIGATION      URETEROSCOPY Bilateral 10/25/2022    Procedure: URETEROSCOPY;  Surgeon: Raz Cantrell MD;  Location: [facility];  Service: Urology;  Laterality: Bilateral;     Developmental History:  No developmental concerns reported  Highest level of education achieved: Some college    Family History:    Calos's oldest daughter (34yo) is reported to have congenital deafness. Her youngest daughter (23yo) has no reported health issues; per Calos she had an identical twin sister who  at 3 weeks old. Calos's brother (49yo) is reported to have hypertension and unspecified kidney issues. Her mother  at 77yo, was reported to have a heart murmur. Maternal uncle  at 64yo from complications due to diabetes. Maternal grandmother  in her early 60s, was reported to have a heart attack which was confirmed at a hospital. Maternal grandfather  in his 30s from suicide. Calos's father  at 58yo,  was reported to have kidney issues requiring transplant. One paternal aunt  in her 40s from ovarian cancer diagnosed in her 40s. Two paternal aunts and one paternal uncle who all  in their 60s were reported to have unspecified heart issues. Paternal grandfather  suddenly at home, age of death unknown. Paternal grandmother  in her 70s, was reported to have Alzheimer's diagnosed in her 70s.    3-generation family history obtained and otherwise negative for history of intellectual disability/developmental delay, birth defects, or 3 or more miscarriages unless otherwise noted above. Consanguinity denied.    Social History:  Lives with her brother. Calos resides in [place], [place].    DIAGNOSTIC STUDIES REVIEWED:     GENETIC TESTING RESULTS:     [facility] Arrhythmia [facility] Cardiomyopathy Comprehensive Panel (25):        PRIOR RADIOLOGY, IMAGING, AND OTHER STUDIES:      Exercise stress echo w/ color flow doppler (2025):    Stress Protocol: The patient exercised for 5 minutes 49 seconds on a high ramp protocol, achieving a peak heart rate of 137 bpm, which is 86% of the age predicted maximum heart rate. Their exercise capacity was normal. The patient reported no symptoms during the stress test. The test was stopped because the patient experienced fatigue.    Stress ECG: There is no ST segment deviation identified during the protocol. There are no arrhythmias during stress. There is normal blood pressure response with stress.    ECG Conclusion: The ECG portion of the study is negative for ischemia.    Left Ventricle: The left ventricle is normal in size. Moderate septal thickening. IVSd is 1.6 cm. There is normal systolic function. Ejection fraction is approximately 60%. There is normal diastolic function. No LVOT obstruction at rest. Peak gradient after exercise is 16 mm Hg.    Right Ventricle: Normal right ventricular cavity size. Wall thickness is normal. Systolic function is normal.    Mitral  Valve: There is normal leaflet mobility. There is no systolic anterior motion of the mitral valve. There is mild regurgitation.    Pulmonary Artery: Pulmonary artery pressure could not be accurately determined.    Post-stress Echo: The left ventricle systolic function is hyperdynamic. Right ventricle systolic function is normal.    Post-stress Conclusion: The study is negative with no echocardiographic evidence of stress induced ischemia.    Regadenoson stress test with myocardial perfusion SPECT (multi study) (11/08/24):    Normal myocardial perfusion scan. There is no evidence of myocardial ischemia or infarction.    There is a trivial to mild intensity fixed perfusion abnormality in the  wall of the left ventricle secondary to diaphragm attenuation.    The gated perfusion images showed an ejection fraction of 78% at rest.    There is normal wall motion at rest and post-stress.    The ECG portion of the study is negative for ischemia.    The patient reported no chest pain during the stress test.    There were no arrhythmias during stress.    Transthoracic echo (TTE) complete (11/07/24):    Left Ventricle: The left ventricle is normal in size. Moderately increased wall thickness. There is moderate concentric hypertrophy. There is hyperdynamic systolic function with a visually estimated ejection fraction of 75 - 80%. Grade I diastolic dysfunction. Mild LVOT obstruction at rest. LVOT pressure gradient increases with Valsalva.  Peak jolene 4.75 m/sec. 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    Right Ventricle: Normal right ventricular cavity size. Systolic function is normal.    Pulmonary Artery: The estimated pulmonary artery systolic pressure is 38 mmHg.     EKG 12-lead (11/07/24):  Vent. Rate : 072 BPM     Atrial Rate : 072 BPM      P-R Int : 140 ms          QRS Dur : 100 ms       QT Int : 480 ms       P-R-T Axes : 023 048 003 degrees      QTc Int : 525 ms     Normal sinus rhythm    Possible Left atrial enlargement   LVH   Prolonged QT   Abnormal ECG     ASSESSMENT/DISCUSSION:  Calos Rios is a 54 y.o. female with left ventricular hypertrophy (LVH) on recent echocardiogram raising concern for hypertrophic cardiomyopathy (HCM). 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 It is also associated with autosomal dominant and recessive myosin storage myopathy (MSMA) and autosomal dominant scapuloperoneal myopathy (SPMM).We discussed that a variant of uncertain significance (VUS) is a change that we do not currently know the impact of. There are changes in genes that we know can be disease-causing and changes that are part of normal human variation; a VUS is a change that we cannot classify into one of those categories at this time. A VUS is not a diagnosis. Variants are evaluated to determine its classification using evidence from population and gene/disease-specific databases, in silico prediction tools, laboratory variant databases, and the relevant scientific literature. Review of the MYH7 variant identified in Calos indicates it is present in population databases ([id number], gnomAD 0.03%). This variant has been classified as a VUS by multiple other laboratories as well as by the ClinGen Cardiomyoapthy Variant Curation Expert Panel (ClinVar Variant ID [id number]). This variant has been previously observed in individual(s) with HCM. [employer] of protein sequence and biophysical properties indicates that this missense variant is expected to disrupt MYH7 protein  function. Over time, additional information will be available to clarify the significance of this variant. [employer] does not offer complimentary VUS resolution testing for this result.    We discussed that at this time, this MYH7 result is not diagnostic for Calos's cardiac history. Calos is recommended to continue follow-up as recommended by cardiology for her LVH. Periodic follow-up with genetics for reassessment of the result is also advised. We additionally discussed that baseline cardiac evaluation can be considered for Calos's brother and children; any relatives who are found to have evidence suggestive of HCM can consider genetic evaluation and testing.     Calos verbalized understanding of this information and all questions were answered. She was encouraged to reach out if any additional questions or concerns arise.    RECOMMENDATIONS/PLAN:  Continue care as recommended by cardiology  Follow-up with genetics as needed    REFERENCES:  JANICE Thompson., CHERELLE Gaxiola., LISA Chisholm, AASHISH Casas., CARLIE Tolliver., KASANDRA Waldron., OSVALDO Robles., DICK Soto., & Gustabo, PJosselin BERMUDEZ. (2019). Defining genotype-phenotype relationships in patients with hypertrophic cardiomyopathy using cardiovascular magnetic resonance imaging. PloS one, 14(6), m7350135. https://doi.org/10.1371/journal.pone.4626832    TIME SPENT:   Face to Face time with patient: 8 minutes with over 50% spent counseling  30 minutes of total time spent today on the encounter, which includes face to face time and non-face to face time preparing to see the patient (eg, review of tests), Obtaining and/or reviewing separately obtained history, Documenting clinical information in the electronic or other health record, Independently interpreting results (not separately reported) and communicating results to the patient/family/caregiver, or Care coordination (not separately reported).     Carina Jang MS, [facility]  Certified Genetic Counselor   [facility]    EXTERNAL CC:    Darwin Cannon Jr*   Gretchen MurphyP-C

## 2025-04-02 ENCOUNTER — PATIENT MESSAGE (OUTPATIENT)
Dept: INTERNAL MEDICINE | Facility: CLINIC | Age: 55
End: 2025-04-02
Payer: MEDICAID

## 2025-04-02 DIAGNOSIS — R07.9 CHEST PAIN, UNSPECIFIED TYPE: ICD-10-CM

## 2025-04-02 DIAGNOSIS — R07.9 CHEST PAIN, UNSPECIFIED TYPE: Primary | ICD-10-CM

## 2025-04-02 RX ORDER — NITROGLYCERIN 0.4 MG/1
0.4 TABLET SUBLINGUAL EVERY 5 MIN PRN
Qty: 25 TABLET | Refills: 1 | Status: SHIPPED | OUTPATIENT
Start: 2025-04-02

## 2025-04-02 RX ORDER — HYDROCHLOROTHIAZIDE 25 MG/1
25 TABLET ORAL DAILY
Qty: 90 TABLET | Refills: 3 | Status: SHIPPED | OUTPATIENT
Start: 2025-04-02

## 2025-04-02 RX ORDER — HYDROCHLOROTHIAZIDE 25 MG/1
25 TABLET ORAL DAILY
Qty: 30 TABLET | Refills: 0 | OUTPATIENT
Start: 2025-04-02

## 2025-04-02 NOTE — TELEPHONE ENCOUNTER
I spoke with the patient and informed her that TRACI Jack had put in a refill order for her nitroglycerin. And she was concerned about swelling in her legs but she was taking all of her meds except the hydrochlorothiazide which is a fluid pill. Traci Jack has put the refill order in for that medication as well. I told the patient if she any more problems or concerns she can call us back.

## 2025-04-02 NOTE — TELEPHONE ENCOUNTER
No care due was identified.  [facility] Embedded Care Due Messages. Reference number: [id number].   4/02/2025 4:35:20 PM CDT

## 2025-04-02 NOTE — TELEPHONE ENCOUNTER
Refill Decision Note   Calos Rios  is requesting a refill authorization.    Brief Assessment and Rationale for Refill:   Quick Discontinue       Medication Therapy Plan:   Date/Time Signed: 4/2/2025 Ordering User: Guero Moser NP      Comments:     Note composed:4:59 PM 04/02/2025

## 2025-04-04 ENCOUNTER — PATIENT MESSAGE (OUTPATIENT)
Dept: GENETICS | Facility: CLINIC | Age: 55
End: 2025-04-04

## 2025-04-04 ENCOUNTER — OFFICE VISIT (OUTPATIENT)
Dept: GENETICS | Facility: CLINIC | Age: 55
End: 2025-04-04
Payer: MEDICAID

## 2025-04-04 DIAGNOSIS — I42.1 HOCM (HYPERTROPHIC OBSTRUCTIVE CARDIOMYOPATHY): ICD-10-CM

## 2025-04-04 DIAGNOSIS — I51.7 LEFT VENTRICULAR HYPERTROPHY: Primary | ICD-10-CM

## 2025-04-11 ENCOUNTER — OFFICE VISIT (OUTPATIENT)
Dept: CARDIOLOGY | Facility: CLINIC | Age: 55
End: 2025-04-11
Payer: MEDICAID

## 2025-04-11 VITALS
HEIGHT: 64 IN | BODY MASS INDEX: 31.94 KG/M2 | DIASTOLIC BLOOD PRESSURE: 88 MMHG | WEIGHT: 187.06 LBS | OXYGEN SATURATION: 97 % | HEART RATE: 90 BPM | SYSTOLIC BLOOD PRESSURE: 138 MMHG

## 2025-04-11 DIAGNOSIS — Z95.5 S/P RIGHT CORONARY ARTERY (RCA) STENT PLACEMENT: ICD-10-CM

## 2025-04-11 DIAGNOSIS — I10 ESSENTIAL HYPERTENSION: Primary | ICD-10-CM

## 2025-04-11 DIAGNOSIS — I42.1 HOCM (HYPERTROPHIC OBSTRUCTIVE CARDIOMYOPATHY): ICD-10-CM

## 2025-04-11 DIAGNOSIS — F17.210 CIGARETTE NICOTINE DEPENDENCE WITHOUT COMPLICATION: ICD-10-CM

## 2025-04-11 DIAGNOSIS — E66.09 OBESITY DUE TO EXCESS CALORIES WITH SERIOUS COMORBIDITY, UNSPECIFIED CLASS: ICD-10-CM

## 2025-04-11 DIAGNOSIS — Z95.5 HISTORY OF HEART ARTERY STENT: ICD-10-CM

## 2025-04-11 PROCEDURE — G2211 COMPLEX E/M VISIT ADD ON: HCPCS | Mod: S$PBB,,,

## 2025-04-11 PROCEDURE — 3079F DIAST BP 80-89 MM HG: CPT | Mod: CPTII,,,

## 2025-04-11 PROCEDURE — 99213 OFFICE O/P EST LOW 20 MIN: CPT | Mod: PBBFAC,PN

## 2025-04-11 PROCEDURE — 3044F HG A1C LEVEL LT 7.0%: CPT | Mod: CPTII,,,

## 2025-04-11 PROCEDURE — 99215 OFFICE O/P EST HI 40 MIN: CPT | Mod: S$PBB,,,

## 2025-04-11 PROCEDURE — 3075F SYST BP GE 130 - 139MM HG: CPT | Mod: CPTII,,,

## 2025-04-11 PROCEDURE — 99999 PR PBB SHADOW E&M-EST. PATIENT-LVL III: CPT | Mod: PBBFAC,,,

## 2025-04-11 PROCEDURE — 1159F MED LIST DOCD IN RCRD: CPT | Mod: CPTII,,,

## 2025-04-11 PROCEDURE — 3008F BODY MASS INDEX DOCD: CPT | Mod: CPTII,,,

## 2025-04-11 NOTE — PROGRESS NOTES
"[facility] - Cardiology [facility] [address]  Cardiology Clinic Note      Chief Complaint  Chief Complaint   Patient presents with    Follow-up    Joint Swelling    Leg Problem     Tingling sometimes       HPI:  Ms. Rios is a 53-year-old female with a past medical history of anxiety, depression, migraine headaches, obesity, GERD, tobacco dependency, hypertension, NSTEMI with [facility] 2/25/25 with stents x2 to 1st Mrg and Prox RCA to Mid RCA    Here for follow up  Recent hospital encounter February 25th 2025 for NSTEMI s/p stent x2  Has been asymptomatic and doing well since procedure  Placed on dual antiplatelet therapy (aspirin 81 lifelong, stopped Plavix February 2026)  Also reports that she stopped smoking  Working on diet and exercise routine  Otherwise asymptomatic from a cardiac standpoint  Will begin cardiac rehab soon  Cardiac MRI pending for HOCM workup but genetic testing resulted unremarkable    Cardiology course:    Patient is new to me and here to establish care  She was previously seen in at [facility] for chest tightness, during this visit ischemic workup was negative  Echo completed during hospital admission findings consistent with HOCM  She continues to have symptoms of midsternal chest tightness that she describes to be a bubbling, burning sensation"  Reports that she notices symptoms after eating and lying down shortly after  She does have a history of GERD but does not take any medication currently  Endorses tobacco use     EKG normal sinus rhythm heart rate 70s possible left atrial enlargement LVH prolonged QT interval    Medications  Current Medications[1]     History  Past Medical History:   Diagnosis Date    Anxiety disorder, unspecified     Hypertension     Migraine headache     Obesity      Past Surgical History:   Procedure Laterality Date    ANGIOGRAM, CORONARY, WITH LEFT HEART CATHETERIZATION N/A 2/25/2025    Procedure: Angiogram, Coronary, with Left Heart Cath;  Surgeon: Maycol Pastor, " MD;  Location: [facility] CATH LAB;  Service: Cardiology;  Laterality: N/A;    CYSTOURETEROSCOPY,WITH HOLMIUM LASER LITHOTRIPSY OF URETERAL CALCULUS - Bilateral Bilateral 11/09/2022    ESOPHAGOGASTRODUODENOSCOPY N/A 12/23/2022    Procedure: EGD (ESOPHAGOGASTRODUODENOSCOPY);  Surgeon: Anaid Cohen MD;  [facility];  Service: Endoscopy;  Laterality: N/A;    HYSTERECTOMY, TOTAL, LAPAROSCOPIC, WITH SALPINGO-OOPHORECTOMY Bilateral 03/17/2021    INSTANTANEOUS WAVE-FREE RATIO (IFR) N/A 2/25/2025    Procedure: Instantaneous Wave-Free Ratio (IFR);  Surgeon: Maycol Pastor MD;  Location: [facility] CATH LAB;  Service: Cardiology;  Laterality: N/A;    LAPAROSCOPIC TOTAL HYSTERECTOMY Bilateral 03/17/2021    PYELOSCOPY Bilateral 10/25/2022    Procedure: PYELOSCOPY;  Surgeon: Raz Cantrell MD;  Location: [facility] OR;  Service: Urology;  Laterality: Bilateral;    STENT, DRUG ELUTING, MULTI VESSEL, CORONARY  2/25/2025    Procedure: Stent, Drug Eluting, Multi Vessel, Coronary;  Surgeon: Maycol Pastor MD;  Location: [facility] CATH LAB;  Service: Cardiology;;    TUBAL LIGATION      URETEROSCOPY Bilateral 10/25/2022    Procedure: URETEROSCOPY;  Surgeon: Raz Cantrell MD;  Location: [facility] OR;  Service: Urology;  Laterality: Bilateral;     Social History[2]  Family History   Problem Relation Name Age of Onset    Heart disease Mother      Drug abuse Father      Heart disease Father      Kidney disease Father      Diabetes Maternal Uncle      Heart disease Maternal Grandmother      Ovarian cancer Maternal Aunt      Cancer Neg Hx          Allergies  Review of patient's allergies indicates:  No Known Allergies    Review of Systems   Review of Systems   Constitutional: Negative for chills, decreased appetite, diaphoresis, fever, malaise/fatigue, weight gain and weight loss.   Eyes:  Negative for blurred vision.   Cardiovascular:  Negative for chest pain, claudication, dyspnea on exertion, irregular heartbeat, leg swelling,  near-syncope, orthopnea, palpitations, paroxysmal nocturnal dyspnea and syncope.   Respiratory:  Negative for cough, shortness of breath, snoring, sputum production and wheezing.    Endocrine: Negative for cold intolerance, heat intolerance, polydipsia, polyphagia and polyuria.   Skin:  Negative for color change, dry skin, itching, nail changes and poor wound healing.   Musculoskeletal:  Negative for back pain, gout, joint pain and joint swelling.   Gastrointestinal:  Negative for bloating, abdominal pain, constipation, diarrhea, hematemesis, hematochezia, melena, nausea and vomiting.   Genitourinary:  Negative for dysuria and hematuria.   Neurological:  Negative for dizziness, headaches, light-headedness, numbness, paresthesias and weakness.   Psychiatric/Behavioral:  Negative for altered mental status, depression and memory loss.        Physical Exam  Vitals:    04/11/25 1211   BP: 138/88   Pulse: 90     Wt Readings from Last 1 Encounters:   04/11/25 84.8 kg (187 lb 1 oz)     Physical Exam  Constitutional:       General: She is not in acute distress.  HENT:      Head: Normocephalic and atraumatic.      Mouth/Throat:      Mouth: Mucous membranes are moist.   Eyes:      Extraocular Movements: Extraocular movements intact.      Pupils: Pupils are equal, round, and reactive to light.   Neck:      Vascular: No carotid bruit or JVD.   Cardiovascular:      Rate and Rhythm: Normal rate and regular rhythm.      Heart sounds: No murmur heard.     No friction rub. No gallop.   Pulmonary:      Effort: Pulmonary effort is normal.      Breath sounds: Normal breath sounds.   Abdominal:      General: Abdomen is flat.      Palpations: Abdomen is soft.   Musculoskeletal:      Right lower leg: No edema.      Left lower leg: No edema.   Skin:     General: Skin is warm.      Capillary Refill: Capillary refill takes less than 2 seconds.   Neurological:      General: No focal deficit present.   Psychiatric:         Mood and Affect: Mood  normal.         Labs  Orders Only on 03/21/2025   Component Date Value Ref Range Status    QRS Duration 03/21/2025 94  ms Final    [facility]HS QTC Calculation 03/21/2025 469  ms Final    QRS Duration 03/21/2025 92  ms Final    OHS QTC Calculation 03/21/2025 465  ms Final   Admission on 03/21/2025, Discharged on 03/21/2025   Component Date Value Ref Range Status    QRS Duration 03/21/2025 90  ms Final    OHS QTC Calculation 03/21/2025 462  ms Final    WBC 03/21/2025 7.97  3.90 - 12.70 K/uL Final    RBC 03/21/2025 4.41  4.00 - 5.40 M/uL Final    Hemoglobin 03/21/2025 13.7  12.0 - 16.0 g/dL Final    Hematocrit 03/21/2025 42.4  37.0 - 48.5 % Final    MCV 03/21/2025 96  82 - 98 fL Final    MCH 03/21/2025 31.1 (H)  27.0 - 31.0 pg Final    MCHC 03/21/2025 32.3  32.0 - 36.0 g/dL Final    RDW 03/21/2025 13.5  11.5 - 14.5 % Final    Platelets 03/21/2025 SEE COMMENT  150 - 450 K/uL Final    Unable to report platelet count due to clumping.    MPV 03/21/2025 SEE COMMENT  9.2 - 12.9 fL Final    Unable to report platelet count due to clumping.    Immature Granulocytes 03/21/2025 0.3  0.0 - 0.5 % Final    Gran # (ANC) 03/21/2025 5.2  1.8 - 7.7 K/uL Final    Immature Grans (Abs) 03/21/2025 0.02  0.00 - 0.04 K/uL Final    Comment: Mild elevation in immature granulocytes is non specific and   can be seen in a variety of conditions including stress response,   acute inflammation, trauma and pregnancy. Correlation with other   laboratory and clinical findings is essential.      Lymph # 03/21/2025 1.5  1.0 - 4.8 K/uL Final    Mono # 03/21/2025 0.6  0.3 - 1.0 K/uL Final    Eos # 03/21/2025 0.6 (H)  0.0 - 0.5 K/uL Final    Baso # 03/21/2025 0.07  0.00 - 0.20 K/uL Final    nRBC 03/21/2025 0  0 /100 WBC Final    Gran % 03/21/2025 65.3  38.0 - 73.0 % Final    Lymph % 03/21/2025 18.8  18.0 - 48.0 % Final    Mono % 03/21/2025 7.4  4.0 - 15.0 % Final    Eosinophil % 03/21/2025 7.3  0.0 - 8.0 % Final    Basophil % 03/21/2025 0.9  0.0 - 1.9 % Final     Platelet Estimate 03/21/2025 Clumped (A)   Final    Differential Method 03/21/2025 Automated   Final    Troponin I High Sensitivity 03/21/2025 141 (H)  0 - 14 ng/L Final    BNP 03/21/2025 40  0 - 99 pg/mL Final    Values of less than 100 pg/ml are consistent with non-CHF populations.    SARS-CoV-2 RNA, Amplification, Qual 03/21/2025 Negative  Negative Final    Comment: This test utilizes isothermal nucleic acid amplification technology   to   detect the SARS-CoV-2 RdRp nucleic acid segment. The analytical   sensitivity   (limit of detection) is 500 copies/swab.    A POSITIVE result is indicative of the presence of SARS-CoV-2 RNA;   clinical   correlation with patient history and other diagnostic information is   necessary to determine patient infection status.    A NEGATIVE result means that SARS-CoV-2 nucleic acids are not present   above   the limit of detection. A NEGATIVE result should be treated as   presumptive.   It does not rule out the possibility of COVID-19 and should not be   the sole   basis for treatment decisions.    If COVID-19 is strongly suspected based on clinical and exposure   history,   re-testing using an alternate molecular assay should be considered.    This test is Food and Drug Administration (FDA) approved. Performance   characteristics of this has been independently verified by [facility] Department of Pat                           hology and Laboratory Medicine.      Influenza A, Molecular 03/21/2025 Negative  Negative Final    Influenza B, Molecular 03/21/2025 Negative  Negative Final    Flu A & B Source 03/21/2025 NP   Final    Sodium 03/21/2025 139  136 - 145 mmol/L Final    Potassium 03/21/2025 3.2 (L)  3.5 - 5.1 mmol/L Final    Chloride 03/21/2025 105  95 - 110 mmol/L Final    CO2 03/21/2025 21 (L)  23 - 29 mmol/L Final    Glucose 03/21/2025 94  70 - 110 mg/dL Final    BUN 03/21/2025 46 (H)  6 - 20 mg/dL Final    Creatinine 03/21/2025 1.5 (H)  0.5 - 1.4 mg/dL Final     Calcium 03/21/2025 9.3  8.7 - 10.5 mg/dL Final    Total Protein 03/21/2025 7.2  6.0 - 8.4 g/dL Final    Albumin 03/21/2025 3.6  3.5 - 5.2 g/dL Final    Total Bilirubin 03/21/2025 0.3  0.1 - 1.0 mg/dL Final    Comment: For infants and newborns, interpretation of results should be based  on gestational age, weight and in agreement with clinical  observations.    Premature Infant recommended reference ranges:  Up to 24 hours.............<8.0 mg/dL  Up to 48 hours............<12.0 mg/dL  3-5 days..................<15.0 mg/dL  6-29 days.................<15.0 mg/dL      Alkaline Phosphatase 03/21/2025 64  40 - 150 U/L Final    AST 03/21/2025 22  10 - 40 U/L Final    ALT 03/21/2025 18  10 - 44 U/L Final    eGFR 03/21/2025 41.4 (A)  >60 mL/min/1.73 m^2 Final    Anion Gap 03/21/2025 13  8 - 16 mmol/L Final    Magnesium 03/21/2025 2.3  1.6 - 2.6 mg/dL Final    Lipase 03/21/2025 21  4 - 60 U/L Final    WBC 03/21/2025 7.43  3.90 - 12.70 K/uL Final    RBC 03/21/2025 4.23  4.00 - 5.40 M/uL Final    Hemoglobin 03/21/2025 13.4  12.0 - 16.0 g/dL Final    Hematocrit 03/21/2025 40.2  37.0 - 48.5 % Final    MCV 03/21/2025 95  82 - 98 fL Final    MCH 03/21/2025 31.7 (H)  27.0 - 31.0 pg Final    MCHC 03/21/2025 33.3  32.0 - 36.0 g/dL Final    RDW 03/21/2025 13.5  11.5 - 14.5 % Final    Platelets 03/21/2025 161  150 - 450 K/uL Final    MPV 03/21/2025 10.4  9.2 - 12.9 fL Final    Immature Granulocytes 03/21/2025 0.3  0.0 - 0.5 % Final    Gran # (ANC) 03/21/2025 5.4  1.8 - 7.7 K/uL Final    Immature Grans (Abs) 03/21/2025 0.02  0.00 - 0.04 K/uL Final    Comment: Mild elevation in immature granulocytes is non specific and   can be seen in a variety of conditions including stress response,   acute inflammation, trauma and pregnancy. Correlation with other   laboratory and clinical findings is essential.      Lymph # 03/21/2025 1.1  1.0 - 4.8 K/uL Final    Mono # 03/21/2025 0.3  0.3 - 1.0 K/uL Final    Eos # 03/21/2025 0.5  0.0 - 0.5 K/uL  Final    Baso # 03/21/2025 0.03  0.00 - 0.20 K/uL Final    nRBC 03/21/2025 0  0 /100 WBC Final    Gran % 03/21/2025 73.2 (H)  38.0 - 73.0 % Final    Lymph % 03/21/2025 15.2 (L)  18.0 - 48.0 % Final    Mono % 03/21/2025 4.6  4.0 - 15.0 % Final    Eosinophil % 03/21/2025 6.3  0.0 - 8.0 % Final    Basophil % 03/21/2025 0.4  0.0 - 1.9 % Final    Differential Method 03/21/2025 Automated   Final    Troponin I High Sensitivity 03/21/2025 136 (H)  0 - 14 ng/L Final    Troponin I High Sensitivity 03/21/2025 111 (H)  0 - 14 ng/L Final   Lab Visit on 03/07/2025   Component Date Value Ref Range Status    Sodium 03/07/2025 141  136 - 145 mmol/L Final    Potassium 03/07/2025 4.0  3.5 - 5.1 mmol/L Final    Chloride 03/07/2025 108  95 - 110 mmol/L Final    CO2 03/07/2025 26  23 - 29 mmol/L Final    Glucose 03/07/2025 73  70 - 110 mg/dL Final    BUN 03/07/2025 23 (H)  6 - 20 mg/dL Final    Creatinine 03/07/2025 1.4  0.5 - 1.4 mg/dL Final    Calcium 03/07/2025 9.5  8.7 - 10.5 mg/dL Final    Total Protein 03/07/2025 7.1  6.0 - 8.4 g/dL Final    Albumin 03/07/2025 3.4 (L)  3.5 - 5.2 g/dL Final    Total Bilirubin 03/07/2025 0.4  0.1 - 1.0 mg/dL Final    Comment: For infants and newborns, interpretation of results should be based  on gestational age, weight and in agreement with clinical  observations.    Premature Infant recommended reference ranges:  Up to 24 hours.............<8.0 mg/dL  Up to 48 hours............<12.0 mg/dL  3-5 days..................<15.0 mg/dL  6-29 days.................<15.0 mg/dL      Alkaline Phosphatase 03/07/2025 66  40 - 150 U/L Final    AST 03/07/2025 24  10 - 40 U/L Final    ALT 03/07/2025 17  10 - 44 U/L Final    eGFR 03/07/2025 45.0 (A)  >60 mL/min/1.73 m^2 Final    Anion Gap 03/07/2025 7 (L)  8 - 16 mmol/L Final   Office Visit on 03/07/2025   Component Date Value Ref Range Status    Specimen UA 03/07/2025 Urine, Clean Catch   Final    Color, UA 03/07/2025 Yellow  Yellow, Straw, Ariella Final    Appearance,  UA 03/07/2025 Clear  Clear Final    pH, UA 03/07/2025 6.0  5.0 - 8.0 Final    Specific Gravity, UA 03/07/2025 1.020  1.005 - 1.030 Final    Protein, UA 03/07/2025 Trace (A)  Negative Final    Comment: Recommend a 24 hour urine protein or a urine   protein/creatinine ratio if globulin induced proteinuria is  clinically suspected.      Glucose, UA 03/07/2025 4+ (A)  Negative Final    Ketones, UA 03/07/2025 Negative  Negative Final    Bilirubin (UA) 03/07/2025 Negative  Negative Final    Occult Blood UA 03/07/2025 Trace (A)  Negative Final    Nitrite, UA 03/07/2025 Negative  Negative Final    Leukocytes, UA 03/07/2025 Negative  Negative Final    RBC, UA 03/07/2025 1  0 - 4 /hpf Final    WBC, UA 03/07/2025 3  0 - 5 /hpf Final    Bacteria 03/07/2025 Few (A)  None-Occ /hpf Final    Yeast, UA 03/07/2025 None  None Final    Squam Epithel, UA 03/07/2025 5  /hpf Final    Hyaline Casts, UA 03/07/2025 6 (A)  0-1/lpf /lpf Final    Microscopic Comment 03/07/2025 SEE COMMENT   Final    Comment: Other formed elements not mentioned in the report are not   present in the microscopic examination.      Lab Visit on 02/28/2025   Component Date Value Ref Range Status    Sodium 02/28/2025 139  136 - 145 mmol/L Final    Potassium 02/28/2025 4.4  3.5 - 5.1 mmol/L Final    Chloride 02/28/2025 109  95 - 110 mmol/L Final    CO2 02/28/2025 17 (L)  23 - 29 mmol/L Final    Glucose 02/28/2025 85  70 - 110 mg/dL Final    BUN 02/28/2025 20  6 - 20 mg/dL Final    Creatinine 02/28/2025 1.3  0.5 - 1.4 mg/dL Final    Calcium 02/28/2025 10.2  8.7 - 10.5 mg/dL Final    Anion Gap 02/28/2025 13  8 - 16 mmol/L Final    eGFR 02/28/2025 49.2 (A)  >60 mL/min/1.73 m^2 Final   Admission on 02/25/2025, Discharged on 02/26/2025   Component Date Value Ref Range Status    Hepatitis C Ab 02/25/2025 Non-reactive  Non-reactive Final    HIV 1/2 Ag/Ab 02/25/2025 Non-reactive  Non-reactive Final    QRS Duration 02/25/2025 96  ms Final    OHS QTC Calculation 02/25/2025 487   ms Final    Sodium 02/25/2025 140  136 - 145 mmol/L Final    Potassium 02/25/2025 3.1 (L)  3.5 - 5.1 mmol/L Final    Chloride 02/25/2025 110  95 - 110 mmol/L Final    CO2 02/25/2025 23  23 - 29 mmol/L Final    Glucose 02/25/2025 112 (H)  70 - 110 mg/dL Final    BUN 02/25/2025 20  6 - 20 mg/dL Final    Creatinine 02/25/2025 1.1  0.5 - 1.4 mg/dL Final    Calcium 02/25/2025 8.9  8.7 - 10.5 mg/dL Final    Total Protein 02/25/2025 6.9  6.0 - 8.4 g/dL Final    Albumin 02/25/2025 3.4 (L)  3.5 - 5.2 g/dL Final    Total Bilirubin 02/25/2025 0.2  0.1 - 1.0 mg/dL Final    Comment: For infants and newborns, interpretation of results should be based  on gestational age, weight and in agreement with clinical  observations.    Premature Infant recommended reference ranges:  Up to 24 hours.............<8.0 mg/dL  Up to 48 hours............<12.0 mg/dL  3-5 days..................<15.0 mg/dL  6-29 days.................<15.0 mg/dL      Alkaline Phosphatase 02/25/2025 61  40 - 150 U/L Final    AST 02/25/2025 24  10 - 40 U/L Final    ALT 02/25/2025 15  10 - 44 U/L Final    eGFR 02/25/2025 >60.0  >60 mL/min/1.73 m^2 Final    Anion Gap 02/25/2025 7 (L)  8 - 16 mmol/L Final    WBC 02/25/2025 8.27  3.90 - 12.70 K/uL Final    RBC 02/25/2025 4.30  4.00 - 5.40 M/uL Final    Hemoglobin 02/25/2025 13.7  12.0 - 16.0 g/dL Final    Hematocrit 02/25/2025 41.4  37.0 - 48.5 % Final    MCV 02/25/2025 96  82 - 98 fL Final    MCH 02/25/2025 31.9 (H)  27.0 - 31.0 pg Final    MCHC 02/25/2025 33.1  32.0 - 36.0 g/dL Final    RDW 02/25/2025 13.8  11.5 - 14.5 % Final    Platelets 02/25/2025 190  150 - 450 K/uL Final    MPV 02/25/2025 10.3  9.2 - 12.9 fL Final    Immature Granulocytes 02/25/2025 0.2  0.0 - 0.5 % Final    Gran # (ANC) 02/25/2025 4.3  1.8 - 7.7 K/uL Final    Immature Grans (Abs) 02/25/2025 0.02  0.00 - 0.04 K/uL Final    Comment: Mild elevation in immature granulocytes is non specific and   can be seen in a variety of conditions including stress  response,   acute inflammation, trauma and pregnancy. Correlation with other   laboratory and clinical findings is essential.      Lymph # 02/25/2025 3.1  1.0 - 4.8 K/uL Final    Mono # 02/25/2025 0.4  0.3 - 1.0 K/uL Final    Eos # 02/25/2025 0.4  0.0 - 0.5 K/uL Final    Baso # 02/25/2025 0.04  0.00 - 0.20 K/uL Final    nRBC 02/25/2025 0  0 /100 WBC Final    Gran % 02/25/2025 52.1  38.0 - 73.0 % Final    Lymph % 02/25/2025 36.9  18.0 - 48.0 % Final    Mono % 02/25/2025 5.2  4.0 - 15.0 % Final    Eosinophil % 02/25/2025 5.1  0.0 - 8.0 % Final    Basophil % 02/25/2025 0.5  0.0 - 1.9 % Final    Differential Method 02/25/2025 Automated   Final    Troponin I High Sensitivity 02/25/2025 1660 (H)  0 - 14 ng/L Final    Lipase 02/25/2025 29  4 - 60 U/L Final    Magnesium 02/25/2025 1.9  1.6 - 2.6 mg/dL Final    aPTT 02/25/2025 27.1  21.0 - 32.0 sec Final    Comment: Refer to local heparin nomogram for intensity/dose specific   therapeutic   range.      aPTT 02/25/2025 27.1  21.0 - 32.0 sec Final    Comment: Refer to local heparin nomogram for intensity/dose specific   therapeutic   range.      Prothrombin Time 02/25/2025 10.5  9.0 - 12.5 sec Final    INR 02/25/2025 1.0  0.8 - 1.2 Final    Comment: Coumadin Therapy:  2.0 - 3.0 for INR for all indicators except mechanical heart valves  and antiphospholipid syndromes which should use 2.5 - 3.5.      QRS Duration 02/25/2025 102  ms Final    OHS QTC Calculation 02/25/2025 480  ms Final    Troponin I High Sensitivity 02/25/2025 1335 (H)  0 - 14 ng/L Final    Cholesterol 02/25/2025 157  120 - 199 mg/dL Final    Comment: The National Cholesterol Education Program (NCEP) has set the  following guidelines (reference ranges) for Cholesterol:  Optimal.....................<200 mg/dL  Borderline High.............200-239 mg/dL  High........................> or = 240 mg/dL      Triglycerides 02/25/2025 32  30 - 150 mg/dL Final    Comment: The National Cholesterol Education Program (NCEP)  has set the  following guidelines (reference values) for triglycerides:  Normal......................<150 mg/dL  Borderline High.............150-199 mg/dL  High........................200-499 mg/dL      HDL 02/25/2025 54  40 - 75 mg/dL Final    Comment: The National Cholesterol Education Program (NCEP) has set the  following guidelines (reference values) for HDL Cholesterol:  Low...............<40 mg/dL  Optimal...........>60 mg/dL      LDL Cholesterol 02/25/2025 96.6  63.0 - 159.0 mg/dL Final    Comment: The National Cholesterol Education Program (NCEP) has set the  following guidelines (reference values) for LDL Cholesterol:  Optimal.......................<130 mg/dL  Borderline High...............130-159 mg/dL  High..........................160-189 mg/dL  Very High.....................>190 mg/dL      HDL/Cholesterol Ratio 02/25/2025 34.4  20.0 - 50.0 % Final    Total Cholesterol/HDL Ratio 02/25/2025 2.9  2.0 - 5.0 Final    Non-HDL Cholesterol 02/25/2025 103  mg/dL Final    Comment: Risk category and Non-HDL cholesterol goals:  Coronary heart disease (CHD)or equivalent (10-year risk of CHD >20%):  Non-HDL cholesterol goal     <130 mg/dL  Two or more CHD risk factors and 10-year risk of CHD <= 20%:  Non-HDL cholesterol goal     <160 mg/dL  0 to 1 CHD risk factor:  Non-HDL cholesterol goal     <190 mg/dL      Hemoglobin A1C 02/25/2025 5.2  4.0 - 5.6 % Final    Comment: ADA Screening Guidelines:  5.7-6.4%  Consistent with prediabetes  >or=6.5%  Consistent with diabetes    High levels of fetal hemoglobin interfere with the HbA1C  assay. Heterozygous hemoglobin variants (HbS, HgC, etc)do  not significantly interfere with this assay.   However, presence of multiple variants may affect accuracy.      Estimated Avg Glucose 02/25/2025 103  68 - 131 mg/dL Final    Group & Rh 02/25/2025 O POS   Final    Indirect Edel 02/25/2025 NEG   Final    Specimen Outdate 02/25/2025 02/28/2025 23:59   Final    BSA 02/25/2025 1.98  m2  Final    Est. RA pres 02/25/2025 3  mmHg Final    BNP 02/25/2025 64  0 - 99 pg/mL Final    Values of less than 100 pg/ml are consistent with non-CHF populations.    aPTT 02/25/2025 57.8 (H)  21.0 - 32.0 sec Final    Comment: Refer to local heparin nomogram for intensity/dose specific   therapeutic   range.      Prothrombin Time 02/25/2025 10.9  9.0 - 12.5 sec Final    INR 02/25/2025 1.0  0.8 - 1.2 Final    Comment: Coumadin Therapy:  2.0 - 3.0 for INR for all indicators except mechanical heart valves  and antiphospholipid syndromes which should use 2.5 - 3.5.      LV Diastolic Volume 02/26/2025 62  mL Final    Echo EF Estimated 02/26/2025 75  % Final    LV Systolic Volume 02/26/2025 16  mL Final    IVS 02/26/2025 1.5 (A)  0.6 - 1.1 cm Final    LVIDd 02/26/2025 3.8  3.5 - 6.0 cm Final    LVIDs 02/26/2025 2.2  2.1 - 4.0 cm Final    LVOT diameter 02/26/2025 2.0  cm Final    PW 02/26/2025 1.2 (A)  0.6 - 1.1 cm Final    IVRT 02/26/2025 111  ms Final    AV LVOT peak gradient 02/26/2025 6  mmHg Final    LVOT mn grad 02/26/2025 3  mmHg Final    LVOT peak allen 02/26/2025 1.2  m/s Final    LVOT peak VTI 02/26/2025 25.0  cm Final    RV- roy basal diam 02/26/2025 3.0  cm Final    RV S' 02/26/2025 11.27  cm/s Final    LA size 02/26/2025 3.7  cm Final    Left Atrium Major Axis 02/26/2025 5.4  cm Final    Left Atrium Minor Axis 02/26/2025 5.3  cm Final    LA Vol (MOD) 02/26/2025 42  mL Final    RA Major Axis 02/26/2025 4.22  cm Final    RA Area 02/26/2025 12.5  cm2 Final    AV valve area 02/26/2025 3.1  cm2 Final    AV area by cont VTI 02/26/2025 3.0  cm2 Final    AV peak gradient 02/26/2025 8  mmHg Final    AV mean gradient 02/26/2025 5  mmHg Final    Ao peak allen 02/26/2025 1.4  m/s Final    Ao VTI 02/26/2025 25.2  cm Final    MV Peak A Allen 02/26/2025 1.03  m/s Final    E wave deceleration time 02/26/2025 393  ms Final    MV Peak E Allen 02/26/2025 0.60  m/s Final    E/A ratio 02/26/2025 0.58   Final    LV LATERAL E/E' RATIO  "02/26/2025 12.0   Final    LV SEPTAL E/E' RATIO 02/26/2025 15.0   Final    TDI LATERAL 02/26/2025 0.05  m/s Final    TDI SEPTAL 02/26/2025 0.04  m/s Final    TV peak gradient 02/26/2025 7  mmHg Final    TR Max Allen 02/26/2025 1.4  m/s Final    Ascending aorta 02/26/2025 2.75  cm Final    STJ 02/26/2025 2.67  cm Final    P vein A 02/26/2025 0.2  m/s Final    MV "A" wave duration 02/26/2025 139.87  ms Final    Pulm vein "A" wave 02/26/2025 139.87  ms Final    PV Peak D Allen 02/26/2025 0.28  m/s Final    PV Peak S Allen 02/26/2025 0.45  m/s Final    IVC diameter 02/26/2025 0.90  cm Final    Sinus 02/26/2025 2.75  cm Final    RA Width 02/26/2025 3.00  cm Final    TAPSE 02/26/2025 1.44  cm Final    LA WIDTH 02/26/2025 3.7  cm Final    BSA 02/26/2025 1.97  m2 Final    LVOT stroke volume 02/26/2025 78.5  cm3 Final    LV Systolic Volume Index 02/26/2025 8.4  mL/m2 Final    LV Diastolic Volume Index 02/26/2025 32.46  mL/m2 Final    LVOT area 02/26/2025 3.1  cm2 Final    FS 02/26/2025 42.1  28 - 44 % Final    Left Ventricle Relative Wall Thick* 02/26/2025 0.63  cm Final    LV mass 02/26/2025 183.4  g Final    LV Mass Index 02/26/2025 96.0  g/m2 Final    E/E' ratio 02/26/2025 13  m/s Final    ANGELES 02/26/2025 33  mL/m2 Final    LA Vol 02/26/2025 62  cm3 Final    Pulm vein S/D ratio 02/26/2025 1.61   Final    RV/LV Ratio 02/26/2025 0.79  cm Final    ANGELES (MOD) 02/26/2025 22  mL/m2 Final    AV Velocity Ratio 02/26/2025 0.86   Final    AV index (prosthetic) 02/26/2025 0.99   Final    ALEXEI by Velocity Ratio 02/26/2025 2.7  cm² Final    Mean e' 02/26/2025 0.05  m/s Final    ZLVIDS 02/26/2025 -3.23   Final    ZLVIDD 02/26/2025 -3.47   Final    TV resting pulmonary artery pressu* 02/26/2025 11  mmHg Final    RV TB RVSP 02/26/2025 4  mmHg Final    Est. RA pres 02/26/2025 3  mmHg Final    QRS Duration 02/26/2025 88  ms Final    [facility] QTC Calculation 02/26/2025 471  ms Final    Sodium 02/26/2025 138  136 - 145 mmol/L Final    Potassium " 02/26/2025 4.3  3.5 - 5.1 mmol/L Final    Chloride 02/26/2025 110  95 - 110 mmol/L Final    CO2 02/26/2025 21 (L)  23 - 29 mmol/L Final    Glucose 02/26/2025 84  70 - 110 mg/dL Final    BUN 02/26/2025 16  6 - 20 mg/dL Final    Creatinine 02/26/2025 1.3  0.5 - 1.4 mg/dL Final    Calcium 02/26/2025 9.2  8.7 - 10.5 mg/dL Final    Total Protein 02/26/2025 6.4  6.0 - 8.4 g/dL Final    Albumin 02/26/2025 3.0 (L)  3.5 - 5.2 g/dL Final    Total Bilirubin 02/26/2025 0.4  0.1 - 1.0 mg/dL Final    Comment: For infants and newborns, interpretation of results should be based  on gestational age, weight and in agreement with clinical  observations.    Premature Infant recommended reference ranges:  Up to 24 hours.............<8.0 mg/dL  Up to 48 hours............<12.0 mg/dL  3-5 days..................<15.0 mg/dL  6-29 days.................<15.0 mg/dL      Alkaline Phosphatase 02/26/2025 57  40 - 150 U/L Final    AST 02/26/2025 111 (H)  10 - 40 U/L Final    ALT 02/26/2025 26  10 - 44 U/L Final    eGFR 02/26/2025 49.2 (A)  >60 mL/min/1.73 m^2 Final    Anion Gap 02/26/2025 7 (L)  8 - 16 mmol/L Final    Magnesium 02/26/2025 2.0  1.6 - 2.6 mg/dL Final    Phosphorus 02/26/2025 3.0  2.7 - 4.5 mg/dL Final    WBC 02/26/2025 8.72  3.90 - 12.70 K/uL Final    RBC 02/26/2025 4.05  4.00 - 5.40 M/uL Final    Hemoglobin 02/26/2025 13.1  12.0 - 16.0 g/dL Final    Hematocrit 02/26/2025 39.8  37.0 - 48.5 % Final    MCV 02/26/2025 98  82 - 98 fL Final    MCH 02/26/2025 32.3 (H)  27.0 - 31.0 pg Final    MCHC 02/26/2025 32.9  32.0 - 36.0 g/dL Final    RDW 02/26/2025 13.9  11.5 - 14.5 % Final    Platelets 02/26/2025 207  150 - 450 K/uL Final    MPV 02/26/2025 10.1  9.2 - 12.9 fL Final    Immature Granulocytes 02/26/2025 0.2  0.0 - 0.5 % Final    Gran # (ANC) 02/26/2025 5.1  1.8 - 7.7 K/uL Final    Immature Grans (Abs) 02/26/2025 0.02  0.00 - 0.04 K/uL Final    Comment: Mild elevation in immature granulocytes is non specific and   can be seen in a  variety of conditions including stress response,   acute inflammation, trauma and pregnancy. Correlation with other   laboratory and clinical findings is essential.      Lymph # 02/26/2025 2.6  1.0 - 4.8 K/uL Final    Mono # 02/26/2025 0.6  0.3 - 1.0 K/uL Final    Eos # 02/26/2025 0.3  0.0 - 0.5 K/uL Final    Baso # 02/26/2025 0.03  0.00 - 0.20 K/uL Final    nRBC 02/26/2025 0  0 /100 WBC Final    Gran % 02/26/2025 59.0  38.0 - 73.0 % Final    Lymph % 02/26/2025 29.6  18.0 - 48.0 % Final    Mono % 02/26/2025 7.0  4.0 - 15.0 % Final    Eosinophil % 02/26/2025 3.9  0.0 - 8.0 % Final    Basophil % 02/26/2025 0.3  0.0 - 1.9 % Final    Differential Method 02/26/2025 Automated   Final    POCT Glucose 02/25/2025 104  70 - 110 mg/dL Final   Hospital Outpatient Visit on 02/19/2025   Component Date Value Ref Range Status    LV Diastolic Volume 02/19/2025 67.72  mL Final    Echo EF Estimated 02/19/2025 66  % Final    LV Systolic Volume 02/19/2025 22.69  mL Final    IVS 02/19/2025 1.6 (A)  0.6 - 1.1 cm Final    LVIDd 02/19/2025 3.9  3.5 - 6.0 cm Final    LVIDs 02/19/2025 2.5  2.1 - 4.0 cm Final    LVOT diameter 02/19/2025 2.0  cm Final    PW 02/19/2025 1.3 (A)  0.6 - 1.1 cm Final    AV LVOT peak gradient 02/19/2025 4  mmHg Final    LVOT mn grad 02/19/2025 3  mmHg Final    LVOT peak jolene 02/19/2025 1.0  m/s Final    LVOT peak VTI 02/19/2025 19.3  cm Final    RV- roy basal diam 02/19/2025 3.4  cm Final    RVOT prox diameter 02/19/2025 2.19  cm Final    RV S' 02/19/2025 11.07  cm/s Final    LA size 02/19/2025 4.0  cm Final    Left Atrium Major Axis 02/19/2025 4.8  cm Final    Left Atrium Minor Axis 02/19/2025 4.0  cm Final    LA Vol (MOD) 02/19/2025 38  mL Final    RA Major Axis 02/19/2025 4.41  cm Final    RA Area 02/19/2025 13.6  cm2 Final    AV valve area 02/19/2025 2.8  cm2 Final    AV area by cont VTI 02/19/2025 2.9  cm2 Final    AV peak gradient 02/19/2025 7  mmHg Final    AV mean gradient 02/19/2025 3  mmHg Final    Ao peak  allen 02/19/2025 1.3  m/s Final    Ao VTI 02/19/2025 21.7  cm Final    MV Peak A Allen 02/19/2025 1.16  m/s Final    E wave deceleration time 02/19/2025 246  ms Final    MV Peak E Allen 02/19/2025 0.72  m/s Final    E/A ratio 02/19/2025 0.62   Final    LV LATERAL E/E' RATIO 02/19/2025 18.0   Final    LV SEPTAL E/E' RATIO 02/19/2025 14.4   Final    TDI LATERAL 02/19/2025 0.04  m/s Final    TDI SEPTAL 02/19/2025 0.05  m/s Final    Ascending aorta 02/19/2025 2.65  cm Final    STJ 02/19/2025 2.56  cm Final    IVC diameter 02/19/2025 0.91  cm Final    Sinus 02/19/2025 3.14  cm Final    LA WIDTH 02/19/2025 3.5  cm Final    RA Width 02/19/2025 3.38  cm Final    TAPSE 02/19/2025 1.95  cm Final    BSA 02/19/2025 1.98  m2 Final    85% Max Predicted HR 02/19/2025 142   Final    Max Predicted HR 02/19/2025 167   Final    OHS CV CPX PATIENT IS MALE 02/19/2025 0.0   Final    OHS CV CPX PATIENT IS FEMALE 02/19/2025 1.0   Final    LVOT stroke volume 02/19/2025 60.6  cm3 Final    LV Systolic Volume Index 02/19/2025 11.8  mL/m2 Final    LV Diastolic Volume Index 02/19/2025 35.27  mL/m2 Final    LVOT area 02/19/2025 3.1  cm2 Final    FS 02/19/2025 35.9  28 - 44 % Final    Left Ventricle Relative Wall Thick* 02/19/2025 0.67  cm Final    LV mass 02/19/2025 212.9  g Final    LV Mass Index 02/19/2025 110.9  g/m2 Final    E/E' ratio 02/19/2025 16  m/s Final    ANGELES 02/19/2025 27  mL/m2 Final    LA Vol 02/19/2025 52  cm3 Final    RVOT area 02/19/2025 3.76  cm2 Final    RV/LV Ratio 02/19/2025 0.87  cm Final    ANGELES (MOD) 02/19/2025 20  mL/m2 Final    AV Velocity Ratio 02/19/2025 0.77   Final    AV index (prosthetic) 02/19/2025 0.89   Final    ALEXEI by Velocity Ratio 02/19/2025 2.4  cm² Final    Mean e' 02/19/2025 0.05  m/s Final    ZLVIDS 02/19/2025 -2.28   Final    ZLVIDD 02/19/2025 -3.29   Final    HR at rest 02/19/2025 85  bpm Final    Systolic blood pressure 02/19/2025 142  mmHg Final    Diastolic blood pressure 02/19/2025 100  mmHg Final    RPP  02/19/2025 12,070   Final    Exercise duration (min) 02/19/2025 5  minutes Final    Exercise duration (sec) 02/19/2025 49  seconds Final    Peak HR 02/19/2025 137  bpm Final    Peak Systolic BP 02/19/2025 190  mmHg Final    Peak Diatolic BP 02/19/2025 96  mmHg Final    Peak RPP 02/19/2025 26,030   Final    Estimated METs 02/19/2025 9   Final    % Max HR Achieved 02/19/2025 86   Final    1 Minute Recovery HR 02/19/2025 109  bpm Final    EF 02/19/2025 60  % Final    Est. RA pres 02/19/2025 3  mmHg Final   Lab Visit on 02/04/2025   Component Date Value Ref Range Status    Sodium 02/04/2025 140  136 - 145 mmol/L Final    Potassium 02/04/2025 3.0 (L)  3.5 - 5.1 mmol/L Final    Chloride 02/04/2025 103  95 - 110 mmol/L Final    CO2 02/04/2025 24  23 - 29 mmol/L Final    Glucose 02/04/2025 89  70 - 110 mg/dL Final    BUN 02/04/2025 26 (H)  6 - 20 mg/dL Final    Creatinine 02/04/2025 1.6 (H)  0.5 - 1.4 mg/dL Final    Calcium 02/04/2025 9.9  8.7 - 10.5 mg/dL Final    Total Protein 02/04/2025 8.2  6.0 - 8.4 g/dL Final    Albumin 02/04/2025 3.9  3.5 - 5.2 g/dL Final    Total Bilirubin 02/04/2025 0.4  0.1 - 1.0 mg/dL Final    Comment: For infants and newborns, interpretation of results should be based  on gestational age, weight and in agreement with clinical  observations.    Premature Infant recommended reference ranges:  Up to 24 hours.............<8.0 mg/dL  Up to 48 hours............<12.0 mg/dL  3-5 days..................<15.0 mg/dL  6-29 days.................<15.0 mg/dL      Alkaline Phosphatase 02/04/2025 71  40 - 150 U/L Final    AST 02/04/2025 22  10 - 40 U/L Final    ALT 02/04/2025 26  10 - 44 U/L Final    eGFR 02/04/2025 38.3 (A)  >60 mL/min/1.73 m^2 Final    Anion Gap 02/04/2025 13  8 - 16 mmol/L Final    BNP 02/04/2025 99  0 - 99 pg/mL Final    Values of less than 100 pg/ml are consistent with non-CHF populations.    Magnesium 02/04/2025 2.0  1.6 - 2.6 mg/dL Final    TSH 02/04/2025 1.260  0.400 - 4.000 uIU/mL Final     NT-proBNP 2025 847 (H)  <=141 pg/mL Final    Comment: NT-proBNP values less than 300 pg/mL have a 99% negative   predictive value for excluding acute congestive heart   failure. A cutoff of 1200 pg/mL for patients with an eGFR<60  yields a diagnostic sensitivity and specificity of 89% and   72% for acute congestive heart failure. A diagnostic   NT-proBNP cutoff of 900 pg/mL has been suggested in adults   50-75 years of age in the absence of renal failure.    Test Performed by:  [place] [address]  : Valerie James Ph.D.; CLIA# [id number]     Lab Visit on 2024   Component Date Value Ref Range Status    Aldosterone 2024 24.2  ng/dL Final    Comment: INTERPRETIVE INFORMATION: Aldosterone, Serum  Reference intervals for age 15 and older:  Upright .........  4.0 - 31.0 ng/dL  Supine ..........  Less than or equal to 16.0 ng/dL  Unspecified .....  Less than or equal to 31.0 ng/dL  Normal serum levels of aldosterone are dependent on the   sodium intake and whether the patient is upright or supine.   High sodium intake will tend to suppress serum aldosterone,   whereas low sodium intake will elevate serum aldosterone.   The reference intervals for serum aldosterone are based on   normal sodium intake.     Access complete set of age- and/or gender-specific   reference intervals for this test in the [facility]   Test Directory ([email or web address]).      Renin 2024 8.1  ng/mL/hr Final    Comment: INTERPRETIVE INFORMATION: Renin Activity  Adult, Normal sodium diet:  Supine ................. 0.2-1.6 ng/mL/hr  Upright ................ 0.5-4.0 ng/mL/hr  Children, Normal sodium diet, Supine:   (1-7 days) ..... 2.0-35.0 ng/mL/hr  Cord blood ............. 4.0-32.0 ng/mL/hr  1-12 mos ............... 2.4-37.0 ng/mL/hr  13 mos-3 yrs ........... 1.7-11.2 ng/mL/hr  4-5 yrs ................ 1.0- 6.5 ng/mL/hr  6-10 yrs  ............... 0.5- 5.9 ng/mL/hr  11-15 yrs .............. 0.5- 3.3 ng/mL/hr  Children, normal sodium diet, Upright:  0-3 yrs ................ Not Available  4-5 yrs ................ Less than or equal to 15 ng/mL/hr  6-10 yrs ............... Less than or equal to 17 ng/mL/hr  11-15 yrs .............. Less than or equal to 16 ng/mL/hr  Plasma renin activity measures enzyme ability to convert   angiotensinogen to angiotensin I and is limited by the   availability of angiotensinogen. Plasma renin activity is   not an accurate indicator of enzyme activity when   angiotens                           inogen is decreased.  This test was developed and its performance characteristics   determined by [facility]. It has not been cleared or   approved by the US Food and Drug Administration. This test   was performed in a CLIA certified laboratory and is   intended for clinical purposes.      Aldosterone/Renin Activity Calcula* 11/25/2024 3.0  <=25.0 ratio Final    Comment: INTERPRETIVE INFORMATION: A/RA Ratio Calculation  Aldosterone/Renin Activity Ratio: Less than or equal to 25  An Aldosterone/Renin Activity Ratio of greater than 25 is   suggestive of hyperaldosteronism if the aldosterone   concentration is greater than 15 ng/dL.  Performed By: [facility]  [address] [place] [id number]  : Kev Cruz MD, PhD  CLIA Number: [id number]      Glucose 11/25/2024 85  70 - 110 mg/dL Final    Sodium 11/25/2024 142  136 - 145 mmol/L Final    Potassium 11/25/2024 3.2 (L)  3.5 - 5.1 mmol/L Final    Chloride 11/25/2024 105  95 - 110 mmol/L Final    CO2 11/25/2024 28  23 - 29 mmol/L Final    BUN 11/25/2024 28 (H)  6 - 20 mg/dL Final    Calcium 11/25/2024 9.3  8.7 - 10.5 mg/dL Final    Creatinine 11/25/2024 1.4  0.5 - 1.4 mg/dL Final    Albumin 11/25/2024 3.1 (L)  3.5 - 5.2 g/dL Final    Phosphorus 11/25/2024 4.4  2.7 - 4.5 mg/dL Final    eGFR 11/25/2024 45.3 (A)  >60 mL/min/1.73 m^2  Final    Anion Gap 11/25/2024 9  8 - 16 mmol/L Final   Lab Visit on 11/21/2024   Component Date Value Ref Range Status    Sodium 11/21/2024 142  136 - 145 mmol/L Final    Potassium 11/21/2024 3.0 (L)  3.5 - 5.1 mmol/L Final    Chloride 11/21/2024 101  95 - 110 mmol/L Final    CO2 11/21/2024 29  23 - 29 mmol/L Final    Glucose 11/21/2024 81  70 - 110 mg/dL Final    BUN 11/21/2024 29 (H)  6 - 20 mg/dL Final    Creatinine 11/21/2024 1.8 (H)  0.5 - 1.4 mg/dL Final    Calcium 11/21/2024 9.8  8.7 - 10.5 mg/dL Final    Total Protein 11/21/2024 7.2  6.0 - 8.4 g/dL Final    Albumin 11/21/2024 3.5  3.5 - 5.2 g/dL Final    Total Bilirubin 11/21/2024 0.6  0.1 - 1.0 mg/dL Final    Comment: For infants and newborns, interpretation of results should be based  on gestational age, weight and in agreement with clinical  observations.    Premature Infant recommended reference ranges:  Up to 24 hours.............<8.0 mg/dL  Up to 48 hours............<12.0 mg/dL  3-5 days..................<15.0 mg/dL  6-29 days.................<15.0 mg/dL      Alkaline Phosphatase 11/21/2024 72  40 - 150 U/L Final    AST 11/21/2024 24  10 - 40 U/L Final    ALT 11/21/2024 17  10 - 44 U/L Final    eGFR 11/21/2024 33.5 (A)  >60 mL/min/1.73 m^2 Final    Anion Gap 11/21/2024 12  8 - 16 mmol/L Final   There may be more visits with results that are not included.       EKG  Reviewed    Echo   Results for orders placed or performed during the hospital encounter of 02/25/25   Echo   Result Value Ref Range    LV Diastolic Volume 62 mL    Echo EF Estimated 75 %    LV Systolic Volume 16 mL    IVS 1.5 (A) 0.6 - 1.1 cm    LVIDd 3.8 3.5 - 6.0 cm    LVIDs 2.2 2.1 - 4.0 cm    LVOT diameter 2.0 cm    PW 1.2 (A) 0.6 - 1.1 cm    IVRT 111 ms    AV LVOT peak gradient 6 mmHg    LVOT mn grad 3 mmHg    LVOT peak jolene 1.2 m/s    LVOT peak VTI 25.0 cm    RV- roy basal diam 3.0 cm    RV S' 11.27 cm/s    LA size 3.7 cm    Left Atrium Major Axis 5.4 cm    Left Atrium Minor Axis 5.3  "cm    LA Vol (MOD) 42 mL    RA Major Axis 4.22 cm    RA Area 12.5 cm2    AV valve area 3.1 cm2    AV area by cont VTI 3.0 cm2    AV peak gradient 8 mmHg    AV mean gradient 5 mmHg    Ao peak allen 1.4 m/s    Ao VTI 25.2 cm    MV Peak A Allen 1.03 m/s    E wave deceleration time 393 ms    MV Peak E Allen 0.60 m/s    E/A ratio 0.58     LV LATERAL E/E' RATIO 12.0     LV SEPTAL E/E' RATIO 15.0     TDI LATERAL 0.05 m/s    TDI SEPTAL 0.04 m/s    TV peak gradient 7 mmHg    TR Max Allen 1.4 m/s    Ascending aorta 2.75 cm    STJ 2.67 cm    P vein A 0.2 m/s    MV "A" wave duration 139.87 ms    Pulm vein "A" wave 139.87 ms    PV Peak D Allen 0.28 m/s    PV Peak S Allen 0.45 m/s    IVC diameter 0.90 cm    Sinus 2.75 cm    RA Width 3.00 cm    TAPSE 1.44 cm    LA WIDTH 3.7 cm    BSA 1.97 m2    LVOT stroke volume 78.5 cm3    LV Systolic Volume Index 8.4 mL/m2    LV Diastolic Volume Index 32.46 mL/m2    LVOT area 3.1 cm2    FS 42.1 28 - 44 %    Left Ventricle Relative Wall Thickness 0.63 cm    LV mass 183.4 g    LV Mass Index 96.0 g/m2    E/E' ratio 13 m/s    ANGELES 33 mL/m2    LA Vol 62 cm3    Pulm vein S/D ratio 1.61     RV/LV Ratio 0.79 cm    ANGELES (MOD) 22 mL/m2    AV Velocity Ratio 0.86     AV index (prosthetic) 0.99     ALEXEI by Velocity Ratio 2.7 cm²    Mean e' 0.05 m/s    ZLVIDS -3.23     ZLVIDD -3.47     TV resting pulmonary artery pressure 11 mmHg    RV TB RVSP 4 mmHg    Est. RA pres 3 mmHg    Narrative      Left Ventricle: The left ventricle is normal in size. Severe septal   thickening. Patient with prior history of HCM. No obstructive gradient is   seen through the LVOT at rest or with Valsalva. Mild global hypokinesis   present. There is low normal systolic function with a visually estimated   ejection fraction of 50 - 55%. Grade I diastolic dysfunction.    Right Ventricle: The right ventricle is normal in size. Wall thickness   is normal. Systolic function is normal.    Mitral Valve: There is mild regurgitation with a posterolateral "   eccentrically directed jet.    Tricuspid Valve: There is mild regurgitation.    Pulmonary Artery: The estimated pulmonary artery systolic pressure is   11 mmHg.    IVC/SVC: Normal venous pressure at 3 mmHg.         Imaging  X-Ray Chest AP Portable  Result Date: 3/21/2025  EXAMINATION: XR CHEST AP PORTABLE CLINICAL HISTORY: Dyspnea, unspecified TECHNIQUE: Single frontal view of the chest was performed. COMPARISON: Chest radiograph performed 02/25/2025. FINDINGS: Monitoring leads overlie the chest. Cardiomediastinal contours appear to be within normal limits. Lungs appear essentially clear.  No definite pneumothorax or large volume pleural effusion. No acute findings in the visualized abdomen.  Osseous and soft tissue structures appear without definite acute change.     No convincing evidence of acute cardiopulmonary disease. Electronically signed by: Yfn Bowen Date:    03/21/2025 Time:    07:49      Prior coronary angiogram / intervention:  See HPI    Assessment and Plan  Ms. Rios is a 53-year-old female with a past medical history of anxiety, depression, migraine headaches, obesity, GERD, tobacco dependency, hypertension, NSTEMI with C 2/25/25 with stents x2 to 1st Mrg and Prox RCA to Mid RCA    Essential hypertension  Controlled continue beta-blocker valsartan    HOCM (hypertrophic obstructive cardiomyopathy) (Primary)  Genetic testing a nondiagnostic  Cardiac MRI pending  Followed by advanced heart failure clinic  - carvediloL (COREG) 3.125 MG tablet; Take 1 tablet (3.125 mg total) by mouth 2 (two) times daily with meals.  Dispense: 180 tablet; Refill: 3  - Ambulatory referral/consult to Adult Advanced Heart Failure; Future  - Holter monitor - 48 hour; Future  - Cardiac MRI Morphology; Future  - MRI Cardiac Morphology Function W WO Contrast; Future     Cigarette nicotine dependence without complication  Encouraged continuation of smoking cessation     Class 1 obesity due to excess calories with body mass  index (BMI) of 31.0 to 31.9 in adult, unspecified whether serious comorbidity present  Encouraged a heart healthy diet that is low in saturated fats and sodium   Also encouraged an increase in activities as tolerated for healthy weight management   Discussed Mediterranean Diet recommendations (Adopted from Tyree et al, NEJ, 2018.)  - Eat primarily plant-based foods, such as fruits/vegetables, whole grains, legumes & nuts  - Limit refined carbohydrates (white pasta, bread, rice).  - Replace butter with healthy fats such as olive oil.  - Use herbs and spices instead of salt to flavor foods.  - Limit red meat and processed meats to no more than a few times a month.  - Avoid sugary sodas, bakery goods, and sweets.  - Eat fish and poultry at least twice a week.              - Get plenty of exercise (150 minutes per week).     S/P right coronary artery (RCA) stent placement  History of heart artery stent  Status post stents x2 February 25th 2025 for NSTEMI  Continue dual antiplatelet therapy (stopped Plavix February 2026)  LDL goal <70; continue statin, consider adding Zetia next visit following lipid panel  Continue beta-blocker    Follow Up  Follow up in about 6 months (around 10/11/2025), or if symptoms worsen or fail to improve.       Brandi R. Carter, FNP-C [facility] - Cardiology    Total professional time spent for the encounter: 40 minutes  Time was spent preparing to see the patient, reviewing results of prior testing, obtaining and/or reviewing separately obtained history, performing a medically appropriate examination and interview, counseling and educating the patient/family, ordering medications/tests/procedures, referring and communicating with other health care professionals, documenting clinical information in the electronic health record, and independently interpreting results.    Disclaimer: This document was created using voice recognition software (M*Modal Fluency Direct). Although it may be  edited, this document may contain errors related to incorrect recognition of the spoken word. Please call the physician if clarification is needed.          [1]   Current Outpatient Medications   Medication Sig Dispense Refill    allopurinoL (ZYLOPRIM) 100 MG tablet Take 1 tablet (100 mg total) by mouth once daily. 30 tablet 3    aspirin 81 MG Chew Chew and swallow 1 tablet (81 mg total) by mouth once daily. 360 tablet 0    atorvastatin (LIPITOR) 80 MG tablet Take 1 tablet (80 mg total) by mouth every evening. 90 tablet 3    carvediloL (COREG) 25 MG tablet Take 1 tablet (25 mg total) by mouth 2 (two) times daily with meals. 180 tablet 3    clopidogreL (PLAVIX) 75 mg tablet Take 1 tablet (75 mg total) by mouth once daily. 30 tablet 11    empagliflozin (JARDIANCE) 10 mg tablet Take 1 tablet (10 mg total) by mouth once daily. 30 tablet 3    hydroCHLOROthiazide (HYDRODIURIL) 25 MG tablet Take 1 tablet (25 mg total) by mouth once daily. 90 tablet 3    NIFEdipine (PROCARDIA-XL) 90 MG (OSM) 24 hr tablet Take 1 tablet (90 mg total) by mouth once daily. 30 tablet 11    nitroGLYCERIN (NITROSTAT) 0.4 MG SL tablet Place 1 tablet (0.4 mg total) under the tongue every 5 (five) minutes as needed for Chest pain (angina). 25 tablet 1    albuterol (PROVENTIL/VENTOLIN HFA) 90 mcg/actuation inhaler Inhale 2 puffs into the lungs every 6 (six) hours as needed for Wheezing. 18 g 12    ALPRAZolam (XANAX) 0.5 MG tablet Take 1 tablet (0.5 mg total) by mouth nightly as needed for Anxiety. (Patient not taking: Reported on 4/11/2025) 30 tablet 0    brimonidine tartrate (LUMIFY OPHT) Place 2 drops into both eyes once daily.      buPROPion (WELLBUTRIN XL) 150 MG TB24 tablet Take 1 tablet (150 mg total) by mouth once daily. (Patient not taking: Reported on 4/11/2025) 90 tablet 3    [Paused] DULoxetine (CYMBALTA) 60 MG capsule Take 120 mg by mouth once daily. (Patient not taking: Reported on 4/11/2025)      fluticasone propionate (FLONASE) 50  mcg/actuation nasal spray 1 spray (50 mcg total) by Each Nostril route 2 (two) times daily as needed for Rhinitis. 15 g 0    predniSONE (DELTASONE) 50 MG Tab Take 1 tablet (50 mg total) by mouth once daily. 5 tablet 0    valsartan (DIOVAN) 160 MG tablet Take 1 tablet (160 mg total) by mouth once daily. (Patient not taking: Reported on 4/11/2025) 90 tablet 3     No current facility-administered medications for this visit.   [2]   Social History  Socioeconomic History    Marital status: Single   Occupational History     Employer: [employer]   Tobacco Use    Smoking status: Former     Current packs/day: 0.25     Average packs/day: 0.3 packs/day for 15.0 years (3.8 ttl pk-yrs)     Types: Cigarettes    Smokeless tobacco: Never    Tobacco comments:     Quite 10/2024.     Substance and Sexual Activity    Alcohol use: Yes     Alcohol/week: 3.0 standard drinks of alcohol     Types: 3 Shots of liquor per week     Comment: scocially on weekends    Drug use: Yes     Types: Marijuana    Sexual activity: Yes     Partners: Male     Birth control/protection: None     Social Drivers of Health     Financial Resource Strain: Low Risk  (2/26/2025)    Overall Financial Resource Strain (CARDIA)     Difficulty of Paying Living Expenses: Not very hard   Food Insecurity: No Food Insecurity (2/26/2025)    Hunger Vital Sign     Worried About Running Out of Food in the Last Year: Never true     Ran Out of Food in the Last Year: Never true   Transportation Needs: No Transportation Needs (2/21/2025)    PRAPARE - Transportation     Lack of Transportation (Medical): No     Lack of Transportation (Non-Medical): No   Physical Activity: Inactive (2/26/2025)    Exercise Vital Sign     Days of Exercise per Week: 0 days     Minutes of Exercise per Session: 0 min   Stress: Stress Concern Present (2/26/2025)    [employer] - Occupational Stress Questionnaire     Feeling of Stress : To some extent   Housing Stability: Low Risk   (2/26/2025)    Housing Stability Vital Sign     Unable to Pay for Housing in the Last Year: No     Number of Times Moved in the Last Year: 0     Homeless in the Last Year: No

## 2025-05-08 ENCOUNTER — TELEPHONE (OUTPATIENT)
Dept: CARDIOLOGY | Facility: HOSPITAL | Age: 55
End: 2025-05-08
Payer: MEDICAID

## 2025-05-08 ENCOUNTER — PATIENT MESSAGE (OUTPATIENT)
Dept: CARDIOLOGY | Facility: HOSPITAL | Age: 55
End: 2025-05-08
Payer: MEDICAID

## 2025-05-08 NOTE — TELEPHONE ENCOUNTER
I had the pleasure of discussing your upcoming Cardiac MRI scheduled for 05/15/2025 @ 9:00 at [facility] at [address] [address] [address] from the [facility]. Please aim to arrive at least 20-30 minutes before your scheduled time to allow staff time to check you in for the test as well as do their prep for the MRI to ensure this is done safely.    We discussed and ensured that you will arrive for the scheduled appointment and confirmed the absence of a pacemaker/defibrillator, the absence of a cerebral aneurysm or surgical clip, pump, nerve or brain stimulator, middle or inner ear prosthesis or other metal implant or being injured by a metal object (i.e. bullet, bb, shrapnel).    What is it? Imaging to look at your heart and its surrounding structures with and/or without the administration of Gadolinium contrast. Patients with pacemakers previously could not be scanned. However, new pacemakers have been designed to withstand MRI scans safely. Also, it has been determined that many of the older pacemakers can be scanned safely under the proper conditions.     Why are you having this test? An MRI is used to visualize various structures and tissue types within the body. Because it provides a high level of detail, an MRI is preferred for a wide variety of purposes. Your doctor has decided that an MRI is necessary to visualize the pathology present.    NO special preparation is required for this particular MRI. You can eat and take medications as you normally do.    The test should take about 40-50 minutes to complete. It is important to hold still for the exam or the pictures will be unreadable. If you are claustrophobic or have pain issues that may interfere with your ability to stay still, please discuss this with the ordering provider. You may or may not receive IV Gadolinium during the exam if this is indicated, so an IV will be placed. THIS IS NOT A DYE AND DOES NOT CONTAIN  IODINE. Your heart rate, blood pressure, and oxygen saturation will be continuously monitored throughout the exam. Rescue medications will be on hand in the event of any issues.      Thank you again for your time today. I can be reached at [phone], M-F from 7:30-4.

## 2025-05-15 ENCOUNTER — HOSPITAL ENCOUNTER (OUTPATIENT)
Dept: RADIOLOGY | Facility: HOSPITAL | Age: 55
Discharge: HOME | End: 2025-05-15
Payer: MEDICAID

## 2025-05-15 DIAGNOSIS — I42.1 HOCM (HYPERTROPHIC OBSTRUCTIVE CARDIOMYOPATHY): ICD-10-CM

## 2025-05-15 PROCEDURE — 25500020 PHARM REV CODE 255

## 2025-05-15 PROCEDURE — 75565 CARD MRI VELOC FLOW MAPPING: CPT | Mod: 26,,, | Performed by: PEDIATRICS

## 2025-05-15 PROCEDURE — 75561 CARDIAC MRI FOR MORPH W/DYE: CPT | Mod: 26,,, | Performed by: PEDIATRICS

## 2025-05-15 PROCEDURE — 75561 CARDIAC MRI FOR MORPH W/DYE: CPT | Mod: TC

## 2025-05-15 PROCEDURE — A9585 GADOBUTROL INJECTION: HCPCS

## 2025-05-15 RX ORDER — GADOBUTROL 604.72 MG/ML
12 INJECTION INTRAVENOUS
Status: COMPLETED | OUTPATIENT
Start: 2025-05-15 | End: 2025-05-15

## 2025-05-15 RX ADMIN — GADOBUTROL 12 ML: 604.72 INJECTION INTRAVENOUS at 09:05

## 2025-05-27 ENCOUNTER — TELEPHONE (OUTPATIENT)
Dept: TRANSPLANT | Facility: CLINIC | Age: 55
End: 2025-05-27
Payer: MEDICAID

## 2025-05-27 DIAGNOSIS — I42.1 HOCM (HYPERTROPHIC OBSTRUCTIVE CARDIOMYOPATHY): Primary | ICD-10-CM

## 2025-05-28 ENCOUNTER — TELEPHONE (OUTPATIENT)
Facility: CLINIC | Age: 55
End: 2025-05-28
Payer: MEDICAID

## 2025-05-28 ENCOUNTER — OFFICE VISIT (OUTPATIENT)
Facility: CLINIC | Age: 55
End: 2025-05-28
Payer: MEDICAID

## 2025-05-28 DIAGNOSIS — J45.901 MODERATE ASTHMA WITH EXACERBATION, UNSPECIFIED WHETHER PERSISTENT: Primary | ICD-10-CM

## 2025-05-28 PROCEDURE — 3044F HG A1C LEVEL LT 7.0%: CPT | Mod: CPTII,95,, | Performed by: INTERNAL MEDICINE

## 2025-05-28 PROCEDURE — 1159F MED LIST DOCD IN RCRD: CPT | Mod: CPTII,95,, | Performed by: INTERNAL MEDICINE

## 2025-05-28 PROCEDURE — 98000 SYNCH AUDIO-VIDEO NEW SF 15: CPT | Mod: 95,,, | Performed by: INTERNAL MEDICINE

## 2025-05-28 PROCEDURE — 1160F RVW MEDS BY RX/DR IN RCRD: CPT | Mod: CPTII,95,, | Performed by: INTERNAL MEDICINE

## 2025-05-28 RX ORDER — IPRATROPIUM BROMIDE AND ALBUTEROL SULFATE 2.5; .5 MG/3ML; MG/3ML
3 SOLUTION RESPIRATORY (INHALATION) EVERY 6 HOURS PRN
Qty: 300 ML | Refills: 11 | Status: SHIPPED | OUTPATIENT
Start: 2025-05-28 | End: 2026-05-28

## 2025-05-28 NOTE — PROGRESS NOTES
[facility]  The patient location is: [place]  The chief complaint leading to consultation is: Asthma    Visit type: audiovisual    Face to Face time with patient: 15  15 minutes of total time spent on the encounter, which includes face to face time and non-face to face time preparing to see the patient (eg, review of tests), Obtaining and/or reviewing separately obtained history, Documenting clinical information in the electronic or other health record, Independently interpreting results (not separately reported) and communicating results to the patient/family/caregiver, or Care coordination (not separately reported).     Each patient to whom he or she provides medical services by telemedicine is:  (1) informed of the relationship between the physician and patient and the respective role of any other health care provider with respect to management of the patient; and (2) notified that he or she may decline to receive medical services by telemedicine and may withdraw from such care at any time.    Notes:    Today's Date: 05/28/2025    subjective    History of Present Illness    CHIEF COMPLAINT:  - Calos presents with a recent episode of difficulty breathing, which she attributes to seasonal grass cutting in her area.    HPI:  - Calos reports difficulty breathing approximately 2 weeks ago, triggered by grass cutting in her area coinciding with a change in seasons. She felt congested and out of breath. To manage her symptoms, she used a nebulizer with Albuterol for 2 treatments, which alleviated her breathing difficulties. She improvised with an old nebulizer machine belonging to her mother, securing it with tape to make it functional.  - A similar episode occurred about a month prior to this recent incident, shortly after having a heart attack. During that episode, which happened a week before Mardi Gras, she was evaluated at the emergency room for an asthma attack. She received a breathing  treatment in the ER, which included cortisone and use of her inhaler.  - Calos keeps an inhaler with her regularly but had to search for the nebulizer equipment during the recent episode. She is concerned about managing her breathing issues, especially with the approaching hot weather, stating she cannot walk outside during lunch breaks due to the heat.  - Calos denies that the breathing difficulty was related to a heart attack.    MEDICAL HISTORY:  - Asthma  - Heart attack      ROS:  ROS as indicated in HPI.          Imaging study reviewed in [facility]: []    No results found for this or any previous visit.       objective   There were no vitals filed for this visit.  Physical Exam            assessment & plan     Assessment & Plan    MODERATE ASTHMA WITH EXACERBATION, UNSPECIFIED WHETHER PERSISTENT:   Considered recent episodes of breathing difficulty, likely asthma exacerbations.   Offered options for management: formal workup with pulmonary function testing vs. symptomatic treatment with home nebulizer.   Initiated symptomatic management with home nebulizer based on patient preference and intermittent nature of symptoms.   Ordered home nebulizer machine through DME provider.   Contact the office if nebulizer machine is not delivered within a week.   Follow up as needed if respiratory symptoms worsen.         Summary/Orders  1. Moderate asthma with exacerbation, unspecified whether persistent  - Ambulatory referral/consult to Pulmonology  - NEBULIZER KIT (SUPPLIES) FOR HOME USE  - albuterol-ipratropium (DUO-NEB) 2.5 mg-0.5 mg/3 mL nebulizer solution; Take 3 mLs by nebulization every 6 (six) hours as needed for Wheezing. Rescue  Dispense: 300 mL; Refill: 11         Duane Thompson MD  Pulmonary & Critical Care Medicine  [facility]  O - [phone]  F - [phone]    This note was generated with the assistance of ambient listening technology. Verbal consent was obtained by the  patient and accompanying visitor(s) for the recording of patient appointment to facilitate this note. I attest to having reviewed and edited the generated note for accuracy, though some syntax or spelling errors may persist. Please contact the author of this note for any clarification.

## 2025-06-13 ENCOUNTER — RESULTS FOLLOW-UP (OUTPATIENT)
Dept: TRANSPLANT | Facility: CLINIC | Age: 55
End: 2025-06-13

## 2025-06-16 ENCOUNTER — TELEPHONE (OUTPATIENT)
Dept: TRANSPLANT | Facility: CLINIC | Age: 55
End: 2025-06-16
Payer: MEDICAID

## 2025-06-16 NOTE — TELEPHONE ENCOUNTER
----- Message from Dariwn Cannon MD sent at 6/13/2025  7:20 PM CDT -----      I saw you at the request of Dr. Balderas for possible hypertrophic cardiomyopathy.  As we discussed at your visit, in the absence of a family history of this entity in a patient with hypertension this   diagnosis can be challenging.  As part of your evaluation, I had ordered a genetic consult.  As they explained to you, your genetic testing did not confirm an abnormal gene definitely associated with   this condition.  Since the septum is thicker than the rest of the heart I remain suspicious that you have this condition.      I had requested an appointment for you to see Dr. Aldo Lim who directs our hypertrophic cardiomyopathy program on the day of the MRI.  Upon reviewing your record I see that visit is not going to   occur until August.  He has ordered a stress ECHO to be performed at that visit.  He will review all and complete your consult at that time.      I am passing this along to Dr. Balderas as well.  If you are able to maintain good control of BP with values below 130/80 and if the thickening of the heart is due to BP and not hypertrophic   cardiomyopathy it is possible that the thickness will lessen.  Please keep in touch with Dr. Balderas and Dr. Doll who manage your blood pressure if this goal is not being met.    ----- Message -----  From: Magnolia Donaldson MD  Sent: 5/15/2025   4:52 PM CDT  To: Darwin Cannon Jr., MD

## 2025-06-20 ENCOUNTER — PATIENT MESSAGE (OUTPATIENT)
Dept: NEPHROLOGY | Facility: CLINIC | Age: 55
End: 2025-06-20
Payer: COMMERCIAL

## 2025-07-01 DIAGNOSIS — R07.9 CHEST PAIN, UNSPECIFIED TYPE: ICD-10-CM

## 2025-07-01 RX ORDER — NITROGLYCERIN 0.4 MG/1
0.4 TABLET SUBLINGUAL EVERY 5 MIN PRN
Qty: 25 TABLET | Refills: 1 | Status: SHIPPED | OUTPATIENT
Start: 2025-07-01

## 2025-08-21 ENCOUNTER — TELEPHONE (OUTPATIENT)
Dept: TRANSPLANT | Facility: CLINIC | Age: 55
End: 2025-08-21
Payer: COMMERCIAL

## (undated) DEVICE — GUIDE VISTA 6FR XB RCA

## (undated) DEVICE — GUIDE WIRE MOTION .035 X 150CM

## (undated) DEVICE — SEE L#120831

## (undated) DEVICE — CATH DIAG IMPULSE 6FR FL4

## (undated) DEVICE — Device

## (undated) DEVICE — INFLATOR ENCORE 26 BLLN INFL

## (undated) DEVICE — CATH JACKY RADIAL 3.5 110CM

## (undated) DEVICE — PAD DEFIB CADENCE ADULT R2

## (undated) DEVICE — SOL PVP-I SCRUB 7.5% 4OZ

## (undated) DEVICE — CATH EMERGE MR 15 X 2.00

## (undated) DEVICE — SET IRR URLGY 2LINE UNIV SPIKE

## (undated) DEVICE — MICROCATHETER MAMBA FLEX 135CM

## (undated) DEVICE — KIT CUSTOM MANIFOLD

## (undated) DEVICE — KIT COPILOT VALVE HEMO TOOL

## (undated) DEVICE — WIRE SPORT 014X300

## (undated) DEVICE — FIBER QUANTA OPT STD 1000UM

## (undated) DEVICE — GUIDEWIRE RUNTHROUGH EF 180CM

## (undated) DEVICE — SEAL UROLOGY

## (undated) DEVICE — BRUSH SCRUB SURGICALW/BETADINE

## (undated) DEVICE — CATH CONE TIP

## (undated) DEVICE — VIDEO RENTAL SERVICE CYSTO

## (undated) DEVICE — KIT GLIDESHEATH SLEND 6FR 10CM

## (undated) DEVICE — CATH DIAG IMPULSE 6FR FR4

## (undated) DEVICE — HEMOSTAT VASC BAND REG 24CM

## (undated) DEVICE — SPIKE SHORT LG BORE 1-WAY 2IN

## (undated) DEVICE — GLOVE BIOGEL SKINSENSE PI 7.5

## (undated) DEVICE — TRAY CATH LAB OMC

## (undated) DEVICE — CATH VISTA 6F .070 XB 3.5 ECO

## (undated) DEVICE — GUIDEWIRE SUPRA CORE 035 190CM

## (undated) DEVICE — DRAPE ANGIO BRACH 38X44IN

## (undated) DEVICE — EXTRACTOR TIPLESS 2.4FRX1115CM

## (undated) DEVICE — CATH EMERGE MR 40X3.0MM

## (undated) DEVICE — OMNIPAQUE 350MG 150ML VIAL

## (undated) DEVICE — FIBER QUANTA OPT SDT 272UM

## (undated) DEVICE — TRANSDUCER ADULT DISP

## (undated) DEVICE — GUIDEWIRE OMNI J TIP 185CM